# Patient Record
Sex: MALE | Race: WHITE | NOT HISPANIC OR LATINO | Employment: OTHER | URBAN - METROPOLITAN AREA
[De-identification: names, ages, dates, MRNs, and addresses within clinical notes are randomized per-mention and may not be internally consistent; named-entity substitution may affect disease eponyms.]

---

## 2019-01-10 ENCOUNTER — APPOINTMENT (EMERGENCY)
Dept: RADIOLOGY | Facility: HOSPITAL | Age: 74
DRG: 377 | End: 2019-01-10
Payer: MEDICARE

## 2019-01-10 ENCOUNTER — HOSPITAL ENCOUNTER (INPATIENT)
Facility: HOSPITAL | Age: 74
LOS: 5 days | Discharge: HOME HEALTH | DRG: 377 | End: 2019-01-15
Attending: EMERGENCY MEDICINE | Admitting: SURGERY
Payer: MEDICARE

## 2019-01-10 ENCOUNTER — APPOINTMENT (INPATIENT)
Dept: NON INVASIVE DIAGNOSTICS | Facility: HOSPITAL | Age: 74
DRG: 377 | End: 2019-01-10
Payer: MEDICARE

## 2019-01-10 DIAGNOSIS — K92.0 GASTROINTESTINAL HEMORRHAGE WITH HEMATEMESIS: ICD-10-CM

## 2019-01-10 DIAGNOSIS — K31.1 GASTRIC OUTLET OBSTRUCTION: ICD-10-CM

## 2019-01-10 DIAGNOSIS — K29.60 EMPHYSEMATOUS GASTRITIS: Primary | ICD-10-CM

## 2019-01-10 LAB
ABO GROUP BLD: NORMAL
ALBUMIN SERPL BCP-MCNC: 3.2 G/DL (ref 3.5–5)
ALP SERPL-CCNC: 70 U/L (ref 46–116)
ALT SERPL W P-5'-P-CCNC: 20 U/L (ref 12–78)
ANION GAP SERPL CALCULATED.3IONS-SCNC: 10 MMOL/L (ref 4–13)
AST SERPL W P-5'-P-CCNC: 15 U/L (ref 5–45)
ATRIAL RATE: 108 BPM
BASOPHILS # BLD AUTO: 0.22 THOUSANDS/ΜL (ref 0–0.1)
BASOPHILS NFR BLD AUTO: 2 % (ref 0–1)
BILIRUB SERPL-MCNC: 0.22 MG/DL (ref 0.2–1)
BLD GP AB SCN SERPL QL: NEGATIVE
BUN SERPL-MCNC: 26 MG/DL (ref 5–25)
CALCIUM SERPL-MCNC: 8.5 MG/DL (ref 8.3–10.1)
CHLORIDE SERPL-SCNC: 102 MMOL/L (ref 100–108)
CO2 SERPL-SCNC: 25 MMOL/L (ref 21–32)
CREAT SERPL-MCNC: 1.09 MG/DL (ref 0.6–1.3)
EOSINOPHIL # BLD AUTO: 0.49 THOUSAND/ΜL (ref 0–0.61)
EOSINOPHIL NFR BLD AUTO: 4 % (ref 0–6)
ERYTHROCYTE [DISTWIDTH] IN BLOOD BY AUTOMATED COUNT: 13.7 % (ref 11.6–15.1)
GFR SERPL CREATININE-BSD FRML MDRD: 67 ML/MIN/1.73SQ M
GLUCOSE SERPL-MCNC: 237 MG/DL (ref 65–140)
HCT VFR BLD AUTO: 29 % (ref 36.5–49.3)
HCT VFR BLD AUTO: 31.6 % (ref 36.5–49.3)
HCT VFR BLD AUTO: 32.2 % (ref 36.5–49.3)
HCT VFR BLD AUTO: 38.1 % (ref 36.5–49.3)
HGB BLD-MCNC: 11.4 G/DL (ref 12–17)
HGB BLD-MCNC: 8.8 G/DL (ref 12–17)
HGB BLD-MCNC: 9.7 G/DL (ref 12–17)
HGB BLD-MCNC: 9.8 G/DL (ref 12–17)
IMM GRANULOCYTES # BLD AUTO: 0.07 THOUSAND/UL (ref 0–0.2)
IMM GRANULOCYTES NFR BLD AUTO: 1 % (ref 0–2)
LACTATE SERPL-SCNC: 1.5 MMOL/L (ref 0.5–2)
LACTATE SERPL-SCNC: 3 MMOL/L (ref 0.5–2)
LYMPHOCYTES # BLD AUTO: 3.27 THOUSANDS/ΜL (ref 0.6–4.47)
LYMPHOCYTES NFR BLD AUTO: 27 % (ref 14–44)
MCH RBC QN AUTO: 30.1 PG (ref 26.8–34.3)
MCHC RBC AUTO-ENTMCNC: 29.9 G/DL (ref 31.4–37.4)
MCV RBC AUTO: 101 FL (ref 82–98)
MONOCYTES # BLD AUTO: 1.58 THOUSAND/ΜL (ref 0.17–1.22)
MONOCYTES NFR BLD AUTO: 13 % (ref 4–12)
NEUTROPHILS # BLD AUTO: 6.58 THOUSANDS/ΜL (ref 1.85–7.62)
NEUTS SEG NFR BLD AUTO: 53 % (ref 43–75)
NRBC BLD AUTO-RTO: 0 /100 WBCS
P AXIS: 53 DEGREES
PLATELET # BLD AUTO: 386 THOUSANDS/UL (ref 149–390)
PMV BLD AUTO: 10.7 FL (ref 8.9–12.7)
POTASSIUM SERPL-SCNC: 4.3 MMOL/L (ref 3.5–5.3)
PR INTERVAL: 134 MS
PROT SERPL-MCNC: 6.9 G/DL (ref 6.4–8.2)
QRS AXIS: -36 DEGREES
QRSD INTERVAL: 102 MS
QT INTERVAL: 346 MS
QTC INTERVAL: 463 MS
RBC # BLD AUTO: 3.79 MILLION/UL (ref 3.88–5.62)
RH BLD: POSITIVE
SODIUM SERPL-SCNC: 137 MMOL/L (ref 136–145)
SPECIMEN EXPIRATION DATE: NORMAL
T WAVE AXIS: 77 DEGREES
TROPONIN I SERPL-MCNC: <0.02 NG/ML
VENTRICULAR RATE: 108 BPM
WBC # BLD AUTO: 12.21 THOUSAND/UL (ref 4.31–10.16)

## 2019-01-10 PROCEDURE — 86901 BLOOD TYPING SEROLOGIC RH(D): CPT | Performed by: EMERGENCY MEDICINE

## 2019-01-10 PROCEDURE — 96361 HYDRATE IV INFUSION ADD-ON: CPT

## 2019-01-10 PROCEDURE — 80053 COMPREHEN METABOLIC PANEL: CPT | Performed by: EMERGENCY MEDICINE

## 2019-01-10 PROCEDURE — 86923 COMPATIBILITY TEST ELECTRIC: CPT

## 2019-01-10 PROCEDURE — 71046 X-RAY EXAM CHEST 2 VIEWS: CPT

## 2019-01-10 PROCEDURE — 86850 RBC ANTIBODY SCREEN: CPT | Performed by: EMERGENCY MEDICINE

## 2019-01-10 PROCEDURE — 99223 1ST HOSP IP/OBS HIGH 75: CPT | Performed by: SURGERY

## 2019-01-10 PROCEDURE — 83605 ASSAY OF LACTIC ACID: CPT | Performed by: SURGERY

## 2019-01-10 PROCEDURE — 74175 CTA ABDOMEN W/CONTRAST: CPT

## 2019-01-10 PROCEDURE — 93010 ELECTROCARDIOGRAM REPORT: CPT | Performed by: INTERNAL MEDICINE

## 2019-01-10 PROCEDURE — 1124F ACP DISCUSS-NO DSCNMKR DOCD: CPT | Performed by: SURGERY

## 2019-01-10 PROCEDURE — 85014 HEMATOCRIT: CPT | Performed by: SURGERY

## 2019-01-10 PROCEDURE — 96365 THER/PROPH/DIAG IV INF INIT: CPT

## 2019-01-10 PROCEDURE — 83605 ASSAY OF LACTIC ACID: CPT | Performed by: EMERGENCY MEDICINE

## 2019-01-10 PROCEDURE — 71275 CT ANGIOGRAPHY CHEST: CPT

## 2019-01-10 PROCEDURE — 93005 ELECTROCARDIOGRAM TRACING: CPT

## 2019-01-10 PROCEDURE — 99285 EMERGENCY DEPT VISIT HI MDM: CPT

## 2019-01-10 PROCEDURE — 84484 ASSAY OF TROPONIN QUANT: CPT | Performed by: EMERGENCY MEDICINE

## 2019-01-10 PROCEDURE — 93880 EXTRACRANIAL BILAT STUDY: CPT

## 2019-01-10 PROCEDURE — 86900 BLOOD TYPING SEROLOGIC ABO: CPT | Performed by: EMERGENCY MEDICINE

## 2019-01-10 PROCEDURE — 96375 TX/PRO/DX INJ NEW DRUG ADDON: CPT

## 2019-01-10 PROCEDURE — 85018 HEMOGLOBIN: CPT | Performed by: SURGERY

## 2019-01-10 PROCEDURE — 85025 COMPLETE CBC W/AUTO DIFF WBC: CPT | Performed by: EMERGENCY MEDICINE

## 2019-01-10 PROCEDURE — C9113 INJ PANTOPRAZOLE SODIUM, VIA: HCPCS | Performed by: STUDENT IN AN ORGANIZED HEALTH CARE EDUCATION/TRAINING PROGRAM

## 2019-01-10 PROCEDURE — 36415 COLL VENOUS BLD VENIPUNCTURE: CPT | Performed by: EMERGENCY MEDICINE

## 2019-01-10 RX ORDER — SODIUM CHLORIDE, SODIUM LACTATE, POTASSIUM CHLORIDE, CALCIUM CHLORIDE 600; 310; 30; 20 MG/100ML; MG/100ML; MG/100ML; MG/100ML
125 INJECTION, SOLUTION INTRAVENOUS CONTINUOUS
Status: DISCONTINUED | OUTPATIENT
Start: 2019-01-10 | End: 2019-01-14

## 2019-01-10 RX ORDER — PANTOPRAZOLE SODIUM 40 MG/1
40 INJECTION, POWDER, FOR SOLUTION INTRAVENOUS
Status: DISCONTINUED | OUTPATIENT
Start: 2019-01-11 | End: 2019-01-10

## 2019-01-10 RX ORDER — PANTOPRAZOLE SODIUM 40 MG/1
40 INJECTION, POWDER, FOR SOLUTION INTRAVENOUS EVERY 12 HOURS SCHEDULED
Status: DISCONTINUED | OUTPATIENT
Start: 2019-01-10 | End: 2019-01-13

## 2019-01-10 RX ORDER — ACETAMINOPHEN 325 MG/1
650 TABLET ORAL EVERY 6 HOURS PRN
Status: DISCONTINUED | OUTPATIENT
Start: 2019-01-10 | End: 2019-01-15 | Stop reason: HOSPADM

## 2019-01-10 RX ORDER — ONDANSETRON 2 MG/ML
4 INJECTION INTRAMUSCULAR; INTRAVENOUS ONCE
Status: COMPLETED | OUTPATIENT
Start: 2019-01-10 | End: 2019-01-10

## 2019-01-10 RX ORDER — LIDOCAINE HYDROCHLORIDE 20 MG/ML
JELLY TOPICAL ONCE
Status: COMPLETED | OUTPATIENT
Start: 2019-01-10 | End: 2019-01-10

## 2019-01-10 RX ORDER — ONDANSETRON 2 MG/ML
4 INJECTION INTRAMUSCULAR; INTRAVENOUS EVERY 6 HOURS PRN
Status: DISCONTINUED | OUTPATIENT
Start: 2019-01-10 | End: 2019-01-15 | Stop reason: HOSPADM

## 2019-01-10 RX ADMIN — SODIUM CHLORIDE, SODIUM LACTATE, POTASSIUM CHLORIDE, AND CALCIUM CHLORIDE 125 ML/HR: .6; .31; .03; .02 INJECTION, SOLUTION INTRAVENOUS at 17:28

## 2019-01-10 RX ADMIN — LIDOCAINE HYDROCHLORIDE 1 APPLICATION: 20 JELLY TOPICAL at 11:34

## 2019-01-10 RX ADMIN — SODIUM CHLORIDE 1000 ML: 0.9 INJECTION, SOLUTION INTRAVENOUS at 07:42

## 2019-01-10 RX ADMIN — SODIUM CHLORIDE 1000 ML: 0.9 INJECTION, SOLUTION INTRAVENOUS at 10:29

## 2019-01-10 RX ADMIN — TOPICAL ANESTHETIC 2 SPRAY: 200 SPRAY DENTAL; PERIODONTAL at 11:34

## 2019-01-10 RX ADMIN — SODIUM CHLORIDE, SODIUM LACTATE, POTASSIUM CHLORIDE, AND CALCIUM CHLORIDE 125 ML/HR: .6; .31; .03; .02 INJECTION, SOLUTION INTRAVENOUS at 20:25

## 2019-01-10 RX ADMIN — IOHEXOL 100 ML: 350 INJECTION, SOLUTION INTRAVENOUS at 09:58

## 2019-01-10 RX ADMIN — ONDANSETRON 4 MG: 2 INJECTION INTRAMUSCULAR; INTRAVENOUS at 07:41

## 2019-01-10 RX ADMIN — PANTOPRAZOLE SODIUM 40 MG: 40 INJECTION, POWDER, FOR SOLUTION INTRAVENOUS at 22:41

## 2019-01-10 RX ADMIN — PIPERACILLIN SODIUM,TAZOBACTAM SODIUM 3.38 G: 3; .375 INJECTION, POWDER, FOR SOLUTION INTRAVENOUS at 11:08

## 2019-01-10 RX ADMIN — SODIUM CHLORIDE 1000 ML: 0.9 INJECTION, SOLUTION INTRAVENOUS at 19:15

## 2019-01-10 NOTE — ED NOTES
1L NSS bolus started at this time   Dr Brandan Clay at the bedside for patient evaluation at this time     Venecia Bustos RN  01/10/19 8770

## 2019-01-10 NOTE — ED PROVIDER NOTES
History  Chief Complaint   Patient presents with    Syncope     pt with syncope x2 and vomited red colored emesis for EMS x2 pt with some dizzyness and weakness feeling at the ED upon arrival      HPI   Patient is a 42-year-old gentleman with a history of gastritis who presents to the emergency department for dizziness and syncopal episodes this morning  Patient states that other than mild nasal congestion over the past couple days he went to bed last night feeling well  When he woke up this morning at stepped out of bed and was extremely dizzy  He fell on the floor from the dizziness but did not lose consciousness  Patient's fiancee heard a thud from downstairs, and EMS was called  Per EMS patient had 2 additional syncopal episodes en route to the hospital, although patient says he does not remember this happening  He also had 1 episode of reddish black emesis before arriving to the hospital   At time of evaluation in the emergency department he is essentially asymptomatic  Patient denies any history of esophageal varices or peptic ulcers  No black/tarry bowel movements  No history of liver disease  Denies recent alcohol use  No fevers, chest pain, shortness of breath, abdominal pain, diarrhea, epistaxis  Prior to Admission Medications   Prescriptions Last Dose Informant Patient Reported? Taking? Esomeprazole Magnesium (NEXIUM PO) 1/9/2019 at Unknown time  Yes Yes   Sig: Take 20 mg by mouth every 12 (twelve) hours        Facility-Administered Medications: None       Past Medical History:   Diagnosis Date    Bladder cancer [facility])        History reviewed  No pertinent surgical history  History reviewed  No pertinent family history  I have reviewed and agree with the history as documented      Social History   Substance Use Topics    Smoking status: Current Every Day Smoker     Packs/day: 0 25     Types: Cigarettes    Smokeless tobacco: Former User    Alcohol use Yes      Comment: Socially Review of Systems   Constitutional: Negative for chills, diaphoresis, fatigue and fever  HENT: Positive for congestion  Negative for hearing loss, nosebleeds, sinus pain and sore throat  Eyes: Negative for photophobia, pain, redness and visual disturbance  Respiratory: Negative for cough, chest tightness, shortness of breath, wheezing and stridor  Cardiovascular: Negative for chest pain, palpitations and leg swelling  Gastrointestinal: Positive for abdominal distention and vomiting  Negative for abdominal pain, blood in stool, constipation, diarrhea and nausea  Genitourinary: Negative for dysuria, flank pain and hematuria  Musculoskeletal: Negative for back pain, neck pain and neck stiffness  Skin: Negative for pallor and rash  Allergic/Immunologic: Negative for immunocompromised state  Neurological: Positive for dizziness and syncope  Negative for weakness, numbness and headaches  Hematological: Negative for adenopathy  Psychiatric/Behavioral: Negative for agitation and confusion  All other systems reviewed and are negative  Physical Exam  ED Triage Vitals   Temperature Pulse Respirations Blood Pressure SpO2   01/10/19 0744 01/10/19 0711 01/10/19 0710 01/10/19 0710 01/10/19 0710   (!) 97 2 °F (36 2 °C) 105 16 (!) 55/38 95 %      Temp Source Heart Rate Source Patient Position - Orthostatic VS BP Location FiO2 (%)   01/10/19 0744 01/10/19 0711 01/10/19 0710 01/10/19 0710 --   Oral Monitor Sitting Right arm       Pain Score       01/10/19 0710       No Pain           Orthostatic Vital Signs  Vitals:    01/10/19 1545 01/10/19 1628 01/10/19 1630 01/10/19 1731   BP: 90/60 104/61 93/66 103/70   Pulse: (!) 116 (!) 114 (!) 114 (!) 111   Patient Position - Orthostatic VS: Sitting Lying         Physical Exam   Constitutional: He is oriented to person, place, and time  He appears well-developed and well-nourished  No distress  HENT:   Head: Normocephalic and atraumatic     Right Ear: External ear normal    Left Ear: External ear normal    Nose: Nose normal    Mouth/Throat: Oropharynx is clear and moist    No visible blood in nasal or oropharynx  Small amount of dried blood on beard  No signs of head trauma externally  Eyes: Pupils are equal, round, and reactive to light  Conjunctivae and EOM are normal  No scleral icterus  Neck: Normal range of motion  Neck supple  No JVD present  No tracheal deviation present  Cardiovascular: Normal rate and regular rhythm  Exam reveals no gallop and no friction rub  No murmur heard  Pulmonary/Chest: Effort normal and breath sounds normal  No stridor  No respiratory distress  He has no wheezes  He has no rales  He exhibits no tenderness  Abdominal: Soft  He exhibits distension  There is no tenderness  There is no rebound and no guarding  Musculoskeletal: Normal range of motion  He exhibits no edema or tenderness  Lymphadenopathy:     He has no cervical adenopathy  Neurological: He is alert and oriented to person, place, and time  No cranial nerve deficit or sensory deficit  He exhibits normal muscle tone  Skin: Skin is warm and dry  He is not diaphoretic  No erythema  No pallor  Psychiatric: He has a normal mood and affect  His behavior is normal    Nursing note and vitals reviewed        ED Medications  Medications   lactated ringers infusion (125 mL/hr Intravenous New Bag 1/10/19 2025)   ondansetron (ZOFRAN) injection 4 mg (not administered)   acetaminophen (TYLENOL) tablet 650 mg (not administered)   pantoprazole (PROTONIX) injection 40 mg (not administered)   ondansetron (ZOFRAN) injection 4 mg (4 mg Intravenous Given 1/10/19 0741)   sodium chloride 0 9 % bolus 1,000 mL (0 mL Intravenous Stopped 1/10/19 0743)   sodium chloride 0 9 % bolus 1,000 mL (0 mL Intravenous Stopped 1/10/19 0742)   iohexol (OMNIPAQUE) 350 MG/ML injection (MULTI-DOSE) 100 mL (100 mL Intravenous Given 1/10/19 0958)   sodium chloride 0 9 % bolus 1,000 mL (0 mL Intravenous Stopped 1/10/19 1108)   piperacillin-tazobactam (ZOSYN) 3 375 g in sodium chloride 0 9 % 50 mL IVPB (0 g Intravenous Stopped 1/10/19 1138)   lidocaine (URO-JET) 2 % topical gel (1 application Topical Given 1/10/19 1134)   benzocaine (HURRICAINE) 20 % mucosal spray 2 spray (2 sprays Mucosal Given 1/10/19 1134)   sodium chloride 0 9 % bolus 1,000 mL (0 mL Intravenous Stopped 1/10/19 2015)       Diagnostic Studies  Results Reviewed     Procedure Component Value Units Date/Time    Hemoglobin and hematocrit, blood [[id number]]  (Abnormal) Collected:  01/10/19 1641    Lab Status:  Final result Specimen:  Blood from Arm, Left Updated:  01/10/19 1731     Hemoglobin 9 7 (L) g/dL      Hematocrit 32 2 (L) %     Hemoglobin and hematocrit, blood [[id number]]  (Abnormal) Collected:  01/10/19 1135    Lab Status:  Final result Specimen:  Blood from Arm, Left Updated:  01/10/19 1152     Hemoglobin 9 8 (L) g/dL      Hematocrit 31 6 (L) %     Lactic acid, plasma [[id number]]  (Abnormal) Collected:  01/10/19 1037    Lab Status:  Final result Specimen:  Blood from Arm, Left Updated:  01/10/19 1120     LACTIC ACID 3 0 (HH) mmol/L     Narrative:         Result may be elevated if tourniquet was used during collection      Comprehensive metabolic panel [[id number]]  (Abnormal) Collected:  01/10/19 0705    Lab Status:  Final result Specimen:  Blood from Arm, Left Updated:  01/10/19 0750     Sodium 137 mmol/L      Potassium 4 3 mmol/L      Chloride 102 mmol/L      CO2 25 mmol/L      ANION GAP 10 mmol/L      BUN 26 (H) mg/dL      Creatinine 1 09 mg/dL      Glucose 237 (H) mg/dL      Calcium 8 5 mg/dL      AST 15 U/L      ALT 20 U/L      Alkaline Phosphatase 70 U/L      Total Protein 6 9 g/dL      Albumin 3 2 (L) g/dL      Total Bilirubin 0 22 mg/dL      eGFR 67 ml/min/1 73sq m     Narrative:         National Kidney Disease Education Program recommendations are as follows:  GFR calculation is accurate only with a steady state creatinine  Chronic Kidney disease less than 60 ml/min/1 73 sq  meters  Kidney failure less than 15 ml/min/1 73 sq  meters  Troponin I [[id number]]  (Normal) Collected:  01/10/19 0705    Lab Status:  Final result Specimen:  Blood from Arm, Left Updated:  01/10/19 0750     Troponin I <0 02 ng/mL     CBC and differential [[id number]]  (Abnormal) Collected:  01/10/19 0705    Lab Status:  Final result Specimen:  Blood from Arm, Left Updated:  01/10/19 0736     WBC 12 21 (H) Thousand/uL      RBC 3 79 (L) Million/uL      Hemoglobin 11 4 (L) g/dL      Hematocrit 38 1 %       (H) fL      MCH 30 1 pg      MCHC 29 9 (L) g/dL      RDW 13 7 %      MPV 10 7 fL      Platelets 499 Thousands/uL      nRBC 0 /100 WBCs      Neutrophils Relative 53 %      Immat GRANS % 1 %      Lymphocytes Relative 27 %      Monocytes Relative 13 (H) %      Eosinophils Relative 4 %      Basophils Relative 2 (H) %      Neutrophils Absolute 6 58 Thousands/µL      Immature Grans Absolute 0 07 Thousand/uL      Lymphocytes Absolute 3 27 Thousands/µL      Monocytes Absolute 1 58 (H) Thousand/µL      Eosinophils Absolute 0 49 Thousand/µL      Basophils Absolute 0 22 (H) Thousands/µL                  CTA chest and abdomen w wo contrast   Final Result by Yoseph Rojas MD (01/10 1040)      1  Infrarenal abdominal aortic aneurysm measuring 3 7 cm  No evidence of dissection  2   Markedly gastric distention with gastric intramural air and portal venous gas  Differential diagnosis include emphysematous gastritis or gastric emphysema, the former favored due to presence of portal venous gas  Further evaluation with upper GI    endoscopy is recommended  3   Age indeterminate compression fractures of T12 and L1               I personally discussed this study with Dr Azra Urias on 1/10/2019 at 10:28 AM                            Workstation performed: [id number]         X-ray chest 2 views   ED Interpretation by Ava Carmen MD (11/37 0373)   ED wet read:  Questionable pneumoperitoneum verses large gastric bubble  Final Result by Stephenie Jones DO (01/10 9964)      Apical interstitial and airspace opacities  Stomach is markedly distended with a large amount of fluid layering, best seen on the lateral view  CT of the chest, abdomen and pelvis has already been obtained  Workstation performed: [id number]         VAS carotid complete study    (Results Pending)         Procedures  Procedures      Phone Consults  ED Phone Contact    ED Course  ED Course as of Jeff 10 2110   Thu Jeff 10, 2019   0750 WBC: (!) 12 21   0750 Hemoglobin: (!) 11 4   0804 Troponin I: <0 02           Identification of Seniors at Risk      Most Recent Value   (ISAR) Identification of Seniors at Risk   Before the illness or injury that brought you to the Emergency, did you need someone to help you on a regular basis? 0 Filed at: 01/10/2019 3194   In the last 24 hours, have you needed more help than usual?  0 Filed at: 01/10/2019 1686   Have you been hospitalized for one or more nights during the past 6 months? 0 Filed at: 01/10/2019 7717   In general, do you see well?  0 Filed at: 01/10/2019 3638   In general, do you have serious problems with your memory? 0 Filed at: 01/10/2019 0708   Do you take more than three different medications every day? 1 Filed at: 01/10/2019 7757   ISAR Score  1 Filed at: 01/10/2019 1709          MDM   26-year-old gentleman presents to the emergency department after episode of hematemesis and multiple syncopal episodes this morning  Patient arrived in the emergency department hypotensive to the 06D systolic  Patient is asymptomatic and says that his blood pressure "always runs low "  He is afebrile with otherwise normal vital signs  IV fluid resuscitation begun with blood pressure improving to the 120s over 50s    He did have another episode of mucus tinged hematemesis shortly after arrival   Suspect that dizziness and syncopal episode this morning are likely related to upper GI bleed, although unclear what the source is at this point  Cardiac/abdominal pain labs and chest x-ray ordered  Will continue with antiemetics and fluid resuscitation  Frequently reassess  Mild anemia and leukocytosis  Chest x-ray concerning for pneumoperitoneum verses very distended gastric bubble  CTA chest/abdomen ordered showing likely emphysematous gastritis with portal venous air  Patient continues to have no abdominal pain or tenderness to palpation  Red surgery was consulted, who came and evaluated the patient in the emergency department  NG tube placed with large amount of bloody gastric contents drained from stomach  Patient continues to be hemodynamically normal   He will require admission to the hospital for continued monitoring, serial H&H, IV fluid resuscitation, and likely endoscopic evaluation by GI for hemorrhagic gastritis  Patient accepted by red surgery physician Dr Ange Schuzlnaz Time    Disposition  Final diagnoses:   Emphysematous gastritis     Time reflects when diagnosis was documented in both MDM as applicable and the Disposition within this note     Time User Action Codes Description Comment    1/10/2019 11:19 AM Gwendolyn Nugent [K29 60] Emphysematous gastritis       ED Disposition     ED Disposition Condition Comment    Admit  Admitting Physician: Lorena Rojas [[id number]]   Level of Care: Level 2 Stepdown / HOT [14]        Follow-up Information    None         Current Discharge Medication List      CONTINUE these medications which have NOT CHANGED    Details   Esomeprazole Magnesium (NEXIUM PO) Take 20 mg by mouth every 12 (twelve) hours             No discharge procedures on file  ED Provider  Attending physically available and evaluated Amira Grubbs  I managed the patient along with the ED Attending      Electronically Signed by         Jessica Gongora MD  01/10/19 4556

## 2019-01-10 NOTE — UTILIZATION REVIEW
Initial Clinical Review    Admission: Date/Time/Statement:INPT  1/10/19 @ 1145   Orders Placed This Encounter   Procedures    Inpatient Admission (expected length of stay for this patient is greater than two midnights)     Standing Status:   Standing     Number of Occurrences:   1     Order Specific Question:   Admitting Physician     Answer:   Thao Gill     Order Specific Question:   Level of Care     Answer:   Med Surg [16]     Order Specific Question:   Estimated length of stay     Answer:   More than 2 Midnights     Order Specific Question:   Certification     Answer:   I certify that inpatient services are medically necessary for this patient for a duration of greater than two midnights  See H&P and MD Progress Notes for additional information about the patient's course of treatment  ED: Date/Time/Mode of Arrival:   ED Arrival Information     Expected Arrival Acuity Means of Arrival Escorted By Service Admission Type    - 1/10/2019 06:52 [facility]) Surgery-General Emergency    Arrival Complaint    syncope        Chief Complaint:   Chief Complaint   Patient presents with    Syncope     pt with syncope x2 and vomited red colored emesis for EMS x2 pt with some dizzyness and weakness feeling at the ED upon arrival      History of Illness: 68 y o  male with past medical history bladder cancer status post resection and abdominal aortic aneurysm (3 6 cm) who presents following a syncopal episode earlier this morning around 4:30 a m  Patient's fiancee is bedside to assist in providing history  She states that she heard a loud thud, suspecting that he fell she found him lying on the bed minimally responsive  At this point she called 911  On arrival patient was refusing medical care and had an episode of hematemesis  At this point he was taken to [facility] ED for further evaluation    In the emergency department, patient had another episode of bloody emesis accompanied by episodes of hypotension that responded to IV fluids  CT imaging performed in the emergency department was especially concerning given the patient's marked gastric distention, gastric pneumatosis, and portal venous gas  However, patient denies any abdominal pain  Currently, he is complaining of left-sided rib pain that is reproducible on exam   States that he has been taking Excedrin on a regular basis for history of migraines  Patient denies reflux symptoms however fiancee reports that he has and has been treating with Pepto-Bismol       Last bowel movement was yesterday evening  Patient denies bloody bowel movements, has noted infrequent dark stools  Has a history of upper GI endoscopy approximately 5 years ago that was unremarkable per the patient's fiancee at that time  Denies fever chills chest pain or shortness of breath   Current smoker      ED Vital Signs:   ED Triage Vitals   Temperature Pulse Respirations Blood Pressure SpO2   01/10/19 0744 01/10/19 0711 01/10/19 0710 01/10/19 0710 01/10/19 0710   (!) 97 2 °F (36 2 °C) 105 16 (!) 55/38 95 %      Temp Source Heart Rate Source Patient Position - Orthostatic VS BP Location FiO2 (%)   01/10/19 0744 01/10/19 0711 01/10/19 0710 01/10/19 0710 --   Oral Monitor Sitting Right arm       Pain Score       01/10/19 0710       No Pain        Wt Readings from Last 1 Encounters:   01/10/19 63 5 kg (140 lb)     Vital Signs (abnormal):   01/10/19 1015  --   136  18  91/69  97 %  Nasal cannula  --   01/10/19 1000  --   116  18  106/65  95 %  --  --   01/10/19 0930  --   114  20  107/62  97 %  Nasal cannula  --   01/10/19 0845  --   112  18  113/79  96 %  Nasal cannula  --   01/10/19 0825  --   110  18  114/74  95 %  Nasal cannula  --   01/10/19 0745  --  102  --  128/56  95 %  --  --   01/10/19 0744   97 2 °F (36 2 °C)  --  --  --  --  --  --   01/10/19 0730  --   108  --  145/60  93 %  --  --   01/10/19 0729  --  --  --  --  --  None (Room air)  --   01/10/19 0715  -- 108  18   70/55  92 %  --  --   01/10/19 0711  --  105  16   70/55  95 %  None (Room air)  --   01/10/19 0710  --  --  16   55/38  95 %  None (Room air)         Pertinent Labs/Diagnostic Test Results:   Wbc 12 21  hgb 9 8  hct 31 6  Bun 26  alb 3 2  LA 3 0    CTA chest/abd:  1  Infrarenal abdominal aortic aneurysm measuring 3 7 cm  No evidence of dissection  2   Markedly gastric distention with gastric intramural air and portal venous gas  Differential diagnosis include emphysematous gastritis or gastric emphysema, the former favored due to presence of portal venous gas  Further evaluation with upper GI    endoscopy is recommended  3   Age indeterminate compression fractures of T12 and L1  CXR:  Apical interstitial and airspace opacities        Stomach is markedly distended with a large amount of fluid layering, best seen on the lateral view  CT of the chest, abdomen and pelvis has already been obtained           ED Treatment:   Medication Administration from 01/10/2019 0652 to 01/10/2019 1506       Date/Time Order Dose Route Action Action by Comments     01/10/2019 0741 ondansetron (ZOFRAN) injection 4 mg 4 mg Intravenous Given Jessica Pollock RN      01/10/2019 0743 sodium chloride 0 9 % bolus 1,000 mL 0 mL Intravenous Stopped John Bergeron RN      01/10/2019 8416 sodium chloride 0 9 % bolus 1,000 mL 1,000 mL Intravenous [address] [facility]      01/10/2019 3040 sodium chloride 0 9 % bolus 1,000 mL 0 mL Intravenous Stopped Jessica Pollock RN      01/10/2019 9647 sodium chloride 0 9 % bolus 1,000 mL 1,000 mL Intravenous [address] [facility]      01/10/2019 0958 iohexol (OMNIPAQUE) 350 MG/ML injection (MULTI-DOSE) 100 mL 100 mL Intravenous Given Aquiles Mercado      01/10/2019 1108 sodium chloride 0 9 % bolus 1,000 mL 0 mL Intravenous [address], RN      01/10/2019 1029 sodium chloride 0 9 % bolus 1,000 mL 1,000 mL Intravenous New Bag Akosua Easley RN      01/10/2019 1138 piperacillin-tazobactam (ZOSYN) 3 375 g in sodium chloride 0 9 % 50 mL IVPB 0 g Intravenous Stopped Akosua Easley RN      01/10/2019 1108 piperacillin-tazobactam (ZOSYN) 3 375 g in sodium chloride 0 9 % 50 mL IVPB 3 375 g Intravenous New [id number] Willis Townsend RN      01/10/2019 1134 lidocaine (URO-JET) 2 % topical gel 1 application Topical Given Akosua Easley RN      01/10/2019 1134 benzocaine (HURRICAINE) 20 % mucosal spray 2 spray 2 spray Mucosal Given Akosua Easley RN         Past Medical/Surgical History: Active Ambulatory Problems     Diagnosis Date Noted    No Active Ambulatory Problems     Resolved Ambulatory Problems     Diagnosis Date Noted    No Resolved Ambulatory Problems     Past Medical History:   Diagnosis Date    Bladder cancer [facility])      Admitting Diagnosis: Syncope [R55]  Age/Sex: 68 y o  male  Assessment/Plan: 67 yo male with hx bladder cancer s/p resection and AAA 3 7cm with syncopal episode, hematemesis  CT shows gastric emphysema and portal veinous gas  Possible gastritis, upper GI bleed, peptic ulcer disease  Admission Orders:  INPT  TELE  NGT    Scheduled Meds:   Continuous Infusions:   No current facility-administered medications for this encounter     PRN Meds:

## 2019-01-10 NOTE — ED NOTES
Pt sitting in chair, watching TV  Pt has NG tube in and is hooked to suction       Angelina Rudd, RN  01/10/19 8699

## 2019-01-10 NOTE — H&P
H&P Exam - General Surgery   Maggi Andrade 68 y o  male MRN: [id number]  Unit/Bed#: ED 26 Encounter: [id number]    Assessment/Plan     Assessment:  Maggi Andrade is a 68 y o  male with past medical history bladder cancer status post resection and abdominal aortic aneurysm (3 7 cm) who presents following a syncopal episode, hematemesis, and CT imaging w/ gastric emphysema and portal veinous gas  Concern for gastritis, upper GI bleed, peptic ulcer disease  Plan:  NPO/Nasogastric tube  Salena@SCL  STAT repeat hemoglobin   GI consult for ? upper GI endoscopy  Protonix  Pain control  Serial abdominal exams  SCDs/Hold pharmacologic DVT ppx    History of Present Illness     HPI:  Maggi Andrade is a 68 y o  male with past medical history bladder cancer status post resection and abdominal aortic aneurysm (3 6 cm) who presents following a syncopal episode earlier this morning around 4:30 a m  Patient's pabloe is bedside to assist in providing history  She states that she heard a loud thud, suspecting that he fell she found him lying on the bed minimally responsive  At this point she called 911  On arrival patient was refusing medical care and had an episode of hematemesis  At this point he was taken to [facility] ED for further evaluation  In the emergency department, patient had another episode of bloody emesis accompanied by episodes of hypotension that responded to IV fluids  CT imaging performed in the emergency department was especially concerning given the patient's marked gastric distention, gastric pneumatosis, and portal venous gas  However, patient denies any abdominal pain  Currently, he is complaining of left-sided rib pain that is reproducible on exam   States that he has been taking Excedrin on a regular basis for history of migraines  Patient denies reflux symptoms however pabloe reports that he has and has been treating with Pepto-Bismol  Last bowel movement was yesterday evening    Patient denies bloody bowel movements, has noted infrequent dark stools  Has a history of upper GI endoscopy approximately 5 years ago that was unremarkable per the patient's fiancee at that time  Denies fever chills chest pain or shortness of breath  Current smoker  Review of Systems   Constitutional: Negative for chills and fever  HENT: Negative  Eyes: Negative  Respiratory: Negative for shortness of breath  Cardiovascular: Negative  Negative for chest pain, palpitations and leg swelling  Gastrointestinal: Positive for abdominal distention and vomiting  Negative for abdominal pain, blood in stool, constipation, diarrhea and nausea  Endocrine: Negative  Genitourinary: Negative for difficulty urinating and hematuria  Musculoskeletal: Negative  Skin: Negative  Allergic/Immunologic: Negative  Neurological: Negative  Hematological: Negative  Psychiatric/Behavioral: Negative  Historical Information   Past Medical History:   Diagnosis Date    Bladder cancer [facility])      History reviewed  No pertinent surgical history    Social History   History   Alcohol Use    Yes     Comment: Socially     History   Drug Use No     History   Smoking Status    Current Every Day Smoker    Packs/day: 0 25    Types: Cigarettes   Smokeless Tobacco    Former User     Family History: non-contributory    Meds/Allergies   all medications and allergies reviewed  No Known Allergies    Objective   First Vitals:   Blood Pressure: (!) 55/38 (01/10/19 0710)  Pulse: 105 (01/10/19 0711)  Temperature: (!) 97 2 °F (36 2 °C) (01/10/19 0744)  Temp Source: Oral (01/10/19 0744)  Respirations: 16 (01/10/19 0710)  Height: 5' 10" (177 8 cm) (01/10/19 0710)  Weight - Scale: 63 5 kg (140 lb) (01/10/19 0710)  SpO2: 95 % (01/10/19 0710)    Current Vitals:   Blood Pressure: 94/63 (01/10/19 1045)  Pulse: (!) 114 (01/10/19 1045)  Temperature: (!) 97 2 °F (36 2 °C) (01/10/19 0744)  Temp Source: Oral (01/10/19 0744)  Respirations: 18 (01/10/19 1045)  Height: 5' 10" (177 8 cm) (01/10/19 0710)  Weight - Scale: 63 5 kg (140 lb) (01/10/19 0710)  SpO2: 97 % (01/10/19 1045)      Intake/Output Summary (Last 24 hours) at 01/10/19 1117  Last data filed at 01/10/19 0743   Gross per 24 hour   Intake             2400 ml   Output                0 ml   Net             2400 ml       Invasive Devices     Peripheral Intravenous Line            Peripheral IV 01/10/19 Left Antecubital less than 1 day    Peripheral IV 01/10/19 Right Antecubital less than 1 day                Physical Exam   Constitutional: He is oriented to person, place, and time  He appears well-developed and well-nourished  No distress  HENT:   Head: Normocephalic and atraumatic  NGT in place w/ dark red drainage   Cardiovascular: Regular rhythm  Tachycardic   Pulmonary/Chest: Effort normal  No respiratory distress  He exhibits no tenderness  Abdominal: Soft  He exhibits distension  There is no tenderness  There is no rebound and no guarding  Musculoskeletal: Normal range of motion  He exhibits no edema, tenderness or deformity  Neurological: He is alert and oriented to person, place, and time  Skin: Skin is warm and dry  Psychiatric: He has a normal mood and affect  Lab Results:   CBC:   Lab Results   Component Value Date    WBC 12 21 (H) 01/10/2019    HGB 9 8 (L) 01/10/2019    HCT 31 6 (L) 01/10/2019     (H) 01/10/2019     01/10/2019    MCH 30 1 01/10/2019    MCHC 29 9 (L) 01/10/2019    RDW 13 7 01/10/2019    MPV 10 7 01/10/2019    NRBC 0 01/10/2019   , CMP:   Lab Results   Component Value Date    SODIUM 137 01/10/2019    K 4 3 01/10/2019     01/10/2019    CO2 25 01/10/2019    BUN 26 (H) 01/10/2019    CREATININE 1 09 01/10/2019    CALCIUM 8 5 01/10/2019    AST 15 01/10/2019    ALT 20 01/10/2019    ALKPHOS 70 01/10/2019    EGFR 67 01/10/2019   , Coagulation: No results found for: PT, INR, APTT  Imaging: I have personally reviewed pertinent reports  and I have personally reviewed pertinent films in PACS  CTA chest and abdomen w wo contrast   Final Result by Abundio Ellison MD (01/10 4173)      1  Infrarenal abdominal aortic aneurysm measuring 3 7 cm  No evidence of dissection  2   Markedly gastric distention with gastric intramural air and portal venous gas  Differential diagnosis include emphysematous gastritis or gastric emphysema, the former favored due to presence of portal venous gas  Further evaluation with upper GI    endoscopy is recommended  3   Age indeterminate compression fractures of T12 and L1  I personally discussed this study with Dr Chely Cuevas on 1/10/2019 at 10:28 AM                            Workstation performed: [id number]         X-ray chest 2 views   ED Interpretation by Jenny Roque MD (01/10 9709)   ED wet read:  Questionable pneumoperitoneum verses large gastric bubble  Final Result by Amber Adams DO (01/10 7986)      Apical interstitial and airspace opacities  Stomach is markedly distended with a large amount of fluid layering, best seen on the lateral view  CT of the chest, abdomen and pelvis has already been obtained              Workstation performed: [id number]             Code Status: No Order  Advance Directive and Living Will:      Power of :    POLST:

## 2019-01-11 ENCOUNTER — APPOINTMENT (INPATIENT)
Dept: NON INVASIVE DIAGNOSTICS | Facility: HOSPITAL | Age: 74
DRG: 377 | End: 2019-01-11
Payer: MEDICARE

## 2019-01-11 LAB
ALBUMIN SERPL BCP-MCNC: 2.8 G/DL (ref 3.5–5)
ALP SERPL-CCNC: 51 U/L (ref 46–116)
ALT SERPL W P-5'-P-CCNC: 15 U/L (ref 12–78)
ANION GAP SERPL CALCULATED.3IONS-SCNC: 5 MMOL/L (ref 4–13)
AST SERPL W P-5'-P-CCNC: 24 U/L (ref 5–45)
BASOPHILS # BLD AUTO: 0.08 THOUSANDS/ΜL (ref 0–0.1)
BASOPHILS NFR BLD AUTO: 1 % (ref 0–1)
BILIRUB SERPL-MCNC: 0.19 MG/DL (ref 0.2–1)
BUN SERPL-MCNC: 40 MG/DL (ref 5–25)
CALCIUM SERPL-MCNC: 7.6 MG/DL (ref 8.3–10.1)
CHLORIDE SERPL-SCNC: 109 MMOL/L (ref 100–108)
CO2 SERPL-SCNC: 26 MMOL/L (ref 21–32)
CREAT SERPL-MCNC: 0.64 MG/DL (ref 0.6–1.3)
EOSINOPHIL # BLD AUTO: 0.1 THOUSAND/ΜL (ref 0–0.61)
EOSINOPHIL NFR BLD AUTO: 1 % (ref 0–6)
ERYTHROCYTE [DISTWIDTH] IN BLOOD BY AUTOMATED COUNT: 13.9 % (ref 11.6–15.1)
GFR SERPL CREATININE-BSD FRML MDRD: 97 ML/MIN/1.73SQ M
GLUCOSE SERPL-MCNC: 88 MG/DL (ref 65–140)
HCT VFR BLD AUTO: 28 % (ref 36.5–49.3)
HCT VFR BLD AUTO: 28.5 % (ref 36.5–49.3)
HGB BLD-MCNC: 8.3 G/DL (ref 12–17)
HGB BLD-MCNC: 8.6 G/DL (ref 12–17)
IMM GRANULOCYTES # BLD AUTO: 0.09 THOUSAND/UL (ref 0–0.2)
IMM GRANULOCYTES NFR BLD AUTO: 1 % (ref 0–2)
LYMPHOCYTES # BLD AUTO: 1.51 THOUSANDS/ΜL (ref 0.6–4.47)
LYMPHOCYTES NFR BLD AUTO: 11 % (ref 14–44)
MAGNESIUM SERPL-MCNC: 2 MG/DL (ref 1.6–2.6)
MCH RBC QN AUTO: 29.6 PG (ref 26.8–34.3)
MCHC RBC AUTO-ENTMCNC: 29.6 G/DL (ref 31.4–37.4)
MCV RBC AUTO: 100 FL (ref 82–98)
MONOCYTES # BLD AUTO: 1.31 THOUSAND/ΜL (ref 0.17–1.22)
MONOCYTES NFR BLD AUTO: 10 % (ref 4–12)
NEUTROPHILS # BLD AUTO: 10.24 THOUSANDS/ΜL (ref 1.85–7.62)
NEUTS SEG NFR BLD AUTO: 76 % (ref 43–75)
NRBC BLD AUTO-RTO: 0 /100 WBCS
PLATELET # BLD AUTO: 233 THOUSANDS/UL (ref 149–390)
PMV BLD AUTO: 10.5 FL (ref 8.9–12.7)
POTASSIUM SERPL-SCNC: 4.6 MMOL/L (ref 3.5–5.3)
PROT SERPL-MCNC: 5.9 G/DL (ref 6.4–8.2)
RBC # BLD AUTO: 2.8 MILLION/UL (ref 3.88–5.62)
SODIUM SERPL-SCNC: 140 MMOL/L (ref 136–145)
WBC # BLD AUTO: 13.33 THOUSAND/UL (ref 4.31–10.16)

## 2019-01-11 PROCEDURE — 85014 HEMATOCRIT: CPT | Performed by: PHYSICIAN ASSISTANT

## 2019-01-11 PROCEDURE — 85025 COMPLETE CBC W/AUTO DIFF WBC: CPT | Performed by: SURGERY

## 2019-01-11 PROCEDURE — 99232 SBSQ HOSP IP/OBS MODERATE 35: CPT | Performed by: SURGERY

## 2019-01-11 PROCEDURE — 93880 EXTRACRANIAL BILAT STUDY: CPT | Performed by: SURGERY

## 2019-01-11 PROCEDURE — 80053 COMPREHEN METABOLIC PANEL: CPT | Performed by: SURGERY

## 2019-01-11 PROCEDURE — 99222 1ST HOSP IP/OBS MODERATE 55: CPT | Performed by: INTERNAL MEDICINE

## 2019-01-11 PROCEDURE — C9113 INJ PANTOPRAZOLE SODIUM, VIA: HCPCS | Performed by: STUDENT IN AN ORGANIZED HEALTH CARE EDUCATION/TRAINING PROGRAM

## 2019-01-11 PROCEDURE — 93306 TTE W/DOPPLER COMPLETE: CPT | Performed by: INTERNAL MEDICINE

## 2019-01-11 PROCEDURE — 83735 ASSAY OF MAGNESIUM: CPT | Performed by: SURGERY

## 2019-01-11 PROCEDURE — 93306 TTE W/DOPPLER COMPLETE: CPT

## 2019-01-11 PROCEDURE — 85018 HEMOGLOBIN: CPT | Performed by: PHYSICIAN ASSISTANT

## 2019-01-11 RX ORDER — BISACODYL 10 MG
10 SUPPOSITORY, RECTAL RECTAL DAILY
Status: DISCONTINUED | OUTPATIENT
Start: 2019-01-12 | End: 2019-01-13

## 2019-01-11 RX ORDER — BISACODYL 10 MG
10 SUPPOSITORY, RECTAL RECTAL ONCE
Status: COMPLETED | OUTPATIENT
Start: 2019-01-11 | End: 2019-01-11

## 2019-01-11 RX ADMIN — BISACODYL 10 MG: 10 SUPPOSITORY RECTAL at 09:58

## 2019-01-11 RX ADMIN — SODIUM CHLORIDE, SODIUM LACTATE, POTASSIUM CHLORIDE, AND CALCIUM CHLORIDE 125 ML/HR: .6; .31; .03; .02 INJECTION, SOLUTION INTRAVENOUS at 07:31

## 2019-01-11 RX ADMIN — PANTOPRAZOLE SODIUM 40 MG: 40 INJECTION, POWDER, FOR SOLUTION INTRAVENOUS at 21:39

## 2019-01-11 RX ADMIN — SODIUM CHLORIDE, SODIUM LACTATE, POTASSIUM CHLORIDE, AND CALCIUM CHLORIDE 125 ML/HR: .6; .31; .03; .02 INJECTION, SOLUTION INTRAVENOUS at 16:02

## 2019-01-11 RX ADMIN — PANTOPRAZOLE SODIUM 40 MG: 40 INJECTION, POWDER, FOR SOLUTION INTRAVENOUS at 09:01

## 2019-01-11 NOTE — SOCIAL WORK
CM met with patient and explained cm role  Pt alert and oriented  Pt reports [facility] a [facility] with Alma Mcgraw [phone], with 15 steps to the 2nd floor with the bedrooms, and bathrooms, and 5 pilo in the front and 0 pilo in the back  Pt denies DME, and rehab, reports prev [facility] [place]  Pt reports being independent PTA, reports good support from zaria and family, he drives, and has transport home with zaria for d/c  Pts pharmacy is [facility]  Pt denies hx/admission for drug/etoh and psych/mental health  Pending d/c plan is home with family support  CM reviewed d/c planning process including the following: identifying help at home, patient preference for d/c planning needs, Discharge Lounge, [facility] Meds to Bed program, availability of treatment team to discuss questions or concerns patient and/or family may have regarding understanding medications and recognizing signs and symptoms once discharged  CM also encouraged patient to follow up with all recommended appointments after discharge  Patient advised of importance for patient and family to participate in managing patients medical well being

## 2019-01-11 NOTE — QUICK NOTE
Nurse provider rounds completed with Mitesh Garner    Continue NGT  Awaiting noon Hgb  GI consult noted and appreciated   Continue IVF

## 2019-01-11 NOTE — PLAN OF CARE
INFECTION - ADULT     Absence or prevention of progression during hospitalization Completed     Absence of fever/infection during neutropenic period Completed          DISCHARGE PLANNING     Discharge to home or other facility with appropriate resources Progressing        GASTROINTESTINAL - ADULT     Minimal or absence of nausea and/or vomiting Progressing     Maintains or returns to baseline bowel function Progressing        Knowledge Deficit     Patient/family/caregiver demonstrates understanding of disease process, treatment plan, medications, and discharge instructions Progressing        Nutrition/Hydration-ADULT     Nutrient/Hydration intake appropriate for improving, restoring or maintaining nutritional needs Progressing        PAIN - ADULT     Verbalizes/displays adequate comfort level or baseline comfort level Progressing        Potential for Falls     Patient will remain free of falls Progressing        SAFETY ADULT     Maintain or return to baseline ADL function Progressing     Maintain or return mobility status to optimal level Progressing

## 2019-01-11 NOTE — PROGRESS NOTES
Progress Note - General Surgery   Stephneie Hull 68 y o  male MRN: [id number]  Unit/Bed#: [facility] 928-01 Encounter: [id number]    Assessment:  Stephenie Hull is a 68 y o  male with past medical history bladder cancer status post resection and abdominal aortic aneurysm (3 7 cm) who presents following a syncopal episode, hematemesis, and CT imaging w/ gastric emphysema and portal veinous gas  Concern for gastritis, upper GI bleed, peptic ulcer disease  Plan:  Continue NPO with NG tube  Continue fluids  Follow-up repeat hemoglobins  Follow-up GI recommendations  Continue Protonix  Serial abdominal exams    Subjective/Objective   Chief Complaint:     Subjective:  No acute events overnight  Resident team overnight did advance NG tube, however this morning and appears of NG tube has continued dislodge, will confirm with a [facility] B  Patient states he is no longer any pain  Denies passing any flatus or having any bowel movements  Objective:     Blood pressure 104/69, pulse (!) 112, temperature 98 2 °F (36 8 °C), resp  rate 20, height 5' 10" (1 778 m), weight 63 5 kg (140 lb), SpO2 97 %  ,Body mass index is 20 09 kg/m²        Intake/Output Summary (Last 24 hours) at 01/11/19 9144  Last data filed at 01/11/19 1698   Gross per 24 hour   Intake          4535 83 ml   Output             2390 ml   Net          2145 83 ml       Invasive Devices     Peripheral Intravenous Line            Peripheral IV 01/10/19 Left Antecubital less than 1 day    Peripheral IV 01/10/19 Left Forearm less than 1 day          Drain            NG/OG/Enteral Tube Nasogastric 18 Fr Left nares less than 1 day                Physical Exam: General: AAOx3  Head: normocephalic, atraumatic  Neck: supple, trachea midline   Nose: NGT seems to be not in place - NGT with 840 cc out, coffee-ground emesis appearing  Respiratory: BS b/l  Abdomen: Soft, NT, no rebound/guarding  Heart:  Tachycardic, S1s2  Ext: Warm no cyanosis   Pulse:  2+ radial bilaterally      Lab, Imaging and other studies:  I have personally reviewed pertinent lab results    , CBC:   Lab Results   Component Value Date    WBC 12 21 (H) 01/10/2019    HGB 8 8 (L) 01/10/2019    HCT 29 0 (L) 01/10/2019     (H) 01/10/2019     01/10/2019    MCH 30 1 01/10/2019    MCHC 29 9 (L) 01/10/2019    RDW 13 7 01/10/2019    MPV 10 7 01/10/2019    NRBC 0 01/10/2019   , CMP:   Lab Results   Component Value Date    SODIUM 137 01/10/2019    K 4 3 01/10/2019     01/10/2019    CO2 25 01/10/2019    BUN 26 (H) 01/10/2019    CREATININE 1 09 01/10/2019    CALCIUM 8 5 01/10/2019    AST 15 01/10/2019    ALT 20 01/10/2019    ALKPHOS 70 01/10/2019    EGFR 67 01/10/2019     VTE Pharmacologic Prophylaxis: Sequential compression device (Venodyne)   VTE Mechanical Prophylaxis: sequential compression device

## 2019-01-11 NOTE — UTILIZATION REVIEW
Initial Clinical Review    Admission: Date/Time/Statement:INPT  1/10/19 @ 1145   Orders Placed This Encounter   Procedures    Inpatient Admission (expected length of stay for this patient is greater than two midnights)     Standing Status:   Standing     Number of Occurrences:   1     Order Specific Question:   Admitting Physician     Answer:   Luiza Jimenez     Order Specific Question:   Level of Care     Answer:   Med Surg [16]     Order Specific Question:   Estimated length of stay     Answer:   More than 2 Midnights     Order Specific Question:   Certification     Answer:   I certify that inpatient services are medically necessary for this patient for a duration of greater than two midnights  See H&P and MD Progress Notes for additional information about the patient's course of treatment  ED: Date/Time/Mode of Arrival:   ED Arrival Information     Expected Arrival Acuity Means of Arrival Escorted By Service Admission Type    - 1/10/2019 06:52 Emergent Ambulance [facility]) Surgery-General Emergency    Arrival Complaint    syncope        Chief Complaint:   Chief Complaint   Patient presents with    Syncope     pt with syncope x2 and vomited red colored emesis for EMS x2 pt with some dizzyness and weakness feeling at the ED upon arrival      History of Illness: 68 y o  male with past medical history bladder cancer status post resection and abdominal aortic aneurysm (3 6 cm) who presents following a syncopal episode earlier this morning around 4:30 a m  Patient's fiancee is bedside to assist in providing history  She states that she heard a loud thud, suspecting that he fell she found him lying on the bed minimally responsive  At this point she called 911  On arrival patient was refusing medical care and had an episode of hematemesis  At this point he was taken to [facility] ED for further evaluation    In the emergency department, patient had another episode of bloody emesis accompanied by episodes of hypotension that responded to IV fluids  CT imaging performed in the emergency department was especially concerning given the patient's marked gastric distention, gastric pneumatosis, and portal venous gas  However, patient denies any abdominal pain  Currently, he is complaining of left-sided rib pain that is reproducible on exam   States that he has been taking Excedrin on a regular basis for history of migraines  Patient denies reflux symptoms however fiancee reports that he has and has been treating with Pepto-Bismol       Last bowel movement was yesterday evening  Patient denies bloody bowel movements, has noted infrequent dark stools  Has a history of upper GI endoscopy approximately 5 years ago that was unremarkable per the patient's fiancee at that time  Denies fever chills chest pain or shortness of breath   Current smoker      ED Vital Signs:   ED Triage Vitals   Temperature Pulse Respirations Blood Pressure SpO2   01/10/19 0744 01/10/19 0711 01/10/19 0710 01/10/19 0710 01/10/19 0710   (!) 97 2 °F (36 2 °C) 105 16 (!) 55/38 95 %      Temp Source Heart Rate Source Patient Position - Orthostatic VS BP Location FiO2 (%)   01/10/19 0744 01/10/19 0711 01/10/19 0710 01/10/19 0710 --   Oral Monitor Sitting Right arm       Pain Score       01/10/19 0710       No Pain          Wt Readings from Last 1 Encounters:   01/10/19 63 5 kg (140 lb)     Vital Signs (abnormal):   01/10/19 1015  --   136  18  91/69  97 %  Nasal cannula  --   01/10/19 1000  --   116  18  106/65  95 %  --  --   01/10/19 0930  --   114  20  107/62  97 %  Nasal cannula  --   01/10/19 0845  --   112  18  113/79  96 %  Nasal cannula  --   01/10/19 0825  --   110  18  114/74  95 %  Nasal cannula  --   01/10/19 0745  --  102  --  128/56  95 %  --  --   01/10/19 0744   97 2 °F (36 2 °C)  --  --  --  --  --  --   01/10/19 0730  --   108  --  145/60  93 %  --  --   01/10/19 0729  --  --  --  --  --  None (Room air)  --   01/10/19 0715  -- 108  18   70/55  92 %  --  --   01/10/19 0711  --  105  16   70/55  95 %  None (Room air)  --   01/10/19 0710  --  --  16   55/38  95 %  None (Room air)         Pertinent Labs/Diagnostic Test Results:      Ref Range & Units 1/11/19 0547 1/10/19 2211 1/10/19 1641 1/10/19 1135 1/10/19 0705   WBC 4 31 - 10 16 Thousand/uL 13 33      12 21     RBC 3 88 - 5 62 Million/uL 2 80      3 79     Hemoglobin 12 0 - 17 0 g/dL 8 3   8 8   9 7   9 8   11 4     Hematocrit 36 5 - 49 3 % 28 0   29 0   32 2   31 6   38 1    MCV 82 - 98 fL 100      101     MCH 26 8 - 34 3 pg 29 6     30 1    MCHC 31 4 - 37 4 g/dL 29 6      29 9     RDW 11 6 - 15 1 % 13 9     13 7    MPV 8 9 - 12 7 fL 10 5     10 7    Platelets 224 - 528 Thousands/uL 233     386    nRBC /100 WBCs 0     0    Neutrophils Relative 43 - 75 % 76      53    Immat GRANS % 0 - 2 % 1     1    Lymphocytes Relative 14 - 44 % 11      27    Monocytes Relative 4 - 12 % 10     13     Eosinophils Relative 0 - 6 % 1     4    Basophils Relative 0 - 1 % 1     2     Neutrophils Absolute 1 85 - 7 62 Thousands/µL 10 24      6 58    Immature Grans Absolute 0 00 - 0 20 Thousand/uL 0 09     0 07    Lymphocytes Absolute 0 60 - 4 47 Thousands/µL 1 51     3 27    Monocytes Absolute 0 17 - 1 22 Thousand/µL 1 31      1 58     Eosinophils Absolute 0 00 - 0 61 Thousand/µL 0 10     0 49    Basophils Absolute 0 00 - 0 10 Thousands/µL 0 08     0 22              Ref Range & Units 1/11/19 0547 1/10/19 0705   Sodium 136 - 145 mmol/L 140  137    Potassium 3 5 - 5 3 mmol/L 4 6  4 3    Chloride 100 - 108 mmol/L 109   102    CO2 21 - 32 mmol/L 26  25    ANION GAP 4 - 13 mmol/L 5  10    BUN 5 - 25 mg/dL 40   26     Creatinine 0 60 - 1 30 mg/dL 0 64  1 09CM    Glucose 65 - 140 mg/dL 88  237CM     Calcium 8 3 - 10 1 mg/dL 7 6   8 5    AST 5 - 45 U/L 24  15CM    ALT 12 - 78 U/L 15  20CM    Alkaline Phosphatase 46 - 116 U/L 51  70    Total Protein 6 4 - 8 2 g/dL 5 9   6 9    Albumin 3 5 - 5 0 g/dL 2 8   3 2     Total Bilirubin 0 20 - 1 00 mg/dL 0 19   0 22    eGFR ml/min/1 73sq m 97  67                CTA chest/abd:  1  Infrarenal abdominal aortic aneurysm measuring 3 7 cm  No evidence of dissection  2   Markedly gastric distention with gastric intramural air and portal venous gas  Differential diagnosis include emphysematous gastritis or gastric emphysema, the former favored due to presence of portal venous gas  Further evaluation with upper GI    endoscopy is recommended  3   Age indeterminate compression fractures of T12 and L1  CXR:  Apical interstitial and airspace opacities        Stomach is markedly distended with a large amount of fluid layering, best seen on the lateral view  CT of the chest, abdomen and pelvis has already been obtained  ED Treatment:   Medication Administration from 01/10/2019 0652 to 01/10/2019 1506       Date/Time Order Dose Route Action     01/10/2019 0741 ondansetron (ZOFRAN) injection 4 mg 4 mg Intravenous Given     01/10/2019 0742 sodium chloride 0 9 % bolus 1,000 mL 1,000 mL Intravenous New Bag     01/10/2019 0742 sodium chloride 0 9 % bolus 1,000 mL 1,000 mL Intravenous New Bag     01/10/2019 0958 iohexol (OMNIPAQUE) 350 MG/ML injection (MULTI-DOSE) 100 mL 100 mL Intravenous Given     01/10/2019 1029 sodium chloride 0 9 % bolus 1,000 mL 1,000 mL Intravenous New Bag     01/10/2019 1108 piperacillin-tazobactam (ZOSYN) 3 375 g in sodium chloride 0 9 % 50 mL IVPB 3 375 g Intravenous New Bag     01/10/2019 1134 lidocaine (URO-JET) 2 % topical gel 1 application Topical Given     01/10/2019 1134 benzocaine (HURRICAINE) 20 % mucosal spray 2 spray 2 spray Mucosal Given     Past Medical/Surgical History:     Diagnosis    Bladder cancer ([facility] [address] )     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]  Syncope [R55]  Age/Sex: 68 y o  male  Assessment/Plan: 69 yo male with hx bladder cancer s/p resection and AAA 3 7cm with syncopal episode, hematemesis   CT shows gastric emphysema and portal veinous gas  Possible gastritis, upper GI bleed, peptic ulcer disease      Admission Orders:  INPT  TELE  NGT - LCWS - Diet NPO   SCD's to le's   Up with assistance -       Scheduled Meds:  Current Facility-Administered Medications:  acetaminophen 650 mg Oral Q6H PRN    [START ON 1/12/2019] bisacodyl 10 mg Rectal Daily    ondansetron 4 mg Intravenous Q6H PRN    pantoprazole 40 mg Intravenous Q12H [address]      Continuous Infusions:  lactated ringers 125 mL/hr         Gastroenterology - Hematemesis  -unclear etiology at this juncture may be secondary to NSAID use however patient denies excessive use, other differentials include esophagitis, gastritis, Dieulafoy's lesion, GI malignancy possible Melody-Chiu tear in the setting of CT findings with intramural gastric air and portal venous gas  -continue monitor hemoglobin; hemoglobin 8 3 from 11 4 on presentation  -continue with NG tube to intermittent suction  -continue with IV Protonix twice daily  -transfuse for hemoglobin less than 7 or hemodynamic instability  -continue with IV fluids as per primary team  -keep NPO  -no evidence of aortoenteric fistula  -will plan on EGD early next week pending improvement on CT exam as insufflation will increase risk of perforation     Intramural gastric air  Portal venous air  Gastric distension  -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, likely former given clinical stability, benign abdominal exam and hemodynamic stability  -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 likely secondary to trauma (in the setting of vomiting-possible Melody-Chiu tear) versus obstructive(given gastric distention seen on CT)-lactate normal  -no evidence of bowel necrosis, SBO, intra-abdominal abscess  -patient fortunately hemodynamically stable with normal lactate and no abdominal pain  -patient denies any recent procedures  -this will likely resolve on its own  -consider repeat imaging 48 hours  -patient will certainly need EGD for evaluation of hematemesis as well as gastric distension and possible gastric outlet obstruction however insufflation of air at this juncture may increase risk of perforation with his gastric emphysema  -supportive measures for now  -if patient acutely decompensates will consider EGD sooner     Constipation  -moderate stool burden seen on CT imaging in colon  -recommend suppository and tap water enemas as needed

## 2019-01-11 NOTE — CONSULTS
Consultation -  [facility]  Ginny Newton 68 y o  male MRN: [id number]  Unit/Bed#: [facility] 928-01 Encounter: [id number]             Inpatient consult to gastroenterology     Performed by  Leroy Officer by Mj Lofton              Reason for Consult / Principal Problem:     Hematemesis       ASSESSMENT AND PLAN:      Hematemesis  -unclear etiology at this juncture may be secondary to NSAID use however patient denies excessive use, other differentials include esophagitis, gastritis, Dieulafoy's lesion, GI malignancy possible Melody-Chiu tear in the setting of CT findings with intramural gastric air and portal venous gas  -continue monitor hemoglobin; hemoglobin 8 3 from 11 4 on presentation  -continue with NG tube to intermittent suction  -continue with IV Protonix twice daily  -transfuse for hemoglobin less than 7 or hemodynamic instability  -continue with IV fluids as per primary team  -keep NPO  -no evidence of aortoenteric fistula  -will plan on EGD early next week pending improvement on CT exam as insufflation will increase risk of perforation    Intramural gastric air  Portal venous air  Gastric distension  -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, likely former given clinical stability, benign abdominal exam and hemodynamic stability  -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 likely secondary to trauma (in the setting of vomiting-possible Melody-Chiu tear) versus obstructive(given gastric distention seen on CT)-lactate normal  -no evidence of bowel necrosis, SBO, intra-abdominal abscess  -patient fortunately hemodynamically stable with normal lactate and no abdominal pain  -patient denies any recent procedures  -this will likely resolve on its own  -consider repeat imaging 48 hours  -patient will certainly need EGD for evaluation of hematemesis as well as gastric distension and possible gastric outlet obstruction however insufflation of air at this juncture may increase risk of perforation with his gastric emphysema  -supportive measures for now  -if patient acutely decompensates will consider EGD sooner    Constipation  -moderate stool burden seen on CT imaging in colon  -recommend suppository and tap water enemas as needed  ______________________________________________________________________    HPI:  45-year-old male seen in consultation for emphysematous gastritis and hematemesis  Patient with significant past medical history for bladder cancer known aortic aneurysm  If patient presents to ED for syncope  As per patient he was sitting on the side of the bed yesterday and when he stood up he passed out  He denies chest pain shortness of breath or palpitations at that juncture  He or states that he was feeling lightheaded and dizzy  At home he had an episode of hematemesis  In the ED he had another episode of hematemesis  He denies associated abdominal pain, nausea, melena or overt GI bleeding  Prior to syncopal event he denies excessive vomiting  Denies diarrhea or constipation  Last bowel movement was 2 days ago and usually has 2 bowel movements daily  He denies excessive alcohol use, NSAID use and had not noticed any changes in his bowel habits or stool color  He states he has been taking 2 Excedrin daily for the past 2 days due to head cold  He denies heartburn or reflux or difficulty swallowing however he takes Nexium on a daily basis  He states he has had an "upper GI" twice in the past but does not recall the indications for this  Patient denies previous colonoscopy  CTA scan in of chest abdomen pelvis demonstrates infrarenal AAA measuring 3 7 cm without evidence of dissection, markedly gastric distention with gastric intramural air and portal venous gas suspicious for emphysematous gastritis or gastric emphysema the abdomen      REVIEW OF SYSTEMS:    CONSTITUTIONAL: Denies any fever, chills, rigors, and weight loss    HEENT: No earache or tinnitus  Denies hearing loss or visual disturbances  CARDIOVASCULAR: No chest pain or palpitations  RESPIRATORY: Denies any cough, hemoptysis, shortness of breath or dyspnea on exertion  GASTROINTESTINAL: As noted in the History of Present Illness  GENITOURINARY: No problems with urination  Denies any hematuria or dysuria  NEUROLOGIC: + dizziness/syncope   MUSCULOSKELETAL: Denies any muscle or joint pain  SKIN: Denies skin rashes or itching  ENDOCRINE: Denies excessive thirst  Denies intolerance to heat or cold  PSYCHOSOCIAL: Denies depression or anxiety  Denies any recent memory loss  Historical Information   Past Medical History:   Diagnosis Date    Bladder cancer [facility])      History reviewed  No pertinent surgical history  Social History   History   Alcohol Use    Yes     Comment: Socially     History   Drug Use No     History   Smoking Status    Current Every Day Smoker    Packs/day: 0 25    Types: Cigarettes   Smokeless Tobacco    Former User     History reviewed  No pertinent family history  Meds/Allergies     Prescriptions Prior to Admission   Medication    Esomeprazole Magnesium (NEXIUM PO)     Current Facility-Administered Medications   Medication Dose Route Frequency    acetaminophen (TYLENOL) tablet 650 mg  650 mg Oral Q6H PRN    lactated ringers infusion  125 mL/hr Intravenous Continuous    ondansetron (ZOFRAN) injection 4 mg  4 mg Intravenous Q6H PRN    pantoprazole (PROTONIX) injection 40 mg  40 mg Intravenous Q12H [facility]       No Known Allergies        Objective     Blood pressure 105/71, pulse 103, temperature 98 1 °F (36 7 °C), resp  rate 20, height 5' 10" (1 778 m), weight 63 5 kg (140 lb), SpO2 97 %  Body mass index is 20 09 kg/m²        Intake/Output Summary (Last 24 hours) at 01/11/19 0754  Last data filed at 01/11/19 0700   Gross per 24 hour   Intake          2635 83 ml   Output             2590 ml   Net            45 83 ml         PHYSICAL EXAM:      General Appearance:   Alert, cooperative, no distress   HEENT:   Normocephalic, atraumatic, anicteric      Neck:  Supple, symmetrical, trachea midline   Lungs:   Clear to auscultation bilaterally; no rales, rhonchi or wheezing; respirations unlabored    Heart[de-identified]   Regular rate and rhythm; no murmur, rub, or gallop     Abdomen:   Soft, non-tender, non-distended; normal bowel sounds; no masses, no organomegaly    Genitalia:   Deferred    Rectal:   Deferred    Extremities:  No cyanosis, clubbing or edema    Pulses:  2+ and symmetric all extremities    Skin:  No jaundice, rashes, or lesions    Lymph nodes:  No palpable cervical lymphadenopathy        Lab Results:   Admission on 01/10/2019   Component Date Value    Sodium 01/10/2019 137     Potassium 01/10/2019 4 3     Chloride 01/10/2019 102     CO2 01/10/2019 25     ANION GAP 01/10/2019 10     BUN 01/10/2019 26*    Creatinine 01/10/2019 1 09     Glucose 01/10/2019 237*    Calcium 01/10/2019 8 5     AST 01/10/2019 15     ALT 01/10/2019 20     Alkaline Phosphatase 01/10/2019 70     Total Protein 01/10/2019 6 9     Albumin 01/10/2019 3 2*    Total Bilirubin 01/10/2019 0 22     eGFR 01/10/2019 67     WBC 01/10/2019 12 21*    RBC 01/10/2019 3 79*    Hemoglobin 01/10/2019 11 4*    Hematocrit 01/10/2019 38 1     MCV 01/10/2019 101*    MCH 01/10/2019 30 1     MCHC 01/10/2019 29 9*    RDW 01/10/2019 13 7     MPV 01/10/2019 10 7     Platelets 91/09/7708 386     nRBC 01/10/2019 0     Neutrophils Relative 01/10/2019 53     Immat GRANS % 01/10/2019 1     Lymphocytes Relative 01/10/2019 27     Monocytes Relative 01/10/2019 13*    Eosinophils Relative 01/10/2019 4     Basophils Relative 01/10/2019 2*    Neutrophils Absolute 01/10/2019 6 58     Immature Grans Absolute 01/10/2019 0 07     Lymphocytes Absolute 01/10/2019 3 27     Monocytes Absolute 01/10/2019 1 58*    Eosinophils Absolute 01/10/2019 0 49     Basophils Absolute 01/10/2019 0 22*    Troponin I 01/10/2019 <0 02     ABO Grouping 01/10/2019 B     Rh Factor 01/10/2019 Positive     Antibody Screen 01/10/2019 Negative     Specimen Expiration Date 01/10/2019 46397012     LACTIC ACID 01/10/2019 3 0*    Hemoglobin 01/10/2019 9 8*    Hematocrit 01/10/2019 31 6*    Hemoglobin 01/10/2019 9 7*    Hematocrit 01/10/2019 32 2*    Ventricular Rate 01/10/2019 108     Atrial Rate 01/10/2019 108     UT Interval 01/10/2019 134     QRSD Interval 01/10/2019 102     QT Interval 01/10/2019 346     QTC Interval 01/10/2019 463     P Axis 01/10/2019 53     QRS Axis 01/10/2019 -36     T Wave Axis 01/10/2019 77     Hemoglobin 01/10/2019 8 8*    Hematocrit 01/10/2019 29 0*    LACTIC ACID 01/10/2019 1 5     Sodium 01/11/2019 140     Potassium 01/11/2019 4 6     Chloride 01/11/2019 109*    CO2 01/11/2019 26     ANION GAP 01/11/2019 5     BUN 01/11/2019 40*    Creatinine 01/11/2019 0 64     Glucose 01/11/2019 88     Calcium 01/11/2019 7 6*    AST 01/11/2019 24     ALT 01/11/2019 15     Alkaline Phosphatase 01/11/2019 51     Total Protein 01/11/2019 5 9*    Albumin 01/11/2019 2 8*    Total Bilirubin 01/11/2019 0 19*    eGFR 01/11/2019 97     Magnesium 01/11/2019 2 0        Imaging Studies: I have personally reviewed pertinent imaging studies

## 2019-01-12 LAB
ANION GAP SERPL CALCULATED.3IONS-SCNC: 5 MMOL/L (ref 4–13)
BASOPHILS # BLD AUTO: 0.06 THOUSANDS/ΜL (ref 0–0.1)
BASOPHILS NFR BLD AUTO: 1 % (ref 0–1)
BUN SERPL-MCNC: 21 MG/DL (ref 5–25)
CALCIUM SERPL-MCNC: 7.9 MG/DL (ref 8.3–10.1)
CHLORIDE SERPL-SCNC: 106 MMOL/L (ref 100–108)
CO2 SERPL-SCNC: 27 MMOL/L (ref 21–32)
CREAT SERPL-MCNC: 0.54 MG/DL (ref 0.6–1.3)
EOSINOPHIL # BLD AUTO: 0.19 THOUSAND/ΜL (ref 0–0.61)
EOSINOPHIL NFR BLD AUTO: 2 % (ref 0–6)
ERYTHROCYTE [DISTWIDTH] IN BLOOD BY AUTOMATED COUNT: 14.2 % (ref 11.6–15.1)
GFR SERPL CREATININE-BSD FRML MDRD: 104 ML/MIN/1.73SQ M
GLUCOSE SERPL-MCNC: 76 MG/DL (ref 65–140)
HCT VFR BLD AUTO: 26.1 % (ref 36.5–49.3)
HGB BLD-MCNC: 7.9 G/DL (ref 12–17)
IMM GRANULOCYTES # BLD AUTO: 0.03 THOUSAND/UL (ref 0–0.2)
IMM GRANULOCYTES NFR BLD AUTO: 0 % (ref 0–2)
LYMPHOCYTES # BLD AUTO: 1.33 THOUSANDS/ΜL (ref 0.6–4.47)
LYMPHOCYTES NFR BLD AUTO: 14 % (ref 14–44)
MCH RBC QN AUTO: 29.9 PG (ref 26.8–34.3)
MCHC RBC AUTO-ENTMCNC: 30.3 G/DL (ref 31.4–37.4)
MCV RBC AUTO: 99 FL (ref 82–98)
MONOCYTES # BLD AUTO: 0.88 THOUSAND/ΜL (ref 0.17–1.22)
MONOCYTES NFR BLD AUTO: 10 % (ref 4–12)
NEUTROPHILS # BLD AUTO: 6.78 THOUSANDS/ΜL (ref 1.85–7.62)
NEUTS SEG NFR BLD AUTO: 73 % (ref 43–75)
NRBC BLD AUTO-RTO: 0 /100 WBCS
PLATELET # BLD AUTO: 211 THOUSANDS/UL (ref 149–390)
PMV BLD AUTO: 11.1 FL (ref 8.9–12.7)
POTASSIUM SERPL-SCNC: 4.6 MMOL/L (ref 3.5–5.3)
RBC # BLD AUTO: 2.64 MILLION/UL (ref 3.88–5.62)
SODIUM SERPL-SCNC: 138 MMOL/L (ref 136–145)
WBC # BLD AUTO: 9.27 THOUSAND/UL (ref 4.31–10.16)

## 2019-01-12 PROCEDURE — C9113 INJ PANTOPRAZOLE SODIUM, VIA: HCPCS | Performed by: STUDENT IN AN ORGANIZED HEALTH CARE EDUCATION/TRAINING PROGRAM

## 2019-01-12 PROCEDURE — 99232 SBSQ HOSP IP/OBS MODERATE 35: CPT | Performed by: SURGERY

## 2019-01-12 PROCEDURE — 80048 BASIC METABOLIC PNL TOTAL CA: CPT | Performed by: PHYSICIAN ASSISTANT

## 2019-01-12 PROCEDURE — 85025 COMPLETE CBC W/AUTO DIFF WBC: CPT | Performed by: PHYSICIAN ASSISTANT

## 2019-01-12 PROCEDURE — 99232 SBSQ HOSP IP/OBS MODERATE 35: CPT | Performed by: INTERNAL MEDICINE

## 2019-01-12 RX ORDER — OXYCODONE HYDROCHLORIDE 5 MG/1
2.5 TABLET ORAL EVERY 4 HOURS PRN
Status: DISCONTINUED | OUTPATIENT
Start: 2019-01-12 | End: 2019-01-15 | Stop reason: HOSPADM

## 2019-01-12 RX ORDER — HYDROMORPHONE HCL/PF 1 MG/ML
0.2 SYRINGE (ML) INJECTION
Status: DISCONTINUED | OUTPATIENT
Start: 2019-01-12 | End: 2019-01-15 | Stop reason: HOSPADM

## 2019-01-12 RX ORDER — OXYCODONE HYDROCHLORIDE 5 MG/1
5 TABLET ORAL EVERY 4 HOURS PRN
Status: DISCONTINUED | OUTPATIENT
Start: 2019-01-12 | End: 2019-01-15 | Stop reason: HOSPADM

## 2019-01-12 RX ADMIN — HYDROMORPHONE HYDROCHLORIDE 0.2 MG: 1 INJECTION, SOLUTION INTRAMUSCULAR; INTRAVENOUS; SUBCUTANEOUS at 22:05

## 2019-01-12 RX ADMIN — SODIUM CHLORIDE, SODIUM LACTATE, POTASSIUM CHLORIDE, AND CALCIUM CHLORIDE 125 ML/HR: .6; .31; .03; .02 INJECTION, SOLUTION INTRAVENOUS at 16:31

## 2019-01-12 RX ADMIN — ONDANSETRON 4 MG: 2 INJECTION INTRAMUSCULAR; INTRAVENOUS at 19:51

## 2019-01-12 RX ADMIN — ACETAMINOPHEN 650 MG: 325 TABLET, FILM COATED ORAL at 19:54

## 2019-01-12 RX ADMIN — PANTOPRAZOLE SODIUM 40 MG: 40 INJECTION, POWDER, FOR SOLUTION INTRAVENOUS at 08:10

## 2019-01-12 RX ADMIN — SODIUM CHLORIDE, SODIUM LACTATE, POTASSIUM CHLORIDE, AND CALCIUM CHLORIDE 125 ML/HR: .6; .31; .03; .02 INJECTION, SOLUTION INTRAVENOUS at 08:08

## 2019-01-12 RX ADMIN — PANTOPRAZOLE SODIUM 40 MG: 40 INJECTION, POWDER, FOR SOLUTION INTRAVENOUS at 22:05

## 2019-01-12 RX ADMIN — OXYCODONE HYDROCHLORIDE 5 MG: 5 TABLET ORAL at 19:51

## 2019-01-12 NOTE — ED ATTENDING ATTESTATION
Francisco Nelson DO, saw and evaluated the patient  I have discussed the patient with the resident/non-physician practitioner and agree with the resident's/non-physician practitioner's findings, Plan of Care, and MDM as documented in the resident's/non-physician practitioner's note, except where noted  All available labs and Radiology studies were reviewed  At this point I agree with the current assessment done in the Emergency Department  I have conducted an independent evaluation of this patient a history and physical is as follows:      68 yom p/w syncope and vomiting BRB  Patient reports being at home and feeling lightheaded  Syncope x 2, after standing  Had myoclonic jerking  Reports vomiting mucous and blood pta  Ems called  Patient has syncope with EMS as well  BP on arrival 55/30 but alert and oriented  Denies melena  Denies blood thinners  Very poor historian otherwise  Gets most of his care at [address] [place]  Was told he had aneurysm but not sure where also that his stomach was "shifted"  A/P: syncope, shock, likely hypovolemic  Stat cxr showed possible free air but more likyl gastric bubble, given hx of aneurysm, cta ordered and 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  likeyl bleeding, no SBO  Surgery to see patient/will admit  Surgery saw patient and ng tube placed with maroon return, patient otherwise stable      Critical Care Time  CritCare Time    CriticalCare Time  Performed by: Danyell Mcrae  Authorized by: Danyell Mcrae     Critical care provider statement:     Critical care time (minutes):  40    Critical care time was exclusive of:  Separately billable procedures and treating other patients and teaching time    Critical care was necessary to treat or prevent imminent or life-threatening deterioration of the following conditions:  Shock    Critical care was time spent personally by me on the following activities:  Blood draw for specimens, obtaining history from patient or surrogate, re-evaluation of patient's condition, review of old charts, evaluation of patient's response to treatment, examination of patient, ordering and review of laboratory studies, ordering and performing treatments and interventions, development of treatment plan with patient or surrogate, ordering and review of radiographic studies and discussions with consultants

## 2019-01-12 NOTE — PROGRESS NOTES
Progress Note - General Surgery   Stephenie Hull 68 y o  male MRN: [id number]  Unit/Bed#: [facility] 928-01 Encounter: [id number]    Assessment:  77-year-old male with syncopal episode, hematemesis, and a CT demonstrating gastric emphysema with portal venous gas  GI has evaluated and will do a delayed endoscopy on 1/14  NG tube removed per patient request    Plan:  -advance diet to clears and continue IV fluids  -continued trend hemoglobin  -await EGD per GI  -continue Protonix  -serial abdominal exams  -out of bed, ambulate as tolerated    Subjective/Objective   Chief Complaint: none    Subjective: Feels well, no nausea, vomiting, fevers, chills  Objective:   Blood pressure 109/72, pulse 93, temperature 97 9 °F (36 6 °C), resp  rate 20, height 5' 10" (1 778 m), weight 63 5 kg (140 lb), SpO2 94 %  ,Body mass index is 20 09 kg/m²  Intake/Output Summary (Last 24 hours) at 01/12/19 0605  Last data filed at 01/12/19 0530   Gross per 24 hour   Intake          1439 58 ml   Output             1325 ml   Net           114 58 ml       Invasive Devices     Peripheral Intravenous Line            Peripheral IV 01/10/19 Left Forearm 1 day                Physical Exam:   General: No acute distress  HEENT: Normocephalic, atraumatic   Neck: Normal ROM  No tracheal deviation  Cardio: Normal rate, regular rhythm  Pulm: Normal respiratory effort  Abdomen: Soft, non-tender, non-distended  Extremities: PENA, No edema  Neuro: Cranial nerves II-XII intact  Psych: Normal affect      Lab, Imaging and other studies:I have personally reviewed pertinent lab results      VTE Pharmacologic Prophylaxis: Heparin  VTE Mechanical Prophylaxis: sequential compression device

## 2019-01-13 ENCOUNTER — APPOINTMENT (INPATIENT)
Dept: RADIOLOGY | Facility: HOSPITAL | Age: 74
DRG: 377 | End: 2019-01-13
Payer: MEDICARE

## 2019-01-13 ENCOUNTER — ANESTHESIA (INPATIENT)
Dept: PERIOP | Facility: HOSPITAL | Age: 74
DRG: 377 | End: 2019-01-13
Payer: MEDICARE

## 2019-01-13 ENCOUNTER — ANESTHESIA EVENT (INPATIENT)
Dept: PERIOP | Facility: HOSPITAL | Age: 74
DRG: 377 | End: 2019-01-13
Payer: MEDICARE

## 2019-01-13 PROBLEM — K92.2 GI BLEED: Status: ACTIVE | Noted: 2019-01-13

## 2019-01-13 PROBLEM — K92.0 GASTROINTESTINAL HEMORRHAGE WITH HEMATEMESIS: Status: ACTIVE | Noted: 2019-01-10

## 2019-01-13 PROBLEM — K31.1 GASTRIC OUTLET OBSTRUCTION: Status: ACTIVE | Noted: 2019-01-13

## 2019-01-13 PROBLEM — D62 ACUTE BLOOD LOSS ANEMIA: Status: ACTIVE | Noted: 2019-01-13

## 2019-01-13 LAB
ABO GROUP BLD BPU: NORMAL
ABO GROUP BLD: NORMAL
ANION GAP SERPL CALCULATED.3IONS-SCNC: 6 MMOL/L (ref 4–13)
APTT PPP: 29 SECONDS (ref 26–38)
BASOPHILS # BLD AUTO: 0.08 THOUSANDS/ΜL (ref 0–0.1)
BASOPHILS NFR BLD AUTO: 0 % (ref 0–1)
BLD GP AB SCN SERPL QL: NEGATIVE
BPU ID: NORMAL
BUN SERPL-MCNC: 27 MG/DL (ref 5–25)
CALCIUM SERPL-MCNC: 7.3 MG/DL (ref 8.3–10.1)
CHLORIDE SERPL-SCNC: 104 MMOL/L (ref 100–108)
CO2 SERPL-SCNC: 27 MMOL/L (ref 21–32)
CREAT SERPL-MCNC: 0.59 MG/DL (ref 0.6–1.3)
EOSINOPHIL # BLD AUTO: 0.08 THOUSAND/ΜL (ref 0–0.61)
EOSINOPHIL NFR BLD AUTO: 0 % (ref 0–6)
ERYTHROCYTE [DISTWIDTH] IN BLOOD BY AUTOMATED COUNT: 14.2 % (ref 11.6–15.1)
FIBRINOGEN PPP-MCNC: 354 MG/DL (ref 227–495)
GFR SERPL CREATININE-BSD FRML MDRD: 100 ML/MIN/1.73SQ M
GLUCOSE SERPL-MCNC: 128 MG/DL (ref 65–140)
HCT VFR BLD AUTO: 20.6 % (ref 36.5–49.3)
HCT VFR BLD AUTO: 23.5 % (ref 36.5–49.3)
HCT VFR BLD AUTO: 24.9 % (ref 36.5–49.3)
HCT VFR BLD AUTO: 26.7 % (ref 36.5–49.3)
HCT VFR BLD AUTO: 27.7 % (ref 36.5–49.3)
HGB BLD-MCNC: 6.5 G/DL (ref 12–17)
HGB BLD-MCNC: 7.3 G/DL (ref 12–17)
HGB BLD-MCNC: 7.9 G/DL (ref 12–17)
HGB BLD-MCNC: 8.5 G/DL (ref 12–17)
HGB BLD-MCNC: 8.9 G/DL (ref 12–17)
IMM GRANULOCYTES # BLD AUTO: 0.23 THOUSAND/UL (ref 0–0.2)
IMM GRANULOCYTES NFR BLD AUTO: 1 % (ref 0–2)
INR PPP: 1.05 (ref 0.86–1.17)
LYMPHOCYTES # BLD AUTO: 0.96 THOUSANDS/ΜL (ref 0.6–4.47)
LYMPHOCYTES NFR BLD AUTO: 4 % (ref 14–44)
MCH RBC QN AUTO: 30 PG (ref 26.8–34.3)
MCHC RBC AUTO-ENTMCNC: 31.1 G/DL (ref 31.4–37.4)
MCV RBC AUTO: 97 FL (ref 82–98)
MONOCYTES # BLD AUTO: 1.3 THOUSAND/ΜL (ref 0.17–1.22)
MONOCYTES NFR BLD AUTO: 5 % (ref 4–12)
NEUTROPHILS # BLD AUTO: 21.5 THOUSANDS/ΜL (ref 1.85–7.62)
NEUTS SEG NFR BLD AUTO: 90 % (ref 43–75)
NRBC BLD AUTO-RTO: 0 /100 WBCS
PLATELET # BLD AUTO: 182 THOUSANDS/UL (ref 149–390)
PMV BLD AUTO: 10.2 FL (ref 8.9–12.7)
POTASSIUM SERPL-SCNC: 4.7 MMOL/L (ref 3.5–5.3)
PROTHROMBIN TIME: 13.8 SECONDS (ref 11.8–14.2)
RBC # BLD AUTO: 2.43 MILLION/UL (ref 3.88–5.62)
RH BLD: POSITIVE
SODIUM SERPL-SCNC: 137 MMOL/L (ref 136–145)
SPECIMEN EXPIRATION DATE: NORMAL
UNIT DISPENSE STATUS: NORMAL
UNIT PRODUCT CODE: NORMAL
UNIT RH: NORMAL
WBC # BLD AUTO: 24.15 THOUSAND/UL (ref 4.31–10.16)

## 2019-01-13 PROCEDURE — 71045 X-RAY EXAM CHEST 1 VIEW: CPT

## 2019-01-13 PROCEDURE — 88305 TISSUE EXAM BY PATHOLOGIST: CPT | Performed by: PATHOLOGY

## 2019-01-13 PROCEDURE — 30233N1 TRANSFUSION OF NONAUTOLOGOUS RED BLOOD CELLS INTO PERIPHERAL VEIN, PERCUTANEOUS APPROACH: ICD-10-PCS | Performed by: SURGERY

## 2019-01-13 PROCEDURE — 80048 BASIC METABOLIC PNL TOTAL CA: CPT | Performed by: SURGERY

## 2019-01-13 PROCEDURE — 85610 PROTHROMBIN TIME: CPT | Performed by: PHYSICIAN ASSISTANT

## 2019-01-13 PROCEDURE — 86900 BLOOD TYPING SEROLOGIC ABO: CPT | Performed by: SURGERY

## 2019-01-13 PROCEDURE — P9021 RED BLOOD CELLS UNIT: HCPCS

## 2019-01-13 PROCEDURE — 85014 HEMATOCRIT: CPT | Performed by: SURGERY

## 2019-01-13 PROCEDURE — 88341 IMHCHEM/IMCYTCHM EA ADD ANTB: CPT | Performed by: PATHOLOGY

## 2019-01-13 PROCEDURE — 85384 FIBRINOGEN ACTIVITY: CPT | Performed by: PHYSICIAN ASSISTANT

## 2019-01-13 PROCEDURE — 88342 IMHCHEM/IMCYTCHM 1ST ANTB: CPT | Performed by: PATHOLOGY

## 2019-01-13 PROCEDURE — 86901 BLOOD TYPING SEROLOGIC RH(D): CPT | Performed by: SURGERY

## 2019-01-13 PROCEDURE — C9113 INJ PANTOPRAZOLE SODIUM, VIA: HCPCS | Performed by: STUDENT IN AN ORGANIZED HEALTH CARE EDUCATION/TRAINING PROGRAM

## 2019-01-13 PROCEDURE — 85730 THROMBOPLASTIN TIME PARTIAL: CPT | Performed by: PHYSICIAN ASSISTANT

## 2019-01-13 PROCEDURE — 85014 HEMATOCRIT: CPT | Performed by: PHYSICIAN ASSISTANT

## 2019-01-13 PROCEDURE — 85025 COMPLETE CBC W/AUTO DIFF WBC: CPT | Performed by: SURGERY

## 2019-01-13 PROCEDURE — 85014 HEMATOCRIT: CPT | Performed by: EMERGENCY MEDICINE

## 2019-01-13 PROCEDURE — 85018 HEMOGLOBIN: CPT | Performed by: PHYSICIAN ASSISTANT

## 2019-01-13 PROCEDURE — C9113 INJ PANTOPRAZOLE SODIUM, VIA: HCPCS | Performed by: PHYSICIAN ASSISTANT

## 2019-01-13 PROCEDURE — 99232 SBSQ HOSP IP/OBS MODERATE 35: CPT | Performed by: INTERNAL MEDICINE

## 2019-01-13 PROCEDURE — 43239 EGD BIOPSY SINGLE/MULTIPLE: CPT | Performed by: INTERNAL MEDICINE

## 2019-01-13 PROCEDURE — 99291 CRITICAL CARE FIRST HOUR: CPT | Performed by: SURGERY

## 2019-01-13 PROCEDURE — 85018 HEMOGLOBIN: CPT | Performed by: SURGERY

## 2019-01-13 PROCEDURE — 85018 HEMOGLOBIN: CPT | Performed by: EMERGENCY MEDICINE

## 2019-01-13 PROCEDURE — 86850 RBC ANTIBODY SCREEN: CPT | Performed by: SURGERY

## 2019-01-13 PROCEDURE — 0DB68ZX EXCISION OF STOMACH, VIA NATURAL OR ARTIFICIAL OPENING ENDOSCOPIC, DIAGNOSTIC: ICD-10-PCS | Performed by: INTERNAL MEDICINE

## 2019-01-13 PROCEDURE — 74018 RADEX ABDOMEN 1 VIEW: CPT

## 2019-01-13 RX ORDER — METOCLOPRAMIDE HCL 10 MG/2 ML
15 SYRINGE (ML) INJECTION ONCE
Status: COMPLETED | OUTPATIENT
Start: 2019-01-13 | End: 2019-01-13

## 2019-01-13 RX ORDER — LIDOCAINE HYDROCHLORIDE 20 MG/ML
JELLY TOPICAL ONCE
Status: COMPLETED | OUTPATIENT
Start: 2019-01-13 | End: 2019-01-13

## 2019-01-13 RX ORDER — PANTOPRAZOLE SODIUM 40 MG/1
40 INJECTION, POWDER, FOR SOLUTION INTRAVENOUS EVERY 12 HOURS SCHEDULED
Status: DISCONTINUED | OUTPATIENT
Start: 2019-01-13 | End: 2019-01-14

## 2019-01-13 RX ORDER — METOCLOPRAMIDE HYDROCHLORIDE 5 MG/ML
10 INJECTION INTRAMUSCULAR; INTRAVENOUS ONCE
Status: COMPLETED | OUTPATIENT
Start: 2019-01-13 | End: 2019-01-13

## 2019-01-13 RX ORDER — ONDANSETRON 2 MG/ML
4 INJECTION INTRAMUSCULAR; INTRAVENOUS ONCE AS NEEDED
Status: DISCONTINUED | OUTPATIENT
Start: 2019-01-13 | End: 2019-01-13 | Stop reason: HOSPADM

## 2019-01-13 RX ORDER — LEVALBUTEROL 1.25 MG/.5ML
1.25 SOLUTION, CONCENTRATE RESPIRATORY (INHALATION) ONCE
Status: COMPLETED | OUTPATIENT
Start: 2019-01-13 | End: 2019-01-13

## 2019-01-13 RX ORDER — METOCLOPRAMIDE HCL 10 MG/2 ML
15 SYRINGE (ML) INJECTION EVERY 6 HOURS PRN
Status: DISCONTINUED | OUTPATIENT
Start: 2019-01-13 | End: 2019-01-13

## 2019-01-13 RX ORDER — SUCCINYLCHOLINE CHLORIDE 20 MG/ML
INJECTION INTRAMUSCULAR; INTRAVENOUS AS NEEDED
Status: DISCONTINUED | OUTPATIENT
Start: 2019-01-13 | End: 2019-01-13 | Stop reason: SURG

## 2019-01-13 RX ORDER — PROPOFOL 10 MG/ML
INJECTION, EMULSION INTRAVENOUS AS NEEDED
Status: DISCONTINUED | OUTPATIENT
Start: 2019-01-13 | End: 2019-01-13 | Stop reason: SURG

## 2019-01-13 RX ORDER — MEPERIDINE HYDROCHLORIDE 25 MG/ML
25 INJECTION INTRAMUSCULAR; INTRAVENOUS; SUBCUTANEOUS AS NEEDED
Status: DISCONTINUED | OUTPATIENT
Start: 2019-01-13 | End: 2019-01-13 | Stop reason: HOSPADM

## 2019-01-13 RX ORDER — FENTANYL CITRATE 50 UG/ML
25 INJECTION, SOLUTION INTRAMUSCULAR; INTRAVENOUS ONCE
Status: COMPLETED | OUTPATIENT
Start: 2019-01-13 | End: 2019-01-13

## 2019-01-13 RX ORDER — METOCLOPRAMIDE HYDROCHLORIDE 5 MG/ML
10 INJECTION INTRAMUSCULAR; INTRAVENOUS ONCE
Status: DISCONTINUED | OUTPATIENT
Start: 2019-01-13 | End: 2019-01-13

## 2019-01-13 RX ORDER — LIDOCAINE HYDROCHLORIDE 10 MG/ML
INJECTION, SOLUTION INFILTRATION; PERINEURAL AS NEEDED
Status: DISCONTINUED | OUTPATIENT
Start: 2019-01-13 | End: 2019-01-13 | Stop reason: SURG

## 2019-01-13 RX ORDER — SODIUM CHLORIDE, SODIUM LACTATE, POTASSIUM CHLORIDE, CALCIUM CHLORIDE 600; 310; 30; 20 MG/100ML; MG/100ML; MG/100ML; MG/100ML
125 INJECTION, SOLUTION INTRAVENOUS CONTINUOUS
Status: DISCONTINUED | OUTPATIENT
Start: 2019-01-13 | End: 2019-01-13

## 2019-01-13 RX ADMIN — LIDOCAINE HYDROCHLORIDE: 20 JELLY TOPICAL at 10:06

## 2019-01-13 RX ADMIN — OXYCODONE HYDROCHLORIDE 5 MG: 5 TABLET ORAL at 05:57

## 2019-01-13 RX ADMIN — PANTOPRAZOLE SODIUM 40 MG: 40 INJECTION, POWDER, FOR SOLUTION INTRAVENOUS at 07:53

## 2019-01-13 RX ADMIN — OXYCODONE HYDROCHLORIDE 5 MG: 5 TABLET ORAL at 02:07

## 2019-01-13 RX ADMIN — FENTANYL CITRATE 25 MCG: 50 INJECTION INTRAMUSCULAR; INTRAVENOUS at 09:58

## 2019-01-13 RX ADMIN — TOPICAL ANESTHETIC 2 SPRAY: 200 SPRAY DENTAL; PERIODONTAL at 10:06

## 2019-01-13 RX ADMIN — LIDOCAINE HYDROCHLORIDE 50 MG: 10 INJECTION, SOLUTION INFILTRATION; PERINEURAL at 15:34

## 2019-01-13 RX ADMIN — SODIUM CHLORIDE, SODIUM LACTATE, POTASSIUM CHLORIDE, AND CALCIUM CHLORIDE 125 ML/HR: .6; .31; .03; .02 INJECTION, SOLUTION INTRAVENOUS at 17:38

## 2019-01-13 RX ADMIN — SODIUM CHLORIDE 8 MG/HR: 9 INJECTION, SOLUTION INTRAVENOUS at 09:58

## 2019-01-13 RX ADMIN — SUCCINYLCHOLINE CHLORIDE 100 MG: 20 INJECTION, SOLUTION INTRAMUSCULAR; INTRAVENOUS at 15:34

## 2019-01-13 RX ADMIN — PANTOPRAZOLE SODIUM 40 MG: 40 INJECTION, POWDER, FOR SOLUTION INTRAVENOUS at 20:08

## 2019-01-13 RX ADMIN — PROPOFOL 150 MG: 10 INJECTION, EMULSION INTRAVENOUS at 15:34

## 2019-01-13 RX ADMIN — SODIUM CHLORIDE, SODIUM LACTATE, POTASSIUM CHLORIDE, AND CALCIUM CHLORIDE 125 ML/HR: .6; .31; .03; .02 INJECTION, SOLUTION INTRAVENOUS at 16:56

## 2019-01-13 RX ADMIN — METOCLOPRAMIDE 10 MG: 5 INJECTION, SOLUTION INTRAMUSCULAR; INTRAVENOUS at 14:22

## 2019-01-13 RX ADMIN — LEVALBUTEROL HYDROCHLORIDE 1.25 MG: 1.25 SOLUTION, CONCENTRATE RESPIRATORY (INHALATION) at 16:12

## 2019-01-13 RX ADMIN — METOCLOPRAMIDE 15 MG: 5 INJECTION, SOLUTION INTRAMUSCULAR; INTRAVENOUS at 11:48

## 2019-01-13 RX ADMIN — SODIUM CHLORIDE, SODIUM LACTATE, POTASSIUM CHLORIDE, AND CALCIUM CHLORIDE 125 ML/HR: .6; .31; .03; .02 INJECTION, SOLUTION INTRAVENOUS at 09:43

## 2019-01-13 NOTE — NURSING NOTE
Notified Dr Jose Bucio 7 3/23 5 WBC: 24 15, vs, 97 9 80/55, 101, 20 per physician will place orders for blood transfusion continue to monitor vital signs/patient and advise

## 2019-01-13 NOTE — OP NOTE
ESOPHAGOGASTRODUODENOSCOPY    PROCEDURE: EGD    SEDATION: Monitored anesthesia care, check anesthesia records    ASA Class: 4E    INDICATIONS: Anemia, distended stomach on CT scan, air in the gastric wall, and old blood in the nasogastric tube aspirate    CONSENT:  Informed consent was obtained for the procedure, including sedation after explaining the risks and benefits of the procedure  Risks including but not limited to bleeding, perforation, infection, and missed lesion  PREPARATION:   Telemetry, pulse oximetry, blood pressure were monitored throughout the procedure  Patient was identified by myself both verbally and by visual inspection of ID band  DESCRIPTION:   Patient was placed in the left lateral decubitus position and was sedated with the above medication  The gastroscope was introduced in to the oropharynx and the esophagus was intubated under direct visualization  Scope was passed down the esophagus up to 2nd part of the duodenum  A careful inspection was made as the gastroscope was withdrawn, including a retroflexed view of the stomach; findings and interventions are described below  FINDINGS:    #1  Esophagus-  LA class B erosive reflux esophagitis was noted  #2  Stomach-   There was a small sliding hiatal hernia and there was evidence of severe erythema with some clean based ulcers in the body of the stomach  Biopsies were taken from these erythematous areas to rule out H pylori, malignancy, and ischemia  There is also a clean based ulcer next to the pylorus with some scarring in that area appearing to cause a partial gastric outlet obstruction      #3  Duodenum-   Normal          IMPRESSIONS:       LA class B erosive reflux esophagitis   small sliding hiatal hernia   severe erythema with some small clean based gastric ulcers - H pylori, malignancy, ischemia?   partial gastric outlet obstruction likely due to scarring from previous gastric ulcers    RECOMMENDATIONS:      await pathology results   continue Protonix twice a day   resume diet    COMPLICATIONS:  None; patient tolerated the procedure well      SPECIMENS:    ID Type Source Tests Collected by Time Destination   1 : GASTRIC BODY  R/O H-PYLORI Tissue Stomach TISSUE EXAM Darrius Thompson MD 1/13/2019 4523        ESTIMATED BLOOD LOSS:  Minimal

## 2019-01-13 NOTE — ANESTHESIA PREPROCEDURE EVALUATION
Review of Systems/Medical History          Cardiovascular  EKG reviewed,   Comment: EF-60%-1/19,  Infra Renal AAA -3 7cm,  Pulmonary  Smoker ,        GI/Hepatic      Comment: Gastric outlet Obstruction, Distended Abdomen     Genitourinary malignancy Bladder cancer,        Endo/Other     GYN       Hematology  Anemia ,     Musculoskeletal       Neurology   Psychology           Physical Exam    Airway  Comment: Large Ashby  Mallampati score: I  TM Distance: >3 FB  Neck ROM: full     Dental   Comment: Multiple Missing Teeth  Multiple carious teeth,     Cardiovascular      Pulmonary  Decreased breath sounds,     Other Findings        Anesthesia Plan  ASA Score- 4 Emergent    Anesthesia Type- general with ASA Monitors  Additional Monitors:   Airway Plan: ETT  Plan Factors-Patient not instructed to abstain from smoking on day of procedure  Patient did not smoke on day of surgery  Induction- intravenous  Postoperative Plan-     Informed Consent- Anesthetic plan and risks discussed with patient  I personally reviewed this patient with the CRNA  Discussed and agreed on the Anesthesia Plan with the CRNA  Saúl Bowie           2 units Crossmatched    Lab Results   Component Value Date    GLUC 128 01/13/2019    ALT 15 01/11/2019    AST 24 01/11/2019    BUN 27 (H) 01/13/2019    CALCIUM 7 3 (L) 01/13/2019     01/13/2019    CO2 27 01/13/2019    CREATININE 0 59 (L) 01/13/2019    INR 1 05 01/13/2019    HCT 27 7 (L) 01/13/2019    HGB 8 9 (L) 01/13/2019    MG 2 0 01/11/2019     01/13/2019    K 4 7 01/13/2019    WBC 24 15 (H) 01/13/2019

## 2019-01-13 NOTE — ANESTHESIA POSTPROCEDURE EVALUATION
Post-Op Assessment Note      CV Status:  Stable    Mental Status:  Alert and awake    Hydration Status:  Stable    PONV Controlled:  None    Airway Patency:  Patent and adequate  Airway: intubated    Post Op Vitals Reviewed: Yes          Staff: Anesthesiologist, CRNA           /66 (01/13/19 1602)    Temp 98 4 °F (36 9 °C) (01/13/19 1602)    Pulse (!) 112 (01/13/19 1602)   Resp (!) 24 (01/13/19 1602)    SpO2 99 % (01/13/19 1602)

## 2019-01-13 NOTE — PROGRESS NOTES
Bedside nasogastric tube lavage    Asked by GI to perform bedside lavage to clean out stomach and to assess for bleeding    NGT inserted into left nare without difficulty or vomiting or aspiration  Patient tolerated well  Immediate return of 600ml of thin, non clotted, dark blood tinged fluid mixed with bile     Stomach lavaged with 1 0L of warm water  Aspirate began to become clear after about 600ml  Plan for EGD later to assess etiology of gastric outlet obstruction and bleeding source   Ulcer vs masses vs  mucosal bleeding from over distension

## 2019-01-13 NOTE — PROGRESS NOTES
Progress Note - General Surgery   Dia Coe 68 y o  male MRN: [id number]  Unit/Bed#: [facility] 928-01 Encounter: [id number]    Assessment:  73M with hematemesis and a syncopal episode  He was intially stable with no signs of ongoing blood loss, but overnight he was hypotensive, dizzy, and got a bolus of fluids plus 1 unit pRBC  Hgb responded 6 5-> 7 3, but WBC also went up to 24    Plan:  -transfuse additional 1 unit pRBC, follow up Hgb  -contact GI about possibility for urgent EGD  -keep diet at clears  -serial abdominal exams  -OOB, AAT    Subjective/Objective   Chief Complaint: dizzy    Subjective: Feels ok, was dizzy and sweaty while sitting up last night  He got 1 unit of blood and a 1L bolus and felt better, but still not normal  No nausea or vomiting, no bloody BMs  Still passing gas    Objective:   Blood pressure 114/59, pulse 100, temperature 98 2 °F (36 8 °C), temperature source Oral, resp  rate 20, height 5' 10" (1 778 m), weight 63 5 kg (140 lb), SpO2 97 %  ,Body mass index is 20 09 kg/m²  Intake/Output Summary (Last 24 hours) at 01/13/19 0706  Last data filed at 01/13/19 0501   Gross per 24 hour   Intake             1220 ml   Output             2000 ml   Net             -780 ml       Invasive Devices     Peripheral Intravenous Line            Peripheral IV 01/10/19 Left Forearm 2 days                Physical Exam:   General: No acute distress  HEENT: Normocephalic, atraumatic  Neck: Normal ROM  No tracheal deviation  Cardio: Normal rate, regular rhythm  Pulm: Normal respiratory effort  Abdomen: Soft, non-tender, non-distended   Slightly tympanitic  Extremities: PENA, No edema  Neuro: Cranial nerves II-XII intact  Psych: Normal affect      Lab, Imaging and other studies:  CBC:   Lab Results   Component Value Date    WBC 24 15 (H) 01/13/2019    HGB 7 3 (L) 01/13/2019    HCT 23 5 (L) 01/13/2019    MCV 97 01/13/2019     01/13/2019    MCH 30 0 01/13/2019    MCHC 31 1 (L) 01/13/2019    RDW 14 2 01/13/2019    MPV 10 2 01/13/2019   , CMP:   Lab Results   Component Value Date    SODIUM 137 01/13/2019    K 4 7 01/13/2019     01/13/2019    CO2 27 01/13/2019    BUN 27 (H) 01/13/2019    CREATININE 0 59 (L) 01/13/2019    CALCIUM 7 3 (L) 01/13/2019    EGFR 100 01/13/2019     VTE Pharmacologic Prophylaxis: Heparin  VTE Mechanical Prophylaxis: sequential compression device

## 2019-01-13 NOTE — PROGRESS NOTES
Progress Note - Dia Coe 68 y o  male MRN: [id number]    Unit/Bed#: [facility] 928-01 Encounter: [id number]        Subjective:     Called to see the patient this morning due to symptomatic hypotension and anemia  Per nursing staff and surgical resident, patient over night developed significant hypotension ( BP 74/48 ) associated with complaints of dizziness and fatigue  The patient has received a bolus of NS and 1 until of RBC  Overnight the patient's Hgb dropped from 7 9 to 6 5  His Hgb has been trending down from 8 6 on 1/11/19  The patient has NOT developed any additional signs of overt GI bleeding  Currently he complains of some vague mild abdominal discomfort  I asked if he would be willing to re-attempt and NGT placement for lavage and aspiration  The patient declined NGT, but states he would be willing to attempt OGT lavage if it was temporary  Objective:     Vitals: Blood pressure (!) 89/66, pulse 103, temperature 98 06 °F (36 7 °C), resp  rate 20, height 5' 10" (1 778 m), weight 63 5 kg (140 lb), SpO2 95 %  ,Body mass index is 20 09 kg/m²        Intake/Output Summary (Last 24 hours) at 01/13/19 3233  Last data filed at 01/13/19 0801   Gross per 24 hour   Intake             1220 ml   Output             2300 ml   Net            -1080 ml       Physical Exam:     General Appearance: Alert, appears stated age and cooperative  Lungs: Clear to auscultation bilaterally, no rales or rhonchi, no labored breathing/accessory muscle use  Heart: Regular rate and rhythm, S1, S2 normal, no murmur, click, rub or gallop  Abdomen: Soft, mild diffuse tenderness, non-distended; bowel sounds normal; no masses or no organomegaly  Extremities: No cyanosis, edema    Invasive Devices     Peripheral Intravenous Line            Peripheral IV 01/10/19 Left Forearm 2 days                Lab Results:      Results from last 7 days  Lab Units 01/13/19  0548   WBC Thousand/uL 24 15*   HEMOGLOBIN g/dL 7 3*   HEMATOCRIT % 23 5* PLATELETS Thousands/uL 182   NEUTROS PCT % 90*   LYMPHS PCT % 4*   MONOS PCT % 5   EOS PCT % 0       Results from last 7 days  Lab Units 01/13/19  0548  01/11/19  0547   POTASSIUM mmol/L 4 7  < > 4 6   CHLORIDE mmol/L 104  < > 109*   CO2 mmol/L 27  < > 26   BUN mg/dL 27*  < > 40*   CREATININE mg/dL 0 59*  < > 0 64   CALCIUM mg/dL 7 3*  < > 7 6*   ALK PHOS U/L  --   --  51   ALT U/L  --   --  15   AST U/L  --   --  24   < > = values in this interval not displayed  Imaging Studies: I have personally reviewed pertinent imaging studies  X-ray Chest 2 Views    Result Date: 1/10/2019  Impression: Apical interstitial and airspace opacities  Stomach is markedly distended with a large amount of fluid layering, best seen on the lateral view  CT of the chest, abdomen and pelvis has already been obtained  Workstation performed: [id number]     Cta Chest And Abdomen W Wo Contrast    Result Date: 1/10/2019  Impression: 1  Infrarenal abdominal aortic aneurysm measuring 3 7 cm  No evidence of dissection  2   Markedly gastric distention with gastric intramural air and portal venous gas  Differential diagnosis include emphysematous gastritis or gastric emphysema, the former favored due to presence of portal venous gas  Further evaluation with upper GI endoscopy is recommended  3   Age indeterminate compression fractures of T12 and L1  I personally discussed this study with Dr Carroll Fleming on 1/10/2019 at 10:28 AM  Workstation performed: [id number]       ASSESMENT/ PLAN:   Principal Problem:    Gastric outlet obstruction      1  Symptomatic hypotension   -patient to be transferred to the ICU  Agree with supportive management (fluid bolus and RBC resuscitation)  Appreciate surgery and ICU's assistance       2  GI bleed-coffee-ground emesis/hematemesis   -no further overt signs of GI bleeding    -will ask that OG tube lavage and aspiration be performed further quantify possible source of GI bleeding    -will ask that the patient receive 1 additional unit of RBCs prior to endoscopic evaluation  -will check stat PT / INR    -tentative EGD planned for later today pending above    -Continue PPI will swtich from IV BID to ggt     3  Anemia secondary to suspected GI bleed    4  Gastric emphysema with portal venous gas on CT - ? Underlying PUD     Discussed above with attending  Please do not hesitate to contact us with any questions or concerns        [facility]

## 2019-01-13 NOTE — PROGRESS NOTES
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 and we will plan for an upper endoscopy on Monday if he remains clinically stable  He is aware there is an increased risk of perforation given his CT findings  We will continue Protonix and follow his abdominal exam, white blood count, and hemoglobin closely  His diet is being advanced and he is tolerating it well     ______________________________________________________________________    Subjective:     He denies any abdominal pain and he reports having a good appetite  He has not had any recent nausea, vomiting, or bleeding  Medication Administration - last 24 hours from 01/11/2019 2159 to 01/12/2019 2159       Date/Time Order Dose Route Action Action by     01/12/2019 1631 lactated ringers infusion 125 mL/hr Intravenous [facility], RN     01/12/2019 8315 lactated ringers infusion 125 mL/hr Intravenous Josephdaijamariela  Lien [address]ix[address]     01/12/2019 6425 lactated ringers infusion 0 mL/hr Intravenous Stopped Lien Calix RN     01/12/2019 1951 ondansetron (ZOFRAN) injection 4 mg 4 mg Intravenous Given Ivette Gram     01/12/2019 1954 acetaminophen (TYLENOL) tablet 650 mg 650 mg Oral Given Ivette Gram     01/12/2019 0810 pantoprazole (PROTONIX) injection 40 mg 40 mg Intravenous Given Lien Calix RN     01/12/2019 0809 bisacodyl (DULCOLAX) rectal suppository 10 mg 10 mg Rectal Not Given Lien Calix RN     01/12/2019 1951 oxyCODONE (ROXICODONE) IR tablet 5 mg 5 mg Oral Given Chacha Villafuerte          Objective:     Vitals: Blood pressure 115/69, pulse 99, temperature 97 9 °F (36 6 °C), temperature source Oral, resp  rate 20, height 5' 10" (1 778 m), weight 63 5 kg (140 lb), SpO2 97 %  ,Body mass index is 20 09 kg/m²        Intake/Output Summary (Last 24 hours) at 01/12/19 2159  Last data filed at 01/12/19 1900   Gross per 24 hour   Intake              740 ml   Output             1575 ml   Net             -835 ml       Physical Exam:   General Appearance: Awake and alert, in no acute distress  Abdomen: Soft, non-tender, non-distended; bowel sounds normal; no masses or no organomegaly    Invasive Devices     Peripheral Intravenous Line            Peripheral IV 01/10/19 Left Forearm 2 days                Lab Results:  Admission on 01/10/2019   Component Date Value    Sodium 01/10/2019 137     Potassium 01/10/2019 4 3     Chloride 01/10/2019 102     CO2 01/10/2019 25     ANION GAP 01/10/2019 10     BUN 01/10/2019 26*    Creatinine 01/10/2019 1 09     Glucose 01/10/2019 237*    Calcium 01/10/2019 8 5     AST 01/10/2019 15     ALT 01/10/2019 20     Alkaline Phosphatase 01/10/2019 70     Total Protein 01/10/2019 6 9     Albumin 01/10/2019 3 2*    Total Bilirubin 01/10/2019 0 22     eGFR 01/10/2019 67     WBC 01/10/2019 12 21*    RBC 01/10/2019 3 79*    Hemoglobin 01/10/2019 11 4*    Hematocrit 01/10/2019 38 1     MCV 01/10/2019 101*    MCH 01/10/2019 30 1     MCHC 01/10/2019 29 9*    RDW 01/10/2019 13 7     MPV 01/10/2019 10 7     Platelets 44/27/6068 386     nRBC 01/10/2019 0     Neutrophils Relative 01/10/2019 53     Immat GRANS % 01/10/2019 1     Lymphocytes Relative 01/10/2019 27     Monocytes Relative 01/10/2019 13*    Eosinophils Relative 01/10/2019 4     Basophils Relative 01/10/2019 2*    Neutrophils Absolute 01/10/2019 6 58     Immature Grans Absolute 01/10/2019 0 07     Lymphocytes Absolute 01/10/2019 3 27     Monocytes Absolute 01/10/2019 1 58*    Eosinophils Absolute 01/10/2019 0 49     Basophils Absolute 01/10/2019 0 22*    Troponin I 01/10/2019 <0 02     ABO Grouping 01/10/2019 B     Rh Factor 01/10/2019 Positive     Antibody Screen 01/10/2019 Negative     Specimen Expiration Date 01/10/2019 92175341     LACTIC ACID 01/10/2019 3 0*    Hemoglobin 01/10/2019 9 8*    Hematocrit 01/10/2019 31 6*    Hemoglobin 01/10/2019 9 7*    Hematocrit 01/10/2019 32 2*    Ventricular Rate 01/10/2019 108     Atrial Rate 01/10/2019 108     [place] Interval 01/10/2019 134     QRSD Interval 01/10/2019 102     QT Interval 01/10/2019 346     QTC Interval 01/10/2019 463     P Axis 01/10/2019 53     QRS Axis 01/10/2019 -36     T Wave Axis 01/10/2019 77     Hemoglobin 01/10/2019 8 8*    Hematocrit 01/10/2019 29 0*    LACTIC ACID 01/10/2019 1 5     WBC 01/11/2019 13 33*    RBC 01/11/2019 2 80*    Hemoglobin 01/11/2019 8 3*    Hematocrit 01/11/2019 28 0*    MCV 01/11/2019 100*    MCH 01/11/2019 29 6     MCHC 01/11/2019 29 6*    RDW 01/11/2019 13 9     MPV 01/11/2019 10 5     Platelets 88/99/1916 233     nRBC 01/11/2019 0     Neutrophils Relative 01/11/2019 76*    Immat GRANS % 01/11/2019 1     Lymphocytes Relative 01/11/2019 11*    Monocytes Relative 01/11/2019 10     Eosinophils Relative 01/11/2019 1     Basophils Relative 01/11/2019 1     Neutrophils Absolute 01/11/2019 10 24*    Immature Grans Absolute 01/11/2019 0 09     Lymphocytes Absolute 01/11/2019 1 51     Monocytes Absolute 01/11/2019 1 31*    Eosinophils Absolute 01/11/2019 0 10     Basophils Absolute 01/11/2019 0 08     Sodium 01/11/2019 140     Potassium 01/11/2019 4 6     Chloride 01/11/2019 109*    CO2 01/11/2019 26     ANION GAP 01/11/2019 5     BUN 01/11/2019 40*    Creatinine 01/11/2019 0 64     Glucose 01/11/2019 88     Calcium 01/11/2019 7 6*    AST 01/11/2019 24     ALT 01/11/2019 15     Alkaline Phosphatase 01/11/2019 51     Total Protein 01/11/2019 5 9*    Albumin 01/11/2019 2 8*    Total Bilirubin 01/11/2019 0 19*    eGFR 01/11/2019 97     Magnesium 01/11/2019 2 0     Hemoglobin 01/11/2019 8 6*    Hematocrit 01/11/2019 28 5*    WBC 01/12/2019 9 27     RBC 01/12/2019 2 64*    Hemoglobin 01/12/2019 7 9*    Hematocrit 01/12/2019 26 1*    MCV 01/12/2019 99*    MCH 01/12/2019 29 9     MCHC 01/12/2019 30 3*    RDW 01/12/2019 14 2     MPV 01/12/2019 11 1     Platelets 86/78/0002 211     nRBC 01/12/2019 0     Neutrophils Relative 01/12/2019 73     Immat GRANS % 01/12/2019 0     Lymphocytes Relative 01/12/2019 14     Monocytes Relative 01/12/2019 10     Eosinophils Relative 01/12/2019 2     Basophils Relative 01/12/2019 1     Neutrophils Absolute 01/12/2019 6 78     Immature Grans Absolute 01/12/2019 0 03     Lymphocytes Absolute 01/12/2019 1 33     Monocytes Absolute 01/12/2019 0 88     Eosinophils Absolute 01/12/2019 0 19     Basophils Absolute 01/12/2019 0 06     Sodium 01/12/2019 138     Potassium 01/12/2019 4 6     Chloride 01/12/2019 106     CO2 01/12/2019 27     ANION GAP 01/12/2019 5     BUN 01/12/2019 21     Creatinine 01/12/2019 0 54*    Glucose 01/12/2019 76     Calcium 01/12/2019 7 9*    eGFR 01/12/2019 104        Imaging Studies: I have personally reviewed pertinent imaging studies

## 2019-01-13 NOTE — PROGRESS NOTES
ICU Acceptance Note- R Venita Mccormick [age] y o  male MRN: [id number]  Unit/Bed#: [facility] 495-60 Encounter: [id number]     Physician Requesting Consult: Padmini Leary DO  Consults     Reason for Consultation / Chief Complaint: Acute blood loss anemia, ICU level of care     History of Present Illness:  sYabel Fonseca is a 68 y o  male w/ hx of bladder cancer s/p resection and abdominal aortic aneurysm (3 7 cm) who presented to hospital for syncopal episode and hematemesis  CT of abdomen/pelvis showed gastric emphysema and portal venous gas  Patient became hypotensive and had a drop in his hemoglobin to 6 5  Patient has received 3 U PRBCs  History obtained from chart review and the patient  Past Medical History:  Past Medical History:   Diagnosis Date    Bladder cancer [facility])         Past Surgical History:  History reviewed  No pertinent surgical history  Past Family History:  History reviewed  No pertinent family history  Social History:  History   Smoking Status    Current Every Day Smoker    Packs/day: 0 25    Types: Cigarettes   Smokeless Tobacco    Former User     History   Alcohol Use    Yes     Comment: Socially     History   Drug Use No     Marital Status:   Exercise History:      Home Medications:   Prior to Admission medications    Medication Sig Start Date End Date Taking?  Authorizing Provider   Esomeprazole Magnesium (NEXIUM PO) Take 20 mg by mouth every 12 (twelve) hours     Yes Historical Provider, MD        Inpatient Medications:  Scheduled Meds:  Current Facility-Administered Medications:  acetaminophen 650 mg Oral Q6H PRN David Willson MD    HYDROmorphone 0 2 mg Intravenous Q3H PRN Artem Rogers MD    lactated ringers 125 mL/hr Intravenous Continuous David Willson MD Last Rate: 125 mL/hr (01/13/19 0943)   ondansetron 4 mg Intravenous Q6H PRN David Willson MD    oxyCODONE 2 5 mg Oral Q4H PRN Artem Rogers MD    oxyCODONE 5 mg Oral Q4H PRN Essence Velazquez MD    pantoprozole (PROTONIX) infusion (Continuous) 8 mg/hr Intravenous Continuous Ron Pradhan PA-C      Continuous Infusions:  lactated ringers 125 mL/hr Last Rate: 125 mL/hr (19 0943)   pantoprozole (PROTONIX) infusion (Continuous) 8 mg/hr      PRN Meds:    acetaminophen 650 mg Q6H PRN   HYDROmorphone 0 2 mg Q3H PRN   ondansetron 4 mg Q6H PRN   oxyCODONE 2 5 mg Q4H PRN   oxyCODONE 5 mg Q4H PRN        Allergies:  No Known Allergies     ROS:   Review of Systems   Constitutional: Negative for chills, fever and unexpected weight change  HENT: Negative for congestion, sore throat and trouble swallowing  Eyes: Negative for pain, discharge and itching  Respiratory: Negative for cough, chest tightness, shortness of breath and wheezing  Cardiovascular: Negative for chest pain, palpitations and leg swelling  Gastrointestinal: Negative for abdominal pain, blood in stool, diarrhea, nausea and vomiting  Endocrine: Negative for polyuria  Genitourinary: Negative for difficulty urinating, dysuria, frequency and hematuria  Musculoskeletal: Negative for arthralgias and back pain  Neurological: Negative for dizziness, syncope, weakness, light-headedness and headaches  Vitals:  Vitals:    19 0805 19 0806 19 0918 19 0921   BP:  (!) 89/66  99/59   BP Location:       Pulse: 103 103  100   Resp:  20  22   Temp:  98 06 °F (36 7 °C) 98 3 °F (36 8 °C)    TempSrc:   Oral    SpO2: 95% 95%  94%   Weight:    69 8 kg (153 lb 14 1 oz)   Height:         Temperature:   Temp (24hrs), Av °F (36 7 °C), Min:97 5 °F (36 4 °C), Max:98 3 °F (36 8 °C)    Current Temperature: 98 3 °F (36 8 °C)     Weights:   IBW: 73 kg  Body mass index is 22 08 kg/m²       Hemodynamic Monitoring:  N/A     Non-Invasive/Invasive Ventilation Settings:  Respiratory    Lab Data (Last 4 hours)    None         O2/Vent Data (Last 4 hours)    None              No results found for: PHART, HPP9CSJ, PO2ART, SIL3GDM, G4MQNNPL, BEART, SOURCE  SpO2: SpO2: 94 %     Physical Exam:  Physical Exam   Constitutional: He is oriented to person, place, and time  He appears well-developed and well-nourished  No distress  HENT:   Head: Normocephalic and atraumatic  Right Ear: External ear normal    Left Ear: External ear normal    Mouth/Throat: No oropharyngeal exudate  Eyes: Pupils are equal, round, and reactive to light  Conjunctivae and EOM are normal  Right eye exhibits no discharge  Left eye exhibits no discharge  Neck: Normal range of motion  Neck supple  Cardiovascular: Normal rate, regular rhythm, normal heart sounds and intact distal pulses  No murmur heard  Pulmonary/Chest: Effort normal and breath sounds normal  No respiratory distress  He has no wheezes  He has no rales  Abdominal: Soft  He exhibits no distension  There is no tenderness  There is no rebound  Musculoskeletal: He exhibits no edema or deformity  Lymphadenopathy:     He has no cervical adenopathy  Neurological: He is alert and oriented to person, place, and time  No cranial nerve deficit  He exhibits normal muscle tone  Skin: Skin is warm and dry  No rash noted  Labs:    Results from last 7 days  Lab Units 01/13/19  0548 01/13/19  0010 01/12/19  0526 01/11/19  0547   WBC Thousand/uL 24 15*  --  9 27  --  13 33*   HEMOGLOBIN g/dL 7 3* 6 5* 7 9*  < > 8 3*   HEMATOCRIT % 23 5* 20 6* 26 1*  < > 28 0*   PLATELETS Thousands/uL 182  --  211  --  233   NEUTROS PCT % 90*  --  73  --  76*   MONOS PCT % 5  --  10  --  10   < > = values in this interval not displayed     Results from last 7 days  Lab Units 01/13/19  0548 01/12/19  0526 01/11/19  0547 01/10/19  0705   SODIUM mmol/L 137 138 140 137   POTASSIUM mmol/L 4 7 4 6 4 6 4 3   CHLORIDE mmol/L 104 106 109* 102   CO2 mmol/L 27 27 26 25   BUN mg/dL 27* 21 40* 26*   CREATININE mg/dL 0 59* 0 54* 0 64 1 09   CALCIUM mg/dL 7 3* 7 9* 7 6* 8 5   ALK PHOS U/L  --   --  51 70 ALT U/L  --   --  15 20   AST U/L  --   --  24 15       Results from last 7 days  Lab Units 01/11/19  0547   MAGNESIUM mg/dL 2 0                Results from last 7 days  Lab Units 01/10/19  2211   LACTIC ACID mmol/L 1 5       0  Lab Value Date/Time   TROPONINI <0 02 01/10/2019 6449        Imaging:  I have personally reviewed pertinent reports  EKG: This was personally reviewed by myself  Micro:  No results found for: Varsha Scale, WOUNDCULT, SPUTUMCULTUR    Assessment:   Acute blood loss anemia  Hypotension  Gastric emphysema     Plan:                   Neuro:    - No acute issues  - Pain control: PRN tylenol, oxycodone, dilaudid                  CV:    - Hypotension  2/2 to acute blood loss anemia  S/p 3 U PRBCs  Continue to monitor   - MAP goal > 65                 Lung:    - CXR ordered given cough   - Nasal Canula 2L  Maintain O2 > 94%                 GI:    - Acute blood loss anemia likely 2/2 to GI bleed  S/p 3 U PRBCs  Will have EGD done today  GI consulted   - NGT placed   - Protonix gtt                 FEN:    - LR @ 125 cc/hr   - replete electrolytes prn   - NPO for EGD later today  :    - Voiding spontaneously   - Monitor I/Os                 ID:    - No active issues  Continue to monitor                 Heme:    - Acute blood loss anemia  Hgb drop to 6 5  S/p 3U PRBCs  Q4 hr H/H   - PTT/ PT-INR/ Fibrinogen ordered   - Hold DVT ppx  SCDs                 Endo:   - Q6 hr glucose checks given NPO status                 Msk/Skin:    -Frequent repositioning   - PT/OT consult when able                  Disposition: ICU     VTE Pharmacologic Prophylaxis: Reason for no pharmacologic prophylaxis Acute blood loss anemia  VTE Mechanical Prophylaxis: sequential compression device     Invasive lines and devices:   Invasive Devices     Peripheral Intravenous Line            Peripheral IV 01/10/19 Left Forearm 2 days    Peripheral IV 01/13/19 Left Antecubital less than 1 day Peripheral IV 01/13/19 Right Antecubital less than 1 day                 Code Status: Level 1 - Full Code  POA:    POLST:       Given critical illness, patient length of stay will require greater than two midnights  Counseling / Coordination of Care  Total Critical Care time spent 30 minutes excluding procedures, teaching and family updates  Portions of the record may have been created with voice recognition software  Occasional wrong word or "sound a like" substitutions may have occurred due to the inherent limitations of voice recognition software  Read the chart carefully and recognize, using context, where substitutions have occurred          Vanda Hadley DO

## 2019-01-13 NOTE — PROGRESS NOTES
Patient with episodes of hypotension  Seen and evaluated  Appears pale and diaphoretic  1L bolus of NS was initiated  H&H and T&S drawn  Hemoglobin is 6 5 from 7 9 this morning  I will transfuse one unit at this time, then recheck       Rhina Ashford MD

## 2019-01-13 NOTE — NURSING NOTE
Pt awoke with tachycardia, diaphoresis and dizziness  Vitals show low BP 70s/40s  Red Surgery paged and ordered 1L NS bolus  Labs ordered STAT  Red Sx at bedside

## 2019-01-13 NOTE — PROGRESS NOTES
Since being transferred to CCU, the patient's Repeat Hgb( s/p 1 additional unit of RBC is 8 9)  The patient agreed to a temporarily placed NGT  A Large volume of black gastric contents was aspirated  Above consistent with upper gi bleeding  I spoke to Dr Severino Ogden, he will plan an EGD at the bedside today  Please call with any questions or concerns   Ashleigh Moraes [facility]

## 2019-01-13 NOTE — PROGRESS NOTES
Post Procedure Check    Patient was evaluated following EGD  Findings included mutliple clean based ulcers and scarring near the pylorus causing partial gastric outlet obstruction  Also showed esophagitis  Ulcerations concerning for possible malignancy or H  Pylori  Biopsies were sent  No intervention needed due to no active bleeding  He received a third unit of PRBCs in the OR  Patient extubated post procedure  He states that his abdomen feels better now  He denies pain or nausea  On exam his abdomen is soft and mildly distended but non-tender to palpation  Lungs are clear, heart sounds normal skin pink/warm/dry  Will plan to check serial H/H q6h starting at 6pm  Will change protonix gtt to IV BID and start diet if 6 pm H/H stable as recommended by GI

## 2019-01-14 ENCOUNTER — APPOINTMENT (INPATIENT)
Dept: RADIOLOGY | Facility: HOSPITAL | Age: 74
DRG: 377 | End: 2019-01-14
Payer: MEDICARE

## 2019-01-14 LAB
ABO GROUP BLD BPU: NORMAL
ALBUMIN SERPL BCP-MCNC: 2.7 G/DL (ref 3.5–5)
ALP SERPL-CCNC: 45 U/L (ref 46–116)
ALT SERPL W P-5'-P-CCNC: 17 U/L (ref 12–78)
ANION GAP SERPL CALCULATED.3IONS-SCNC: 3 MMOL/L (ref 4–13)
AST SERPL W P-5'-P-CCNC: 44 U/L (ref 5–45)
BASOPHILS # BLD AUTO: 0.08 THOUSANDS/ΜL (ref 0–0.1)
BASOPHILS NFR BLD AUTO: 1 % (ref 0–1)
BILIRUB SERPL-MCNC: 0.99 MG/DL (ref 0.2–1)
BPU ID: NORMAL
BUN SERPL-MCNC: 23 MG/DL (ref 5–25)
CALCIUM SERPL-MCNC: 8.2 MG/DL (ref 8.3–10.1)
CHLORIDE SERPL-SCNC: 101 MMOL/L (ref 100–108)
CO2 SERPL-SCNC: 31 MMOL/L (ref 21–32)
CREAT SERPL-MCNC: 0.6 MG/DL (ref 0.6–1.3)
EOSINOPHIL # BLD AUTO: 0.21 THOUSAND/ΜL (ref 0–0.61)
EOSINOPHIL NFR BLD AUTO: 2 % (ref 0–6)
ERYTHROCYTE [DISTWIDTH] IN BLOOD BY AUTOMATED COUNT: 16.1 % (ref 11.6–15.1)
GFR SERPL CREATININE-BSD FRML MDRD: 100 ML/MIN/1.73SQ M
GLUCOSE SERPL-MCNC: 113 MG/DL (ref 65–140)
HCT VFR BLD AUTO: 24.5 % (ref 36.5–49.3)
HCT VFR BLD AUTO: 26.3 % (ref 36.5–49.3)
HGB BLD-MCNC: 7.8 G/DL (ref 12–17)
HGB BLD-MCNC: 8.4 G/DL (ref 12–17)
IMM GRANULOCYTES # BLD AUTO: 0.13 THOUSAND/UL (ref 0–0.2)
IMM GRANULOCYTES NFR BLD AUTO: 1 % (ref 0–2)
LYMPHOCYTES # BLD AUTO: 1.55 THOUSANDS/ΜL (ref 0.6–4.47)
LYMPHOCYTES NFR BLD AUTO: 13 % (ref 14–44)
MAGNESIUM SERPL-MCNC: 1.6 MG/DL (ref 1.6–2.6)
MCH RBC QN AUTO: 30.1 PG (ref 26.8–34.3)
MCHC RBC AUTO-ENTMCNC: 31.9 G/DL (ref 31.4–37.4)
MCV RBC AUTO: 94 FL (ref 82–98)
MONOCYTES # BLD AUTO: 1.24 THOUSAND/ΜL (ref 0.17–1.22)
MONOCYTES NFR BLD AUTO: 11 % (ref 4–12)
NEUTROPHILS # BLD AUTO: 8.33 THOUSANDS/ΜL (ref 1.85–7.62)
NEUTS SEG NFR BLD AUTO: 72 % (ref 43–75)
NRBC BLD AUTO-RTO: 0 /100 WBCS
PHOSPHATE SERPL-MCNC: 2.1 MG/DL (ref 2.3–4.1)
PLATELET # BLD AUTO: 182 THOUSANDS/UL (ref 149–390)
PMV BLD AUTO: 10.3 FL (ref 8.9–12.7)
POTASSIUM SERPL-SCNC: 4.1 MMOL/L (ref 3.5–5.3)
PROT SERPL-MCNC: 5.7 G/DL (ref 6.4–8.2)
RBC # BLD AUTO: 2.79 MILLION/UL (ref 3.88–5.62)
SODIUM SERPL-SCNC: 135 MMOL/L (ref 136–145)
UNIT DISPENSE STATUS: NORMAL
UNIT PRODUCT CODE: NORMAL
UNIT RH: NORMAL
WBC # BLD AUTO: 11.54 THOUSAND/UL (ref 4.31–10.16)

## 2019-01-14 PROCEDURE — 99232 SBSQ HOSP IP/OBS MODERATE 35: CPT | Performed by: SURGERY

## 2019-01-14 PROCEDURE — 85025 COMPLETE CBC W/AUTO DIFF WBC: CPT | Performed by: PHYSICIAN ASSISTANT

## 2019-01-14 PROCEDURE — 80053 COMPREHEN METABOLIC PANEL: CPT | Performed by: PHYSICIAN ASSISTANT

## 2019-01-14 PROCEDURE — 83735 ASSAY OF MAGNESIUM: CPT | Performed by: PHYSICIAN ASSISTANT

## 2019-01-14 PROCEDURE — C9113 INJ PANTOPRAZOLE SODIUM, VIA: HCPCS | Performed by: PHYSICIAN ASSISTANT

## 2019-01-14 PROCEDURE — 74240 X-RAY XM UPR GI TRC 1CNTRST: CPT

## 2019-01-14 PROCEDURE — 84100 ASSAY OF PHOSPHORUS: CPT | Performed by: PHYSICIAN ASSISTANT

## 2019-01-14 RX ORDER — PANTOPRAZOLE SODIUM 40 MG/1
40 TABLET, DELAYED RELEASE ORAL
Status: DISCONTINUED | OUTPATIENT
Start: 2019-01-14 | End: 2019-01-15 | Stop reason: HOSPADM

## 2019-01-14 RX ORDER — SODIUM CHLORIDE, SODIUM LACTATE, POTASSIUM CHLORIDE, CALCIUM CHLORIDE 600; 310; 30; 20 MG/100ML; MG/100ML; MG/100ML; MG/100ML
75 INJECTION, SOLUTION INTRAVENOUS CONTINUOUS
Status: DISCONTINUED | OUTPATIENT
Start: 2019-01-14 | End: 2019-01-14

## 2019-01-14 RX ORDER — ECHINACEA PURPUREA EXTRACT 125 MG
1 TABLET ORAL
Status: DISCONTINUED | OUTPATIENT
Start: 2019-01-14 | End: 2019-01-15 | Stop reason: HOSPADM

## 2019-01-14 RX ORDER — SUCRALFATE ORAL 1 G/10ML
1000 SUSPENSION ORAL EVERY 6 HOURS SCHEDULED
Status: DISCONTINUED | OUTPATIENT
Start: 2019-01-14 | End: 2019-01-15 | Stop reason: HOSPADM

## 2019-01-14 RX ADMIN — SUCRALFATE 1000 MG: 1 SUSPENSION ORAL at 12:11

## 2019-01-14 RX ADMIN — SALINE NASAL SPRAY 1 SPRAY: 1.5 SOLUTION NASAL at 20:11

## 2019-01-14 RX ADMIN — SUCRALFATE 1000 MG: 1 SUSPENSION ORAL at 17:06

## 2019-01-14 RX ADMIN — SALINE NASAL SPRAY 1 SPRAY: 1.5 SOLUTION NASAL at 18:03

## 2019-01-14 RX ADMIN — SODIUM CHLORIDE, SODIUM LACTATE, POTASSIUM CHLORIDE, AND CALCIUM CHLORIDE 125 ML/HR: .6; .31; .03; .02 INJECTION, SOLUTION INTRAVENOUS at 01:37

## 2019-01-14 RX ADMIN — PANTOPRAZOLE SODIUM 40 MG: 40 TABLET, DELAYED RELEASE ORAL at 15:20

## 2019-01-14 RX ADMIN — PANTOPRAZOLE SODIUM 40 MG: 40 INJECTION, POWDER, FOR SOLUTION INTRAVENOUS at 08:15

## 2019-01-14 RX ADMIN — SUCRALFATE 1000 MG: 1 SUSPENSION ORAL at 08:16

## 2019-01-14 NOTE — PROGRESS NOTES
01/14/19 1000   Clinical Encounter Type   Visited With Patient   Routine Visit Introduction   Continue Visiting Yes

## 2019-01-14 NOTE — PROGRESS NOTES
Pt walked to BR from bed standby assist while on dash monitor  After BR, pt in bed for dinner  Chair alarm on  Will monitor

## 2019-01-14 NOTE — UTILIZATION REVIEW
Continued Stay Review    Date: 1/14/19 Monday LEVEL 2 STEPDOWN    Vital Signs: /65 (BP Location: Left arm)   Pulse 100   Temp 98 5 °F (36 9 °C) (Oral)   Resp (!) 29   Ht 5' 10" (1 778 m)   Wt 69 8 kg (153 lb 14 1 oz)   SpO2 94%   BMI 22 08 kg/m²        01/13 0701 01/14 0700 01/14 0701 01/14 1609  Most Recent    Temperature (°F) 97  998 9 98 198 5  98 5 (36 9)    Pulse 86112 100114  100    Respirations 1639 2234  29    Blood Pressure 83/62123/69 93/57131/71  123/65    Shock Index 0 811 28 0 781 23  0 81    SpO2 (%) 84100 9098  94              01/12 0701 01/13 0700 01/13 0701 01/14 0700 01/14 0701  01/15 0700   P  O  1220 2260 240   I V  (mL/kg)  7610 8 (109) 716 7 (10 3)   IV Piggyback  50    Total Intake(mL/kg) 1220 (19 2) 9920 8 (142 1) 956 7 (13 7)   Urine (mL/kg/hr) 2425 (1 6) 4275 (2 6) 675 (1 1)   Emesis/NG output   0   Total Output 2425 4275 675   Net -1205 +5645 8 +281 7         Unmeasured Urine Occurrence 1 x     Unmeasured Stool Occurrence   0 x       Diet Regular; House; Non Ulcerogenic     Continuous IV Infusions:  IVF lactated ringers infusion at 75 cc/hr      Assessment/Plan:   Assessment:  - UGI Bleed  - Gastric outlet obstruction  - Acute blood loss anemia  - Hx of bladder cancer  - Hx of abdominal aortic aneurysm      Plan:         Neuro:               - Pain control: PRN tylenol, oxycodone and Dilaudid (0 doses overnight)              - CAM ICU assessment                 CV:               - Hypotension 2/2 to acute blood loss anemia  Resolved s/p 3 U PRBCs yesterday  Continue to monitor  Goal MAP > 65              -  Tachycardia 2/2 acute blood loss anemia - resolved  Continue to monitor                 Lung:                - NC @ 2L  Maintain O2 > 94%               - CXR (1/13): stable biapical scarring              - Pulm toilet/ICS                 GI:              - UGI s/p 3 U PRBCs  600cc of dark fluid aspirated from NGT piror to EGD    Continue NGT - EGD (1/13): - Severe erythema w/ some small clean based gastric ulcers --- biopsy results pending                          - Partial gastric outlet obstruction likely 2/2 to scarring from previous gastric ulcers                          - LA class B erosive reflux esophagitis                          - Small sliding hiatal hernia               - Consider Gastric emptying study to determine degree of gastric outlet obstruction               - GI consulted  - Protonix 40mg BID              - Zofran prn nausea                 FEN:               - LR @ 125 cc/hr  Discontinue once patient has regular diet              - replete electrolytes PRN              - Hgb stable improved to 8 4 from 7 8  Advance diet per general surgery                  :               - Voiding spontaneously  - Monitor I/Os                 ID:               - No active signs of infection  Continue to monitor                  Heme:               - PT/INR, PTT and Fibrinogen WNL              - Hgb 8,4 from 7 8  Continue to monitor Q 12 hrs              - Hold DVT ppx   SCDs                 Endo:               - No acute issues              - Q6 hr glucose checks                            Msk/Skin:               - Frequent repositioning and turning                  Disposition:               - Step down 2      VTE Pharmacologic Prophylaxis: Reason for no pharmacologic prophylaxis UGI bleed  VTE Mechanical Prophylaxis: sequential compression device       Medications:   Scheduled Meds:   Current Facility-Administered Medications:  acetaminophen 650 mg Oral Q6H PRN    HYDROmorphone 0 2 mg Intravenous Q3H PRN    ondansetron 4 mg Intravenous Q6H PRN    oxyCODONE 2 5 mg Oral Q4H PRN    oxyCODONE 5 mg Oral Q4H PRN    pantoprazole 40 mg Oral BID AC    sodium chloride 1 spray Each Nare Q1H PRN    sucralfate 1,000 mg Oral Q6H [address]        PRN Meds:     acetaminophen    HYDROmorphone   ondansetron    oxyCODONE    oxyCODONE    sodium chloride    LABS/Diagnostic Results:   Results from last 7 days  Lab Units 01/14/19  0601 01/13/19  2355 01/13/19  1755   01/13/19  0548   01/12/19  0526   WBC Thousand/uL 11 54*  --   --   --  24 15*  --  9 27   HEMOGLOBIN g/dL 8 4* 7 8* 7 9*  < > 7 3*  < > 7 9*   HEMATOCRIT % 26 3* 24 5* 24 9*  < > 23 5*  < > 26 1*   PLATELETS Thousands/uL 182  --   --   --  182  --  211   NEUTROS PCT % 72  --   --   --  90*  --  73   MONOS PCT % 11  --   --   --  5  --  10      Results from last 7 days  Lab Units 01/13/19  0548 01/12/19  0526 01/11/19  0547 01/10/19  0705   SODIUM mmol/L 137 138 140 137   POTASSIUM mmol/L 4 7 4 6 4 6 4 3   CHLORIDE mmol/L 104 106 109* 102   CO2 mmol/L 27 27 26 25   BUN mg/dL 27* 21 40* 26*   CREATININE mg/dL 0 59* 0 54* 0 64 1 09   CALCIUM mg/dL 7 3* 7 9* 7 6* 8 5   ALK PHOS U/L  --   --  51 70   ALT U/L  --   --  15 20   AST U/L  --   --  24 15       Results from last 7 days  Lab Units 01/11/19  0547   MAGNESIUM mg/dL 2 0       Results from last 7 days  Lab Units 01/13/19  0920   INR   1 05   PTT seconds 29       Age/Sex: 68 y o  male           [facility]   [facility]    CASE MANAGEMENT FOLLOWING CLOSELY FOR ALL DISCHARGE NEEDS

## 2019-01-14 NOTE — PROGRESS NOTES
01/14/19 1000   Spiritual Beliefs/Perceptions   Support Systems Spouse/significant other   Stress Factors   Patient Stress Factors Other (Comment)   Coping Responses   Patient Coping Other (Comment)   Plan of Care   Comments Cultivated a caring and supportive presence     Assessment Completed by: Unit visit

## 2019-01-14 NOTE — NUTRITION
01/14/19 1410   Recommendations/Interventions   Interventions Diet: continued as ordered   Nutrition Recommendations Continue diet as ordered; Other (specify)  (Replete Phos)

## 2019-01-14 NOTE — PROGRESS NOTES
Progress Note - General Surgery   Olga Mckeon 68 y o  male MRN: [id number]  Unit/Bed#: [facility] 516-01 Encounter: [id number]    Assessment:  68year old male with bleeding gastric ulcers, partial gastric outlet obstruction s/p EGD    Plan:  NPO  Upper GI  Continue protonix BID, carafate  Trend hemoglobin  IV fluids    Subjective/Objective     Subjective: No events overnight  EGD yesterday revealed gastric ulcers with partial gastric outlet obstruction  Objective:    Blood pressure 123/69, pulse 104, temperature 98 9 °F (37 2 °C), temperature source Oral, resp  rate 22, height 5' 10" (1 778 m), weight 69 8 kg (153 lb 14 1 oz), SpO2 94 %  ,Body mass index is 22 08 kg/m²  Intake/Output Summary (Last 24 hours) at 01/14/19 0635  Last data filed at 01/14/19 0600   Gross per 24 hour   Intake          9920 75 ml   Output             4275 ml   Net          5645 75 ml       Invasive Devices     Peripheral Intravenous Line            Peripheral IV 01/10/19 Left Forearm 3 days    Peripheral IV 01/13/19 Left Antecubital less than 1 day    Peripheral IV 01/13/19 Right Antecubital less than 1 day                Physical Exam:   General: NAD, AAOx3  Eyes: PERRL  ENT: moist mucous membranes  Neck: supple  CV: tachycardic 100s  Chest: breath sounds bilaterally  Abdomen: soft, NT ND  Extremities: atraumatic, no edema        Results from last 7 days  Lab Units 01/14/19  0601 01/13/19  2355 01/13/19  1755  01/13/19  0548  01/12/19  0526   WBC Thousand/uL 11 54*  --   --   --  24 15*  --  9 27   HEMOGLOBIN g/dL 8 4* 7 8* 7 9*  < > 7 3*  < > 7 9*   HEMATOCRIT % 26 3* 24 5* 24 9*  < > 23 5*  < > 26 1*   PLATELETS Thousands/uL 182  --   --   --  182  --  211   < > = values in this interval not displayed      Results from last 7 days  Lab Units 01/13/19  0548 01/12/19  0526 01/11/19  0547   POTASSIUM mmol/L 4 7 4 6 4 6   CHLORIDE mmol/L 104 106 109*   CO2 mmol/L 27 27 26   BUN mg/dL 27* 21 40*   CREATININE mg/dL 0 59* 0 54* 0 64 CALCIUM mg/dL 7 3* 7 9* 7 6*       Results from last 7 days  Lab Units 01/13/19  0920   INR  1 05   PTT seconds 29

## 2019-01-14 NOTE — PROGRESS NOTES
Progress Note - Critical Care   Quita Elizabeth 68 y o  male MRN: [id number]  Unit/Bed#: [facility] 516-01 Encounter: [id number]    Assessment:  - UGI Bleed  - Gastric outlet obstruction  - Acute blood loss anemia  - Hx of bladder cancer  - Hx of abdominal aortic aneurysm     Plan:          Neuro:    - Pain control: PRN tylenol, oxycodone and Dilaudid (0 doses overnight)   - CAM ICU assessment                 CV:    - Hypotension 2/2 to acute blood loss anemia  Resolved s/p 3 U PRBCs yesterday  Continue to monitor  Goal MAP > 65   -  Tachycardia 2/2 acute blood loss anemia - resolved  Continue to monitor                 Lung:     - NC @ 2L  Maintain O2 > 94%    - CXR (1/13): stable biapical scarring   - Pulm toilet/ICS                GI:   - UGI s/p 3 U PRBCs  600cc of dark fluid aspirated from NGT piror to EGD  Continue NGT   - EGD (1/13): - Severe erythema w/ some small clean based gastric ulcers --- biopsy results pending    - Partial gastric outlet obstruction likely 2/2 to scarring from previous gastric ulcers    - LA class B erosive reflux esophagitis    - Small sliding hiatal hernia    - Consider Gastric emptying study to determine degree of gastric outlet obstruction    - GI consulted  - Protonix 40mg BID   - Zofran prn nausea                FEN:    - LR @ 125 cc/hr  Discontinue once patient has regular diet   - replete electrolytes PRN   - Hgb stable improved to 8 4 from 7 8  Advance diet per general surgery                 :    - Voiding spontaneously  - Monitor I/Os                 ID:    - No active signs of infection  Continue to monitor                  Heme:    - PT/INR, PTT and Fibrinogen WNL   - Hgb 8,4 from 7 8  Continue to monitor Q 12 hrs   - Hold DVT ppx  SCDs                 Endo:    - No acute issues   - Q6 hr glucose checks                            Msk/Skin:    - Frequent repositioning and turning                   Disposition:    - Step down 2     Chief Complaint: Sinus congestion    HPI/24hr events: s/p EGD for UGI and gastric outlet obstruction, s/p 3 U PRBCs    Physical Exam: Physical Exam   Constitutional: He is oriented to person, place, and time  He appears well-developed and well-nourished  No distress  HENT:   Head: Normocephalic and atraumatic  Right Ear: External ear normal    Left Ear: External ear normal    Eyes: Pupils are equal, round, and reactive to light  Conjunctivae and EOM are normal    Neck: Normal range of motion  Cardiovascular: Normal rate, regular rhythm, normal heart sounds and intact distal pulses  Exam reveals no gallop and no friction rub  No murmur heard  Pulmonary/Chest: Effort normal and breath sounds normal  No respiratory distress  He has no wheezes  He has no rales  Abdominal: Soft  Bowel sounds are normal  He exhibits no distension  There is no tenderness  There is no guarding  Musculoskeletal: He exhibits no edema, tenderness or deformity  Lymphadenopathy:     He has no cervical adenopathy  Neurological: He is alert and oriented to person, place, and time  No cranial nerve deficit or sensory deficit  He exhibits normal muscle tone  Skin: Skin is warm and dry  Psychiatric: He has a normal mood and affect  His behavior is normal    Nursing note and vitals reviewed  Vitals:    19 0300 19 0400 19 0500 19 0600   BP: 119/72 116/62 121/74 123/69   Pulse: 98 104 98 104   Resp: 21 22 20 22   Temp:       TempSrc:       SpO2: 95% 93% 92% 94%   Weight:       Height:                 Temperature:   Temp (24hrs), Av 4 °F (36 9 °C), Min:97 9 °F (36 6 °C), Max:98 9 °F (37 2 °C)    Current: Temperature: 98 9 °F (37 2 °C)    Weights:   IBW: 73 kg    Body mass index is 22 08 kg/m²    Weight (last 2 days)     Date/Time   Weight    19 0921  69 8 (153 88)              Hemodynamic Monitoring:  N/A     Non-Invasive/Invasive Ventilation Settings:  Respiratory    Lab Data (Last 4 hours)    None         O2/Vent Data (Last 4 hours)    None              No results found for: PHART, QJZ2XZQ, PO2ART, WWF9YFU, B5HXTAMI, BEART, SOURCE  SpO2: SpO2: 94 %    Intake and Outputs:  I/O       01/12 0701 - 01/13 0700 01/13 0701 - 01/14 0700    P  O  1220 2260    I V  (mL/kg)  7110 8 (101 9)    IV Piggyback  50    Total Intake(mL/kg) 1220 (19 2) 9420 8 (135)    Urine (mL/kg/hr) 2425 (1 6) 3625 (2 2)    Total Output 2425 3625    Net -1205 +5795 8          Unmeasured Urine Occurrence 1 x         Nutrition:        Diet Orders            Start     Ordered    01/13/19 1654  Diet GI; Non Ulcerogenic  Diet effective now     Question Answer Comment   Diet Type GI    GI Non Ulcerogenic    RD to adjust diet per protocol? No        01/13/19 1653          Labs:     Results from last 7 days  Lab Units 01/14/19  0601 01/13/19  2355 01/13/19  1755  01/13/19  0548  01/12/19  0526   WBC Thousand/uL 11 54*  --   --   --  24 15*  --  9 27   HEMOGLOBIN g/dL 8 4* 7 8* 7 9*  < > 7 3*  < > 7 9*   HEMATOCRIT % 26 3* 24 5* 24 9*  < > 23 5*  < > 26 1*   PLATELETS Thousands/uL 182  --   --   --  182  --  211   NEUTROS PCT % 72  --   --   --  90*  --  73   MONOS PCT % 11  --   --   --  5  --  10   < > = values in this interval not displayed       Results from last 7 days  Lab Units 01/13/19  0548 01/12/19  0526 01/11/19  0547 01/10/19  0705   SODIUM mmol/L 137 138 140 137   POTASSIUM mmol/L 4 7 4 6 4 6 4 3   CHLORIDE mmol/L 104 106 109* 102   CO2 mmol/L 27 27 26 25   BUN mg/dL 27* 21 40* 26*   CREATININE mg/dL 0 59* 0 54* 0 64 1 09   CALCIUM mg/dL 7 3* 7 9* 7 6* 8 5   ALK PHOS U/L  --   --  51 70   ALT U/L  --   --  15 20   AST U/L  --   --  24 15       Results from last 7 days  Lab Units 01/11/19  0547   MAGNESIUM mg/dL 2 0            Results from last 7 days  Lab Units 01/13/19  0920   INR  1 05   PTT seconds 29       Results from last 7 days  Lab Units 01/10/19  2211   LACTIC ACID mmol/L 1 5       0  Lab Value Date/Time   TROPONINI <0 02 01/10/2019 7487 Imaging:  I have personally reviewed pertinent reports  EKG: I have reviewed pertinent reports    Micro:  No results found for: Rella Sic, WOUNDCULT, SPUTUMCULTUR    Allergies: No Known Allergies    Medications:   Scheduled Meds:    Current Facility-Administered Medications:  acetaminophen 650 mg Oral Q6H PRN Efra Baker MD    HYDROmorphone 0 2 mg Intravenous Q3H PRN Krys Elizabeth MD    lactated ringers 125 mL/hr Intravenous Continuous Efra Baker MD Last Rate: 125 mL/hr (01/14/19 0137)   ondansetron 4 mg Intravenous Q6H PRN Efra Baker MD    oxyCODONE 2 5 mg Oral Q4H PRN Krys Elizabeth MD    oxyCODONE 5 mg Oral Q4H PRN Krys Elizabeth MD    pantoprazole 40 mg Intravenous Q12H [facility] Conchis Bello PA-C      Continuous Infusions:    lactated ringers 125 mL/hr Last Rate: 125 mL/hr (01/14/19 0137)     PRN Meds:    acetaminophen 650 mg Q6H PRN   HYDROmorphone 0 2 mg Q3H PRN   ondansetron 4 mg Q6H PRN   oxyCODONE 2 5 mg Q4H PRN   oxyCODONE 5 mg Q4H PRN       VTE Pharmacologic Prophylaxis: Reason for no pharmacologic prophylaxis UGI bleed  VTE Mechanical Prophylaxis: sequential compression device    Invasive lines and devices: Invasive Devices     Peripheral Intravenous Line            Peripheral IV 01/10/19 Left Forearm 3 days    Peripheral IV 01/13/19 Left Antecubital less than 1 day    Peripheral IV 01/13/19 Right Antecubital less than 1 day                   Counseling / Coordination of Care  Total Critical Care time spent 30 minutes excluding procedures, teaching and family updates  Code Status: Level 1 - Full Code     Portions of the record may have been created with voice recognition software  Occasional wrong word or "sound a like" substitutions may have occurred due to the inherent limitations of voice recognition software  Read the chart carefully and recognize, using context, where substitutions have occurred       Katharina Fishman DO

## 2019-01-14 NOTE — PROGRESS NOTES
Report given to Bonifacio Baptiste on P6  Pt to be taken to new unit via RN transport  Will monitor

## 2019-01-14 NOTE — PLAN OF CARE
DISCHARGE PLANNING     Discharge to home or other facility with appropriate resources Progressing        DISCHARGE PLANNING - CARE MANAGEMENT     Discharge to post-acute care or home with appropriate resources Progressing        GASTROINTESTINAL - ADULT     Minimal or absence of nausea and/or vomiting Progressing     Maintains or returns to baseline bowel function Progressing        Knowledge Deficit     Patient/family/caregiver demonstrates understanding of disease process, treatment plan, medications, and discharge instructions Progressing        Nutrition/Hydration-ADULT     Nutrient/Hydration intake appropriate for improving, restoring or maintaining nutritional needs Progressing        PAIN - ADULT     Verbalizes/displays adequate comfort level or baseline comfort level Progressing        Potential for Falls     Patient will remain free of falls Progressing        Prexisting or High Potential for Compromised Skin Integrity     Skin integrity is maintained or improved Progressing        SAFETY ADULT     Maintain or return to baseline ADL function Progressing     Maintain or return mobility status to optimal level Progressing

## 2019-01-15 ENCOUNTER — TELEPHONE (OUTPATIENT)
Dept: GASTROENTEROLOGY | Facility: CLINIC | Age: 74
End: 2019-01-15

## 2019-01-15 VITALS
OXYGEN SATURATION: 96 % | RESPIRATION RATE: 18 BRPM | HEART RATE: 94 BPM | SYSTOLIC BLOOD PRESSURE: 105 MMHG | DIASTOLIC BLOOD PRESSURE: 71 MMHG | WEIGHT: 140 LBS | TEMPERATURE: 97.3 F | BODY MASS INDEX: 20.04 KG/M2 | HEIGHT: 70 IN

## 2019-01-15 LAB
ANION GAP SERPL CALCULATED.3IONS-SCNC: 8 MMOL/L (ref 4–13)
BASOPHILS # BLD AUTO: 0.06 THOUSANDS/ΜL (ref 0–0.1)
BASOPHILS NFR BLD AUTO: 1 % (ref 0–1)
BUN SERPL-MCNC: 18 MG/DL (ref 5–25)
CALCIUM SERPL-MCNC: 7.9 MG/DL (ref 8.3–10.1)
CHLORIDE SERPL-SCNC: 97 MMOL/L (ref 100–108)
CO2 SERPL-SCNC: 30 MMOL/L (ref 21–32)
CREAT SERPL-MCNC: 0.54 MG/DL (ref 0.6–1.3)
EOSINOPHIL # BLD AUTO: 0.31 THOUSAND/ΜL (ref 0–0.61)
EOSINOPHIL NFR BLD AUTO: 3 % (ref 0–6)
ERYTHROCYTE [DISTWIDTH] IN BLOOD BY AUTOMATED COUNT: 15.7 % (ref 11.6–15.1)
GFR SERPL CREATININE-BSD FRML MDRD: 104 ML/MIN/1.73SQ M
GLUCOSE SERPL-MCNC: 93 MG/DL (ref 65–140)
HCT VFR BLD AUTO: 25.5 % (ref 36.5–49.3)
HGB BLD-MCNC: 8.1 G/DL (ref 12–17)
IMM GRANULOCYTES # BLD AUTO: 0.07 THOUSAND/UL (ref 0–0.2)
IMM GRANULOCYTES NFR BLD AUTO: 1 % (ref 0–2)
LYMPHOCYTES # BLD AUTO: 1.46 THOUSANDS/ΜL (ref 0.6–4.47)
LYMPHOCYTES NFR BLD AUTO: 16 % (ref 14–44)
MAGNESIUM SERPL-MCNC: 1.4 MG/DL (ref 1.6–2.6)
MCH RBC QN AUTO: 30.2 PG (ref 26.8–34.3)
MCHC RBC AUTO-ENTMCNC: 31.8 G/DL (ref 31.4–37.4)
MCV RBC AUTO: 95 FL (ref 82–98)
MONOCYTES # BLD AUTO: 1.09 THOUSAND/ΜL (ref 0.17–1.22)
MONOCYTES NFR BLD AUTO: 12 % (ref 4–12)
NEUTROPHILS # BLD AUTO: 6.28 THOUSANDS/ΜL (ref 1.85–7.62)
NEUTS SEG NFR BLD AUTO: 67 % (ref 43–75)
NRBC BLD AUTO-RTO: 0 /100 WBCS
PHOSPHATE SERPL-MCNC: 2.3 MG/DL (ref 2.3–4.1)
PLATELET # BLD AUTO: 192 THOUSANDS/UL (ref 149–390)
PMV BLD AUTO: 10.8 FL (ref 8.9–12.7)
POTASSIUM SERPL-SCNC: 4 MMOL/L (ref 3.5–5.3)
RBC # BLD AUTO: 2.68 MILLION/UL (ref 3.88–5.62)
SODIUM SERPL-SCNC: 135 MMOL/L (ref 136–145)
WBC # BLD AUTO: 9.27 THOUSAND/UL (ref 4.31–10.16)

## 2019-01-15 PROCEDURE — 80048 BASIC METABOLIC PNL TOTAL CA: CPT | Performed by: PHYSICIAN ASSISTANT

## 2019-01-15 PROCEDURE — 97167 OT EVAL HIGH COMPLEX 60 MIN: CPT

## 2019-01-15 PROCEDURE — 97163 PT EVAL HIGH COMPLEX 45 MIN: CPT

## 2019-01-15 PROCEDURE — 84100 ASSAY OF PHOSPHORUS: CPT | Performed by: PHYSICIAN ASSISTANT

## 2019-01-15 PROCEDURE — G8980 MOBILITY D/C STATUS: HCPCS

## 2019-01-15 PROCEDURE — 99232 SBSQ HOSP IP/OBS MODERATE 35: CPT | Performed by: INTERNAL MEDICINE

## 2019-01-15 PROCEDURE — G8988 SELF CARE GOAL STATUS: HCPCS

## 2019-01-15 PROCEDURE — G8978 MOBILITY CURRENT STATUS: HCPCS

## 2019-01-15 PROCEDURE — 97116 GAIT TRAINING THERAPY: CPT

## 2019-01-15 PROCEDURE — G8979 MOBILITY GOAL STATUS: HCPCS

## 2019-01-15 PROCEDURE — 83735 ASSAY OF MAGNESIUM: CPT | Performed by: PHYSICIAN ASSISTANT

## 2019-01-15 PROCEDURE — 97535 SELF CARE MNGMENT TRAINING: CPT

## 2019-01-15 PROCEDURE — G8987 SELF CARE CURRENT STATUS: HCPCS

## 2019-01-15 PROCEDURE — 85025 COMPLETE CBC W/AUTO DIFF WBC: CPT | Performed by: PHYSICIAN ASSISTANT

## 2019-01-15 RX ORDER — SUCRALFATE ORAL 1 G/10ML
1000 SUSPENSION ORAL EVERY 6 HOURS SCHEDULED
Qty: 420 ML | Refills: 0 | Status: SHIPPED | OUTPATIENT
Start: 2019-01-15 | End: 2020-01-01 | Stop reason: HOSPADM

## 2019-01-15 RX ORDER — SUCRALFATE ORAL 1 G/10ML
1000 SUSPENSION ORAL EVERY 6 HOURS SCHEDULED
Qty: 420 ML | Refills: 0 | Status: SHIPPED | OUTPATIENT
Start: 2019-01-15 | End: 2019-01-15

## 2019-01-15 RX ADMIN — SUCRALFATE 1000 MG: 1 SUSPENSION ORAL at 17:11

## 2019-01-15 RX ADMIN — SUCRALFATE 1000 MG: 1 SUSPENSION ORAL at 05:54

## 2019-01-15 RX ADMIN — PANTOPRAZOLE SODIUM 40 MG: 40 TABLET, DELAYED RELEASE ORAL at 05:55

## 2019-01-15 RX ADMIN — SUCRALFATE 1000 MG: 1 SUSPENSION ORAL at 00:40

## 2019-01-15 RX ADMIN — PANTOPRAZOLE SODIUM 40 MG: 40 TABLET, DELAYED RELEASE ORAL at 17:11

## 2019-01-15 RX ADMIN — SUCRALFATE 1000 MG: 1 SUSPENSION ORAL at 11:57

## 2019-01-15 NOTE — SOCIAL WORK
MSW Student met with patient to get [facility]  Pt said he would like referral to [facility]  Student sent referral  Student informed pt that he is ready for d/c per medical team  Pt called wife and said she will be able to provide transport at [address] unable to accept  Referrals sent to [facility], and [facility] accepted and patient is in agreement  [facility] on Thurs 1/17  CM reviewed d/c planning process including the following: identifying help at home, patient preference for d/c planning needs, Discharge Lounge, [facility] Meds to Bed program, availability of treatment team to discuss questions or concerns patient and/or family may have regarding understanding medications and recognizing signs and symptoms once discharged  CM also encouraged patient to follow up with all recommended appointments after discharge   Patient advised of importance for patient and family to participate in managing patients medical well being     ~I have read and agree with the above documentation~    LINA Nelson

## 2019-01-15 NOTE — PHYSICAL THERAPY NOTE
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]  Syncope [R55]    Problem List  Patient Active Problem List   Diagnosis    Gastric outlet obstruction    GI bleed    Acute blood loss anemia    Gastrointestinal hemorrhage with hematemesis       Past Medical History  Past Medical History:   Diagnosis Date    Bladder cancer [facility])        Past Surgical History  Past Surgical History:   Procedure Laterality Date    ESOPHAGOGASTRODUODENOSCOPY N/A 1/13/2019    Procedure: ESOPHAGOGASTRODUODENOSCOPY (EGD); Surgeon: Calin Adames MD;  Location: [facility];  Service: Gastroenterology      01/15/19 1320   Note Type   Note type Eval/Treat   Pain Assessment   Pain Assessment 0-10   Pain Score 3   Pain Type Acute pain   Pain Location Rib cage   Pain Orientation Left   Patient's Stated Pain Goal No pain   Hospital Pain Intervention(s) Repositioned; Ambulation/increased activity   Response to Interventions Tolerated session   Home Living   Type of Home House  (5 CONSTANCE vs 0 CONSTANCE)   Home Layout Two level;Bed/bath upstairs   Home Equipment (None)   Prior Function   Level of Wallace Independent with ADLs and functional mobility   Lives With Significant other; Son   Les 68 Help From Family   ADL Assistance Independent   IADLs Independent   Falls in the last 6 months 1 to 4   Vocational Retired   Restrictions/Precautions   Wells Vince Bearing Precautions Per Order No   Other Precautions Telemetry; Fall Risk;Pain;Hard of hearing   General   Family/Caregiver Present No   Cognition   Overall Cognitive Status WFL   Arousal/Participation Alert   Attention Within functional limits   Orientation Level Oriented X4   Memory Within functional limits   Following Commands Follows all commands and directions without difficulty   RLE Assessment   RLE Assessment WFL   LLE Assessment   LLE Assessment WFL   Bed Mobility   Additional Comments Pt seated in bedside chair at start of session, bed mobility not assessed   Transfers   Sit to Stand 6  Modified independent  (Progressed from supervision to Neymar within session)   Additional items Increased time required   Stand to Sit 6  Modified independent  (Progressed from supervision to Neymar within session)   Additional items Increased time required   Ambulation/Elevation   Gait pattern Improper Weight shift; Short stride; Excessively slow; Step to  (multiple LOB intiatial amb trial, no LOB with use of RW)   Gait Assistance 6  Modified independent  (Progressed from Olivia to Neymar with use of RW)   Additional items Verbal cues   Assistive Device Rolling walker   Distance 200 ft, 200 ft   Stair Management Assistance 5  Supervision   Additional items Verbal cues; Assist x 1   Stair Management Technique One rail L;Step to pattern   Number of Stairs 6   Balance   Static Sitting Good   Dynamic Sitting Good   Static Standing Fair +   Dynamic Standing Fair +   Ambulatory Fair  (Pt initially presenting poor-, improved with RW)   Endurance Deficit   Endurance Deficit Yes   Endurance Deficit Description O2 decreased to 82%, resaturated to 96% with cues for deep breathing   Activity Tolerance   Activity Tolerance Patient limited by fatigue;Patient tolerated treatment well   Medical Staff Made Aware CM   Nurse Made Aware Yes   Assessment   Prognosis Good   Problem List Decreased endurance; Impaired balance;Pain   Assessment Pt is a 69 yo male presenting to [facility] on 1/10/19 after a syncopal episode, hematemesis  CT imaging w/ gastric emphysema and portal veinous gas with concern for gastritis, upper GI bleed, peptic ulcer disease  Pt is s/p EGD on 1/13/19  PMH is significant for: bladder cancer status post resection and abdominal aortic aneurysm  Pt is seated in bedside chair at start of session and agreeable to therapy  Pt transferred sit to stand with supervision and SPC   Pt ambulated 200 ft with SPC and Olivia, presented with >5 lateral LOB requiring Olivia from therapist for recovery  Pt educated on use of RW for safety and dynamic stability, pt defers use of RW at this time despite maximal encouragement from PT  Pt navigated x6 steps with supervision and use of left hand rail  Pt remained seated in bedside chair with all needs within reach at end of session  Pt educated on importance of safety at home and use of correct AD with ambulation  Pt is anticipated safe to D/C home with family support and HHPT pending improved balance with ambulation  Goals   Patient Goals To go home   Treatment Day 0   Plan   Treatment/Interventions (D/C PT)   PT Frequency (D/C PT)   Recommendation   Recommendation Home PT; Home with family support   Equipment Recommended Walker   PT - OK to Discharge Yes   Modified Eliecer Scale   Modified [place] Scale 2   Barthel Index   Feeding 10   Bathing 0   Grooming Score 5   Dressing Score 5   Bladder Score 10   Bowels Score 10   Toilet Use Score 10   Transfers (Bed/Chair) Score 15   Mobility (Level Surface) Score 15   Stairs Score 5   Barthel Index Score 85     Physical Therapy Treatment   Time In: 1310  Time Out: 1320    Pt is seated in bedside chair and agreeable to ambulation trial with RW  Pt transferred sit <> stand with modified independence and RW  Pt ambulated 200 ft and RW, progressed from supervision to modified independence within session  Pt agreeable to use of RW for safety at home and in the community  Pt is safe to D/C home with home PT and RW  Pt has no further acute PT needs, PT to sign off at this time         Len Monreal, PT, DPT

## 2019-01-15 NOTE — PROGRESS NOTES
Progress Note - General Surgery   Stephenie Hull 68 y o  male MRN: [id number]  Unit/Bed#: [facility] 602-01 Encounter: [id number]    Assessment:  68year old male with bleeding gastric ulcers, partial gastric outlet obstruction s/p EGD    - 1/14 Upper GI Study  IMPRESSION:        1  Mild distal esophageal narrowing near the gastroesophageal junction, suggestive of a mild stricture      2  Moderate gastric distention of uncertain etiology  There is prompt passage of contrast into the duodenum  No stricture or obstructing mass is seen  Evaluation for mucosal ulcers/small lesions is limited due to difficulty in patient positioning      3  Possible nonobstructed right paraduodenal hernia which appears to reduce spontaneously  A similar appearance was seen on the prior CT study  Plan:   Continue Non-ulcerogenic diet  Continue Protonix BID and Carafate  Continue monitoring Hgb's  IV fluids  Dispo planning with GI follow up outpatient      Subjective/Objective   Chief Complaint:     Subjective: No acute events  Patient tolerating PO diet however minimal and states this is normal      Objective:     Blood pressure 104/63, pulse 105, temperature 98 2 °F (36 8 °C), resp  rate 18, height 5' 10" (1 778 m), weight 63 5 kg (140 lb), SpO2 99 %  ,Body mass index is 20 09 kg/m²        Intake/Output Summary (Last 24 hours) at 01/15/19 0600  Last data filed at 01/15/19 0050   Gross per 24 hour   Intake           956 67 ml   Output              950 ml   Net             6 67 ml       Invasive Devices     Peripheral Intravenous Line            Peripheral IV 01/13/19 Left Antecubital 1 day    Peripheral IV 01/13/19 Right Antecubital 1 day                Physical Exam: General: AAOx3  Head: normocephalic, atraumatic  Neck: supple, trachea midline  Respiratory: BS b/l  Abdomen: Soft, NT, no rebound/guarding  Heart: RRR, S1s2  Ext: Warm no cyanosis   Pulse: 2+ radial      Lab, Imaging and other studies:  I have personally reviewed pertinent lab results    , CBC:   Lab Results   Component Value Date    WBC 9 27 01/15/2019    HGB 8 1 (L) 01/15/2019    HCT 25 5 (L) 01/15/2019    MCV 95 01/15/2019     01/15/2019    MCH 30 2 01/15/2019    MCHC 31 8 01/15/2019    RDW 15 7 (H) 01/15/2019    MPV 10 8 01/15/2019    NRBC 0 01/15/2019   , CMP:   Lab Results   Component Value Date    SODIUM 135 (L) 01/15/2019    K 4 0 01/15/2019    CL 97 (L) 01/15/2019    CO2 30 01/15/2019    BUN 18 01/15/2019    CREATININE 0 54 (L) 01/15/2019    CALCIUM 7 9 (L) 01/15/2019    EGFR 104 01/15/2019     VTE Pharmacologic Prophylaxis: Heparin  VTE Mechanical Prophylaxis: sequential compression device

## 2019-01-15 NOTE — PROGRESS NOTES
[facility]  Progress Note - Kassie Kaur 68 y o  male MRN: [id number]    Unit/Bed#: [facility] 602-01 Encounter: [id number]    Assessment/Plan:  Gastric emphysema  Hematemesis  Gastric distention  -suspect that gastric emphysema was secondary to an acute episode of gastric distension secondary to a narrowing of the pylorus from ulcer burden with possible intermittent obstruction secondary to a paraduodenal hernia  -EGD on 1/13/19 revealed LA grade B esophagitis with clean based ulcers in body of stomach as well as 1 next to pylorus with some scarring that appeared to be causing a partial gastric outlet obstruction  -upper GI series reviewed with mild distal esophageal narrowing, moderate gastric distention however with prompt passage of contrast into the duodenum without any obstructions seen and possibly a nonobstructed right para duodenal hernia  -hemoglobin relatively stable at 8 1 without signs of active bleeding   -continue to monitor H&H  -continue with PPI twice daily and Carafate 4 times daily for at least 2 months  -follow up biopsy results  -will need repeat EGD in 2-3 months to document healing of ulcers  -follow-up with GI in 1 month as an outpatient      Subjective:   Patient denies nausea, vomiting, abdominal pain  No further episodes of hematemesis or dark/bloody stool  In fact patient states it is becoming more consistent in more brown in color  Tolerating diet    Objective:     Vitals: Blood pressure 105/71, pulse 94, temperature (!) 97 3 °F (36 3 °C), temperature source Oral, resp  rate 18, height 5' 10" (1 778 m), weight 63 5 kg (140 lb), SpO2 96 %  ,Body mass index is 20 09 kg/m²        Intake/Output Summary (Last 24 hours) at 01/15/19 1011  Last data filed at 01/15/19 8114   Gross per 24 hour   Intake           456 67 ml   Output             1060 ml   Net          -603 33 ml       Review of Systems: as per HPI  Review of Systems    Physical Exam:     Physical Exam Constitutional: He is oriented to person, place, and time  No distress  Eyes: No scleral icterus  Neck: Neck supple  Cardiovascular: Normal rate and regular rhythm  Pulmonary/Chest: Effort normal and breath sounds normal    Abdominal: Soft  Bowel sounds are normal  He exhibits no distension  There is no tenderness  Musculoskeletal: He exhibits no edema  Neurological: He is alert and oriented to person, place, and time  Skin: Skin is warm and dry  Psychiatric: He has a normal mood and affect           Invasive Devices     Peripheral Intravenous Line            Peripheral IV 01/13/19 Left Antecubital 2 days    Peripheral IV 01/13/19 Right Antecubital 2 days                        CBC: Lab Results   Component Value Date    WBC 9 27 01/15/2019    HGB 8 1 (L) 01/15/2019    HCT 25 5 (L) 01/15/2019    MCV 95 01/15/2019     01/15/2019    MCH 30 2 01/15/2019    MCHC 31 8 01/15/2019    RDW 15 7 (H) 01/15/2019    MPV 10 8 01/15/2019    NRBC 0 01/15/2019   ,   CMP: Lab Results   Component Value Date    K 4 0 01/15/2019    CL 97 (L) 01/15/2019    CO2 30 01/15/2019    BUN 18 01/15/2019    CREATININE 0 54 (L) 01/15/2019    CALCIUM 7 9 (L) 01/15/2019    EGFR 104 01/15/2019

## 2019-01-15 NOTE — PLAN OF CARE
Problem: OCCUPATIONAL THERAPY ADULT  Goal: Performs self-care activities at highest level of function for planned discharge setting  See evaluation for individualized goals  Treatment Interventions: ADL retraining, Functional transfer training, Endurance training, Patient/family training, Equipment evaluation/education, Compensatory technique education, Energy conservation, Activityengagement  Equipment Recommended:  (BSC- PT REFUSING  AGREE WITH PT RECS FOR RW  +SC )       See flowsheet documentation for full assessment, interventions and recommendations  Limitation: Decreased ADL status, Decreased Safe judgement during ADL, Decreased endurance, Decreased self-care trans, Decreased high-level ADLs  Prognosis: Good  Assessment: 69 YO Male SEEN FOR INITIAL OCCUPATIONAL THERAPY EVALUATION FOLLOWING ADMISSION TO [facility] S/P SYNCOPAL EPISODE  DX INCLUDES GASTRIC OUTLET OBSTRUCTION/ GI BLEED  PT IS S/P EGD ON 1/13  PROBLEMS LIST INCLUDES BLADDER CA WITH RESECTION AND ACUTE BLOOD LOSS ANEMIA  PT IS FROM HOME WITH FAMILY WHERE HE REPORTS BEING INDEPENDENT WITH ADLS/IADLS/DRIVING PTA  PT CURRENTLY REQUIRES OVERALL SUPERVISION-MIN A WITH ADLS, TRANSFERS AND FUNCTIONAL MOBILITY WITH USE OF SPC  PT WITH MULTIPLE LOB WITH USE OF SPC, REQUIRING MIN A TO CORRECT  PT CURRENTLY REFUSING TO USE RW  O2 SATS DROPPED TO MID 80'S ON RA HOWEVER UNSURE IF ACCURATE- RN AWARE  PT DOES PRESENT WITH MILD SOB WITH COMPLAINTS OF L-SIDED RIB PAIN  PT IS LIMITED 2' PAIN, FATIGUE, IMPAIRED BALANCE, FALL RISK , OVERALL WEAKNESS/DECONDITIONING , DIZZINESS WITH CHANGE OF POSITIONING , SOB, INACCESSIBLE HOME ENVIRONMENT-BED/BATH ON 2ND FLOOR  and OVERALL LIMITED ACTIVITY TOLERANCE  PT EDUCATED ON DEEP BREATHING TECHNIQUES T/O ACTIVITY, SLOWING OF PACE, INCREASED FAMILY SUPPORT and CONTINUE PARTICIPATION IN SELF-CARE/MOBILITY WITH STAFF [address]   PT CURRENTLY REFUSING BSC FOR 1ST FLOOR   FROM AN OCCUPATIONAL THERAPY PERSPECTIVE, PT CAN RETURN HOME WITH INCREASED FAMILY SUPPORT + HOME OT SERVICES UPON D/C  WILL CONT TO FOLLOW TO ADDRESS THE BELOW DESCRIBED GOALS  OT Discharge Recommendation: Home OT (+ INCREASED FAMILY SUPPORT )  OT - OK to Discharge:  Yes

## 2019-01-15 NOTE — DISCHARGE SUMMARY
Discharge Summary - Olga Mckeon 68 y o  male MRN: [id number]    [facility] Encounter: [id number]    Admission Date: 1/10/2019     Discharge Date: 01/15/19    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]  Syncope [R55]    Secondary Diagnosis:   Past Medical History:   Diagnosis Date    Bladder cancer [facility])        Discharge Diagnosis: Same    Procedures Performed: Procedure(s):  ESOPHAGOGASTRODUODENOSCOPY (EGD)    Consults: Gastroenterology    HPI: Olga Mckeon is a 68 y o  male with past medical history bladder cancer status post resection and abdominal aortic aneurysm (3 6 cm) who presents following a syncopal episode earlier this morning around 4:30 a m  Patient's pabloe is bedside to assist in providing history  She states that she heard a loud thud, suspecting that he fell she found him lying on the bed minimally responsive  At this point she called 911  On arrival patient was refusing medical care and had an episode of hematemesis  At this point he was taken to [facility] ED for further evaluation  In the emergency department, patient had another episode of bloody emesis accompanied by episodes of hypotension that responded to IV fluids  CT imaging performed in the emergency department was especially concerning given the patient's marked gastric distention, gastric pneumatosis, and portal venous gas  However, patient denies any abdominal pain  Currently, he is complaining of left-sided rib pain that is reproducible on exam   States that he has been taking Excedrin on a regular basis for history of migraines  Patient denies reflux symptoms however pabloe reports that he has and has been treating with Pepto-Bismol       Last bowel movement was yesterday evening  Patient denies bloody bowel movements, has noted infrequent dark stools  Has a history of upper GI endoscopy approximately 5 years ago that was unremarkable per the patient's fiancee at that time    Denies fever chills chest pain or shortness of breath  Current smoker  Hospital Course: Mr Yoanna Newby was admitted to the Acute Care Surgical team on the morning of 1/10/19 and had an NGT placed which showed bloody output and was being followed with serial H&H's and a consult to our GI team was placed for an EGD  Overnight the patients NGT had dislodged some and was advanced by the overnight team, and by 1/11/19 it was shown to be still somewhat dislodged from its normal position  He was continued on as NPO with fluids, protonix and was seen by GI for discussions of EGD which was planned for Monday, 1/14  At this time patient demanded his NGT be removed and threatened to remove it himself if it was not pulled and the GI team obliged by removing the NGT  He had 3x bowel movements on 1/11 however was continued on as NPO  We were trending the patients Hgb which had shown initially as 11 4 but by 1/12 had dropped to 7 9  He was advanced on his diet on 1/12 and tolerated this well however at midnight of 1/13 his serial Hgb draw showed a Hgb of 6 5 accompanied by an episode of Hypotension  It was decided to transfuse the patient 1U pRBC's at this time along with a bolus of 1L of fluid  Upon redraw the patients Hgb had bounced back to 7 3 but it was deemed necessary to give 1 further Unit of pRBC  The patient underwent a NGT lavage and was given Reglan for stomach clearance by GI and then went for his EGD on 1/13 given the precipitous drop in his Hgb  The EGD procedure showed the following:    FINDINGS:     #1  Esophagus-  LA class B erosive reflux esophagitis was noted      #2  Stomach-   There was a small sliding hiatal hernia and there was evidence of severe erythema with some clean based ulcers in the body of the stomach  Biopsies were taken from these erythematous areas to rule out H pylori, malignancy, and ischemia    There is also a clean based ulcer next to the pylorus with some scarring in that area appearing to cause a partial gastric outlet obstruction      #3  Duodenum-   Normal     GI's recommendations were to continue Protonix 2x/day and resume his diet  Patient was made NPO again at midnight of  for an Upper GI study performed on  which resulted the followin/14 Upper GI Study  IMPRESSION:  1   Mild distal esophageal narrowing near the gastroesophageal junction, suggestive of a mild stricture      2   Moderate gastric distention of uncertain etiology   There is prompt passage of contrast into the duodenum   No stricture or obstructing mass is seen   Evaluation for mucosal ulcers/small lesions is limited due to difficulty in patient positioning      3   Possible nonobstructed right paraduodenal hernia which appears to reduce spontaneously   A similar appearance was seen on the prior CT study  Patient was allowed to have a diet after this and was continued on BID Protonix and Carafate  The patient was deemed medically stable for discharge on 1/15/19 with discussions to follow up with the GI group as outpatient in 2 months time while continuing on the BID Protonix and Carafate regimen for ulcer healing  Disposition: Home/Stable  Call physician for temperature over 101, wound redness or discharge, vomiting, or intolerance to diet  Discharge Medications:  See after visit summary for reconciled discharge medications provided to patient and family  This text is generated with voice recognition software  There may be translation, syntax,  or grammatical errors  If you have any questions, please contact the dictating provider

## 2019-01-15 NOTE — TELEPHONE ENCOUNTER
----- Message from Nikos Cardenas MD sent at 1/15/2019 10:22 AM EST -----  Please schedule hospital follow-up appointment with me in 1 month    Patient will need repeat EGD in 2-3 months-in-hospital   Thanks

## 2019-01-15 NOTE — OCCUPATIONAL THERAPY NOTE
[address] Evaluation     Patient Name: Jose ALMENDAREZ Date: 1/15/2019  Problem List  Patient Active Problem List   Diagnosis    Gastric outlet obstruction    GI bleed    Acute blood loss anemia    Gastrointestinal hemorrhage with hematemesis     Past Medical History  Past Medical History:   Diagnosis Date    Bladder cancer [facility])      Past Surgical History  Past Surgical History:   Procedure Laterality Date    ESOPHAGOGASTRODUODENOSCOPY N/A 1/13/2019    Procedure: ESOPHAGOGASTRODUODENOSCOPY (EGD); Surgeon: Ryann Chavira MD;  Location: [facility];  Service: Gastroenterology         01/15/19 1319   Note Type   Note type Eval/Treat  (EVAL; 9316-8084  F/U TX; 5651-7182)   Restrictions/Precautions   Weight Bearing Precautions Per Order No   Other Precautions Fall Risk;Pain;Hard of hearing   Pain Assessment   Pain Assessment 0-10   Pain Score 3   Pain Type Acute pain   Pain Location Rib cage   Pain Orientation Left   Patient's Stated Pain Goal No pain   Hospital Pain Intervention(s) Repositioned; Ambulation/increased activity; Distraction; Emotional support   Response to Interventions TOLERATED    Home Living   Type of [address] [place] [address] Two level;Bed/bath upstairs;Stairs to enter without rails  (5 VS 0 CONSTANCE )   Bathroom Shower/Tub Tub/shower unit   Bathroom Toilet Standard   Bathroom Accessibility Accessible   Additional Comments NO USE OF DME AT BASELINE    Prior Function   Level of [place] Independent with ADLs and functional mobility   Lives With Significant other; Son   Les 68 Help From Family   ADL Assistance Independent   IADLs Independent   Falls in the last 6 months 1 to 4   Vocational Retired   Lifestyle   Autonomy  [facility] ADLS/IADLS/DRIVING PTA  Reciprocal Relationships LIVES WITH MAIA AND HER SON  PT REPORTS MAIA HAD BRAIN SX HOWEVER IS ABLE TO ASSIST (?)     Service to Others RETIRED   Intrinsic Gratification ENJOYS SPENDING TIME WITH FAMILY Psychosocial   Psychosocial (WDL) WDL   ADL   Eating Assistance 7  Independent   Grooming Assistance 5  Supervision/Setup   UB Bathing Assistance 5  Supervision/Setup   LB Bathing Assistance 4  Minimal Assistance   UB Dressing Assistance 5  Supervision/Setup   LB Dressing Assistance 4  Minimal Assistance   Toileting Assistance  4  Minimal Assistance   Functional Assistance 4  Minimal Assistance   Bed Mobility   Additional Comments PT SITTING OOB UPON ARRIVAL  RETURNED TO BEDSIDE CHAIR WITH ALL NEEDS IN REACH    Transfers   Sit to Stand 4  Minimal assistance   Additional items Assist x 1; Increased time required;Verbal cues   Stand to Sit 5  Supervision   Additional items Assist x 1; Increased time required;Verbal cues   Functional Mobility   Functional Mobility 4  Minimal assistance   Additional items SPC   Balance   Static Sitting Good   Static Standing Fair -   Ambulatory Poor +   Activity Tolerance   Activity Tolerance Patient limited by fatigue   Medical Staff Made Aware SPOKE TO CM REGARDING D/C PLAN    Nurse Made Aware APPROPRIATE TO SEE PER RNTERE Assessment   RUE Assessment WFL   LUE Assessment   LUE Assessment WFL   Hand Function   Gross Motor Coordination Functional   Fine Motor Coordination Functional   Sensation   Light Touch No apparent deficits   Cognition   Overall Cognitive Status WFL   Arousal/Participation Alert; Cooperative   Attention Within functional limits   Orientation Level Oriented X4   Memory Within functional limits   Following Commands Follows multistep commands without difficulty   Comments PT IS PLEASANT AND COOPERATIVE  [place]  PT WITH LIMITED INSIGHT/JUDGEMENT, INITIALLY REFUSING THERAPIST RECOMMENDATIONS STATING "I'M A STUBBORN [place] MAN"  Assessment   Limitation Decreased ADL status; Decreased Safe judgement during ADL;Decreased endurance;Decreased self-care trans;Decreased high-level ADLs   Prognosis Good   Assessment 69 YO Male SEEN FOR INITIAL OCCUPATIONAL THERAPY EVALUATION FOLLOWING ADMISSION TO [facility] S/P SYNCOPAL EPISODE  DX INCLUDES GASTRIC OUTLET OBSTRUCTION/ GI BLEED  PT IS S/P EGD ON 1/13  PROBLEMS LIST INCLUDES BLADDER CA WITH RESECTION AND ACUTE BLOOD LOSS ANEMIA  PT IS FROM HOME WITH FAMILY WHERE HE REPORTS BEING INDEPENDENT WITH ADLS/IADLS/DRIVING PTA  PT CURRENTLY REQUIRES OVERALL SUPERVISION-MIN A WITH ADLS, TRANSFERS AND FUNCTIONAL MOBILITY WITH USE OF SPC  PT WITH MULTIPLE LOB WITH USE OF SPC, REQUIRING MIN A TO CORRECT  PT CURRENTLY REFUSING TO USE RW  O2 SATS DROPPED TO MID 80'S ON RA HOWEVER UNSURE IF ACCURATE- RN AWARE  PT DOES PRESENT WITH MILD SOB WITH COMPLAINTS OF L-SIDED RIB PAIN  PT IS LIMITED 2' PAIN, FATIGUE, IMPAIRED BALANCE, FALL RISK , OVERALL WEAKNESS/DECONDITIONING , DIZZINESS WITH CHANGE OF POSITIONING , SOB, INACCESSIBLE HOME ENVIRONMENT-BED/BATH ON 2ND FLOOR  and OVERALL LIMITED ACTIVITY TOLERANCE  PT EDUCATED ON DEEP BREATHING TECHNIQUES T/O ACTIVITY, SLOWING OF PACE, INCREASED FAMILY SUPPORT and CONTINUE PARTICIPATION IN SELF-CARE/MOBILITY WITH STAFF [address]   PT CURRENTLY REFUSING BSC FOR 1ST FLOOR  FROM AN OCCUPATIONAL THERAPY PERSPECTIVE, PT CAN RETURN HOME WITH INCREASED FAMILY SUPPORT + HOME OT SERVICES UPON D/C  WILL CONT TO FOLLOW TO ADDRESS THE BELOW DESCRIBED GOALS  Goals   LTG Time Frame 3-7   Long Term Goal #1 SEE BELOW    Plan   Treatment Interventions ADL retraining;Functional transfer training; Endurance training;Patient/family training;Equipment evaluation/education; Compensatory technique education; Energy conservation; Activityengagement   Goal Expiration Date 01/22/19   OT Frequency 3-5x/wk   Additional Treatment Session   Start Time 1301   End Time 1319   Treatment Assessment PT SEEN FOR F/U TX SESSION FOCUSING ON PT EDUCATION, FUNCTIONAL MOBILITY AND D/C PLANNING  PT EDUCATED ON NOTED LOB'S WITH USE OF SPC WITH CONCERNS FOR HIGH FALL RISK AT HOME   PT EDUCATED ON USE OF RW FOR ENERGY CONSERVATION AND FALL PREVENTIONS, EVENTUALLY AGREEABLE TO TRIAL RW  IMPROVEMENT NOTED IN FUCNTIONAL MOBILITY TO MOD I LEVEL WITH USE OF RW  PT EDUCATION ON ADDITIONAL ENERGY CONSERVATION TECHNQIUES TO CARRY OVER UPON D/C  PT CONT TO REFUSE BSC FOR 1ST FLOOR SET-UP, PT RECOMMENDED TO USE URINAL FOR 1ST FLOOR AS NEEDED  PT AGREEABLE FOR USE OF FRIEND'S SC UPON D/C  CONT TO RECOMMEND HOME OT + INCREASED FAMILY SUPPORT UPON D/C  WILL CONT TO FOLLOW  Additional Treatment Day 1   Recommendation   OT Discharge Recommendation Home OT  (+ INCREASED FAMILY SUPPORT )   Equipment Recommended (BSC- PT REFUSING  AGREE WITH PT RECS FOR RW  +SC )   OT - OK to Discharge Yes   Barthel Index   Feeding 10   Bathing 0   Grooming Score 5   Dressing Score 5   Bladder Score 10   Bowels Score 10   Toilet Use Score 5   Transfers (Bed/Chair) Score 10   Mobility (Level Surface) Score 10   Stairs Score 5   Barthel Index Score 70   Modified Eliecer Scale   Modified Diamond Scale 3       OCCUPATIONAL THERAPY GOALS TO BE MET WITHIN 7 DAYS:    -Pt will increase bed mobility to MOD I to participate in functional activities with G tolerance and balance  -Pt will improve functional mobility and transfers to MOD I on/off all surfaces w/ G balance/safety including toileting   -Pt will participate in lt grooming task with MOD I after set-up standing at sink ~3-5 minutes with G safety and balance  -Pt will increase independence in all ADLS to MOD I with G balance sitting upright in chair   -Pt will improve activity tolerance to G for 30 min txment sessions w/ G carry over of learned energy conservation techniques   -Pt will improve independence in lt homemaking activities to MOD I without requiring cues for safety   -Pt will demonstrate G carryover of learned safety techniques and proper body mechanics in functional and leisure activities with use of DME   -Pt will be 100% attentive during pt education to assist with safe d/c plan       Documentation completed by Deyanira Duenas, HERSON, OTR/L

## 2019-01-17 NOTE — PROGRESS NOTES
I called him with his results and left a voicemail to call me back  I suspect ulcerative exudate was due to stretch of the gastric wall and possibly ischemia from the gastric distension from gastric outlet obstruction

## 2020-01-01 ENCOUNTER — ANESTHESIA (INPATIENT)
Dept: GASTROENTEROLOGY | Facility: HOSPITAL | Age: 75
DRG: 377 | End: 2020-01-01
Payer: MEDICARE

## 2020-01-01 ENCOUNTER — EPISODE CHANGES (OUTPATIENT)
Dept: CASE MANAGEMENT | Facility: OTHER | Age: 75
End: 2020-01-01

## 2020-01-01 ENCOUNTER — EPISODE CHANGES (OUTPATIENT)
Dept: CASE MANAGEMENT | Facility: HOSPITAL | Age: 75
End: 2020-01-01

## 2020-01-01 ENCOUNTER — PATIENT OUTREACH (OUTPATIENT)
Dept: CASE MANAGEMENT | Facility: OTHER | Age: 75
End: 2020-01-01

## 2020-01-01 ENCOUNTER — HOSPITAL ENCOUNTER (INPATIENT)
Facility: HOSPITAL | Age: 75
LOS: 3 days | DRG: 377 | End: 2020-09-18
Attending: EMERGENCY MEDICINE | Admitting: INTERNAL MEDICINE
Payer: MEDICARE

## 2020-01-01 ENCOUNTER — APPOINTMENT (INPATIENT)
Dept: GASTROENTEROLOGY | Facility: HOSPITAL | Age: 75
DRG: 377 | End: 2020-01-01
Payer: MEDICARE

## 2020-01-01 ENCOUNTER — HOSPITAL ENCOUNTER (INPATIENT)
Facility: HOSPITAL | Age: 75
LOS: 5 days | Discharge: HOME | DRG: 881 | End: 2020-09-23
Attending: PSYCHIATRY & NEUROLOGY | Admitting: PSYCHIATRY & NEUROLOGY
Payer: MEDICARE

## 2020-01-01 ENCOUNTER — ANESTHESIA EVENT (INPATIENT)
Dept: GASTROENTEROLOGY | Facility: HOSPITAL | Age: 75
DRG: 377 | End: 2020-01-01
Payer: MEDICARE

## 2020-01-01 VITALS
TEMPERATURE: 98.5 F | DIASTOLIC BLOOD PRESSURE: 48 MMHG | RESPIRATION RATE: 18 BRPM | WEIGHT: 137.8 LBS | OXYGEN SATURATION: 93 % | HEART RATE: 93 BPM | SYSTOLIC BLOOD PRESSURE: 111 MMHG | BODY MASS INDEX: 19.77 KG/M2

## 2020-01-01 VITALS
WEIGHT: 138.89 LBS | HEIGHT: 71 IN | OXYGEN SATURATION: 90 % | HEART RATE: 86 BPM | RESPIRATION RATE: 18 BRPM | BODY MASS INDEX: 19.44 KG/M2 | SYSTOLIC BLOOD PRESSURE: 90 MMHG | TEMPERATURE: 98.3 F | DIASTOLIC BLOOD PRESSURE: 54 MMHG

## 2020-01-01 VITALS — HEART RATE: 95 BPM

## 2020-01-01 DIAGNOSIS — R45.851 DEPRESSION WITH SUICIDAL IDEATION: ICD-10-CM

## 2020-01-01 DIAGNOSIS — R45.851 SUICIDAL IDEATION: ICD-10-CM

## 2020-01-01 DIAGNOSIS — D64.9 ANEMIA: Primary | ICD-10-CM

## 2020-01-01 DIAGNOSIS — F32.A DEPRESSION WITH SUICIDAL IDEATION: ICD-10-CM

## 2020-01-01 DIAGNOSIS — K92.2 GASTROINTESTINAL HEMORRHAGE, UNSPECIFIED GASTROINTESTINAL HEMORRHAGE TYPE: ICD-10-CM

## 2020-01-01 DIAGNOSIS — K27.9 PUD (PEPTIC ULCER DISEASE): ICD-10-CM

## 2020-01-01 DIAGNOSIS — F32.A DEPRESSIVE DISORDER: Primary | ICD-10-CM

## 2020-01-01 DIAGNOSIS — D62 ACUTE BLOOD LOSS ANEMIA: ICD-10-CM

## 2020-01-01 LAB
ABO GROUP BLD BPU: NORMAL
ABO GROUP BLD BPU: NORMAL
ABO GROUP BLD: NORMAL
ALBUMIN SERPL BCP-MCNC: 2.4 G/DL (ref 3.5–5)
ALBUMIN SERPL BCP-MCNC: 2.8 G/DL (ref 3.5–5)
ALP SERPL-CCNC: 51 U/L (ref 46–116)
ALP SERPL-CCNC: 55 U/L (ref 46–116)
ALT SERPL W P-5'-P-CCNC: 13 U/L (ref 12–78)
ALT SERPL W P-5'-P-CCNC: 14 U/L (ref 12–78)
ANION GAP SERPL CALCULATED.3IONS-SCNC: 3 MMOL/L (ref 4–13)
ANION GAP SERPL CALCULATED.3IONS-SCNC: 4 MMOL/L (ref 4–13)
ANION GAP SERPL CALCULATED.3IONS-SCNC: 4 MMOL/L (ref 4–13)
APTT PPP: 35 SECONDS (ref 23–37)
AST SERPL W P-5'-P-CCNC: 14 U/L (ref 5–45)
AST SERPL W P-5'-P-CCNC: 20 U/L (ref 5–45)
ATRIAL RATE: 82 BPM
BASOPHILS # BLD AUTO: 0.06 THOUSANDS/ΜL (ref 0–0.1)
BASOPHILS # BLD AUTO: 0.09 THOUSANDS/ΜL (ref 0–0.1)
BASOPHILS # BLD AUTO: 0.1 THOUSANDS/ΜL (ref 0–0.1)
BASOPHILS NFR BLD AUTO: 1 % (ref 0–1)
BILIRUB SERPL-MCNC: 0.2 MG/DL (ref 0.2–1)
BILIRUB SERPL-MCNC: 0.4 MG/DL (ref 0.2–1)
BLD GP AB SCN SERPL QL: NEGATIVE
BPU ID: NORMAL
BPU ID: NORMAL
BUN SERPL-MCNC: 19 MG/DL (ref 5–25)
BUN SERPL-MCNC: 27 MG/DL (ref 5–25)
BUN SERPL-MCNC: 35 MG/DL (ref 5–25)
CALCIUM SERPL-MCNC: 7.9 MG/DL (ref 8.3–10.1)
CALCIUM SERPL-MCNC: 8.2 MG/DL (ref 8.3–10.1)
CALCIUM SERPL-MCNC: 8.7 MG/DL (ref 8.3–10.1)
CHLORIDE SERPL-SCNC: 101 MMOL/L (ref 100–108)
CHLORIDE SERPL-SCNC: 105 MMOL/L (ref 100–108)
CHLORIDE SERPL-SCNC: 106 MMOL/L (ref 100–108)
CHOLEST SERPL-MCNC: 96 MG/DL
CO2 SERPL-SCNC: 28 MMOL/L (ref 21–32)
CO2 SERPL-SCNC: 30 MMOL/L (ref 21–32)
CO2 SERPL-SCNC: 34 MMOL/L (ref 21–32)
CREAT SERPL-MCNC: 0.82 MG/DL (ref 0.6–1.3)
CREAT SERPL-MCNC: 0.83 MG/DL (ref 0.6–1.3)
CREAT SERPL-MCNC: 0.89 MG/DL (ref 0.6–1.3)
CROSSMATCH: NORMAL
CROSSMATCH: NORMAL
EOSINOPHIL # BLD AUTO: 0.18 THOUSAND/ΜL (ref 0–0.61)
EOSINOPHIL # BLD AUTO: 0.28 THOUSAND/ΜL (ref 0–0.61)
EOSINOPHIL # BLD AUTO: 0.4 THOUSAND/ΜL (ref 0–0.4)
EOSINOPHIL NFR BLD AUTO: 1 % (ref 0–6)
EOSINOPHIL NFR BLD AUTO: 3 % (ref 0–6)
EOSINOPHIL NFR BLD AUTO: 5 % (ref 0–6)
ERYTHROCYTE [DISTWIDTH] IN BLOOD BY AUTOMATED COUNT: 15.3 %
ERYTHROCYTE [DISTWIDTH] IN BLOOD BY AUTOMATED COUNT: 15.3 % (ref 11.6–15.1)
ERYTHROCYTE [DISTWIDTH] IN BLOOD BY AUTOMATED COUNT: 15.3 % (ref 11.6–15.1)
ERYTHROCYTE [DISTWIDTH] IN BLOOD BY AUTOMATED COUNT: 15.6 % (ref 11.6–15.1)
ERYTHROCYTE [DISTWIDTH] IN BLOOD BY AUTOMATED COUNT: 16.4 % (ref 11.6–15.1)
ETHANOL SERPL-MCNC: <3 MG/DL (ref 0–3)
GFR SERPL CREATININE-BSD FRML MDRD: 84 ML/MIN/1.73SQ M
GFR SERPL CREATININE-BSD FRML MDRD: 86 ML/MIN/1.73SQ M
GFR SERPL CREATININE-BSD FRML MDRD: 87 ML/MIN/1.73SQ M
GLUCOSE SERPL-MCNC: 77 MG/DL (ref 65–140)
GLUCOSE SERPL-MCNC: 85 MG/DL (ref 65–140)
GLUCOSE SERPL-MCNC: 94 MG/DL (ref 65–140)
HCT VFR BLD AUTO: 18.3 % (ref 36.5–49.3)
HCT VFR BLD AUTO: 23.5 % (ref 36.5–49.3)
HCT VFR BLD AUTO: 24.2 % (ref 36.5–49.3)
HCT VFR BLD AUTO: 25.2 % (ref 36.5–49.3)
HCT VFR BLD AUTO: 25.9 % (ref 36.5–49.3)
HCT VFR BLD AUTO: 27.3 % (ref 41–53)
HDLC SERPL-MCNC: 29 MG/DL
HGB BLD-MCNC: 5.8 G/DL (ref 12–17)
HGB BLD-MCNC: 7.7 G/DL (ref 12–17)
HGB BLD-MCNC: 7.8 G/DL (ref 12–17)
HGB BLD-MCNC: 8 G/DL (ref 12–17)
HGB BLD-MCNC: 8.2 G/DL (ref 12–17)
HGB BLD-MCNC: 9 G/DL (ref 13.5–17.5)
IMM GRANULOCYTES # BLD AUTO: 0.05 THOUSAND/UL (ref 0–0.2)
IMM GRANULOCYTES # BLD AUTO: 0.07 THOUSAND/UL (ref 0–0.2)
IMM GRANULOCYTES NFR BLD AUTO: 1 % (ref 0–2)
IMM GRANULOCYTES NFR BLD AUTO: 1 % (ref 0–2)
INR PPP: 1.02 (ref 0.84–1.19)
LDLC SERPL CALC-MCNC: 52 MG/DL
LIPASE SERPL-CCNC: 98 U/L (ref 73–393)
LYMPHOCYTES # BLD AUTO: 1.3 THOUSANDS/ΜL (ref 0.5–4)
LYMPHOCYTES # BLD AUTO: 1.41 THOUSANDS/ΜL (ref 0.6–4.47)
LYMPHOCYTES # BLD AUTO: 1.44 THOUSANDS/ΜL (ref 0.6–4.47)
LYMPHOCYTES NFR BLD AUTO: 11 % (ref 14–44)
LYMPHOCYTES NFR BLD AUTO: 13 % (ref 14–44)
LYMPHOCYTES NFR BLD AUTO: 14 % (ref 25–45)
MCH RBC QN AUTO: 30.8 PG (ref 26–34)
MCH RBC QN AUTO: 31.3 PG (ref 26.8–34.3)
MCH RBC QN AUTO: 31.4 PG (ref 26.8–34.3)
MCH RBC QN AUTO: 31.5 PG (ref 26.8–34.3)
MCH RBC QN AUTO: 31.6 PG (ref 26.8–34.3)
MCHC RBC AUTO-ENTMCNC: 31.7 G/DL (ref 31.4–37.4)
MCHC RBC AUTO-ENTMCNC: 32.8 G/DL (ref 31.4–37.4)
MCHC RBC AUTO-ENTMCNC: 33 G/DL (ref 31–36)
MCV RBC AUTO: 93 FL (ref 80–100)
MCV RBC AUTO: 96 FL (ref 82–98)
MCV RBC AUTO: 99 FL (ref 82–98)
MONOCYTES # BLD AUTO: 1.1 THOUSAND/ΜL (ref 0.2–0.9)
MONOCYTES # BLD AUTO: 1.13 THOUSAND/ΜL (ref 0.17–1.22)
MONOCYTES # BLD AUTO: 1.44 THOUSAND/ΜL (ref 0.17–1.22)
MONOCYTES NFR BLD AUTO: 10 % (ref 4–12)
MONOCYTES NFR BLD AUTO: 11 % (ref 4–12)
MONOCYTES NFR BLD AUTO: 12 % (ref 1–10)
NEUTROPHILS # BLD AUTO: 6.2 THOUSANDS/ΜL (ref 1.8–7.8)
NEUTROPHILS # BLD AUTO: 8.05 THOUSANDS/ΜL (ref 1.85–7.62)
NEUTROPHILS # BLD AUTO: 9.44 THOUSANDS/ΜL (ref 1.85–7.62)
NEUTS SEG NFR BLD AUTO: 68 % (ref 45–65)
NEUTS SEG NFR BLD AUTO: 72 % (ref 43–75)
NEUTS SEG NFR BLD AUTO: 75 % (ref 43–75)
NONHDLC SERPL-MCNC: 67 MG/DL
NRBC BLD AUTO-RTO: 0 /100 WBCS
NRBC BLD AUTO-RTO: 0 /100 WBCS
P AXIS: 35 DEGREES
PLATELET # BLD AUTO: 258 THOUSANDS/UL (ref 149–390)
PLATELET # BLD AUTO: 295 THOUSANDS/UL (ref 149–390)
PLATELET # BLD AUTO: 304 THOUSANDS/UL (ref 149–390)
PLATELET # BLD AUTO: 338 THOUSANDS/UL (ref 149–390)
PLATELET # BLD AUTO: 442 THOUSANDS/UL (ref 150–450)
PMV BLD AUTO: 10 FL (ref 8.9–12.7)
PMV BLD AUTO: 10.4 FL (ref 8.9–12.7)
PMV BLD AUTO: 10.6 FL (ref 8.9–12.7)
PMV BLD AUTO: 8.2 FL (ref 8.9–12.7)
PMV BLD AUTO: 9.9 FL (ref 8.9–12.7)
POTASSIUM SERPL-SCNC: 4.5 MMOL/L (ref 3.5–5.3)
POTASSIUM SERPL-SCNC: 4.6 MMOL/L (ref 3.5–5.3)
POTASSIUM SERPL-SCNC: 4.7 MMOL/L (ref 3.5–5.3)
PR INTERVAL: 140 MS
PROCALCITONIN SERPL-MCNC: <0.05 NG/ML
PROCALCITONIN SERPL-MCNC: <0.05 NG/ML
PROT SERPL-MCNC: 5.3 G/DL (ref 6.4–8.2)
PROT SERPL-MCNC: 5.9 G/DL (ref 6.4–8.2)
PROTHROMBIN TIME: 13.5 SECONDS (ref 11.6–14.5)
QRS AXIS: -36 DEGREES
QRSD INTERVAL: 114 MS
QT INTERVAL: 406 MS
QTC INTERVAL: 474 MS
RBC # BLD AUTO: 1.85 MILLION/UL (ref 3.88–5.62)
RBC # BLD AUTO: 2.44 MILLION/UL (ref 3.88–5.62)
RBC # BLD AUTO: 2.54 MILLION/UL (ref 3.88–5.62)
RBC # BLD AUTO: 2.62 MILLION/UL (ref 3.88–5.62)
RBC # BLD AUTO: 2.93 MILLION/UL (ref 4.5–5.9)
RH BLD: POSITIVE
RPR SER QL: NORMAL
SARS-COV-2 RNA RESP QL NAA+PROBE: NEGATIVE
SODIUM SERPL-SCNC: 138 MMOL/L (ref 136–145)
SODIUM SERPL-SCNC: 138 MMOL/L (ref 136–145)
SODIUM SERPL-SCNC: 139 MMOL/L (ref 136–145)
SPECIMEN EXPIRATION DATE: NORMAL
T WAVE AXIS: 68 DEGREES
TRIGL SERPL-MCNC: 76 MG/DL
TROPONIN I SERPL-MCNC: <0.02 NG/ML
UNIT DISPENSE STATUS: NORMAL
UNIT DISPENSE STATUS: NORMAL
UNIT PRODUCT CODE: NORMAL
UNIT PRODUCT CODE: NORMAL
UNIT RH: NORMAL
UNIT RH: NORMAL
VENTRICULAR RATE: 82 BPM
WBC # BLD AUTO: 10.19 THOUSAND/UL (ref 4.31–10.16)
WBC # BLD AUTO: 11.01 THOUSAND/UL (ref 4.31–10.16)
WBC # BLD AUTO: 12.63 THOUSAND/UL (ref 4.31–10.16)
WBC # BLD AUTO: 8.95 THOUSAND/UL (ref 4.31–10.16)
WBC # BLD AUTO: 9.2 THOUSAND/UL (ref 4.5–11)

## 2020-01-01 PROCEDURE — 99221 1ST HOSP IP/OBS SF/LOW 40: CPT | Performed by: PHYSICIAN ASSISTANT

## 2020-01-01 PROCEDURE — 88305 TISSUE EXAM BY PATHOLOGIST: CPT | Performed by: PATHOLOGY

## 2020-01-01 PROCEDURE — 97167 OT EVAL HIGH COMPLEX 60 MIN: CPT

## 2020-01-01 PROCEDURE — 99223 1ST HOSP IP/OBS HIGH 75: CPT | Performed by: INTERNAL MEDICINE

## 2020-01-01 PROCEDURE — 87635 SARS-COV-2 COVID-19 AMP PRB: CPT | Performed by: EMERGENCY MEDICINE

## 2020-01-01 PROCEDURE — 97530 THERAPEUTIC ACTIVITIES: CPT

## 2020-01-01 PROCEDURE — 99232 SBSQ HOSP IP/OBS MODERATE 35: CPT | Performed by: INTERNAL MEDICINE

## 2020-01-01 PROCEDURE — 97116 GAIT TRAINING THERAPY: CPT

## 2020-01-01 PROCEDURE — 43239 EGD BIOPSY SINGLE/MULTIPLE: CPT | Performed by: INTERNAL MEDICINE

## 2020-01-01 PROCEDURE — 0DB68ZX EXCISION OF STOMACH, VIA NATURAL OR ARTIFICIAL OPENING ENDOSCOPIC, DIAGNOSTIC: ICD-10-PCS | Performed by: INTERNAL MEDICINE

## 2020-01-01 PROCEDURE — 80061 LIPID PANEL: CPT | Performed by: NURSE PRACTITIONER

## 2020-01-01 PROCEDURE — 86592 SYPHILIS TEST NON-TREP QUAL: CPT | Performed by: NURSE PRACTITIONER

## 2020-01-01 PROCEDURE — 36430 TRANSFUSION BLD/BLD COMPNT: CPT

## 2020-01-01 PROCEDURE — 99232 SBSQ HOSP IP/OBS MODERATE 35: CPT | Performed by: PSYCHIATRY & NEUROLOGY

## 2020-01-01 PROCEDURE — 85610 PROTHROMBIN TIME: CPT | Performed by: EMERGENCY MEDICINE

## 2020-01-01 PROCEDURE — 84484 ASSAY OF TROPONIN QUANT: CPT | Performed by: EMERGENCY MEDICINE

## 2020-01-01 PROCEDURE — 85014 HEMATOCRIT: CPT | Performed by: INTERNAL MEDICINE

## 2020-01-01 PROCEDURE — 86920 COMPATIBILITY TEST SPIN: CPT

## 2020-01-01 PROCEDURE — C9113 INJ PANTOPRAZOLE SODIUM, VIA: HCPCS | Performed by: INTERNAL MEDICINE

## 2020-01-01 PROCEDURE — 30233N1 TRANSFUSION OF NONAUTOLOGOUS RED BLOOD CELLS INTO PERIPHERAL VEIN, PERCUTANEOUS APPROACH: ICD-10-PCS | Performed by: EMERGENCY MEDICINE

## 2020-01-01 PROCEDURE — 85018 HEMOGLOBIN: CPT | Performed by: INTERNAL MEDICINE

## 2020-01-01 PROCEDURE — 80053 COMPREHEN METABOLIC PANEL: CPT | Performed by: INTERNAL MEDICINE

## 2020-01-01 PROCEDURE — 80320 DRUG SCREEN QUANTALCOHOLS: CPT | Performed by: EMERGENCY MEDICINE

## 2020-01-01 PROCEDURE — 97110 THERAPEUTIC EXERCISES: CPT

## 2020-01-01 PROCEDURE — 84145 PROCALCITONIN (PCT): CPT | Performed by: INTERNAL MEDICINE

## 2020-01-01 PROCEDURE — 36415 COLL VENOUS BLD VENIPUNCTURE: CPT | Performed by: EMERGENCY MEDICINE

## 2020-01-01 PROCEDURE — 85027 COMPLETE CBC AUTOMATED: CPT | Performed by: INTERNAL MEDICINE

## 2020-01-01 PROCEDURE — 85730 THROMBOPLASTIN TIME PARTIAL: CPT | Performed by: EMERGENCY MEDICINE

## 2020-01-01 PROCEDURE — 99221 1ST HOSP IP/OBS SF/LOW 40: CPT | Performed by: PSYCHIATRY & NEUROLOGY

## 2020-01-01 PROCEDURE — 97163 PT EVAL HIGH COMPLEX 45 MIN: CPT

## 2020-01-01 PROCEDURE — 93005 ELECTROCARDIOGRAM TRACING: CPT

## 2020-01-01 PROCEDURE — 93010 ELECTROCARDIOGRAM REPORT: CPT | Performed by: INTERNAL MEDICINE

## 2020-01-01 PROCEDURE — 99285 EMERGENCY DEPT VISIT HI MDM: CPT | Performed by: EMERGENCY MEDICINE

## 2020-01-01 PROCEDURE — 80053 COMPREHEN METABOLIC PANEL: CPT | Performed by: EMERGENCY MEDICINE

## 2020-01-01 PROCEDURE — P9016 RBC LEUKOCYTES REDUCED: HCPCS

## 2020-01-01 PROCEDURE — 83690 ASSAY OF LIPASE: CPT | Performed by: EMERGENCY MEDICINE

## 2020-01-01 PROCEDURE — 85025 COMPLETE CBC W/AUTO DIFF WBC: CPT | Performed by: EMERGENCY MEDICINE

## 2020-01-01 PROCEDURE — 86900 BLOOD TYPING SEROLOGIC ABO: CPT | Performed by: EMERGENCY MEDICINE

## 2020-01-01 PROCEDURE — 85025 COMPLETE CBC W/AUTO DIFF WBC: CPT | Performed by: INTERNAL MEDICINE

## 2020-01-01 PROCEDURE — 80048 BASIC METABOLIC PNL TOTAL CA: CPT | Performed by: INTERNAL MEDICINE

## 2020-01-01 PROCEDURE — 97535 SELF CARE MNGMENT TRAINING: CPT

## 2020-01-01 PROCEDURE — 84145 PROCALCITONIN (PCT): CPT | Performed by: NURSE PRACTITIONER

## 2020-01-01 PROCEDURE — C9113 INJ PANTOPRAZOLE SODIUM, VIA: HCPCS | Performed by: EMERGENCY MEDICINE

## 2020-01-01 PROCEDURE — 99238 HOSP IP/OBS DSCHRG MGMT 30/<: CPT | Performed by: PSYCHIATRY & NEUROLOGY

## 2020-01-01 PROCEDURE — 86901 BLOOD TYPING SEROLOGIC RH(D): CPT | Performed by: EMERGENCY MEDICINE

## 2020-01-01 PROCEDURE — 86850 RBC ANTIBODY SCREEN: CPT | Performed by: EMERGENCY MEDICINE

## 2020-01-01 PROCEDURE — 99285 EMERGENCY DEPT VISIT HI MDM: CPT

## 2020-01-01 PROCEDURE — 85025 COMPLETE CBC W/AUTO DIFF WBC: CPT | Performed by: PSYCHIATRY & NEUROLOGY

## 2020-01-01 RX ORDER — SODIUM CHLORIDE 9 MG/ML
75 INJECTION, SOLUTION INTRAVENOUS CONTINUOUS
Status: DISCONTINUED | OUTPATIENT
Start: 2020-01-01 | End: 2020-01-01

## 2020-01-01 RX ORDER — POLYETHYLENE GLYCOL 3350 17 G/17G
17 POWDER, FOR SOLUTION ORAL DAILY PRN
Status: DISCONTINUED | OUTPATIENT
Start: 2020-01-01 | End: 2020-01-01 | Stop reason: HOSPADM

## 2020-01-01 RX ORDER — MIRTAZAPINE 15 MG/1
15 TABLET, FILM COATED ORAL
Qty: 30 TABLET | Refills: 0 | Status: SHIPPED | OUTPATIENT
Start: 2020-01-01

## 2020-01-01 RX ORDER — SUCRALFATE 1 G/1
1 TABLET ORAL
Status: DISCONTINUED | OUTPATIENT
Start: 2020-01-01 | End: 2020-01-01

## 2020-01-01 RX ORDER — LORAZEPAM 0.5 MG/1
0.5 TABLET ORAL EVERY 4 HOURS PRN
Status: DISCONTINUED | OUTPATIENT
Start: 2020-01-01 | End: 2020-01-01 | Stop reason: HOSPADM

## 2020-01-01 RX ORDER — ACETAMINOPHEN 325 MG/1
975 TABLET ORAL EVERY 6 HOURS PRN
Status: CANCELLED | OUTPATIENT
Start: 2020-01-01

## 2020-01-01 RX ORDER — LORAZEPAM 0.5 MG/1
0.5 TABLET ORAL EVERY 4 HOURS PRN
Status: CANCELLED | OUTPATIENT
Start: 2020-01-01

## 2020-01-01 RX ORDER — ACETAMINOPHEN 325 MG/1
650 TABLET ORAL EVERY 4 HOURS PRN
Status: DISCONTINUED | OUTPATIENT
Start: 2020-01-01 | End: 2020-01-01 | Stop reason: HOSPADM

## 2020-01-01 RX ORDER — PANTOPRAZOLE SODIUM 40 MG/1
40 INJECTION, POWDER, FOR SOLUTION INTRAVENOUS
Status: DISCONTINUED | OUTPATIENT
Start: 2020-01-01 | End: 2020-01-01

## 2020-01-01 RX ORDER — VITAMIN B COMPLEX
1 CAPSULE ORAL DAILY
Qty: 30 CAPSULE | Refills: 0 | Status: SHIPPED | OUTPATIENT
Start: 2020-01-01

## 2020-01-01 RX ORDER — PANTOPRAZOLE SODIUM 40 MG/1
40 TABLET, DELAYED RELEASE ORAL
Qty: 30 TABLET | Refills: 0 | Status: SHIPPED | OUTPATIENT
Start: 2020-01-01

## 2020-01-01 RX ORDER — AMOXICILLIN 250 MG
1 CAPSULE ORAL DAILY PRN
Status: CANCELLED | OUTPATIENT
Start: 2020-01-01

## 2020-01-01 RX ORDER — HEPARIN SODIUM 5000 [USP'U]/ML
5000 INJECTION, SOLUTION INTRAVENOUS; SUBCUTANEOUS EVERY 8 HOURS SCHEDULED
Status: CANCELLED | OUTPATIENT
Start: 2020-01-01

## 2020-01-01 RX ORDER — NICOTINE 21 MG/24HR
1 PATCH, TRANSDERMAL 24 HOURS TRANSDERMAL DAILY
Status: DISCONTINUED | OUTPATIENT
Start: 2020-01-01 | End: 2020-01-01 | Stop reason: HOSPADM

## 2020-01-01 RX ORDER — AMOXICILLIN 250 MG
1 CAPSULE ORAL DAILY PRN
Status: DISCONTINUED | OUTPATIENT
Start: 2020-01-01 | End: 2020-01-01

## 2020-01-01 RX ORDER — SUCRALFATE ORAL 1 G/10ML
1000 SUSPENSION ORAL ONCE
Status: COMPLETED | OUTPATIENT
Start: 2020-01-01 | End: 2020-01-01

## 2020-01-01 RX ORDER — NICOTINE 21 MG/24HR
1 PATCH, TRANSDERMAL 24 HOURS TRANSDERMAL DAILY
Status: CANCELLED | OUTPATIENT
Start: 2020-01-01

## 2020-01-01 RX ORDER — ACETAMINOPHEN 325 MG/1
650 TABLET ORAL EVERY 6 HOURS PRN
Status: DISCONTINUED | OUTPATIENT
Start: 2020-01-01 | End: 2020-01-01 | Stop reason: HOSPADM

## 2020-01-01 RX ORDER — HALOPERIDOL 5 MG/ML
2 INJECTION INTRAMUSCULAR EVERY 8 HOURS PRN
Status: DISCONTINUED | OUTPATIENT
Start: 2020-01-01 | End: 2020-01-01 | Stop reason: HOSPADM

## 2020-01-01 RX ORDER — CALCIUM CARBONATE 200(500)MG
500 TABLET,CHEWABLE ORAL DAILY PRN
Status: CANCELLED | OUTPATIENT
Start: 2020-01-01

## 2020-01-01 RX ORDER — FAMOTIDINE 20 MG/1
20 TABLET, FILM COATED ORAL ONCE
Status: COMPLETED | OUTPATIENT
Start: 2020-01-01 | End: 2020-01-01

## 2020-01-01 RX ORDER — LORAZEPAM 2 MG/ML
1 INJECTION INTRAMUSCULAR EVERY 8 HOURS PRN
Status: DISCONTINUED | OUTPATIENT
Start: 2020-01-01 | End: 2020-01-01 | Stop reason: HOSPADM

## 2020-01-01 RX ORDER — ACETAMINOPHEN 325 MG/1
650 TABLET ORAL EVERY 6 HOURS PRN
Status: CANCELLED | OUTPATIENT
Start: 2020-01-01

## 2020-01-01 RX ORDER — HALOPERIDOL 1 MG/1
2 TABLET ORAL EVERY 8 HOURS PRN
Status: DISCONTINUED | OUTPATIENT
Start: 2020-01-01 | End: 2020-01-01 | Stop reason: HOSPADM

## 2020-01-01 RX ORDER — FERROUS SULFATE TAB EC 324 MG (65 MG FE EQUIVALENT) 324 (65 FE) MG
324 TABLET DELAYED RESPONSE ORAL DAILY
Qty: 30 TABLET | Refills: 0 | Status: SHIPPED | OUTPATIENT
Start: 2020-01-01

## 2020-01-01 RX ORDER — ACETAMINOPHEN 325 MG/1
650 TABLET ORAL EVERY 4 HOURS PRN
Status: CANCELLED | OUTPATIENT
Start: 2020-01-01

## 2020-01-01 RX ORDER — MIDAZOLAM HYDROCHLORIDE 2 MG/2ML
INJECTION, SOLUTION INTRAMUSCULAR; INTRAVENOUS AS NEEDED
Status: DISCONTINUED | OUTPATIENT
Start: 2020-01-01 | End: 2020-01-01

## 2020-01-01 RX ORDER — PANTOPRAZOLE SODIUM 40 MG/1
40 TABLET, DELAYED RELEASE ORAL
Status: DISCONTINUED | OUTPATIENT
Start: 2020-01-01 | End: 2020-01-01 | Stop reason: HOSPADM

## 2020-01-01 RX ORDER — HALOPERIDOL 0.5 MG/1
2 TABLET ORAL EVERY 8 HOURS PRN
Status: CANCELLED | OUTPATIENT
Start: 2020-01-01

## 2020-01-01 RX ORDER — LORAZEPAM 2 MG/ML
1 INJECTION INTRAMUSCULAR EVERY 8 HOURS PRN
Status: CANCELLED | OUTPATIENT
Start: 2020-01-01

## 2020-01-01 RX ORDER — CALCIUM CARBONATE 200(500)MG
500 TABLET,CHEWABLE ORAL DAILY PRN
Status: DISCONTINUED | OUTPATIENT
Start: 2020-01-01 | End: 2020-01-01 | Stop reason: HOSPADM

## 2020-01-01 RX ORDER — AMOXICILLIN 250 MG
1 CAPSULE ORAL DAILY PRN
Status: DISCONTINUED | OUTPATIENT
Start: 2020-01-01 | End: 2020-01-01 | Stop reason: HOSPADM

## 2020-01-01 RX ORDER — ACETAMINOPHEN 325 MG/1
975 TABLET ORAL EVERY 6 HOURS PRN
Status: DISCONTINUED | OUTPATIENT
Start: 2020-01-01 | End: 2020-01-01 | Stop reason: HOSPADM

## 2020-01-01 RX ORDER — HEPARIN SODIUM 5000 [USP'U]/ML
5000 INJECTION, SOLUTION INTRAVENOUS; SUBCUTANEOUS EVERY 8 HOURS SCHEDULED
Status: DISCONTINUED | OUTPATIENT
Start: 2020-01-01 | End: 2020-01-01

## 2020-01-01 RX ORDER — PANTOPRAZOLE SODIUM 40 MG/1
40 TABLET, DELAYED RELEASE ORAL
Status: CANCELLED | OUTPATIENT
Start: 2020-01-01

## 2020-01-01 RX ORDER — NICOTINE 21 MG/24HR
1 PATCH, TRANSDERMAL 24 HOURS TRANSDERMAL DAILY
Status: DISCONTINUED | OUTPATIENT
Start: 2020-01-01 | End: 2020-01-01

## 2020-01-01 RX ORDER — POLYETHYLENE GLYCOL 3350 17 G/17G
17 POWDER, FOR SOLUTION ORAL DAILY PRN
Status: CANCELLED | OUTPATIENT
Start: 2020-01-01

## 2020-01-01 RX ORDER — HEPARIN SODIUM 5000 [USP'U]/ML
5000 INJECTION, SOLUTION INTRAVENOUS; SUBCUTANEOUS EVERY 8 HOURS SCHEDULED
Status: DISCONTINUED | OUTPATIENT
Start: 2020-01-01 | End: 2020-01-01 | Stop reason: HOSPADM

## 2020-01-01 RX ORDER — PANTOPRAZOLE SODIUM 40 MG/1
40 TABLET, DELAYED RELEASE ORAL
Status: DISCONTINUED | OUTPATIENT
Start: 2020-01-01 | End: 2020-01-01

## 2020-01-01 RX ORDER — MIRTAZAPINE 15 MG/1
15 TABLET, FILM COATED ORAL
Status: DISCONTINUED | OUTPATIENT
Start: 2020-01-01 | End: 2020-01-01 | Stop reason: HOSPADM

## 2020-01-01 RX ORDER — HALOPERIDOL 5 MG/ML
2 INJECTION INTRAMUSCULAR EVERY 8 HOURS PRN
Status: CANCELLED | OUTPATIENT
Start: 2020-01-01

## 2020-01-01 RX ORDER — PROPOFOL 10 MG/ML
INJECTION, EMULSION INTRAVENOUS AS NEEDED
Status: DISCONTINUED | OUTPATIENT
Start: 2020-01-01 | End: 2020-01-01

## 2020-01-01 RX ORDER — PANTOPRAZOLE SODIUM 40 MG/1
40 INJECTION, POWDER, FOR SOLUTION INTRAVENOUS ONCE
Status: COMPLETED | OUTPATIENT
Start: 2020-01-01 | End: 2020-01-01

## 2020-01-01 RX ADMIN — PANTOPRAZOLE SODIUM 40 MG: 40 INJECTION, POWDER, FOR SOLUTION INTRAVENOUS at 08:42

## 2020-01-01 RX ADMIN — PANTOPRAZOLE SODIUM 40 MG: 40 TABLET, DELAYED RELEASE ORAL at 06:14

## 2020-01-01 RX ADMIN — SODIUM CHLORIDE 75 ML/HR: 0.9 INJECTION, SOLUTION INTRAVENOUS at 05:00

## 2020-01-01 RX ADMIN — PANTOPRAZOLE SODIUM 40 MG: 40 TABLET, DELAYED RELEASE ORAL at 05:26

## 2020-01-01 RX ADMIN — HEPARIN SODIUM 5000 UNITS: 5000 INJECTION INTRAVENOUS; SUBCUTANEOUS at 05:00

## 2020-01-01 RX ADMIN — SODIUM CHLORIDE 75 ML/HR: 0.9 INJECTION, SOLUTION INTRAVENOUS at 10:53

## 2020-01-01 RX ADMIN — FAMOTIDINE 20 MG: 20 TABLET ORAL at 12:39

## 2020-01-01 RX ADMIN — SODIUM CHLORIDE: 0.9 INJECTION, SOLUTION INTRAVENOUS at 12:39

## 2020-01-01 RX ADMIN — SODIUM CHLORIDE 75 ML/HR: 0.9 INJECTION, SOLUTION INTRAVENOUS at 21:45

## 2020-01-01 RX ADMIN — Medication 8 MG/HR: at 05:50

## 2020-01-01 RX ADMIN — MIRTAZAPINE 15 MG: 15 TABLET, FILM COATED ORAL at 22:25

## 2020-01-01 RX ADMIN — CALCIUM CARBONATE (ANTACID) CHEW TAB 500 MG 500 MG: 500 CHEW TAB at 11:49

## 2020-01-01 RX ADMIN — Medication 8 MG/HR: at 01:25

## 2020-01-01 RX ADMIN — SODIUM CHLORIDE 75 ML/HR: 0.9 INJECTION, SOLUTION INTRAVENOUS at 01:25

## 2020-01-01 RX ADMIN — PANTOPRAZOLE SODIUM 40 MG: 40 TABLET, DELAYED RELEASE ORAL at 14:01

## 2020-01-01 RX ADMIN — SODIUM CHLORIDE 1000 ML: 0.9 INJECTION, SOLUTION INTRAVENOUS at 19:42

## 2020-01-01 RX ADMIN — HEPARIN SODIUM 5000 UNITS: 5000 INJECTION INTRAVENOUS; SUBCUTANEOUS at 13:54

## 2020-01-01 RX ADMIN — Medication 8 MG/HR: at 20:33

## 2020-01-01 RX ADMIN — PANTOPRAZOLE SODIUM 40 MG: 40 INJECTION, POWDER, FOR SOLUTION INTRAVENOUS at 10:44

## 2020-01-01 RX ADMIN — HEPARIN SODIUM 5000 UNITS: 5000 INJECTION INTRAVENOUS; SUBCUTANEOUS at 20:55

## 2020-01-01 RX ADMIN — PANTOPRAZOLE SODIUM 40 MG: 40 TABLET, DELAYED RELEASE ORAL at 06:01

## 2020-01-01 RX ADMIN — SODIUM CHLORIDE 75 ML/HR: 0.9 INJECTION, SOLUTION INTRAVENOUS at 14:41

## 2020-01-01 RX ADMIN — PROPOFOL 50 MG: 10 INJECTION, EMULSION INTRAVENOUS at 12:52

## 2020-01-01 RX ADMIN — HEPARIN SODIUM 5000 UNITS: 5000 INJECTION INTRAVENOUS; SUBCUTANEOUS at 05:44

## 2020-01-01 RX ADMIN — HEPARIN SODIUM 5000 UNITS: 5000 INJECTION INTRAVENOUS; SUBCUTANEOUS at 14:23

## 2020-01-01 RX ADMIN — Medication 8 MG/HR: at 16:07

## 2020-01-01 RX ADMIN — HEPARIN SODIUM 5000 UNITS: 5000 INJECTION INTRAVENOUS; SUBCUTANEOUS at 18:35

## 2020-01-01 RX ADMIN — PANTOPRAZOLE SODIUM 40 MG: 40 TABLET, DELAYED RELEASE ORAL at 06:15

## 2020-01-01 RX ADMIN — SUCRALFATE 1000 MG: 1 SUSPENSION ORAL at 12:39

## 2020-01-01 RX ADMIN — MIRTAZAPINE 15 MG: 15 TABLET, FILM COATED ORAL at 21:23

## 2020-01-01 RX ADMIN — PANTOPRAZOLE SODIUM 40 MG: 40 INJECTION, POWDER, FOR SOLUTION INTRAVENOUS at 15:16

## 2020-01-01 RX ADMIN — MIRTAZAPINE 15 MG: 15 TABLET, FILM COATED ORAL at 21:16

## 2020-01-01 RX ADMIN — MIDAZOLAM 4 MG: 1 INJECTION INTRAMUSCULAR; INTRAVENOUS at 12:52

## 2020-09-15 PROBLEM — R45.851 SUICIDAL IDEATION: Status: ACTIVE | Noted: 2020-01-01

## 2020-09-15 NOTE — ASSESSMENT & PLAN NOTE
Patient reported with suicidal ideation with a plan  Continue one-to-one  Consult psych for likely inpatient psych admission once medically cleared

## 2020-09-15 NOTE — ED PROVIDER NOTES
History  Chief Complaint   Patient presents with    Weakness - Generalized     pt states not feeling well for one month since his wife   Pt states no appetite, weak  Only drinking Ensure   Psychiatric Evaluation     "I'd take myself out if I could"  Pt states he does not own a pistol, "otherwise it would have been done a long time ago"     77-year-old male presents for fatigue, decreased appetite, weakness  The patient also has worsening depression with suicidal thoughts since his wife  approximately 1 month ago  Patient states that he has no access to a pistol however he has thought about using a knife  The patient also complains about epigastric abdominal pain that has been ongoing for the past month  Prior to Admission Medications   Prescriptions Last Dose Informant Patient Reported? Taking? Esomeprazole Magnesium (NEXIUM PO) Not Taking at Unknown time  Yes No   Sig: Take 20 mg by mouth every 12 (twelve) hours     sucralfate (CARAFATE) 1 g/10 mL suspension   No No   Sig: Take 10 mL (1,000 mg total) by mouth every 6 (six) hours for 90 days      Facility-Administered Medications: None       Past Medical History:   Diagnosis Date    Bladder cancer [facility])        Past Surgical History:   Procedure Laterality Date    ESOPHAGOGASTRODUODENOSCOPY N/A 2019    Procedure: ESOPHAGOGASTRODUODENOSCOPY (EGD); Surgeon: Ino Aguilera MD;  Location: [facility];  Service: Gastroenterology       Family History   Problem Relation Age of Onset    No Known Problems Mother     No Known Problems Father      I have reviewed and agree with the history as documented  E-Cigarette/Vaping     E-Cigarette/Vaping Substances     Social History     Tobacco Use    Smoking status: Current Every Day Smoker     Packs/day: 0 25     Types: Cigarettes    Smokeless tobacco: Former User   Substance Use Topics    Alcohol use: Not Currently     Frequency: Monthly or less     Comment: Socially    Drug use:  No Review of Systems   Constitutional: Positive for appetite change and fatigue  Negative for chills and fever  HENT: Negative for sore throat  Respiratory: Negative for cough, chest tightness and shortness of breath  Cardiovascular: Negative for chest pain and palpitations  Gastrointestinal: Positive for abdominal pain  Negative for constipation, diarrhea, nausea and vomiting  Genitourinary: Negative for difficulty urinating and dysuria  Musculoskeletal: Negative for back pain  Skin: Negative for rash  Neurological: Positive for weakness  Negative for dizziness, seizures, syncope and headaches  Psychiatric/Behavioral: Positive for suicidal ideas  All other systems reviewed and are negative  Physical Exam  Physical Exam  Vitals signs and nursing note reviewed  Constitutional:       General: He is not in acute distress  Appearance: He is well-developed  HENT:      Head: Normocephalic and atraumatic  Right Ear: External ear normal       Left Ear: External ear normal       Mouth/Throat:      Pharynx: No oropharyngeal exudate  Eyes:      General: No scleral icterus  Pupils: Pupils are equal, round, and reactive to light  Neck:      Musculoskeletal: Normal range of motion and neck supple  Cardiovascular:      Rate and Rhythm: Normal rate and regular rhythm  Heart sounds: Normal heart sounds  Pulmonary:      Effort: Pulmonary effort is normal  No respiratory distress  Breath sounds: Normal breath sounds  Abdominal:      General: Bowel sounds are normal       Palpations: Abdomen is soft  Tenderness: There is no abdominal tenderness  There is no guarding or rebound  Genitourinary:     Rectum: Guaiac result positive  Musculoskeletal: Normal range of motion  Skin:     General: Skin is warm and dry  Findings: No rash  Neurological:      Mental Status: He is alert and oriented to person, place, and time     Psychiatric:         Mood and Affect: Mood is depressed  Thought Content: Thought content includes suicidal ideation  Thought content includes suicidal ( Gun or knife) plan  Vital Signs  ED Triage Vitals [09/15/20 1134]   Temperature Pulse Respirations Blood Pressure SpO2   97 5 °F (36 4 °C) 83 17 93/68 99 %      Temp Source Heart Rate Source Patient Position - Orthostatic VS BP Location FiO2 (%)   Oral Monitor Sitting Right arm --      Pain Score       4           Vitals:    09/16/20 1323 09/16/20 2208 09/17/20 0845 09/17/20 1048   BP: 93/50 110/57 (!) 88/39 (!) 91/47   Pulse: 89 65 77 86   Patient Position - Orthostatic VS:   Sitting          Visual Acuity      ED Medications  Medications   nicotine (NICODERM CQ) 14 mg/24hr TD 24 hr patch 1 patch (1 patch Transdermal Not Given 9/17/20 0857)   sodium chloride 0 9 % infusion (75 mL/hr Intravenous New Bag 9/17/20 0125)   pantoprazole (PROTONIX) injection 40 mg (40 mg Intravenous Given 9/17/20 1044)   heparin (porcine) subcutaneous injection 5,000 Units (5,000 Units Subcutaneous Given 9/17/20 0544)   calcium carbonate (TUMS) chewable tablet 500 mg (500 mg Oral Given 9/17/20 1149)   sucralfate (CARAFATE) oral suspension 1,000 mg (1,000 mg Oral Given 9/15/20 1239)   famotidine (PEPCID) tablet 20 mg (20 mg Oral Given 9/15/20 1239)   pantoprazole (PROTONIX) injection 40 mg (40 mg Intravenous Given 9/15/20 1516)   sodium chloride 0 9 % bolus 1,000 mL (1,000 mL Intravenous New Bag 9/15/20 1942)       Diagnostic Studies  Results Reviewed     Procedure Component Value Units Date/Time    Novel Coronavirus Shiraus Paul [[id number]]  (Normal) Collected:  09/15/20 1331    Lab Status:  Final result Specimen:  Nares from Nose Updated:  09/15/20 1440     SARS-CoV-2 Negative    Narrative:        The specimen collection materials, transport medium, and/or testing methodology utilized in the production of these test results have been proven to be reliable in a limited validation with an abbreviated program under the Emergency Utilization Authorization provided by the FDA  Testing reported as "Presumptive positive" will be confirmed with secondary testing with a reference laboratory to ensure result accuracy  Clinical caution and judgement should be used with the interpretation of these results with consideration of the clinical impression and other laboratory testing  Testing reported as "Positive" or "Negative" has been proven to be accurate according to standard laboratory validation requirements  All testing is performed with control materials showing appropriate reactivity at standard intervals        Ethanol [[id number]]  (Normal) Collected:  09/15/20 1331    Lab Status:  Final result Specimen:  Blood from Arm, Left Updated:  09/15/20 1410     Ethanol Lvl <3 mg/dL     Protime-INR [[id number]]  (Normal) Collected:  09/15/20 1331    Lab Status:  Final result Specimen:  Blood from Arm, Left Updated:  09/15/20 1355     Protime 13 5 seconds      INR 1 02    APTT [[id number]]  (Normal) Collected:  09/15/20 1331    Lab Status:  Final result Specimen:  Blood from Arm, Left Updated:  09/15/20 1355     PTT 35 seconds     Troponin I [[id number]]  (Normal) Collected:  09/15/20 1238    Lab Status:  Final result Specimen:  Blood from Arm, Right Updated:  09/15/20 1310     Troponin I <0 02 ng/mL     Comprehensive metabolic panel [[id number]]  (Abnormal) Collected:  09/15/20 1238    Lab Status:  Final result Specimen:  Blood from Arm, Right Updated:  09/15/20 1307     Sodium 138 mmol/L      Potassium 4 7 mmol/L      Chloride 101 mmol/L      CO2 34 mmol/L      ANION GAP 3 mmol/L      BUN 35 mg/dL      Creatinine 0 83 mg/dL      Glucose 94 mg/dL      Calcium 8 7 mg/dL      AST 20 U/L      ALT 14 U/L      Alkaline Phosphatase 55 U/L      Total Protein 5 9 g/dL      Albumin 2 8 g/dL      Total Bilirubin 0 20 mg/dL      eGFR 86 ml/min/1 73sq m     Narrative:       [place] guidelines for Chronic Kidney Disease (CKD):     Stage 1 with normal or high GFR (GFR > 90 mL/min/1 73 square meters)    Stage 2 Mild CKD (GFR = 60-89 mL/min/1 73 square meters)    Stage 3A Moderate CKD (GFR = 45-59 mL/min/1 73 square meters)    Stage 3B Moderate CKD (GFR = 30-44 mL/min/1 73 square meters)    Stage 4 Severe CKD (GFR = 15-29 mL/min/1 73 square meters)    Stage 5 End Stage CKD (GFR <15 mL/min/1 73 square meters)  Note: GFR calculation is accurate only with a steady state creatinine    Lipase [[id number]]  (Normal) Collected:  09/15/20 1238    Lab Status:  Final result Specimen:  Blood from Arm, Right Updated:  09/15/20 1307     Lipase 98 u/L     CBC and differential [[id number]]  (Abnormal) Collected:  09/15/20 1238    Lab Status:  Final result Specimen:  Blood from Arm, Right Updated:  09/15/20 1254     WBC 12 63 Thousand/uL      RBC 1 85 Million/uL      Hemoglobin 5 8 g/dL      Hematocrit 18 3 %      MCV 99 fL      MCH 31 4 pg      MCHC 31 7 g/dL      RDW 16 4 %      MPV 10 0 fL      Platelets 944 Thousands/uL      nRBC 0 /100 WBCs      Neutrophils Relative 75 %      Immat GRANS % 1 %      Lymphocytes Relative 11 %      Monocytes Relative 11 %      Eosinophils Relative 1 %      Basophils Relative 1 %      Neutrophils Absolute 9 44 Thousands/µL      Immature Grans Absolute 0 07 Thousand/uL      Lymphocytes Absolute 1 41 Thousands/µL      Monocytes Absolute 1 44 Thousand/µL      Eosinophils Absolute 0 18 Thousand/µL      Basophils Absolute 0 09 Thousands/µL     Rapid drug screen, urine [[id number]]     Lab Status:  No result Specimen:  Urine                  No orders to display              Procedures  ECG 12 Lead Documentation Only    Date/Time: 9/15/2020 12:14 PM  Performed by: Rudy Richter DO  Authorized by:  Rudy Richter DO     Indications / Diagnosis:  Epigastric  ECG reviewed by me, the ED Provider: yes    Patient location:  ED  Previous ECG:     Previous ECG:  Compared to current    Comparison ECG info: 1/10/2019    Similarity:  No change  Interpretation:     Interpretation: normal    Rate:     ECG rate assessment: normal    Rhythm:     Rhythm: sinus rhythm    Ectopy:     Ectopy: none    QRS:     QRS axis:  Left    QRS intervals:  Normal  Conduction:     Conduction: normal    ST segments:     ST segments:  Normal  T waves:     T waves: normal               ED Course  ED Course as of Sep 17 1208   Tue Sep 15, 2020   1327 [facility] paged for admit      1410 2nd page to [address] [place] [address] 2:33 PM  I discussed the case with the hospitalist  We reviewed the HPI, pertinent PMH, ED course and workup  Hospitalist agreed with plan and will admit the patient to the hospital  Requesting 2nd unit PRBC                               SBIRT 22yo+      Most Recent Value   SBIRT (25 yo +)   In order to provide better care to our patients, we are screening all of our patients for alcohol and drug use  Would it be okay to ask you these screening questions? Yes Filed at: 09/15/2020 1714   Initial Alcohol Screen: US AUDIT-C    1  How often do you have a drink containing alcohol? 1 Filed at: 09/15/2020 1714   2  How many drinks containing alcohol do you have on a typical day you are drinking? 0 Filed at: 09/15/2020 1714   3b  FEMALE Any Age, or MALE 65+: How often do you have 4 or more drinks on one occassion? 0 Filed at: 09/15/2020 1714   Audit-C Score  1 Filed at: 09/15/2020 1714   SABRINA: How many times in the past year have you    Used an illegal drug or used a prescription medication for non-medical reasons?   Never Filed at: 09/15/2020 1714                  MDM  Number of Diagnoses or Management Options  Anemia:   Depression with suicidal ideation:   Diagnosis management comments: Differential diagnosis:  Gastric ulcer, GI bleed, anemia, electrolyte disturbance, renal failure, dehydration this is all heel complicated by the patient's depression with suicidal ideation however he will need medical clearance prior to mental health treatment  Patient has heme-positive stool with associated anemia  Plan is for blood consent, transfusion and admission       Amount and/or Complexity of Data Reviewed  Clinical lab tests: reviewed and ordered  Tests in the medicine section of CPT®: ordered and reviewed  Decide to obtain previous medical records or to obtain history from someone other than the patient: yes  Review and summarize past medical records: yes  Discuss the patient with other providers: yes  Independent visualization of images, tracings, or specimens: yes        Disposition  Final diagnoses:   Anemia   Depression with suicidal ideation     Time reflects when diagnosis was documented in both MDM as applicable and the Disposition within this note     Time User Action Codes Description Comment    9/15/2020  1:11 PM Tom Suzanna Add [D64 9] Anemia     9/15/2020  1:11 PM Tom Suzanna Add [F32 9,  R45 851] Depression with suicidal ideation     9/15/2020  7:40 PM Donte Gallegos Add [R45 851] Suicidal ideation     9/16/2020  8:10 AM Emerson-Berenice West Add [K92 2] Gastrointestinal hemorrhage, unspecified gastrointestinal hemorrhage type     9/16/2020  8:10 AM Berenice Rodgers Add [D62] Acute blood loss anemia     9/16/2020 12:55 PM Genevia Zeus E Modify [D64 9] Anemia     9/16/2020 12:55 PM Genevia Zeus E Modify [F32 9,  R45 851] Depression with suicidal ideation     9/16/2020 12:55 PM Genevia Zeus E Modify [R45 851] Suicidal ideation     9/16/2020 12:55 PM Genevia Zeus E Modify [K92 2] Gastrointestinal hemorrhage, unspecified gastrointestinal hemorrhage type     9/16/2020 12:55 PM Genevia Zeus E Modify [D62] Acute blood loss anemia       ED Disposition     ED Disposition Condition Date/Time Comment    Admit Stable Tue Sep 15, 2020  2:40 PM Case was discussed with Dr Damaris Montgomery and the patient's admission status was agreed to be Admission Status: inpatient status to the service of [facility]   Follow-up Information    None         Current Discharge Medication List      CONTINUE these medications which have NOT CHANGED    Details   Esomeprazole Magnesium (NEXIUM PO) Take 20 mg by mouth every 12 (twelve) hours        sucralfate (CARAFATE) 1 g/10 mL suspension Take 10 mL (1,000 mg total) by mouth every 6 (six) hours for 90 days  Qty: 420 mL, Refills: 0    Associated Diagnoses: Emphysematous gastritis           No discharge procedures on file      PDMP Review     None          ED Provider  Electronically Signed by           Peace Bender DO  09/17/20 9050

## 2020-09-15 NOTE — ED NOTES
Pt states he has urinated in his brief  Pt changed and encouraged to call for urinal next time       Gustabo Castaneda RN  09/15/20 5320

## 2020-09-15 NOTE — CONSULTS
Consultation - GI   Willie Samuels 76 y o  male MRN: [id number]  Unit/Bed#: ED 10 Encounter: [id number]    Consults  ASSESSMENT and PLAN    3 49-year-old male admitted with generalized weakness, found to have a macrocytic profound anemia  Perhaps recent melena he does have a history of ulcer disease  I would recommend admission characterizing the anemia transfusing 2 units  Plan to proceed with EGD tomorrow  He can have clear liquids tonight  Thanks will follow discussed with Dr Means Po   Patient presents with    Weakness - Generalized     pt states not feeling well for one month since his wife   Pt states no appetite, weak  Only drinking Ensure   Psychiatric Evaluation     "I'd take myself out if I could"  Pt states he does not own a pistol, "otherwise it would have been done a long time ago"     Physician Requesting Consult: Carlos Harvey, DO    HPI Willie Samuels is a 76y o  year old male who presents with weakness  He was noted to have generalized abdominal pain as well as occasional dark stools  The patient is confused but does remember previous admission in [place] for ulcers  He denies any nonsteroidal use he has been taking acid lowering medicines by his own account  Historical Information   Past Medical History:   Diagnosis Date    Bladder cancer [facility])      Past Surgical History:   Procedure Laterality Date    ESOPHAGOGASTRODUODENOSCOPY N/A 2019    Procedure: ESOPHAGOGASTRODUODENOSCOPY (EGD); Surgeon: Bruce Moncada MD;  Location: [facility];  Service: Gastroenterology     Social History   Social History     Substance and Sexual Activity   Alcohol Use Yes    Comment: Socially     Social History     Substance and Sexual Activity   Drug Use No     Social History     Tobacco Use   Smoking Status Current Every Day Smoker    Packs/day: 0 25    Types: Cigarettes   Smokeless Tobacco Former User     No family history on file      Meds/Allergies   No current facility-administered medications for this encounter  (Not in a hospital admission)      No Known Allergies    PHYSICALEXAM  Blood pressure (!) 89/57, pulse 84, temperature 98 6 °F (37 °C), temperature source Oral, resp  rate 16, SpO2 94 %  There is no height or weight on file to calculate BMI  General Appearance: NAD, cooperative, alert  Eyes: Anicteric, PERRLA, EOMI  ENT:  Normocephalic, atraumatic, normal mucosa  Neck:  Supple, symmetrical, trachea midline  Resp:  Clear to auscultation bilaterally; no rales, rhonchi or wheezing; respirations unlabored   CV:  S1 S2, Regular rate and rhythm; no murmur, rub, or gallop  GI:  Soft, non-tender, non-distended; normal bowel sounds; no masses, no organomegaly   Rectal: Deferred  Musculoskeletal: No cyanosis, clubbing or edema  Normal ROM  Skin:  No jaundice, rashes, or lesions   Heme/Lymph: No palpable cervical lymphadenopathy  Psych: Normal affect, good eye contact  Neuro: No gross deficits, AAOx3    Lab Results   Component Value Date    CALCIUM 8 7 09/15/2020    K 4 7 09/15/2020    CO2 34 (H) 09/15/2020     09/15/2020    BUN 35 (H) 09/15/2020    CREATININE 0 83 09/15/2020     Lab Results   Component Value Date    WBC 12 63 (H) 09/15/2020    HGB 5 8 (LL) 09/15/2020    HCT 18 3 (L) 09/15/2020    MCV 99 (H) 09/15/2020     09/15/2020     Lab Results   Component Value Date    ALT 14 09/15/2020    AST 20 09/15/2020    ALKPHOS 55 09/15/2020     No results found for: AMYLASE  Lab Results   Component Value Date    LIPASE 98 09/15/2020     No results found for: IRON, TIBC, FERRITIN  Lab Results   Component Value Date    INR 1 02 09/15/2020       Imaging Studies: I have personally reviewed pertinent reports  EKG, Pathology, and Other Studies: I have personally reviewed pertinent reports  REVIEW OF SYSTEMS:    CONSTITUTIONAL: Denies any fever, chills, rigors, and weight loss  HEENT: No earache or tinnitus   Denies hearing loss or visual disturbances  CARDIOVASCULAR: No chest pain or palpitations  RESPIRATORY: Denies any cough, hemoptysis, shortness of breath or dyspnea on exertion  GASTROINTESTINAL: As noted in the History of Present Illness  GENITOURINARY: No problems with urination  Denies any hematuria or dysuria  NEUROLOGIC: No dizziness or vertigo, denies headaches  MUSCULOSKELETAL: Denies any muscle or joint pain  SKIN: Denies skin rashes or itching  ENDOCRINE: Denies excessive thirst  Denies intolerance to heat or cold  PSYCHOSOCIAL: Denies depression or anxiety  Denies any recent memory loss

## 2020-09-15 NOTE — ASSESSMENT & PLAN NOTE
Patient presented with melena  Reports last melanotic bowel movement 3 days ago  Has a history of upper GI bleed previously  Baseline hemoglobin approximately 8  Presented with a hemoglobin of 5 8  Received 2 units packed red blood cells  Will provide IVF  IV PPI  Clear liquid diet now  GI consult  Plan for EGD tomorrow  NPO at midnight

## 2020-09-15 NOTE — ED NOTES
Pt is visibly dirty  Pt states it has been "a while" since he has showered  Pt states nausea and pain in stomach, generalized       Karen Russ, TOVA  09/15/20 4357

## 2020-09-15 NOTE — H&P (VIEW-ONLY)
Consultation - GI   Dia Coe 76 y o  male MRN: [id number]  Unit/Bed#: ED 10 Encounter: [id number]    Consults  ASSESSMENT and PLAN    3 22-year-old male admitted with generalized weakness, found to have a macrocytic profound anemia  Perhaps recent melena he does have a history of ulcer disease  I would recommend admission characterizing the anemia transfusing 2 units  Plan to proceed with EGD tomorrow  He can have clear liquids tonight  Thanks will follow discussed with Dr Juany Navarro   Patient presents with    Weakness - Generalized     pt states not feeling well for one month since his wife   Pt states no appetite, weak  Only drinking Ensure   Psychiatric Evaluation     "I'd take myself out if I could"  Pt states he does not own a pistol, "otherwise it would have been done a long time ago"     Physician Requesting Consult: Chandra Nunez DO    HPI Dia Ceo is a 76y o  year old male who presents with weakness  He was noted to have generalized abdominal pain as well as occasional dark stools  The patient is confused but does remember previous admission in [place] for ulcers  He denies any nonsteroidal use he has been taking acid lowering medicines by his own account  Historical Information   Past Medical History:   Diagnosis Date    Bladder cancer [facility])      Past Surgical History:   Procedure Laterality Date    ESOPHAGOGASTRODUODENOSCOPY N/A 2019    Procedure: ESOPHAGOGASTRODUODENOSCOPY (EGD); Surgeon: Malu Mendenhall MD;  Location: [facility];  Service: Gastroenterology     Social History   Social History     Substance and Sexual Activity   Alcohol Use Yes    Comment: Socially     Social History     Substance and Sexual Activity   Drug Use No     Social History     Tobacco Use   Smoking Status Current Every Day Smoker    Packs/day: 0 25    Types: Cigarettes   Smokeless Tobacco Former User     No family history on file      Meds/Allergies   No current facility-administered medications for this encounter  (Not in a hospital admission)      No Known Allergies    PHYSICALEXAM  Blood pressure (!) 89/57, pulse 84, temperature 98 6 °F (37 °C), temperature source Oral, resp  rate 16, SpO2 94 %  There is no height or weight on file to calculate BMI  General Appearance: NAD, cooperative, alert  Eyes: Anicteric, PERRLA, EOMI  ENT:  Normocephalic, atraumatic, normal mucosa  Neck:  Supple, symmetrical, trachea midline  Resp:  Clear to auscultation bilaterally; no rales, rhonchi or wheezing; respirations unlabored   CV:  S1 S2, Regular rate and rhythm; no murmur, rub, or gallop  GI:  Soft, non-tender, non-distended; normal bowel sounds; no masses, no organomegaly   Rectal: Deferred  Musculoskeletal: No cyanosis, clubbing or edema  Normal ROM  Skin:  No jaundice, rashes, or lesions   Heme/Lymph: No palpable cervical lymphadenopathy  Psych: Normal affect, good eye contact  Neuro: No gross deficits, AAOx3    Lab Results   Component Value Date    CALCIUM 8 7 09/15/2020    K 4 7 09/15/2020    CO2 34 (H) 09/15/2020     09/15/2020    BUN 35 (H) 09/15/2020    CREATININE 0 83 09/15/2020     Lab Results   Component Value Date    WBC 12 63 (H) 09/15/2020    HGB 5 8 (LL) 09/15/2020    HCT 18 3 (L) 09/15/2020    MCV 99 (H) 09/15/2020     09/15/2020     Lab Results   Component Value Date    ALT 14 09/15/2020    AST 20 09/15/2020    ALKPHOS 55 09/15/2020     No results found for: AMYLASE  Lab Results   Component Value Date    LIPASE 98 09/15/2020     No results found for: IRON, TIBC, FERRITIN  Lab Results   Component Value Date    INR 1 02 09/15/2020       Imaging Studies: I have personally reviewed pertinent reports  EKG, Pathology, and Other Studies: I have personally reviewed pertinent reports  REVIEW OF SYSTEMS:    CONSTITUTIONAL: Denies any fever, chills, rigors, and weight loss  HEENT: No earache or tinnitus   Denies hearing loss or visual disturbances  CARDIOVASCULAR: No chest pain or palpitations  RESPIRATORY: Denies any cough, hemoptysis, shortness of breath or dyspnea on exertion  GASTROINTESTINAL: As noted in the History of Present Illness  GENITOURINARY: No problems with urination  Denies any hematuria or dysuria  NEUROLOGIC: No dizziness or vertigo, denies headaches  MUSCULOSKELETAL: Denies any muscle or joint pain  SKIN: Denies skin rashes or itching  ENDOCRINE: Denies excessive thirst  Denies intolerance to heat or cold  PSYCHOSOCIAL: Denies depression or anxiety  Denies any recent memory loss

## 2020-09-15 NOTE — H&P
H&P- Olga Mckeon 1945, 76 y o  male MRN: [id number]    Unit/Bed#: -01 Encounter: [id number]    Primary Care Provider: Joseph Clifford MD   Date and time admitted to hospital: 9/15/2020 11:33 AM        * Acute blood loss anemia  Assessment & Plan  Baseline hemoglobin approximately 8  Hemoglobin noted to be 5 8  Likely secondary to GI bleed  Follow-up hemoglobin after 2 units packed red blood cells  Plan for EGD tomorrow as outlined below    Suicidal ideation  Assessment & Plan  Patient reported with suicidal ideation with a plan  Continue one-to-one  Consult psych for likely inpatient psych admission once medically cleared    GI bleed  Assessment & Plan  Patient presented with melena  Reports last melanotic bowel movement 3 days ago  Has a history of upper GI bleed previously  Baseline hemoglobin approximately 8  Presented with a hemoglobin of 5 8  Received 2 units packed red blood cells  Will provide IVF  IV PPI  Clear liquid diet now  GI consult  Plan for EGD tomorrow  NPO at midnight        VTE Prophylaxis: Pharmacologic VTE Prophylaxis contraindicated due to gi bleed  / sequential compression device   Code Status: full code  POLST: POLST form is on file already (pre-hospital)  Discussion with family: pt    Anticipated Length of Stay:  Patient will be admitted on an Inpatient basis with an anticipated length of stay of  > 2 midnights  Justification for Hospital Stay:  GI bleed    Total Time for Visit, including Counseling / Coordination of Care: 60   Greater than 50% of this total time spent on direct patient counseling and coordination of care  Chief Complaint:   Abdominal pain    History of Present Illness:    lOga Mckeon is a 76 y o  male w with past medical history significant of PUD who initially presented with generalized weakness and generalized abdominal pain  Notes abdominal pain for approximately the past 1 week progressively worsened    Notes occasional dark stool with the last episode 3 days ago  Was previously admitted with PUD  Reports no NSAID use  He also states approximately 1 month ago his wife  and reports suicidal ideation with plan  Notes if he had a pistol he he would take himself out if he could  denies any other complaints at this time    Review of Systems:    Review of Systems   Constitutional: Negative for appetite change, chills, diaphoresis, fatigue, fever and unexpected weight change  HENT: Negative for congestion, rhinorrhea and sore throat  Eyes: Negative for photophobia and visual disturbance  Respiratory: Negative for cough, shortness of breath and wheezing  Cardiovascular: Negative for chest pain, palpitations and leg swelling  Gastrointestinal: Negative for abdominal pain, anal bleeding, blood in stool, constipation, diarrhea, nausea and vomiting  Genitourinary: Negative for decreased urine volume, difficulty urinating, dysuria, flank pain, frequency, hematuria and urgency  Musculoskeletal: Negative for arthralgias, back pain, joint swelling and myalgias  Skin: Negative for color change and rash  Neurological: Negative for dizziness, seizures, facial asymmetry, speech difficulty, numbness and headaches  Psychiatric/Behavioral: Negative for agitation, confusion and decreased concentration  The patient is not nervous/anxious  Past Medical and Surgical History:     Past Medical History:   Diagnosis Date    Bladder cancer [facility])        Past Surgical History:   Procedure Laterality Date    ESOPHAGOGASTRODUODENOSCOPY N/A 2019    Procedure: ESOPHAGOGASTRODUODENOSCOPY (EGD); Surgeon: Mahamed Alfredo MD;  Location:  MAIN OR;  Service: Gastroenterology       Meds/Allergies:    Prior to Admission medications    Medication Sig Start Date End Date Taking?  Authorizing Provider   Esomeprazole Magnesium (NEXIUM PO) Take 20 mg by mouth every 12 (twelve) hours      Historical Provider, MD   sucralfate (CARAFATE) 1 g/10 mL suspension Take 10 mL (1,000 mg total) by mouth every 6 (six) hours for 90 days 1/15/19 4/15/19  Brenda Mcclelland MD     I have reviewed home medications with patient personally  Allergies: No Known Allergies    Social History:     Marital Status:      Patient Pre-hospital Living Situation: home  Patient Pre-hospital Level of Mobility: independent  Patient Pre-hospital Diet Restrictions: none  Substance Use History:   Social History     Substance and Sexual Activity   Alcohol Use Not Currently    Frequency: Monthly or less    Comment: Socially     Social History     Tobacco Use   Smoking Status Current Every Day Smoker    Packs/day: 0 25    Types: Cigarettes   Smokeless Tobacco Former User     Social History     Substance and Sexual Activity   Drug Use No       Family History:    Family History   Problem Relation Age of Onset    No Known Problems Mother     No Known Problems Father        Physical Exam:     Vitals:   Blood Pressure: 97/55 (09/15/20 1840)  Pulse: 82 (09/15/20 1849)  Temperature: 98 2 °F (36 8 °C) (09/15/20 1800)  Temp Source: Oral (09/15/20 1800)  Respirations: 18 (09/15/20 1849)  SpO2: 100 % (09/15/20 1901)    Physical Exam  Constitutional:       General: He is not in acute distress  Appearance: He is well-developed  He is not diaphoretic  HENT:      Head: Normocephalic and atraumatic  Nose: Nose normal       Mouth/Throat:      Pharynx: No oropharyngeal exudate  Eyes:      General: No scleral icterus  Conjunctiva/sclera: Conjunctivae normal    Neck:      Musculoskeletal: Normal range of motion and neck supple  Cardiovascular:      Rate and Rhythm: Normal rate and regular rhythm  Heart sounds: Normal heart sounds  No murmur  No friction rub  No gallop  Pulmonary:      Effort: Pulmonary effort is normal  No respiratory distress  Breath sounds: Normal breath sounds  No wheezing or rales  Chest:      Chest wall: No tenderness     Abdominal: General: Bowel sounds are normal  There is no distension  Palpations: Abdomen is soft  Tenderness: There is no abdominal tenderness  There is no guarding  Musculoskeletal: Normal range of motion  General: No tenderness or deformity  Skin:     General: Skin is warm and dry  Findings: No erythema  Neurological:      Mental Status: He is alert and oriented to person, place, and time  Additional Data:     Lab Results: I have personally reviewed pertinent reports  Results from last 7 days   Lab Units 09/15/20  1238   WBC Thousand/uL 12 63*   HEMOGLOBIN g/dL 5 8*   HEMATOCRIT % 18 3*   PLATELETS Thousands/uL 304   NEUTROS PCT % 75   LYMPHS PCT % 11*   MONOS PCT % 11   EOS PCT % 1     Results from last 7 days   Lab Units 09/15/20  1238   SODIUM mmol/L 138   POTASSIUM mmol/L 4 7   CHLORIDE mmol/L 101   CO2 mmol/L 34*   BUN mg/dL 35*   CREATININE mg/dL 0 83   ANION GAP mmol/L 3*   CALCIUM mg/dL 8 7   ALBUMIN g/dL 2 8*   TOTAL BILIRUBIN mg/dL 0 20   ALK PHOS U/L 55   ALT U/L 14   AST U/L 20   GLUCOSE RANDOM mg/dL 94     Results from last 7 days   Lab Units 09/15/20  1331   INR  1 02                   Imaging: I have personally reviewed pertinent reports  No orders to display       EKG, Pathology, and Other Studies Reviewed on Admission:   · EKG: reviewed    [facility]: Yes     ** Please Note: This note has been constructed using a voice recognition system   **

## 2020-09-15 NOTE — ASSESSMENT & PLAN NOTE
Baseline hemoglobin approximately 8  Hemoglobin noted to be 5 8  Likely secondary to GI bleed  Follow-up hemoglobin after 2 units packed red blood cells  Plan for EGD tomorrow as outlined below

## 2020-09-15 NOTE — PLAN OF CARE
Problem: Potential for Falls  Goal: Patient will remain free of falls  Description: INTERVENTIONS:  - Assess patient frequently for physical needs  -  Identify cognitive and physical deficits and behaviors that affect risk of falls    -  [place] fall precautions as indicated by assessment   - Educate patient/family on patient safety including physical limitations  - Instruct patient to call for assistance with activity based on assessment  - Modify environment to reduce risk of injury  - Consider OT/PT consult to assist with strengthening/mobility  Outcome: Progressing     Problem: Prexisting or High Potential for Compromised Skin Integrity  Goal: Skin integrity is maintained or improved  Description: INTERVENTIONS:  - Identify patients at risk for skin breakdown  - Assess and monitor skin integrity  - Assess and monitor nutrition and hydration status  - Monitor labs   - Assess for incontinence   - Turn and reposition patient  - Assist with mobility/ambulation  - Relieve pressure over bony prominences  - Avoid friction and shearing  - Provide appropriate hygiene as needed including keeping skin clean and dry  - Evaluate need for skin moisturizer/barrier cream  - Collaborate with interdisciplinary team   - Patient/family teaching  - Consider wound care consult   Outcome: Progressing     Problem: PAIN - ADULT  Goal: Verbalizes/displays adequate comfort level or baseline comfort level  Description: Interventions:  - Encourage patient to monitor pain and request assistance  - Assess pain using appropriate pain scale  - Administer analgesics based on type and severity of pain and evaluate response  - Implement non-pharmacological measures as appropriate and evaluate response  - Consider cultural and social influences on pain and pain management  - Notify physician/advanced practitioner if interventions unsuccessful or patient reports new pain  Outcome: Progressing     Problem: INFECTION - ADULT  Goal: Absence or prevention of progression during hospitalization  Description: INTERVENTIONS:  - Assess and monitor for signs and symptoms of infection  - Monitor lab/diagnostic results  - Monitor all insertion sites, i e  indwelling lines, tubes, and drains  - Monitor endotracheal if appropriate and nasal secretions for changes in amount and color  - [place] appropriate cooling/warming therapies per order  - Administer medications as ordered  - Instruct and encourage patient and family to use good hand hygiene technique  - Identify and instruct in appropriate isolation precautions for identified infection/condition  Outcome: Progressing     Problem: SAFETY ADULT  Goal: Patient will remain free of falls  Description: INTERVENTIONS:  - Assess patient frequently for physical needs  -  Identify cognitive and physical deficits and behaviors that affect risk of falls    -  [place] fall precautions as indicated by assessment   - Educate patient/family on patient safety including physical limitations  - Instruct patient to call for assistance with activity based on assessment  - Modify environment to reduce risk of injury  - Consider OT/PT consult to assist with strengthening/mobility  Outcome: Progressing  Goal: Maintain or return to baseline ADL function  Description: INTERVENTIONS:  -  Assess patient's ability to carry out ADLs; assess patient's baseline for ADL function and identify physical deficits which impact ability to perform ADLs (bathing, care of mouth/teeth, toileting, grooming, dressing, etc )  - Assess/evaluate cause of self-care deficits   - Assess range of motion  - Assess patient's mobility; develop plan if impaired  - Assess patient's need for assistive devices and provide as appropriate  - Encourage maximum independence but intervene and supervise when necessary  - Involve family in performance of ADLs  - Assess for home care needs following discharge   - Consider OT consult to assist with ADL evaluation and planning for discharge  - Provide patient education as appropriate  Outcome: Progressing  Goal: Maintain or return mobility status to optimal level  Description: INTERVENTIONS:  - Assess patient's baseline mobility status (ambulation, transfers, stairs, etc )    - Identify cognitive and physical deficits and behaviors that affect mobility  - Identify mobility aids required to assist with transfers and/or ambulation (gait belt, sit-to-stand, lift, walker, cane, etc )  - [place] fall precautions as indicated by assessment  - Record patient progress and toleration of activity level on Mobility SBAR; progress patient to next Phase/Stage  - Instruct patient to call for assistance with activity based on assessment  - Consider rehabilitation consult to assist with strengthening/weightbearing, etc   Outcome: Progressing     Problem: DISCHARGE PLANNING  Goal: Discharge to home or other facility with appropriate resources  Description: INTERVENTIONS:  - Identify barriers to discharge w/patient and caregiver  - Arrange for needed discharge resources and transportation as appropriate  - Identify discharge learning needs (meds, wound care, etc )  - Arrange for interpretive services to assist at discharge as needed  - Refer to Case Management Department for coordinating discharge planning if the patient needs post-hospital services based on physician/advanced practitioner order or complex needs related to functional status, cognitive ability, or social support system  Outcome: Progressing     Problem: Knowledge Deficit  Goal: Patient/family/caregiver demonstrates understanding of disease process, treatment plan, medications, and discharge instructions  Description: Complete learning assessment and assess knowledge base    Interventions:  - Provide teaching at level of understanding  - Provide teaching via preferred learning methods  Outcome: Progressing

## 2020-09-16 PROBLEM — I77.9 AORTIC DISEASE (HCC): Status: ACTIVE | Noted: 2020-01-01

## 2020-09-16 PROBLEM — K27.9 PUD (PEPTIC ULCER DISEASE): Status: ACTIVE | Noted: 2020-01-01

## 2020-09-16 PROBLEM — E43 SEVERE PROTEIN-CALORIE MALNUTRITION (HCC): Status: ACTIVE | Noted: 2020-01-01

## 2020-09-16 NOTE — INTERVAL H&P NOTE
H&P reviewed  After examining the patient I find no changes in the patients condition since the H&P had been written      Vitals:    09/16/20 1233   BP: 105/58   Pulse: 77   Resp: 18   Temp: 98 8 °F (37 1 °C)   SpO2: 98%

## 2020-09-16 NOTE — ANESTHESIA PREPROCEDURE EVALUATION
Procedure:  EGD    Relevant Problems   CARDIO   (+) Aortic disease (HCC)      GI/HEPATIC   (+) GI bleed   (+) Gastric outlet obstruction   (+) Gastrointestinal hemorrhage with hematemesis   (+) PUD (peptic ulcer disease)      HEMATOLOGY   (+) Acute blood loss anemia        Physical Exam    Airway    Mallampati score: I  TM Distance: >3 FB  Neck ROM: full     Dental       Cardiovascular  Rhythm: regular, Rate: normal, Cardiovascular exam normal    Pulmonary  Pulmonary exam normal Breath sounds clear to auscultation,     Other Findings        Anesthesia Plan  ASA Score- 3     Anesthesia Type- IV sedation with anesthesia and general with ASA Monitors  Additional Monitors:   Airway Plan:           Plan Factors-    Induction-     Postoperative Plan-     Informed Consent- Anesthetic plan and risks discussed with patient

## 2020-09-16 NOTE — CASE MANAGEMENT
LOS: 1  Patient is not a 30 day readmisson or a Medicare Bundled patient  Patient's risk for unplanned readmission is 9, [place]  Met with patient and reviewed the discharge planning process including identifying help at home and patient preference for discharge  Patient reports residing in a 2nd floor apartment with [address]  with his SO's son, Kain Guzman after the recent death of his SO in August  Patient report being independent of ADL's and using no assistive device  He reports [employer] [employer] does little for him at home  Patient has been feeling depressed since his SO's death in August, stating to me "if I had a gun I would have shot myself", provided  emotional support  Patient to be seen by psych  Patient denies MH/SNF/D&A rehab admission  Patient uses [facility] for his meds and reports no barriers in obtaining his med  CM to follow and assist with discharge plans once seen by PT/OT and psych

## 2020-09-16 NOTE — ASSESSMENT & PLAN NOTE
Patient reported with suicidal ideation with a plan  Continue one-to-one  Will be seen by Psychiatry tomorrow

## 2020-09-16 NOTE — ASSESSMENT & PLAN NOTE
Due to upper GI bleed, differentials include peptic ulcer disease, Dieulafoy's lesion  Baseline hemoglobin approximately 8  Hemoglobin noted to be 5 8 on presentation status post 2 units of packed red blood cell, hemoglobin is now 7 7  EGD done today: large duodenal bulb ulceration not causing gastric outlet obstruction  No hemorrhage or stigmata of recent hemorrhage  Plan:  Once this IV Protonix is done running    Will switch him to Protonix 40 mg daily  Okay for regular diet

## 2020-09-16 NOTE — PHYSICAL THERAPY NOTE
PT eval   09/16/20 1038   PT Last Visit   PT Visit Date 09/16/20   Note Type   Note type Eval only   Pain Assessment   Pain Assessment Tool 0-10   Pain Score No Pain   Home Living   [facility] [address]   [facility]   Home Equipment [place]   Prior Function   Level of [place] Independent with ADLs and functional mobility; Needs assistance with IADLs   Lives With Son   Receives Help From Family   ADL Assistance Independent   IADLs Needs assistance   Vocational Retired   Comments recent death of wife eating only nesure, son provides little help, adm with weakenss, depreesion, SI   Restrictions/Precautions   Weight Bearing Precautions Per Order No   Other Precautions 1:1;Fall Risk   General   Additional Pertinent History smoker, PUD, adm with anemia, weakness, SI, unkept malnurished   Family/Caregiver Present No   Cognition   Arousal/Participation Alert   Orientation Level Oriented X4   Memory Within functional limits   Following Commands Follows one step commands with increased time or repetition   Comments koes with staff but dark humor  Cooperative with asseement and ADL   RUE Assessment   RUE Assessment WFL   LUE Assessment   LUE Assessment WFL   RLE Assessment   RLE Assessment WFL   LLE Assessment   LLE Assessment WFL   Coordination   Movements are Fluid and Coordinated 1   Proprioception   RLE Proprioception Grossly intact   LLE Proprioception Grossly Intact   Bed Mobility   Rolling R 5  Supervision   Rolling L 5  Supervision   Supine to Sit 4  Minimal assistance   Additional items Assist x 1   Sit to Supine 4  Minimal assistance   Additional items Assist x 1   Transfers   Sit to Stand 4  Minimal assistance   Additional items Assist x 1   Stand to Sit 4  Minimal assistance   Additional items Assist x 1   Stand pivot 4  Minimal assistance   Additional items Assist x 1   Ambulation/Elevation   Gait pattern Improper Weight shift;Narrow AURY; Forward Flexion; Shuffling; Inconsistent khadar; Short stride   Gait Assistance 4  Minimal assist   Additional items Assist x 1;Verbal cues; Tactile cues   Assistive Device   (hand hold assist)   Distance 5'   Balance   Static Sitting Good   Dynamic Sitting Good   Static Standing Fair +   Dynamic Standing Fair   Ambulatory Fair   Endurance Deficit   Endurance Deficit Yes   Endurance Deficit Description tires quickly   Activity Tolerance   Activity Tolerance Patient tolerated treatment well   Medical Staff Made Aware OT Janessa   Nurse Made Aware   (Benny RN)   Assessment   Prognosis Good   Problem List Decreased strength;Decreased endurance; Impaired balance;Decreased mobility   Assessment Pt presents with impaired activit ytolernace, balance, trnsfer and giat  Able to mobilize with 1 asist  Recommend RW use currently for safe amb with assistnace  Can benefit fomr ksilledPT serivc esto regain safe funcitonal mobility  Will follow see goals   Barriers to Discharge   (medical clearance)   Goals   Patient Goals get better   STG Expiration Date 09/26/20   Short Term Goal #1 1) safe ind transfers 2) safe ind amb with rw 150' levle 3) imrpvoe balance to good   Plan   Treatment/Interventions ADL retraining;Functional transfer training;LE strengthening/ROM; Elevations; Therapeutic exercise; Endurance training;Cognitive reorientation;Patient/family training;Equipment eval/education; Bed mobility;Gait training;Spoke to nursing;Spoke to case management;OT   PT Frequency 5x/wk   Recommendation   PT Discharge Recommendation Post-Acute Rehabilitation Services   Equipment Recommended [facility]   PT - OK to Discharge Yes   Additional Comments   (with medical clearance to STR)   Modified Sanders Scale   Modified Sanders Scale 3   Susana [place], PT

## 2020-09-16 NOTE — PLAN OF CARE
Problem: Potential for Falls  Goal: Patient will remain free of falls  Description: INTERVENTIONS:  - Assess patient frequently for physical needs  -  Identify cognitive and physical deficits and behaviors that affect risk of falls    -  [place] fall precautions as indicated by assessment   - Educate patient/family on patient safety including physical limitations  - Instruct patient to call for assistance with activity based on assessment  - Modify environment to reduce risk of injury  - Consider OT/PT consult to assist with strengthening/mobility  Outcome: Progressing     Problem: Prexisting or High Potential for Compromised Skin Integrity  Goal: Skin integrity is maintained or improved  Description: INTERVENTIONS:  - Identify patients at risk for skin breakdown  - Assess and monitor skin integrity  - Assess and monitor nutrition and hydration status  - Monitor labs   - Assess for incontinence   - Turn and reposition patient  - Assist with mobility/ambulation  - Relieve pressure over bony prominences  - Avoid friction and shearing  - Provide appropriate hygiene as needed including keeping skin clean and dry  - Evaluate need for skin moisturizer/barrier cream  - Collaborate with interdisciplinary team   - Patient/family teaching  - Consider wound care consult   Outcome: Progressing     Problem: PAIN - ADULT  Goal: Verbalizes/displays adequate comfort level or baseline comfort level  Description: Interventions:  - Encourage patient to monitor pain and request assistance  - Assess pain using appropriate pain scale  - Administer analgesics based on type and severity of pain and evaluate response  - Implement non-pharmacological measures as appropriate and evaluate response  - Consider cultural and social influences on pain and pain management  - Notify physician/advanced practitioner if interventions unsuccessful or patient reports new pain  Outcome: Progressing     Problem: INFECTION - ADULT  Goal: Absence or prevention of progression during hospitalization  Description: INTERVENTIONS:  - Assess and monitor for signs and symptoms of infection  - Monitor lab/diagnostic results  - Monitor all insertion sites, i e  indwelling lines, tubes, and drains  - Monitor endotracheal if appropriate and nasal secretions for changes in amount and color  - [place] appropriate cooling/warming therapies per order  - Administer medications as ordered  - Instruct and encourage patient and family to use good hand hygiene technique  - Identify and instruct in appropriate isolation precautions for identified infection/condition  Outcome: Progressing     Problem: SAFETY ADULT  Goal: Patient will remain free of falls  Description: INTERVENTIONS:  - Assess patient frequently for physical needs  -  Identify cognitive and physical deficits and behaviors that affect risk of falls    -  [place] fall precautions as indicated by assessment   - Educate patient/family on patient safety including physical limitations  - Instruct patient to call for assistance with activity based on assessment  - Modify environment to reduce risk of injury  - Consider OT/PT consult to assist with strengthening/mobility  Outcome: Progressing  Goal: Maintain or return to baseline ADL function  Description: INTERVENTIONS:  -  Assess patient's ability to carry out ADLs; assess patient's baseline for ADL function and identify physical deficits which impact ability to perform ADLs (bathing, care of mouth/teeth, toileting, grooming, dressing, etc )  - Assess/evaluate cause of self-care deficits   - Assess range of motion  - Assess patient's mobility; develop plan if impaired  - Assess patient's need for assistive devices and provide as appropriate  - Encourage maximum independence but intervene and supervise when necessary  - Involve family in performance of ADLs  - Assess for home care needs following discharge   - Consider OT consult to assist with ADL evaluation and planning for discharge  - Provide patient education as appropriate  Outcome: Progressing  Goal: Maintain or return mobility status to optimal level  Description: INTERVENTIONS:  - Assess patient's baseline mobility status (ambulation, transfers, stairs, etc )    - Identify cognitive and physical deficits and behaviors that affect mobility  - Identify mobility aids required to assist with transfers and/or ambulation (gait belt, sit-to-stand, lift, walker, cane, etc )  - [place] fall precautions as indicated by assessment  - Record patient progress and toleration of activity level on Mobility SBAR; progress patient to next Phase/Stage  - Instruct patient to call for assistance with activity based on assessment  - Consider rehabilitation consult to assist with strengthening/weightbearing, etc   Outcome: Progressing     Problem: DISCHARGE PLANNING  Goal: Discharge to home or other facility with appropriate resources  Description: INTERVENTIONS:  - Identify barriers to discharge w/patient and caregiver  - Arrange for needed discharge resources and transportation as appropriate  - Identify discharge learning needs (meds, wound care, etc )  - Arrange for interpretive services to assist at discharge as needed  - Refer to Case Management Department for coordinating discharge planning if the patient needs post-hospital services based on physician/advanced practitioner order or complex needs related to functional status, cognitive ability, or social support system  Outcome: Progressing     Problem: Knowledge Deficit  Goal: Patient/family/caregiver demonstrates understanding of disease process, treatment plan, medications, and discharge instructions  Description: Complete learning assessment and assess knowledge base    Interventions:  - Provide teaching at level of understanding  - Provide teaching via preferred learning methods  Outcome: Progressing     Problem: Nutrition/Hydration-ADULT  Goal: Nutrient/Hydration intake appropriate for improving, restoring or maintaining nutritional needs  Description: Monitor and assess patient's nutrition/hydration status for malnutrition  Collaborate with interdisciplinary team and initiate plan and interventions as ordered  Monitor patient's weight and dietary intake as ordered or per policy  Utilize nutrition screening tool and intervene as necessary  Determine patient's food preferences and provide high-protein, high-caloric foods as appropriate       INTERVENTIONS:  - Monitor oral intake, urinary output, labs, and treatment plans  - Assess nutrition and hydration status and recommend course of action  - Evaluate amount of meals eaten  - Assist patient with eating if necessary   - Allow adequate time for meals  - Recommend/ encourage appropriate diets, oral nutritional supplements, and vitamin/mineral supplements  - Order, calculate, and assess calorie counts as needed  - Recommend, monitor, and adjust tube feedings and TPN/PPN based on assessed needs  - Assess need for intravenous fluids  - Provide specific nutrition/hydration education as appropriate  - Include patient/family/caregiver in decisions related to nutrition  Outcome: Progressing

## 2020-09-16 NOTE — ASSESSMENT & PLAN NOTE
Malnutrition Findings:   Malnutrition type: Chronic illness  Degree of Malnutrition: Other severe protein calorie malnutrition    BMI Findings: Body mass index is 19 64 kg/m²     Chronic severe protein calorie malnutrition Present on admission as evidenced by BMI of 19 64

## 2020-09-16 NOTE — OCCUPATIONAL THERAPY NOTE
Occupational Therapy Evaluation     Patient Name: Amira Grubbs  EWWTS'E Date: 9/16/2020  Problem List  Principal Problem:    Acute blood loss anemia  Active Problems:    GI bleed    Suicidal ideation    Aortic disease (HCC)    PUD (peptic ulcer disease)    Past Medical History  Past Medical History:   Diagnosis Date    Bladder cancer [facility])      Past Surgical History  Past Surgical History:   Procedure Laterality Date    ESOPHAGOGASTRODUODENOSCOPY N/A 1/13/2019    Procedure: ESOPHAGOGASTRODUODENOSCOPY (EGD); Surgeon: Mark Wheeler MD;  Location:  MAIN OR;  Service: Gastroenterology         09/16/20 1039   OT Last Visit   OT Visit Date 09/16/20   Note Type   Note type Eval only   Pain Assessment   Pain Assessment Tool Pain Assessment not indicated - pt denies pain   Home Living   Type of Home Apartment  (2nd floor)   Home Layout   (2nd floor)   Bathroom Toilet Standard   Prior Function   Lives With   (son-in-law)   Receives Help From Family  (Pt reports assist is minimal)   ADL Assistance Independent   IADLs Needs assistance   Comments Pt reports recentl decline since spouse passed away  Subjective   Subjective Pt states "You guys are so helpful   Thank you "    ADL   Eating Assistance Unable to assess   Eating Deficit NPO   Grooming Assistance 5  Supervision/Setup   UB Bathing Assistance 5  Supervision/Setup   LB Bathing Assistance 5  Supervision/Setup   UB Dressing Assistance 5  Supervision/Setup   LB Dressing Assistance 5  Supervision/Setup   Toileting Assistance  5  Supervision/Setup   Bed Mobility   Supine to Sit 5  Supervision   Additional Comments Pt received sitting in recliner by end of session   Transfers   Sit to Stand 4  Minimal assistance   Additional items Assist x 1;Verbal cues   Stand to Sit 4  Minimal assistance   Additional items Assist x 1;Verbal cues   Stand pivot 4  Minimal assistance   Additional items Assist x 1;Verbal cues   Balance   Static Sitting Normal   Dynamic Sitting Good Static Standing Fair +   Dynamic Standing Fair +   Activity Tolerance   Activity Tolerance Patient tolerated treatment well   Medical Staff Made Aware PT Trecia Gottron, PCA Pushpa Bob   Nurse Made Aware TOVA MCCABE Assessment   RUE Assessment WFL   LUE Assessment   LUE Assessment WFL   Cognition   Overall Cognitive Status Impaired   Arousal/Participation Alert   Attention Within functional limits   Orientation Level Oriented X4   Memory Decreased recall of precautions   Following Commands Follows one step commands without difficulty   Comments Pt made multiple comments in a "joking" manner regarding self-harm  Pt on 1:1   Assessment   Limitation Decreased ADL status; Decreased self-care trans;Decreased high-level ADLs; Decreased endurance   Prognosis Good   Assessment Pt is a 76 y o  male seen for OT evaluation at [facility], admitted 9/15/2020 w/ Acute blood loss anemia, depression with suicidal ideation, and weakness  OT completed expanded review of pt's medical and social history  Comorbidities affecting pt's functional performance at time of assessment include: GI bleed, aortic disease, PUD, and GI hemorrhage with hematemeis   Personal factors affecting pt at time of IE include:limited home support, difficulty performing ADLS, difficulty performing IADLS  and decreased functional mobility  Pt with active OT orders  Prior to admission, pt was living with son-in-law in a 2nd floor apt  Pt was I w/  ADLS and IADLS, & required no use of DME PTA  Pt was functionally declining since recent death of his wife  Upon evaluation: Pt requires supervision for bed mobility, min A for functional mobility/transfers, supervision for UB ADLs and supervision for LB ADLS 2* the following deficits impacting occupational performance: weakness, decreased strength, decreased balance and decreased tolerance   Pt to benefit from continued skilled OT tx while in the hospital to address deficits as defined above and maximize level of functional independence w ADL's and functional mobility  Occupational Performance areas to address include: bathing/shower, toilet hygiene, dressing, health maintenance, functional mobility and community mobility  Based on findings, pt is of hgh complexity  At this time, OT recommends that pt will be safe and functionally able to participate in inpt psych  Plan   Treatment Interventions ADL retraining;Patient/family training;Cognitive reorientation; Endurance training;Functional transfer training; Compensatory technique education   Goal Expiration Date 09/26/20   OT Treatment Day 0   OT Frequency 2-3x/wk   Recommendation   OT Discharge Recommendation Other (Comment)  (Will follow while inpt  Pt possibly going to inpt pysch  )             Pt will achieve the following goals within 10 days  *Pt will complete UB bathing and dressing with independent  *Pt will complete LB bathing and dressing with independent       * Pt will complete toileting w/ independent  w/ G hygiene/thoroughness using DME PRN    *Pt will complete bed mobility with independent  , with bed flat and no side rail to prep for purposeful tasks    *Pt will perform functional transfers with on/off all surfaces with independent  using DME as needed w/ G balance/safety  *Pt will increase standing tolerance to 5 minutes in order to complete sinkside ADL task  *Pt will identify 3-5 fall risks during ADL routine to ensure home safety upon discharge  *Pt will improve functional mobility during ADL/IADL/leisure tasks to mod I using DME as needed w/ G balance/safety       *Pt will demonstrate G carryover of pt/caregiver education and training as appropriate w/ mod I w/o cues w/ good tolerance          Janessa Cheung OTR/EARNEST

## 2020-09-16 NOTE — PLAN OF CARE
Problem: PHYSICAL THERAPY ADULT  Goal: Performs mobility at highest level of function for planned discharge setting  See evaluation for individualized goals  Description: Treatment/Interventions: ADL retraining, Functional transfer training, LE strengthening/ROM, Elevations, Therapeutic exercise, Endurance training, Cognitive reorientation, Patient/family training, Equipment eval/education, Bed mobility, Gait training, Spoke to nursing, Spoke to case management, OT  Equipment Recommended: Sharda Vazquez       See flowsheet documentation for full assessment, interventions and recommendations  Outcome: Progressing  Note: Prognosis: Good  Problem List: Decreased strength, Decreased endurance, Impaired balance, Decreased mobility  Assessment: Pt presents with impaired activit ytolernace, balance, trnsfer and giat  Able to mobilize with 1 asist  Recommend RW use currently for safe amb with assistnace  Can benefit fomr ksilledPT serivc esto regain safe funcitonal mobility  Will follow see goals  Barriers to Discharge: (medical clearance)     PT Discharge Recommendation: Post-Acute Rehabilitation Services     PT - OK to Discharge: Yes    See flowsheet documentation for full assessment

## 2020-09-16 NOTE — PROGRESS NOTES
Progress Note - Bianca Alcaraz 1945, 76 y o  male MRN: [id number]    Unit/Bed#: -01 Encounter: [id number]    Primary Care Provider: Dutch Hinton MD   Date and time admitted to hospital: 9/15/2020 11:33 AM        Severe protein-calorie malnutrition ([address] )  Assessment & Plan  Malnutrition Findings:   Malnutrition type: Chronic illness  Degree of Malnutrition: Other severe protein calorie malnutrition    BMI Findings: Body mass index is 19 64 kg/m²  Chronic severe protein calorie malnutrition Present on admission as evidenced by BMI of 19 64      Suicidal ideation  Assessment & Plan  Patient reported with suicidal ideation with a plan  Continue one-to-one  Will be seen by Psychiatry tomorrow  GI bleed  Assessment & Plan  Plan is as documented on the acute blood loss anemia    * Acute blood loss anemia  Assessment & Plan  Due to upper GI bleed, differentials include peptic ulcer disease, Dieulafoy's lesion  Baseline hemoglobin approximately 8  Hemoglobin noted to be 5 8 on presentation status post 2 units of packed red blood cell, hemoglobin is now 7 7  EGD done today: large duodenal bulb ulceration not causing gastric outlet obstruction  No hemorrhage or stigmata of recent hemorrhage  Plan:  Once this IV Protonix is done running  Will switch him to Protonix 40 mg daily  Okay for regular diet           VTE Pharmacologic Prophylaxis:   Pharmacologic: Heparin  Mechanical VTE Prophylaxis in Place: Yes    Patient Centered Rounds: I have performed bedside rounds with nursing staff today  Discussions with Specialists or Other Care Team Provider:  Gastroenterology    Education and Discussions with Family / Patient:  Yes    Time Spent for Care: 20 minutes  More than 50% of total time spent on counseling and coordination of care as described above      Current Length of Stay: 1 day(s)    Current Patient Status: Inpatient   Certification Statement: The patient will continue to require additional inpatient hospital stay due to Acute blood loss anemia due to upper GI bleed    Discharge Plan:     Code Status: Level 1 - Full Code      Subjective:   Seen  Abdominal pain is much improved  Has not had melena in the stools today  Underwent EGD successfully today  Expresses suicidal ideation  Remains on a one-to-one  Objective:     Vitals:   Temp (24hrs), Av 2 °F (36 8 °C), Min:97 7 °F (36 5 °C), Max:98 8 °F (37 1 °C)    Temp:  [97 7 °F (36 5 °C)-98 8 °F (37 1 °C)] 98 8 °F (37 1 °C)  HR:  [68-95] 89  Resp:  [16-24] 18  BP: ()/(42-61) 93/50  SpO2:  [85 %-100 %] 91 %  Body mass index is 19 64 kg/m²  Input and Output Summary (last 24 hours): Intake/Output Summary (Last 24 hours) at 2020 1736  Last data filed at 2020 1719  Gross per 24 hour   Intake 652 ml   Output 105 ml   Net 547 ml       Physical Exam:     Physical Exam  Vitals signs reviewed  Constitutional:       General: He is not in acute distress  Appearance: Normal appearance  He is not ill-appearing, toxic-appearing or diaphoretic  HENT:      Head: Normocephalic  Eyes:      Extraocular Movements: Extraocular movements intact  Pupils: Pupils are equal, round, and reactive to light  Neck:      Musculoskeletal: Normal range of motion and neck supple  No neck rigidity or muscular tenderness  Vascular: No carotid bruit  Cardiovascular:      Rate and Rhythm: Normal rate and regular rhythm  Pulses: Normal pulses  Heart sounds: Normal heart sounds  No murmur  No friction rub  Pulmonary:      Effort: Pulmonary effort is normal       Breath sounds: Normal breath sounds  No stridor  No wheezing, rhonchi or rales  Chest:      Chest wall: No tenderness  Abdominal:      General: Abdomen is flat  Bowel sounds are normal  There is no distension  Palpations: Abdomen is soft  Tenderness: There is no abdominal tenderness  There is no guarding or rebound     Musculoskeletal: Normal range of motion  General: No swelling or tenderness  Right lower leg: No edema  Left lower leg: No edema  Skin:     General: Skin is warm and dry  Coloration: Skin is not jaundiced  Neurological:      General: No focal deficit present  Mental Status: He is alert and oriented to person, place, and time  Cranial Nerves: No cranial nerve deficit  Sensory: No sensory deficit  Additional Data:     Labs:    Results from last 7 days   Lab Units 09/16/20  0450  09/15/20  1238   WBC Thousand/uL 10 19*  --  12 63*   HEMOGLOBIN g/dL 7 7*   < > 5 8*   HEMATOCRIT % 23 5*   < > 18 3*   PLATELETS Thousands/uL 258  --  304   NEUTROS PCT %  --   --  75   LYMPHS PCT %  --   --  11*   MONOS PCT %  --   --  11   EOS PCT %  --   --  1    < > = values in this interval not displayed  Results from last 7 days   Lab Units 09/16/20  0450   SODIUM mmol/L 139   POTASSIUM mmol/L 4 5   CHLORIDE mmol/L 105   CO2 mmol/L 30   BUN mg/dL 27*   CREATININE mg/dL 0 89   ANION GAP mmol/L 4   CALCIUM mg/dL 8 2*   ALBUMIN g/dL 2 4*   TOTAL BILIRUBIN mg/dL 0 40   ALK PHOS U/L 51   ALT U/L 13   AST U/L 14   GLUCOSE RANDOM mg/dL 85     Results from last 7 days   Lab Units 09/15/20  1331   INR  1 02                       * I Have Reviewed All Lab Data Listed Above  * Additional Pertinent Lab Tests Reviewed:  All Labs Within Last 24 Hours Reviewed    Imaging:    Imaging Reports Reviewed Today Include:   Imaging Personally Reviewed by Myself Includes:      Recent Cultures (last 7 days):           Last 24 Hours Medication List:   Current Facility-Administered Medications   Medication Dose Route Frequency Provider Last Rate    nicotine  1 patch Transdermal Daily Donte Gallegos MD      pantoprozole (PROTONIX) infusion (Continuous)  8 mg/hr Intravenous Continuous Donte Gallegos MD 8 mg/hr (09/16/20 1607)    sodium chloride  75 mL/hr Intravenous Continuous Donte Gallegos MD Stopped (09/16/20 1324)        Today, Patient Was Seen By: Danielle Buck MD    ** Please Note: Dictation voice to text software may have been used in the creation of this document   **

## 2020-09-16 NOTE — PLAN OF CARE
Problem: OCCUPATIONAL THERAPY ADULT  Goal: Performs self-care activities at highest level of function for planned discharge setting  See evaluation for individualized goals  Description: Treatment Interventions: ADL retraining, Patient/family training, Cognitive reorientation, Endurance training, Functional transfer training, Compensatory technique education          See flowsheet documentation for full assessment, interventions and recommendations  Note: Limitation: Decreased ADL status, Decreased self-care trans, Decreased high-level ADLs, Decreased endurance  Prognosis: Good  Assessment: Pt is a 76 y o  male seen for OT evaluation at [facility], admitted 9/15/2020 w/ Acute blood loss anemia, depression with suicidal ideation, and weakness  OT completed expanded review of pt's medical and social history  Comorbidities affecting pt's functional performance at time of assessment include: GI bleed, aortic disease, PUD, and GI hemorrhage with hematemeis   Personal factors affecting pt at time of IE include:limited home support, difficulty performing ADLS, difficulty performing IADLS  and decreased functional mobility  Pt with active OT orders  Prior to admission, pt was living with son-in-law in a 2nd floor apt  Pt was I w/  ADLS and IADLS, & required no use of DME PTA  Pt was functionally declining since recent death of his wife  Upon evaluation: Pt requires supervision for bed mobility, min A for functional mobility/transfers, supervision for UB ADLs and supervision for LB ADLS 2* the following deficits impacting occupational performance: weakness, decreased strength, decreased balance and decreased tolerance  Pt to benefit from continued skilled OT tx while in the hospital to address deficits as defined above and maximize level of functional independence w ADL's and functional mobility   Occupational Performance areas to address include: bathing/shower, toilet hygiene, dressing, health maintenance, functional mobility and community mobility  Based on findings, pt is of hgh complexity  At this time, OT recommends that pt will be safe and functionally able to participate in inpt psych  OT Discharge Recommendation: Other (Comment)(Will follow while inpt   Pt possibly going to inpt pysch  )

## 2020-09-16 NOTE — MALNUTRITION/BMI
/This medical record reflects one or more clinical /indicators suggestive of malnutrition and/or morbid obesity  Malnutrition Findings:   Malnutrition type: Chronic illness  Degree of Malnutrition: Other severe protein calorie malnutrition  Malnutrition Characteristics: (Pt presents with severe protein calorie malnutrition as evidenced by < 75% po intake> 1 month, sunken and darkened orbitals and temporal  hollowing )     Treat with regular diet and supplements TID  BMI Findings: Body mass index is 19 64 kg/m²  See Nutrition note dated 09/16/2020  for additional details  Completed nutrition assessment is viewable in the nutrition documentation

## 2020-09-16 NOTE — ANESTHESIA POSTPROCEDURE EVALUATION
Post-Op Assessment Note    CV Status:  Stable  Pain Score: 0    Pain management: adequate     Mental Status:  Alert and awake   Hydration Status:  Euvolemic   PONV Controlled:  Controlled   Airway Patency:  Patent       Post Op Vitals Reviewed: No      Staff: Anesthesiologist         No complications documented      BP      Temp      Pulse     Resp      SpO2

## 2020-09-17 NOTE — ASSESSMENT & PLAN NOTE
Patient reported with suicidal ideation with a plan  Continue one-to-one  Will be seen by Psychiatry today

## 2020-09-17 NOTE — CASE MANAGEMENT
Called and spoke with Mazin Ortiz [phone] in crisis re: bed availability  As per Marichuy, there are no OA beds in network at this time  Informed patient of same   Will check availability in am

## 2020-09-17 NOTE — CASE MANAGEMENT
LOS: 2 days  Continuing to follow patient  Patient seen by Yoseph Astudillo, psych PA  Recommendation is for IP BHU in [facility]  Patient is agreeable and signed 12  Faxed completed 201 to crisis at [phone]  Await bed availability

## 2020-09-17 NOTE — CONSULTS
Consultation - Felipa 76 y o  male MRN: [id number]  Unit/Bed#: -01 Encounter: [id number]    Assessment/Plan     Assessment:  Mood Disorder    Plan:   1  Patient will sign voluntary 201 at this time  2  Will defer any medications being started to accepting psychiatrist  3  Psychiatry signing off or follow up PRN  4  To remain on 1:1    Risks, benefits and possible side effects of Medications:   Risks, benefits, and possible side effects of medications explained to patient and patient verbalizes understanding  Chief Complaint: "I have nothing left, I'm content"    History of Present Illness   Physician Requesting Consult: Eyal Momin MD  Reason for Consult / Principal Problem: Suicidal thoughts    Patient presented to [address] [place] [address] ED due to generalized weakness  Suicidal ideation with plan to shoot himself  Precipitating event is death of his fiancee well both ago  Since that of he has been increasingly depressed where he is no longer take care of himself  He reported over the last month low energy, anhedonia, hopelessness, poor appetite, and passive death wishes  He did report low levels of anxiety  He states he is content with his life is nothing left to live for  Shows no signs of gus or agitation  Denied psychosis and delusional material   Denied history of PTSD  Currently being treated for anemia      Psychosocial Stressors:  Death of pablo last month    Inpatient consult to Psychiatry  Consult performed by: Kate Lopez PA-C  Consult ordered by: Genevieve Christopher MD          Psychiatric Review Of Systems:  sleep: no  appetite changes: yes, poor  weight changes: yes, loss  energy/anergy: yes, low  interest/pleasure/anhedonia: yes  somatic symptoms: no  anxiety/panic: yes  gus: no  guilty/hopeless: yes  self injurious behavior/risky behavior: no    Historical Information   Past Psychiatric History:   Previous inpatient admission in the 60's  Currently in treatment with nobody  Past Suicide attempts: denied  Past Violent behavior: denied  Past Psychiatric medication trial: denied    Substance Abuse History:  Denied    Family Psychiatric History:   Denied    Social History  Education: high school diploma/GED  Learning Disabilities: no  Marital history: single  Living arrangement, social support: lives with son  Occupational History: retired  Functioning Relationships: poor support system  Other Pertinent History: None    Traumatic History:   Abuse: none      Past Medical History:   Diagnosis Date    Bladder cancer [facility])        Medical Review Of Systems:  Review of Systems    Meds/Allergies   all current active meds have been reviewed and current meds:   Current Facility-Administered Medications   Medication Dose Route Frequency    calcium carbonate (TUMS) chewable tablet 500 mg  500 mg Oral Daily PRN    heparin (porcine) subcutaneous injection 5,000 Units  5,000 Units Subcutaneous Q8H [address]    nicotine (NICODERM CQ) 14 mg/24hr TD 24 hr patch 1 patch  1 patch Transdermal Daily    pantoprazole (PROTONIX) injection 40 mg  40 mg Intravenous Q24H [place]    sodium chloride 0 9 % infusion  75 mL/hr Intravenous Continuous     No Known Allergies    Objective   Vital signs in last 24 hours:  Temp:  [97 1 °F (36 2 °C)-97 4 °F (36 3 °C)] 97 4 °F (36 3 °C)  HR:  [65-86] 86  BP: ()/(39-57) 91/47      Intake/Output Summary (Last 24 hours) at 9/17/2020 1355  Last data filed at 9/17/2020 1254  Gross per 24 hour   Intake 177 ml   Output 955 ml   Net -778 ml       Mental Status Evaluation:  Appearance:  in hospital attire   Behavior:  cooperative   Speech:  loud   Mood:  depressed   Affect:  increased in range   Language: Appropriate   Thought Process:  logical   Thought Content:  normal   Perceptual Disturbances: None   Risk Potential: Passive death wishes  Denied HI    Potential for aggression: No   Sensorium:  person, place and time/date   Cognition:  recent and remote memory grossly intact   Consciousness:  alert and awake    Attention: attention span and concentration were age appropriate   Intellect: within normal limits   Fund of Knowledge: awareness of current events: yes   Insight:  poor   Judgment: poor   Muscle Strength and Tone: Did not assess   Gait/Station: did not assess   Motor Activity: no abnormal movements     Lab Results: reviewed  Imaging Studies: reviewed  EKG, Pathology, and Other Studies: reviewed    Code Status: Level 1 - Full Code     Blake Su PA-C

## 2020-09-17 NOTE — PROGRESS NOTES
Progress note - Gastroenterology   Real Abril 76 y o  male MRN: [id number]  Unit/Bed#: -01 Encounter: [id number]    ASSESSMENT and PLAN    3 41-year-old male admitted with generalized weakness, found to have a macrocytic profound anemia  Perhaps recent melena he does have a history of ulcer disease  EGD showed large duodenal ulcer  Biopsies taken for H pylori  This is an adequate explanation for his pain and probably his anemia  I would recommend advancing his diet continuing his PPI check the results of the H pylori  He should still eventually have a colonoscopy if this is not been done past but we can arrange this as an outpatient assuming he follows up  Chief Complaint   Patient presents with    Weakness - Generalized     pt states not feeling well for one month since his wife   Pt states no appetite, weak  Only drinking Ensure   Psychiatric Evaluation     "I'd take myself out if I could"  Pt states he does not own a pistol, "otherwise it would have been done a long time ago"       SUBJECTIVE/HPI   Feels good no abdominal pain eating      BP (!) 91/47   Pulse 86   Temp (!) 97 4 °F (36 3 °C) (Oral)   Resp 18   Wt 62 1 kg (136 lb 14 5 oz)   SpO2 94%   BMI 19 64 kg/m²     PHYSICALEXAM  General appearance: alert, appears stated age and cooperative  Eyes: PERLLA, EOMI, no icterus   Head: Normocephalic, without obvious abnormality, atraumatic  Abdomen: soft, non-tender; bowel sounds normal; no masses,  no organomegaly  Extremities: extremities normal, atraumatic, no cyanosis or edema  Neurologic: Grossly normal    Lab Results   Component Value Date    CALCIUM 7 9 (L) 2020    K 4 6 2020    CO2 28 2020     2020    BUN 19 2020    CREATININE 0 82 2020     Lab Results   Component Value Date    WBC 8 95 2020    HGB 8 0 (L) 2020    HCT 25 2 (L) 2020    MCV 99 (H) 2020     2020     Lab Results   Component Value Date    ALT 13 09/16/2020    AST 14 09/16/2020    ALKPHOS 51 09/16/2020     No results found for: AMYLASE  Lab Results   Component Value Date    LIPASE 98 09/15/2020     No results found for: IRON, TIBC, FERRITIN  Lab Results   Component Value Date    INR 1 02 09/15/2020

## 2020-09-17 NOTE — PLAN OF CARE
Problem: Potential for Falls  Goal: Patient will remain free of falls  Description: INTERVENTIONS:  - Assess patient frequently for physical needs  -  Identify cognitive and physical deficits and behaviors that affect risk of falls    -  [place] fall precautions as indicated by assessment   - Educate patient/family on patient safety including physical limitations  - Instruct patient to call for assistance with activity based on assessment  - Modify environment to reduce risk of injury  - Consider OT/PT consult to assist with strengthening/mobility  Outcome: Progressing     Problem: Prexisting or High Potential for Compromised Skin Integrity  Goal: Skin integrity is maintained or improved  Description: INTERVENTIONS:  - Identify patients at risk for skin breakdown  - Assess and monitor skin integrity  - Assess and monitor nutrition and hydration status  - Monitor labs   - Assess for incontinence   - Turn and reposition patient  - Assist with mobility/ambulation  - Relieve pressure over bony prominences  - Avoid friction and shearing  - Provide appropriate hygiene as needed including keeping skin clean and dry  - Evaluate need for skin moisturizer/barrier cream  - Collaborate with interdisciplinary team   - Patient/family teaching  - Consider wound care consult   Outcome: Progressing     Problem: PAIN - ADULT  Goal: Verbalizes/displays adequate comfort level or baseline comfort level  Description: Interventions:  - Encourage patient to monitor pain and request assistance  - Assess pain using appropriate pain scale  - Administer analgesics based on type and severity of pain and evaluate response  - Implement non-pharmacological measures as appropriate and evaluate response  - Consider cultural and social influences on pain and pain management  - Notify physician/advanced practitioner if interventions unsuccessful or patient reports new pain  Outcome: Progressing     Problem: INFECTION - ADULT  Goal: Absence or prevention of progression during hospitalization  Description: INTERVENTIONS:  - Assess and monitor for signs and symptoms of infection  - Monitor lab/diagnostic results  - Monitor all insertion sites, i e  indwelling lines, tubes, and drains  - Monitor endotracheal if appropriate and nasal secretions for changes in amount and color  - [place] appropriate cooling/warming therapies per order  - Administer medications as ordered  - Instruct and encourage patient and family to use good hand hygiene technique  - Identify and instruct in appropriate isolation precautions for identified infection/condition  Outcome: Progressing     Problem: SAFETY ADULT  Goal: Patient will remain free of falls  Description: INTERVENTIONS:  - Assess patient frequently for physical needs  -  Identify cognitive and physical deficits and behaviors that affect risk of falls    -  [place] fall precautions as indicated by assessment   - Educate patient/family on patient safety including physical limitations  - Instruct patient to call for assistance with activity based on assessment  - Modify environment to reduce risk of injury  - Consider OT/PT consult to assist with strengthening/mobility  Outcome: Progressing  Goal: Maintain or return to baseline ADL function  Description: INTERVENTIONS:  -  Assess patient's ability to carry out ADLs; assess patient's baseline for ADL function and identify physical deficits which impact ability to perform ADLs (bathing, care of mouth/teeth, toileting, grooming, dressing, etc )  - Assess/evaluate cause of self-care deficits   - Assess range of motion  - Assess patient's mobility; develop plan if impaired  - Assess patient's need for assistive devices and provide as appropriate  - Encourage maximum independence but intervene and supervise when necessary  - Involve family in performance of ADLs  - Assess for home care needs following discharge   - Consider OT consult to assist with ADL evaluation and planning for discharge  - Provide patient education as appropriate  Outcome: Progressing  Goal: Maintain or return mobility status to optimal level  Description: INTERVENTIONS:  - Assess patient's baseline mobility status (ambulation, transfers, stairs, etc )    - Identify cognitive and physical deficits and behaviors that affect mobility  - Identify mobility aids required to assist with transfers and/or ambulation (gait belt, sit-to-stand, lift, walker, cane, etc )  - [place] fall precautions as indicated by assessment  - Record patient progress and toleration of activity level on Mobility SBAR; progress patient to next Phase/Stage  - Instruct patient to call for assistance with activity based on assessment  - Consider rehabilitation consult to assist with strengthening/weightbearing, etc   Outcome: Progressing     Problem: DISCHARGE PLANNING  Goal: Discharge to home or other facility with appropriate resources  Description: INTERVENTIONS:  - Identify barriers to discharge w/patient and caregiver  - Arrange for needed discharge resources and transportation as appropriate  - Identify discharge learning needs (meds, wound care, etc )  - Arrange for interpretive services to assist at discharge as needed  - Refer to Case Management Department for coordinating discharge planning if the patient needs post-hospital services based on physician/advanced practitioner order or complex needs related to functional status, cognitive ability, or social support system  Outcome: Progressing     Problem: Knowledge Deficit  Goal: Patient/family/caregiver demonstrates understanding of disease process, treatment plan, medications, and discharge instructions  Description: Complete learning assessment and assess knowledge base    Interventions:  - Provide teaching at level of understanding  - Provide teaching via preferred learning methods  Outcome: Progressing     Problem: Nutrition/Hydration-ADULT  Goal: Nutrient/Hydration intake appropriate for improving, restoring or maintaining nutritional needs  Description: Monitor and assess patient's nutrition/hydration status for malnutrition  Collaborate with interdisciplinary team and initiate plan and interventions as ordered  Monitor patient's weight and dietary intake as ordered or per policy  Utilize nutrition screening tool and intervene as necessary  Determine patient's food preferences and provide high-protein, high-caloric foods as appropriate       INTERVENTIONS:  - Monitor oral intake, urinary output, labs, and treatment plans  - Assess nutrition and hydration status and recommend course of action  - Evaluate amount of meals eaten  - Assist patient with eating if necessary   - Allow adequate time for meals  - Recommend/ encourage appropriate diets, oral nutritional supplements, and vitamin/mineral supplements  - Order, calculate, and assess calorie counts as needed  - Recommend, monitor, and adjust tube feedings and TPN/PPN based on assessed needs  - Assess need for intravenous fluids  - Provide specific nutrition/hydration education as appropriate  - Include patient/family/caregiver in decisions related to nutrition  Outcome: Progressing

## 2020-09-17 NOTE — PROGRESS NOTES
Progress Note - Dori Napoles 1945, 76 y o  male MRN: [id number]    Unit/Bed#: -01 Encounter: [id number]    Primary Care Provider: Yolanda Wilson MD   Date and time admitted to hospital: 9/15/2020 11:33 AM        Severe protein-calorie malnutrition ([address] )  Assessment & Plan  Malnutrition Findings:   Malnutrition type: Chronic illness  Degree of Malnutrition: Other severe protein calorie malnutrition    BMI Findings: Body mass index is 19 64 kg/m²  Chronic severe protein calorie malnutrition Present on admission as evidenced by BMI of 19 64,< 75% po intake> 1 month, sunken and darkened orbitals and temporal  hollowing )    Plan:  Regular diet, fortify with Ensure    Suicidal ideation  Assessment & Plan  Patient reported with suicidal ideation with a plan  Continue one-to-one  Will be seen by Psychiatry today  GI bleed  Assessment & Plan  Plan is as documented on the acute blood loss anemia    * Acute blood loss anemia  Assessment & Plan  Due to upper GI bleed, differentials include peptic ulcer disease, Dieulafoy's lesion  Baseline hemoglobin approximately 8  Hemoglobin noted to be 5 8 on presentation status post 2 units of packed red blood cell, hemoglobin is stable at 8  EGD done 09/16/2020: large duodenal bulb ulceration not causing gastric outlet obstruction  No hemorrhage or stigmata of recent hemorrhage  Plan:  Protonix 40 mg daily  Okay for regular diet         VTE Pharmacologic Prophylaxis:   Pharmacologic: Heparin  Mechanical VTE Prophylaxis in Place: Yes    Patient Centered Rounds: I have performed bedside rounds with nursing staff today  Discussions with Specialists or Other Care Team Provider:  Gastroenterology    Education and Discussions with Family / Patient:  Yes    Time Spent for Care: 30 minutes  More than 50% of total time spent on counseling and coordination of care as described above      Current Length of Stay: 2 day(s)    Current Patient Status: Inpatient   Certification Statement: The patient will continue to require additional inpatient hospital stay due to Ongoing treatment for acute blood loss anemia, suicidal ideation    Discharge Plan:  Likely inpatient psych    Code Status: Level 1 - Full Code      Subjective:   Seen  Still with generalized weakness, no reports of melena stool  has no other complaints to offer  Remains on a one-to-one    Objective:     Vitals:   Temp (24hrs), Av 8 °F (36 6 °C), Min:97 1 °F (36 2 °C), Max:98 8 °F (37 1 °C)    Temp:  [97 1 °F (36 2 °C)-98 8 °F (37 1 °C)] 97 4 °F (36 3 °C)  HR:  [65-95] 86  Resp:  [16-18] 18  BP: ()/(39-61) 91/47  SpO2:  [91 %-100 %] 94 %  Body mass index is 19 64 kg/m²  Input and Output Summary (last 24 hours): Intake/Output Summary (Last 24 hours) at 2020 1152  Last data filed at 2020 0830  Gross per 24 hour   Intake 327 ml   Output 805 ml   Net -478 ml       Physical Exam:     Physical Exam  Vitals signs reviewed  Constitutional:       General: He is not in acute distress  Appearance: Normal appearance  He is not ill-appearing, toxic-appearing or diaphoretic  HENT:      Head: Normocephalic  Eyes:      Extraocular Movements: Extraocular movements intact  Pupils: Pupils are equal, round, and reactive to light  Neck:      Musculoskeletal: Normal range of motion and neck supple  No neck rigidity or muscular tenderness  Vascular: No carotid bruit  Cardiovascular:      Rate and Rhythm: Normal rate and regular rhythm  Pulses: Normal pulses  Heart sounds: Murmur (Systolic) present  No friction rub  Pulmonary:      Effort: Pulmonary effort is normal       Breath sounds: Normal breath sounds  No stridor  No wheezing, rhonchi or rales  Chest:      Chest wall: No tenderness  Abdominal:      General: Abdomen is flat  Bowel sounds are normal  There is no distension  Palpations: Abdomen is soft  Tenderness:  There is no abdominal tenderness  There is no guarding or rebound  Musculoskeletal: Normal range of motion  General: No swelling or tenderness  Right lower leg: No edema  Left lower leg: No edema  Skin:     General: Skin is warm and dry  Coloration: Skin is not jaundiced  Neurological:      General: No focal deficit present  Mental Status: He is alert and oriented to person, place, and time  Cranial Nerves: No cranial nerve deficit  Sensory: No sensory deficit  Additional Data:     Labs:    Results from last 7 days   Lab Units 09/17/20  0506  09/15/20  1238   WBC Thousand/uL 8 95   < > 12 63*   HEMOGLOBIN g/dL 8 0*   < > 5 8*   HEMATOCRIT % 25 2*   < > 18 3*   PLATELETS Thousands/uL 295   < > 304   NEUTROS PCT %  --   --  75   LYMPHS PCT %  --   --  11*   MONOS PCT %  --   --  11   EOS PCT %  --   --  1    < > = values in this interval not displayed  Results from last 7 days   Lab Units 09/17/20  0506 09/16/20  0450   SODIUM mmol/L 138 139   POTASSIUM mmol/L 4 6 4 5   CHLORIDE mmol/L 106 105   CO2 mmol/L 28 30   BUN mg/dL 19 27*   CREATININE mg/dL 0 82 0 89   ANION GAP mmol/L 4 4   CALCIUM mg/dL 7 9* 8 2*   ALBUMIN g/dL  --  2 4*   TOTAL BILIRUBIN mg/dL  --  0 40   ALK PHOS U/L  --  51   ALT U/L  --  13   AST U/L  --  14   GLUCOSE RANDOM mg/dL 77 85     Results from last 7 days   Lab Units 09/15/20  1331   INR  1 02                       * I Have Reviewed All Lab Data Listed Above  * Additional Pertinent Lab Tests Reviewed:  [facility] Admission Reviewed    Imaging:    Imaging Reports Reviewed Today Include:   Imaging Personally Reviewed by Myself Includes:      Recent Cultures (last 7 days):           Last 24 Hours Medication List:   Current Facility-Administered Medications   Medication Dose Route Frequency Provider Last Rate    calcium carbonate  500 mg Oral Daily PRN Jaswant Spence MD      heparin (porcine)  5,000 Units Subcutaneous [facility] Ham Bush MD      nicotine  1 patch Transdermal Daily Donte Gallegos MD      pantoprazole  40 mg Intravenous Q24H [facility] Marcos Friend MD      sodium chloride  75 mL/hr Intravenous Continuous Donte Gallegos MD 75 mL/hr (09/17/20 0125)        Today, Patient Was Seen By: Ham Bush MD    ** Please Note: Dictation voice to text software may have been used in the creation of this document   **

## 2020-09-17 NOTE — ASSESSMENT & PLAN NOTE
Malnutrition Findings:   Malnutrition type: Chronic illness  Degree of Malnutrition: Other severe protein calorie malnutrition    BMI Findings: Body mass index is 19 64 kg/m²     Chronic severe protein calorie malnutrition Present on admission as evidenced by BMI of 19 64,< 75% po intake> 1 month, sunken and darkened orbitals and temporal  hollowing )    Plan:  Regular diet, fortify with Ensure

## 2020-09-17 NOTE — ASSESSMENT & PLAN NOTE
Due to upper GI bleed, differentials include peptic ulcer disease, Dieulafoy's lesion  Baseline hemoglobin approximately 8  Hemoglobin noted to be 5 8 on presentation status post 2 units of packed red blood cell, hemoglobin is stable at 8  EGD done 09/16/2020: large duodenal bulb ulceration not causing gastric outlet obstruction  No hemorrhage or stigmata of recent hemorrhage        Plan:  Protonix 40 mg daily  Okay for regular diet

## 2020-09-18 NOTE — ASSESSMENT & PLAN NOTE
Plan is as documented on the acute blood loss anemia  Patient is stable for discharge to behavior health today

## 2020-09-18 NOTE — PROGRESS NOTES
Progress note - Gastroenterology   Neal Callahan 76 y o  male MRN: [id number]  Unit/Bed#: -[place] Encounter: [id number]    ASSESSMENT and PLAN    1  Acute blood loss anemia  2  Peptic ulcer disease, noted on EGD on 2020, large duodenal bulb ulcer, clean based  3  GI bleed   4  Suicide ideation    - stable hemoglobin  Continue PPI daily  - negative for H pylori  - tolerating diet  - okay for discharge from GI perspective to inpatient psychiatry    Chief Complaint   Patient presents with    Weakness - Generalized     pt states not feeling well for one month since his wife   Pt states no appetite, weak  Only drinking Ensure   Psychiatric Evaluation     "I'd take myself out if I could"  Pt states he does not own a pistol, "otherwise it would have been done a long time ago"       SUBJECTIVE/HPI   No complaints    BP (!) 110/49   Pulse 91   Temp 98 7 °F (37 1 °C)   Resp 18   Wt 62 5 kg (137 lb 12 8 oz)   SpO2 98%   BMI 19 77 kg/m²     PHYSICALEXAM  General appearance: alert, appears stated age and cooperative  Eyes: SHANE, EOMI, no scleral icterus   Head: Normocephalic, without obvious abnormality, atraumatic  Lungs: clear to auscultation bilaterally, no c/w/r  Heart: regular rate and rhythm, S1, S2 normal, no murmur, click, rub or gallop  Abdomen: soft, non-tender, non-distended; bowel sounds normal; no masses,  no organomegaly  Extremities: extremities normal, atraumatic, no clubbing or cyanosis   No LE edema  Neurologic: Grossly normal, AAOx3, no asterixis    Lab Results   Component Value Date    CALCIUM 7 9 (L) 2020    K 4 6 2020    CO2 28 2020     2020    BUN 19 2020    CREATININE 0 82 2020     Lab Results   Component Value Date    WBC 11 01 (H) 2020    HGB 8 2 (L) 2020    HCT 25 9 (L) 2020    MCV 99 (H) 2020     2020     Lab Results   Component Value Date    ALT 13 2020    AST 14 2020 ALKPHOS 51 09/16/2020     No results found for: AMYLASE  Lab Results   Component Value Date    LIPASE 98 09/15/2020     No results found for: IRON, TIBC, FERRITIN  Lab Results   Component Value Date    INR 1 02 09/15/2020

## 2020-09-18 NOTE — CASE MANAGEMENT
Continue to follow  Spoke with psych intake, awaiting determination for bed availability in the network  Anticipate return call  CM to follow

## 2020-09-18 NOTE — ASSESSMENT & PLAN NOTE
EGD done 09/16/2020: large duodenal bulb ulceration not causing gastric outlet obstruction  No hemorrhage or stigmata of recent hemorrhage  Presented with acute blood loss anemia due to likely bleeding duodenal ulcer with a hemoglobin of 5 8  He was transfused 2 units of blood, hemoglobin improved from 5 8-8 2  At present is stable

## 2020-09-18 NOTE — ASSESSMENT & PLAN NOTE
Due to upper GI bleed, due to duodenal ulcer  Baseline hemoglobin approximately 8  Hemoglobin noted to be 5 8 on presentation status post 2 units of packed red blood cell, hemoglobin is stable at 8  EGD done 09/16/2020: large duodenal bulb ulceration not causing gastric outlet obstruction  No hemorrhage or stigmata of recent hemorrhage  Plan:  Protonix 40 mg daily  regular diet  Medically stable to be discharged to [facility]/ inpatient psych today    Hemoglobin stable at 8 2

## 2020-09-18 NOTE — DISCHARGE SUMMARY
Discharge- Luis Patel 1945, 76 y o  male MRN: [id number]    Unit/Bed#: -01 Encounter: [id number]    Primary Care Provider: Laney Daley MD   Date and time admitted to hospital: 9/15/2020 11:33 AM        Severe protein-calorie malnutrition ([address] )  Assessment & Plan  Malnutrition Findings:   Malnutrition type: Chronic illness  Degree of Malnutrition: Other severe protein calorie malnutrition    BMI Findings: Body mass index is 19 77 kg/m²  Chronic severe protein calorie malnutrition Present on admission as evidenced by BMI of 19 64,< 75% po intake> 1 month, sunken and darkened orbitals and temporal  hollowing )    Plan:  Regular diet, fortify with Ensure    PUD (peptic ulcer disease)  Assessment & Plan  EGD done 09/16/2020: large duodenal bulb ulceration not causing gastric outlet obstruction  No hemorrhage or stigmata of recent hemorrhage  Presented with acute blood loss anemia due to likely bleeding duodenal ulcer with a hemoglobin of 5 8  He was transfused 2 units of blood, hemoglobin improved from 5 8-8 2  At present is stable  Suicidal ideation  Assessment & Plan  Patient reported with suicidal ideation with a plan  Continue one-to-one  He signed voluntary 201  He is stable for discharge to inpatient psych       GI bleed  Assessment & Plan  Plan is as documented on the acute blood loss anemia  Patient is stable for discharge to behavior health today  * Acute blood loss anemia  Assessment & Plan  Due to upper GI bleed, due to duodenal ulcer  Baseline hemoglobin approximately 8  Hemoglobin noted to be 5 8 on presentation status post 2 units of packed red blood cell, hemoglobin is stable at 8  EGD done 09/16/2020: large duodenal bulb ulceration not causing gastric outlet obstruction  No hemorrhage or stigmata of recent hemorrhage        Plan:  Protonix 40 mg daily  regular diet  Medically stable to be discharged to [facility]/ inpatient psych today   Hemoglobin stable at 8 2             Discharging Physician / Practitioner: Danielle Buck MD  PCP: Adry Keller MD  Admission Date:   Admission Orders (From admission, onward)     Ordered        09/15/20 1440  Inpatient Admission  Once                   Discharge Date: 09/18/20    Resolved Problems  Date Reviewed: 9/16/2020    None          Consultations During Hospital Stay:  Gastroenterology, psychiatry  Procedures Performed:   Endoscopy 09/16/2020: large duodenal bulb ulceration not causing gastric outlet obstruction  No hemorrhage or stigmata of recent hemorrhage  ·     Significant Findings / Test Results:   As above  Incidental Findings:   Large duodenal bulb ulceration  Test Results Pending at Discharge (will require follow up): None   Outpatient Tests Requested:  Non  Complications:    Reason for Admission:  Abdominal pain, suicidal ideation  Hospital Course:     Inderjit Machuca is a 76 y o  male patient who originally presented to the hospital on 9/15/2020 due to abdominal pain, suicidal ideation  Patient had noted melena stools at home, hemoglobin on presentation was 5 8 and was transfused with 2 units of blood with appropriate response of hemoglobin  He was seen in consultation by Gastroenterology and underwent an endoscopy on 09/16/2020 which showed large duodenal bulb ulceration  He was started on IV Protonix, changed to oral Protonix  Hemoglobin at present is stable at 8 2  He was seen in consultation by Psychiatry and has opted for inpatient psych  Based on this patient is being transferred to inpatient psych for further management  Recommendations on discharge:  1  He will continue Protonix 40 mg daily       Please see above list of diagnoses and related plan for additional information       Condition at Discharge: stable     Discharge Day Visit / Exam:     * Please refer to separate progress note for these details *    Discussion with Family: No, patient stated he was aware of his condition and is making decisions himself  Discharge instructions/Information to patient and family:   See after visit summary for information provided to patient and family  Provisions for Follow-Up Care:  See after visit summary for information related to follow-up care and any pertinent home health orders  Disposition:     Inpatient Psychiatry at [facility]    For Discharges to [facility]:   · Not Applicable to this Patient - Not Applicable to this Patient    Planned Readmission:  No     Discharge Statement:  I spent 35 minutes discharging the patient  This time was spent on the day of discharge  I had direct contact with the patient on the day of discharge  Greater than 50% of the total time was spent examining patient, answering all patient questions, arranging and discussing plan of care with patient as well as directly providing post-discharge instructions  Additional time then spent on discharge activities  Discharge Medications:  See after visit summary for reconciled discharge medications provided to patient and family        ** Please Note: This note has been constructed using a voice recognition system **

## 2020-09-18 NOTE — ASSESSMENT & PLAN NOTE
Due to upper GI bleed, due to duodenal ulcer  Baseline hemoglobin approximately 8  Hemoglobin noted to be 5 8 on presentation status post 2 units of packed red blood cell, hemoglobin is stable at 8  EGD done 09/16/2020: large duodenal bulb ulceration not causing gastric outlet obstruction  No hemorrhage or stigmata of recent hemorrhage  Plan:  Protonix 40 mg daily  regular diet  Medically stable to be discharged to [facility]/ inpatient psych today    Hemoglobin stable at 8 2

## 2020-09-18 NOTE — ASSESSMENT & PLAN NOTE
Patient reported with suicidal ideation with a plan  Continue one-to-one  He signed voluntary 201  He is stable for discharge to inpatient psych  Cale Quarlse

## 2020-09-18 NOTE — ASSESSMENT & PLAN NOTE
Patient reported with suicidal ideation with a plan  Continue one-to-one  He signed voluntary 201  He is stable for discharge to inpatient psych  Khoa Busby

## 2020-09-18 NOTE — ASSESSMENT & PLAN NOTE
Malnutrition Findings:   Malnutrition type: Chronic illness  Degree of Malnutrition: Other severe protein calorie malnutrition    BMI Findings: Body mass index is 19 77 kg/m²     Chronic severe protein calorie malnutrition Present on admission as evidenced by BMI of 19 64,< 75% po intake> 1 month, sunken and darkened orbitals and temporal  hollowing )    Plan:  Regular diet, fortify with Ensure

## 2020-09-18 NOTE — PLAN OF CARE
Problem: Potential for Falls  Goal: Patient will remain free of falls  Description: INTERVENTIONS:  - Assess patient frequently for physical needs  -  Identify cognitive and physical deficits and behaviors that affect risk of falls    -  [place] fall precautions as indicated by assessment   - Educate patient/family on patient safety including physical limitations  - Instruct patient to call for assistance with activity based on assessment  - Modify environment to reduce risk of injury  - Consider OT/PT consult to assist with strengthening/mobility  Outcome: Progressing     Problem: Prexisting or High Potential for Compromised Skin Integrity  Goal: Skin integrity is maintained or improved  Description: INTERVENTIONS:  - Identify patients at risk for skin breakdown  - Assess and monitor skin integrity  - Assess and monitor nutrition and hydration status  - Monitor labs   - Assess for incontinence   - Turn and reposition patient  - Assist with mobility/ambulation  - Relieve pressure over bony prominences  - Avoid friction and shearing  - Provide appropriate hygiene as needed including keeping skin clean and dry  - Evaluate need for skin moisturizer/barrier cream  - Collaborate with interdisciplinary team   - Patient/family teaching  - Consider wound care consult   Outcome: Progressing     Problem: PAIN - ADULT  Goal: Verbalizes/displays adequate comfort level or baseline comfort level  Description: Interventions:  - Encourage patient to monitor pain and request assistance  - Assess pain using appropriate pain scale  - Administer analgesics based on type and severity of pain and evaluate response  - Implement non-pharmacological measures as appropriate and evaluate response  - Consider cultural and social influences on pain and pain management  - Notify physician/advanced practitioner if interventions unsuccessful or patient reports new pain  Outcome: Progressing     Problem: INFECTION - ADULT  Goal: Absence or prevention of progression during hospitalization  Description: INTERVENTIONS:  - Assess and monitor for signs and symptoms of infection  - Monitor lab/diagnostic results  - Monitor all insertion sites, i e  indwelling lines, tubes, and drains  - Monitor endotracheal if appropriate and nasal secretions for changes in amount and color  - [place] appropriate cooling/warming therapies per order  - Administer medications as ordered  - Instruct and encourage patient and family to use good hand hygiene technique  - Identify and instruct in appropriate isolation precautions for identified infection/condition  Outcome: Progressing     Problem: SAFETY ADULT  Goal: Patient will remain free of falls  Description: INTERVENTIONS:  - Assess patient frequently for physical needs  -  Identify cognitive and physical deficits and behaviors that affect risk of falls    -  [place] fall precautions as indicated by assessment   - Educate patient/family on patient safety including physical limitations  - Instruct patient to call for assistance with activity based on assessment  - Modify environment to reduce risk of injury  - Consider OT/PT consult to assist with strengthening/mobility  Outcome: Progressing  Goal: Maintain or return to baseline ADL function  Description: INTERVENTIONS:  -  Assess patient's ability to carry out ADLs; assess patient's baseline for ADL function and identify physical deficits which impact ability to perform ADLs (bathing, care of mouth/teeth, toileting, grooming, dressing, etc )  - Assess/evaluate cause of self-care deficits   - Assess range of motion  - Assess patient's mobility; develop plan if impaired  - Assess patient's need for assistive devices and provide as appropriate  - Encourage maximum independence but intervene and supervise when necessary  - Involve family in performance of ADLs  - Assess for home care needs following discharge   - Consider OT consult to assist with ADL evaluation and planning for discharge  - Provide patient education as appropriate  Outcome: Progressing  Goal: Maintain or return mobility status to optimal level  Description: INTERVENTIONS:  - Assess patient's baseline mobility status (ambulation, transfers, stairs, etc )    - Identify cognitive and physical deficits and behaviors that affect mobility  - Identify mobility aids required to assist with transfers and/or ambulation (gait belt, sit-to-stand, lift, walker, cane, etc )  - [place] fall precautions as indicated by assessment  - Record patient progress and toleration of activity level on Mobility SBAR; progress patient to next Phase/Stage  - Instruct patient to call for assistance with activity based on assessment  - Consider rehabilitation consult to assist with strengthening/weightbearing, etc   Outcome: Progressing     Problem: DISCHARGE PLANNING  Goal: Discharge to home or other facility with appropriate resources  Description: INTERVENTIONS:  - Identify barriers to discharge w/patient and caregiver  - Arrange for needed discharge resources and transportation as appropriate  - Identify discharge learning needs (meds, wound care, etc )  - Arrange for interpretive services to assist at discharge as needed  - Refer to Case Management Department for coordinating discharge planning if the patient needs post-hospital services based on physician/advanced practitioner order or complex needs related to functional status, cognitive ability, or social support system  Outcome: Progressing     Problem: Knowledge Deficit  Goal: Patient/family/caregiver demonstrates understanding of disease process, treatment plan, medications, and discharge instructions  Description: Complete learning assessment and assess knowledge base    Interventions:  - Provide teaching at level of understanding  - Provide teaching via preferred learning methods  Outcome: Progressing     Problem: Nutrition/Hydration-ADULT  Goal: Nutrient/Hydration intake appropriate for improving, restoring or maintaining nutritional needs  Description: Monitor and assess patient's nutrition/hydration status for malnutrition  Collaborate with interdisciplinary team and initiate plan and interventions as ordered  Monitor patient's weight and dietary intake as ordered or per policy  Utilize nutrition screening tool and intervene as necessary  Determine patient's food preferences and provide high-protein, high-caloric foods as appropriate       INTERVENTIONS:  - Monitor oral intake, urinary output, labs, and treatment plans  - Assess nutrition and hydration status and recommend course of action  - Evaluate amount of meals eaten  - Assist patient with eating if necessary   - Allow adequate time for meals  - Recommend/ encourage appropriate diets, oral nutritional supplements, and vitamin/mineral supplements  - Order, calculate, and assess calorie counts as needed  - Recommend, monitor, and adjust tube feedings and TPN/PPN based on assessed needs  - Assess need for intravenous fluids  - Provide specific nutrition/hydration education as appropriate  - Include patient/family/caregiver in decisions related to nutrition  Outcome: Progressing

## 2020-09-18 NOTE — CASE MANAGEMENT
Continue to follow  Call placed to psych intake([phone]), left message  Anticipate return call  Faxed 201 paperwork to psych intake at [phone]  Await return call  CM to follow

## 2020-09-18 NOTE — SOCIAL WORK
[facility] will transport via BLS to [facility]  RN, MD aware  Medical necess formed emailed and placed on chart  Addendum 1400: pt aware of transport time and destination

## 2020-09-18 NOTE — PROGRESS NOTES
Progress Note - Nicolás Silva 1945, 76 y o  male MRN: [id number]    Unit/Bed#: -01 Encounter: [id number]    Primary Care Provider: Eliel Viveros MD   Date and time admitted to hospital: 9/15/2020 11:33 AM        Severe protein-calorie malnutrition ([address] )  Assessment & Plan  Malnutrition Findings:   Malnutrition type: Chronic illness  Degree of Malnutrition: Other severe protein calorie malnutrition    BMI Findings: Body mass index is 19 77 kg/m²  Chronic severe protein calorie malnutrition Present on admission as evidenced by BMI of 19 64,< 75% po intake> 1 month, sunken and darkened orbitals and temporal  hollowing )    Plan:  Regular diet, fortify with Ensure    PUD (peptic ulcer disease)  Assessment & Plan  EGD done 09/16/2020: large duodenal bulb ulceration not causing gastric outlet obstruction  No hemorrhage or stigmata of recent hemorrhage  Presented with acute blood loss anemia due to likely bleeding duodenal ulcer with a hemoglobin of 5 8  He was transfused 2 units of blood, hemoglobin improved from 5 8-8 2  At present is stable  Suicidal ideation  Assessment & Plan  Patient reported with suicidal ideation with a plan  Continue one-to-one  He signed voluntary 201  He is stable for discharge to inpatient psych       GI bleed  Assessment & Plan  Plan is as documented on the acute blood loss anemia  Patient is stable for discharge to behavior health today  * Acute blood loss anemia  Assessment & Plan  Due to upper GI bleed, due to duodenal ulcer  Baseline hemoglobin approximately 8  Hemoglobin noted to be 5 8 on presentation status post 2 units of packed red blood cell, hemoglobin is stable at 8  EGD done 09/16/2020: large duodenal bulb ulceration not causing gastric outlet obstruction  No hemorrhage or stigmata of recent hemorrhage        Plan:  Protonix 40 mg daily  regular diet  Medically stable to be discharged to [facility]/ inpatient psych today   Hemoglobin stable at 8 2             VTE Pharmacologic Prophylaxis:   Pharmacologic: Heparin  Mechanical VTE Prophylaxis in Place: Yes    Patient Centered Rounds: I have performed bedside rounds with nursing staff today  Discussions with Specialists or Other Care Team Provider:  Gastroenterology  Education and Discussions with Family / Patient:  Yes    Time Spent for Care: 30 minutes  More than 50% of total time spent on counseling and coordination of care as described above  Current Length of Stay: 3 day(s)    Current Patient Status: Inpatient   Certification Statement: The patient will continue to require additional inpatient hospital stay due to Pending dispo for inpatient psych    Discharge Plan:  Inpatient psych  Code Status: Level 1 - Full Code      Subjective:   Seen  Has no complaints to offer today  Feels better than he did yesterday  Objective:     Vitals:   Temp (24hrs), Av 9 °F (36 6 °C), Min:97 3 °F (36 3 °C), Max:98 7 °F (37 1 °C)    Temp:  [97 3 °F (36 3 °C)-98 7 °F (37 1 °C)] 98 7 °F (37 1 °C)  HR:  [83-91] 91  Resp:  [17-18] 18  BP: ()/(49-64) 110/49  SpO2:  [93 %-98 %] 98 %  Body mass index is 19 77 kg/m²  Input and Output Summary (last 24 hours): Intake/Output Summary (Last 24 hours) at 2020 1129  Last data filed at 2020 1049  Gross per 24 hour   Intake 2560 ml   Output 2225 ml   Net 335 ml       Physical Exam:     Physical Exam  Vitals signs reviewed  Constitutional:       General: He is not in acute distress  Appearance: Normal appearance  He is not ill-appearing, toxic-appearing or diaphoretic  HENT:      Head: Normocephalic  Eyes:      Extraocular Movements: Extraocular movements intact  Pupils: Pupils are equal, round, and reactive to light  Neck:      Musculoskeletal: Normal range of motion and neck supple  No neck rigidity or muscular tenderness  Vascular: No carotid bruit     Cardiovascular:      Rate and Rhythm: Normal rate and regular rhythm  Pulses: Normal pulses  Heart sounds: Normal heart sounds  No murmur  No friction rub  Pulmonary:      Effort: Pulmonary effort is normal       Breath sounds: Normal breath sounds  No stridor  No wheezing, rhonchi or rales  Chest:      Chest wall: No tenderness  Abdominal:      General: Abdomen is flat  Bowel sounds are normal  There is no distension  Palpations: Abdomen is soft  Tenderness: There is no abdominal tenderness  There is no guarding or rebound  Musculoskeletal: Normal range of motion  General: No swelling or tenderness  Right lower leg: No edema  Left lower leg: No edema  Skin:     General: Skin is warm and dry  Coloration: Skin is not jaundiced  Neurological:      General: No focal deficit present  Mental Status: He is alert and oriented to person, place, and time  Cranial Nerves: No cranial nerve deficit  Sensory: No sensory deficit  Additional Data:     Labs:    Results from last 7 days   Lab Units 09/18/20  0519   WBC Thousand/uL 11 01*   HEMOGLOBIN g/dL 8 2*   HEMATOCRIT % 25 9*   PLATELETS Thousands/uL 338   NEUTROS PCT % 72   LYMPHS PCT % 13*   MONOS PCT % 10   EOS PCT % 3     Results from last 7 days   Lab Units 09/17/20  0506 09/16/20  0450   SODIUM mmol/L 138 139   POTASSIUM mmol/L 4 6 4 5   CHLORIDE mmol/L 106 105   CO2 mmol/L 28 30   BUN mg/dL 19 27*   CREATININE mg/dL 0 82 0 89   ANION GAP mmol/L 4 4   CALCIUM mg/dL 7 9* 8 2*   ALBUMIN g/dL  --  2 4*   TOTAL BILIRUBIN mg/dL  --  0 40   ALK PHOS U/L  --  51   ALT U/L  --  13   AST U/L  --  14   GLUCOSE RANDOM mg/dL 77 85     Results from last 7 days   Lab Units 09/15/20  1331   INR  1 02                       * I Have Reviewed All Lab Data Listed Above  * Additional Pertinent Lab Tests Reviewed:  All Labs For Current Hospital Admission Reviewed    Imaging:    Imaging Reports Reviewed Today Include:   Imaging Personally Reviewed by Myself Includes:      Recent Cultures (last 7 days):           Last 24 Hours Medication List:   Current Facility-Administered Medications   Medication Dose Route Frequency Provider Last Rate    calcium carbonate  500 mg Oral Daily PRN Sascha Caldwell MD      heparin (porcine)  5,000 Units Subcutaneous Q8H [address] Marcos Garcia MD      nicotine  1 patch Transdermal Daily Donte Gallegos MD      pantoprazole  40 mg Intravenous Q24H [address] Babatunde A Merribeth Goodpasture, MD      sodium chloride  75 mL/hr Intravenous Continuous Donte Gallegos MD 75 mL/hr (09/18/20 0500)        Today, Patient Was Seen By: Sascha Caldwell MD    ** Please Note: Dictation voice to text software may have been used in the creation of this document   **

## 2020-09-19 PROBLEM — F32.A DEPRESSIVE DISORDER: Status: ACTIVE | Noted: 2020-01-01

## 2020-09-19 PROBLEM — Z00.8 MEDICAL CLEARANCE FOR PSYCHIATRIC ADMISSION: Status: ACTIVE | Noted: 2020-01-01

## 2020-09-19 NOTE — NURSING NOTE
Awake in intervals to use BR  Ambulates independently   Pleasant on approach , contracts for safety  Denies SI at present   Rates depression 4/10   Denies anxiety

## 2020-09-19 NOTE — TREATMENT PLAN
TREATMENT PLAN REVIEW - [facility] 76 y o  1945 male MRN: [id number]    [address] [address] [address]den [address] [facility] Room / Bed: [address]/[facility] [id number] Encounter: [id number]          Admit Date/Time:  9/18/2020 10:51 PM    Treatment Team: Attending Provider: Alexandra Ji MD; Consulting Physician: Jayde Uribe MD; Registered Nurse: Debbie Maldonado RN; Advanced Practitioner: Angelo Mckeon PA-C; Patient Care Assistant: Nanette Oliver    Diagnosis: Principal Problem:    Depressive disorder      Patient Strengths/Assets: cooperative, patient is on a voluntary commitment    Patient Barriers/Limitations: limited support system, poor physical health    Short Term Goals: decrease in depressive symptoms, decrease in suicidal thoughts, ability to stay safe on the unit    Long Term Goals: improvement in depression, free of suicidal thoughts, improved insight    Progress Towards Goals: starting psychiatric medications as prescribed    Recommended Treatment: medication management, patient medication education, group therapy, milieu therapy, continued Behavioral Health psychiatric evaluation/assessment process    Treatment Frequency: daily medication monitoring, group and milieu therapy daily, monitoring through interdisciplinary rounds, monitoring through weekly patient care conferences    Expected Discharge Date:   In 3 to 5 days     Discharge Plan: referrals as indicated    Treatment Plan Created/Updated By: Angelina Clarke MD

## 2020-09-19 NOTE — ASSESSMENT & PLAN NOTE
 Patient is medically cleared for admission to [facility] and treatment of underlying psychiatric illness

## 2020-09-19 NOTE — NURSING NOTE
Pt care assumed at 1900 change of shift by day shift RN  Pt is on a 1:1, Patient is AOx4 with no complaints and stable vitals  Patients peripheral access removed at care transferred to ems crew for transfer to facility   Griselda Trinidad, TOVA

## 2020-09-19 NOTE — ASSESSMENT & PLAN NOTE
· Patient originally presented to [facility] with abdominal pain and generalized weakness  Found to have significant anemia and undergone EGD, which demonstrated PUD  Received 2 units PRBCs with stable hemoglobin since  · Monitor H&H weekly

## 2020-09-19 NOTE — CONSULTS
Consult- Delroy Mejia 1945, 76 y o  male MRN: [id number]  Unit/Bed#: [facility] 569-76 Encounter: [id number]  Primary Care Provider: Paty Lagunas MD   Date and time admitted to hospital: 9/18/2020 10:51 PM    Inpatient consult for Medical Clearance for [address] [place] [address] patient  Consult performed by: Isaias Yates PA-C  Consult ordered by: MANSI Munguia        Medical clearance for psychiatric admission  Assessment & Plan   Patient is medically cleared for admission to [facility] and treatment of underlying psychiatric illness  PUD (peptic ulcer disease)  Assessment & Plan  · S/p EGD on 09/16/20 with GI  · Continue PPI    Acute blood loss anemia  Assessment & Plan  · Patient originally presented to [facility] with abdominal pain and generalized weakness  Found to have significant anemia and undergone EGD, which demonstrated PUD  Received 2 units PRBCs with stable hemoglobin since  · Monitor H&H weekly  * Depressive disorder  Assessment & Plan  · Per psychiatry      ECT Clearance:   History of recent seizure or stroke:  No   History of pheochromocytoma:  No   History of active bleeding (Intracranial hemorrhage, aneurysm or AVM):  No   History of metallic implants in the head or neck:  No   History of increased intracranial pressure with mass effect:  No   Does the patient have a current arrhythmia? No      Based on above criteria, Patient is medically cleared for ECT should it be recommended  Counseling / Coordination of Care Time: 30 minutes  Greater than 50% of total time spent on patient counseling and coordination of care  Collaboration of Care: Were Recommendations Directly Discussed with Primary Treatment Team? - Yes     History of Present Illness:    Delroy Mejia is a 76 y o  male who is originally admitted to the psychiatry service due to depression and suicidal ideation   We are consulted for medical clearance for admission to [address] [place] [address] and treatment of underlying psychiatric illness  Patient has a remote history of bladder cancer and PUD  He was just treated at [facility] for acute blood loss anemia and PUD  He recently lost is "soul mate" and has been feeling very depressed  Review of Systems:    Review of Systems   Constitutional: Negative for appetite change, chills, diaphoresis, fatigue, fever and unexpected weight change  HENT: Negative for congestion and sore throat  Eyes: Negative for visual disturbance  Respiratory: Negative for cough, chest tightness, shortness of breath and wheezing  Cardiovascular: Negative for chest pain, palpitations and leg swelling  Gastrointestinal: Negative for abdominal distention, abdominal pain, blood in stool, constipation, diarrhea, nausea and vomiting  Genitourinary: Negative for difficulty urinating, dysuria, frequency, hematuria and urgency  Musculoskeletal: Negative for back pain, gait problem and neck pain  Skin: Negative for rash  Neurological: Negative for dizziness, tremors, speech difficulty, weakness, light-headedness, numbness and headaches  Psychiatric/Behavioral: Positive for dysphoric mood  Negative for confusion  The patient is not nervous/anxious  All other systems reviewed and are negative  Past Medical and Surgical History:     Past Medical History:   Diagnosis Date    Bladder cancer [facility])        Past Surgical History:   Procedure Laterality Date    ESOPHAGOGASTRODUODENOSCOPY N/A 1/13/2019    Procedure: ESOPHAGOGASTRODUODENOSCOPY (EGD); Surgeon: Corey Vasquez MD;  Location: [facility] MAIN OR;  Service: Gastroenterology       Meds/Allergies:    PTA meds:   Prior to Admission Medications   Prescriptions Last Dose Informant Patient Reported? Taking?    Esomeprazole Magnesium (NEXIUM PO) 9/18/2020 at Unknown time  Yes Yes   Sig: Take 20 mg by mouth every 12 (twelve) hours     sucralfate (CARAFATE) 1 g/10 mL suspension   No Yes   Sig: Take 10 mL (1,000 mg total) by mouth every 6 (six) hours for 90 days      Facility-Administered Medications: None       Allergies: No Known Allergies    Social History:     Marital Status:     Substance Use History:   Social History     Substance and Sexual Activity   Alcohol Use Not Currently    Frequency: Monthly or less    Comment: Socially     Social History     Tobacco Use   Smoking Status Former Smoker    Packs/day: 0 25    Types: Cigarettes    Last attempt to quit: 2020    Years since quittin 0   Smokeless Tobacco Former Dirk    stopped smoking 2 weeks ago     Social History     Substance and Sexual Activity   Drug Use No       Family History:    Family History   Problem Relation Age of Onset    No Known Problems Mother     No Known Problems Father        Physical Exam:     Vitals:   Blood Pressure: 90/51 (20 0792)  Pulse: 86 (20)  Temperature: 99 1 °F (37 3 °C) (20)  Temp Source: Temporal (20 2053)  Respirations: 18 (20 3584)  Height: 5' 11" (180 3 cm) (20)  Weight - Scale: 63 kg (138 lb 14 2 oz) (20)  SpO2: 93 % (20 8564)    Physical Exam  Vitals signs reviewed  HENT:      Head: Normocephalic and atraumatic  Eyes:      General: No scleral icterus  Cardiovascular:      Rate and Rhythm: Normal rate and regular rhythm  Heart sounds: No murmur  Pulmonary:      Breath sounds: Normal breath sounds  No wheezing or rales  Chest:      Chest wall: No tenderness  Abdominal:      General: Bowel sounds are normal  There is no distension  Palpations: Abdomen is soft  Tenderness: There is no abdominal tenderness  Musculoskeletal: Normal range of motion  Skin:     General: Skin is warm and dry  Findings: No rash  Additional Data:     Lab Results: I have personally reviewed pertinent reports        Results from last 7 days   Lab Units 20  0519   WBC Thousand/uL 11 01*   HEMOGLOBIN g/dL 8 2*   HEMATOCRIT % 25 9* PLATELETS Thousands/uL 338   NEUTROS PCT % 72   LYMPHS PCT % 13*   MONOS PCT % 10   EOS PCT % 3     Results from last 7 days   Lab Units 09/17/20  0506 09/16/20  0450   SODIUM mmol/L 138 139   POTASSIUM mmol/L 4 6 4 5   CHLORIDE mmol/L 106 105   CO2 mmol/L 28 30   BUN mg/dL 19 27*   CREATININE mg/dL 0 82 0 89   ANION GAP mmol/L 4 4   CALCIUM mg/dL 7 9* 8 2*   ALBUMIN g/dL  --  2 4*   TOTAL BILIRUBIN mg/dL  --  0 40   ALK PHOS U/L  --  51   ALT U/L  --  13   AST U/L  --  14   GLUCOSE RANDOM mg/dL 77 85     Results from last 7 days   Lab Units 09/15/20  1331   INR  1 02     Results from last 7 days   Lab Units 09/15/20  1238   TROPONIN I ng/mL <0 02     No results found for: HGBA1C        EKG, Pathology, and Other Studies Reviewed on Admission:   · EKG (09/15/20): NSR with QTc: 474    ** Please Note: This note has been constructed using a voice recognition system   **

## 2020-09-19 NOTE — H&P
Psychiatric Evaluation - Felipa 76 y o  male MRN: [id number]  Unit/Bed#: [facility] 924-53 Encounter: [id number]    Assessment/Plan   Principal Problem:    Depressive disorder    Plan:   Risks, benefits and possible side effects of Medications:   Risks, benefits, and possible side effects of medications explained to patient and patient verbalizes understanding  I suggested starting SSRI or Mirtazapine to help with depression and poor appetite but patient declined medications    Chief Complaint: "I lost my soul mate and I felt I had nothing to live for"    History of Present Illness     Patient is a 76 y o  male presents with Signs of suicidal potential   Patient was admitted to psychiatric unit on a voluntarily 201 commitment basis  Primary complaints include: feeling depressed and feeling suicidal   Onset of symptoms was gradual starting several weeks ago with gradually improving course since that time  Psychosocial Stressors: family, health and marital     Patient presented to [address] [place] [address] ED due to generalized weakness, fatigue, poor appetite, lack of motivation and suicidal ideation with plan to shoot himself  Precipitating event is death of his fiancee   Since that of he has been increasingly depressed and no longer take care of himself  He reported over the last month low energy, anhedonia, hopelessness, and passive death wishes  He did report low levels of anxiety  He denies hx of gus or agitation  Denied psychosis  Denied history of PTSD  He was admitted to the medical floor to treat bleeding ulcer and anemia  He stated he has been feeling better after he called 911 and was brought in the hospital, he denies SI now or plans to kill himself  He stated he has been learning to accept the loss of his GF   He doesn't feels he needs treatment for depression at this time       Psychosocial Stressors:  Death of fiance last month  Psychiatric Review Of Systems:  sleep: no  appetite changes: yes  weight changes: yes  energy/anergy: yes  interest/pleasure/anhedonia: yes  somatic symptoms: no  anxiety/panic: no  gus: no  guilty/hopeless: no  self injurious behavior/risky behavior: no    Historical Information     Past Psychiatric History: In Patient once many years ago for depression and SI was admitted for only 3 days no medications were prescribed  Past Suicide attempts: denies  Past Violent behavior: denies  Past Psychiatric medication trial: None    Substance Abuse History:  E-Cigarette/Vaping      E-Cigarette/Vaping Substances       Social History     Tobacco History     Smoking Status  Former Smoker Quit date  9/1/2020 Smoking Frequency  0 25 packs/day Smoking Tobacco Type  Cigarettes    Smokeless Tobacco Use  Former User    Tobacco Comment  stopped smoking 2 weeks ago          Alcohol History     Alcohol Use Status  Not Currently Comment  Socially          Drug Use     Drug Use Status  No          Sexual Activity     Sexually Active  Yes Partners  Female          Activities of Daily Living    Not Asked                 I have assessed this patient for substance use within the past 12 months    Family Psychiatric History:   Psychiatric Illness denies Hospitalization: denies    Social History:  Education: high school diploma/GED  Learning Disabilities: denies  Marital history: single  Living arrangement, social support: The patient lives alone  Occupational History: retired  Functioning Relationships: alone & isolated  Other Pertinent History:  Service: branch: [employer]      Traumatic History:   Abuse: denies  Other Traumatic Events: n/a    Past Medical History:   Diagnosis Date    Bladder cancer [facility])        Medical Review Of Systems:  Pertinent items are noted in HPI      Meds/Allergies   all current active meds have been reviewed and current meds:   Current Facility-Administered Medications   Medication Dose Route Frequency    acetaminophen (TYLENOL) tablet 650 mg  650 mg Oral Q6H PRN    acetaminophen (TYLENOL) tablet 650 mg  650 mg Oral Q4H PRN    acetaminophen (TYLENOL) tablet 975 mg  975 mg Oral Q6H PRN    calcium carbonate (TUMS) chewable tablet 500 mg  500 mg Oral Daily PRN    haloperidol (HALDOL) tablet 2 mg  2 mg Oral Q8H PRN    haloperidol lactate (HALDOL) injection 2 mg  2 mg Intramuscular Q8H PRN    LORazepam (ATIVAN) injection 1 mg  1 mg Intramuscular Q8H PRN    LORazepam (ATIVAN) tablet 0 5 mg  0 5 mg Oral Q4H PRN    nicotine (NICODERM CQ) 14 mg/24hr TD 24 hr patch 1 patch  1 patch Transdermal Daily    nicotine polacrilex (NICORETTE) gum 2 mg  2 mg Oral Q2H PRN    pantoprazole (PROTONIX) EC tablet 40 mg  40 mg Oral Early Morning    polyethylene glycol (MIRALAX) packet 17 g  17 g Oral Daily PRN    senna-docusate sodium (SENOKOT S) 8 6-50 mg per tablet 1 tablet  1 tablet Oral Daily PRN     No Known Allergies    Objective   Vital signs in last 24 hours:  Temp:  [97 1 °F (36 2 °C)-99 1 °F (37 3 °C)] 99 1 °F (37 3 °C)  HR:  [86-93] 86  Resp:  [18-22] 18  BP: ()/(48-56) 90/51      Intake/Output Summary (Last 24 hours) at 9/19/2020 1229  Last data filed at 9/19/2020 1215  Gross per 24 hour   Intake 640 ml   Output    Net 640 ml       Mental Status Evaluation:  Appearance:  age appropriate and disheveled   Behavior:  cooperative   Speech:  normal pitch and normal volume   Mood:  constricted and depressed   Affect:  constricted   Language: anomia No and aphasia  No   Thought Process:  goal directed and logical   Thought Content:  normal   Perceptual Disturbances: None   Risk Potential: Suicidal Ideations none  Homicidal Ideations none  Potential for Aggression No   Sensorium:  person, place and time/date   Memory:  recent and remote memory grossly intact   Consciousness:  alert and awake    Attention: attention span and concentration were age appropriate   Intellect: not examined   Fund of Knowledge: awareness of current events: seems adequate   Insight: limited   Judgment: limited   Muscle Strength and Tone: not examined   Gait/Station: normal gait/station and normal balance   Motor Activity: no abnormal movements     Lab Results: I have personally reviewed all pertinent laboratory/tests results  Most Recent Labs:   Lab Results   Component Value Date    WBC 11 01 (H) 09/18/2020    RBC 2 62 (L) 09/18/2020    HGB 8 2 (L) 09/18/2020    HCT 25 9 (L) 09/18/2020     09/18/2020    RDW 15 3 (H) 09/18/2020    NEUTROABS 8 05 (H) 09/18/2020    SODIUM 138 09/17/2020    K 4 6 09/17/2020     09/17/2020    CO2 28 09/17/2020    BUN 19 09/17/2020    CREATININE 0 82 09/17/2020    GLUC 77 09/17/2020    CALCIUM 7 9 (L) 09/17/2020    AST 14 09/16/2020    ALT 13 09/16/2020    ALKPHOS 51 09/16/2020    TP 5 3 (L) 09/16/2020    ALB 2 4 (L) 09/16/2020    TBILI 0 40 09/16/2020    CHOLESTEROL 96 09/19/2020    HDL 29 (L) 09/19/2020    TRIG 76 09/19/2020    LDLCALC 52 09/19/2020    NONHDLC 67 09/19/2020      Imaging Studies: n/a  EKG, Pathology, and Other Studies:   Code Status: Level 1 - Full Code  Advance Directive and Living Will:      Power of :    POLST:        Patient Strengths/Assets: ability for insight, cooperative, patient is on a voluntary commitment    Patient Barriers/Limitations: lack of social/family support    Counseling / Coordination of Care  Total floor / unit time spent today n/a minutes  Greater than 50% of total time was spent with the patient and / or family counseling and / or coordination of care   A description of the counseling / coordination of care:

## 2020-09-19 NOTE — CMS CERTIFICATION NOTE
Certification: Based upon physical, mental and social evaluations, I certify that inpatient psychiatric services are medically necessary for this patient for a duration of 5 midnights for the treatment of Depressive Disorder  Available alternative community resources do not meet the patient's mental health care needs  I further attest that an established written individualized plan of care has been implemented and is outlined in the patient's medical records

## 2020-09-19 NOTE — PLAN OF CARE
Problem: Risk for Self Injury/Neglect  Goal: Treatment Goal: Remain safe during length of stay, learn and adopt new coping skills, and be free of self-injurious ideation, impulses and acts at the time of discharge  Outcome: Not Progressing  Goal: Verbalize thoughts and feelings  Description: Interventions:  - Assess and re-assess patient's lethality and potential for self-injury  - Engage patient in 1:1 interactions, daily, for a minimum of 15 minutes  - Encourage patient to express feelings, fears, frustrations, hopes  - Establish rapport/trust with patient   Outcome: Not Progressing  Goal: Refrain from harming self  Description: Interventions:  - Monitor patient closely, per order  - Develop a trusting relationship  - Supervise medication ingestion, monitor effects and side effects   Outcome: Not Progressing  Goal: Attend and participate in unit activities, including therapeutic, recreational, and educational groups  Description: Interventions:  - Provide therapeutic and educational activities daily, encourage attendance and participation, and document same in the medical record  - Obtain collateral information, encourage visitation and family involvement in care   Outcome: Not Progressing  Goal: Recognize maladaptive responses and adopt new coping mechanisms  Outcome: Not Progressing  Goal: Complete daily ADLs, including personal hygiene independently, as able  Description: Interventions:  - Observe, teach, and assist patient with ADLS  - Monitor and promote a balance of rest/activity, with adequate nutrition and elimination  Outcome: Not Progressing     Problem: Depression  Goal: Treatment Goal: Demonstrate behavioral control of depressive symptoms, verbalize feelings of improved mood/affect, and adopt new coping skills prior to discharge  Outcome: Not Progressing  Goal: Verbalize thoughts and feelings  Description: Interventions:  - Assess and re-assess patient's level of risk   - Engage patient in 1:1 interactions, daily, for a minimum of 15 minutes   - Encourage patient to express feelings, fears, frustrations, hopes   Outcome: Not Progressing  Goal: Refrain from harming self  Description: Interventions:  - Monitor patient closely, per order   - Supervise medication ingestion, monitor effects and side effects   Outcome: Not Progressing  Goal: Refrain from isolation  Description: Interventions:  - Develop a trusting relationship   - Encourage socialization   Outcome: Not Progressing  Goal: Refrain from self-neglect  Outcome: Not Progressing  Goal: Attend and participate in unit activities, including therapeutic, recreational, and educational groups  Description: Interventions:  - Provide therapeutic and educational activities daily, encourage attendance and participation, and document same in the medical record   Outcome: Not Progressing  Goal: Complete daily ADLs, including personal hygiene independently, as able  Description: Interventions:  - Observe, teach, and assist patient with ADLS  -  Monitor and promote a balance of rest/activity, with adequate nutrition and elimination   Outcome: Not Progressing

## 2020-09-19 NOTE — NURSING NOTE
Admitted from [facility] 3rd floor   Is 201  States his wife passed 1 month ago and he had thoughts, "I would take myself out if I could" Denies access to guns  Contracts for safety  " I don't think I could do it " I have kids and foster kids too  " Rates depression 4/10 and anxiety 0/4  Admits to weight loss of about 20 lbs  Treated at [facility] for duodenal ulcer, had 2 units of blood  Ambulates independently  " I move slow but I can still go " Cooperative with assessment  Sacrum red but blanchable, house cream applied  Oriented to unit and roommate  Denies AH  VH

## 2020-09-19 NOTE — NURSING NOTE
Pt bright on approach, conversational, and cooperative with care  Denies abdominal pain and discomfort  Pt reports depression 6/10 and denies suicidal thoughts  States that he felt that he was going to take his own life and was worried about the mess it would make (referring to blood)  He then called 911  He feels that he has lived a fulfilled life and would be OK with not living any longer, but is not suicidal at this time  States that every day he is coming to acceptance with his "soul mate's" death, and every day he feels "a little better " Denies anxiety, AH/VH  Endorses good appetite and sleep  Contracts for safety

## 2020-09-20 NOTE — PLAN OF CARE
Problem: Risk for Self Injury/Neglect  Goal: Treatment Goal: Remain safe during length of stay, learn and adopt new coping skills, and be free of self-injurious ideation, impulses and acts at the time of discharge  Outcome: Progressing  Goal: Verbalize thoughts and feelings  Description: Interventions:  - Assess and re-assess patient's lethality and potential for self-injury  - Engage patient in 1:1 interactions, daily, for a minimum of 15 minutes  - Encourage patient to express feelings, fears, frustrations, hopes  - Establish rapport/trust with patient   Outcome: Progressing  Goal: Refrain from harming self  Description: Interventions:  - Monitor patient closely, per order  - Develop a trusting relationship  - Supervise medication ingestion, monitor effects and side effects   Outcome: Progressing  Goal: Attend and participate in unit activities, including therapeutic, recreational, and educational groups  Description: Interventions:  - Provide therapeutic and educational activities daily, encourage attendance and participation, and document same in the medical record  - Obtain collateral information, encourage visitation and family involvement in care   Outcome: Progressing  Goal: Recognize maladaptive responses and adopt new coping mechanisms  Outcome: Progressing  Goal: Complete daily ADLs, including personal hygiene independently, as able  Description: Interventions:  - Observe, teach, and assist patient with ADLS  - Monitor and promote a balance of rest/activity, with adequate nutrition and elimination  Outcome: Progressing     Problem: Depression  Goal: Treatment Goal: Demonstrate behavioral control of depressive symptoms, verbalize feelings of improved mood/affect, and adopt new coping skills prior to discharge  Outcome: Progressing  Goal: Verbalize thoughts and feelings  Description: Interventions:  - Assess and re-assess patient's level of risk   - Engage patient in 1:1 interactions, daily, for a minimum of 15 minutes   - Encourage patient to express feelings, fears, frustrations, hopes   Outcome: Progressing  Goal: Refrain from harming self  Description: Interventions:  - Monitor patient closely, per order   - Supervise medication ingestion, monitor effects and side effects   Outcome: Progressing  Goal: Refrain from isolation  Description: Interventions:  - Develop a trusting relationship   - Encourage socialization   Outcome: Progressing  Goal: Refrain from self-neglect  Outcome: Progressing  Goal: Attend and participate in unit activities, including therapeutic, recreational, and educational groups  Description: Interventions:  - Provide therapeutic and educational activities daily, encourage attendance and participation, and document same in the medical record   Outcome: Progressing  Goal: Complete daily ADLs, including personal hygiene independently, as able  Description: Interventions:  - Observe, teach, and assist patient with ADLS  -  Monitor and promote a balance of rest/activity, with adequate nutrition and elimination   Outcome: Progressing     Problem: Nutrition/Hydration-ADULT  Goal: Nutrient/Hydration intake appropriate for improving, restoring or maintaining nutritional needs  Description: Monitor and assess patient's nutrition/hydration status for malnutrition  Collaborate with interdisciplinary team and initiate plan and interventions as ordered  Monitor patient's weight and dietary intake as ordered or per policy  Utilize nutrition screening tool and intervene as necessary  Determine patient's food preferences and provide high-protein, high-caloric foods as appropriate       INTERVENTIONS:  - Monitor oral intake, urinary output, labs, and treatment plans  - Assess nutrition and hydration status and recommend course of action  - Evaluate amount of meals eaten  - Assist patient with eating if necessary   - Allow adequate time for meals  - Recommend/ encourage appropriate diets, oral nutritional supplements, and vitamin/mineral supplements  - Order, calculate, and assess calorie counts as needed  - Recommend, monitor, and adjust tube feedings and TPN/PPN based on assessed needs  - Assess need for intravenous fluids  - Provide specific nutrition/hydration education as appropriate  - Include patient/family/caregiver in decisions related to nutrition  Outcome: Progressing

## 2020-09-20 NOTE — PROGRESS NOTES
Progress Note - Behavioral Health   Jessica Alvesr 76 y o  male MRN: [id number]  Unit/Bed#: [facility] Patient 707-11 Encounter: [id number]    Assessment/Plan   Principal Problem:    Depressive disorder  Active Problems:    Acute blood loss anemia    PUD (peptic ulcer disease)    Severe protein-calorie malnutrition (HCC)    Medical clearance for psychiatric admission      Behavior over the last 24 hours:  unchanged  Sleep: insomnia  Appetite: poor  Medication side effects: No  ROS: poor sleep    Mental Status Evaluation:  Appearance:  age appropriate and disheveled   Behavior:  cooperative   Speech:  normal pitch and normal volume   Mood:  constricted   Affect:  constricted   Thought Process:  goal directed and logical   Thought Content:  normal   Perceptual Disturbances: None   Risk Potential: Suicidal Ideations none  Homicidal Ideations none  Potential for Aggression No   Sensorium:  person, place and time/date   Memory:  recent and remote memory grossly intact   Consciousness:  alert and awake    Attention: attention span appeared shorter than expected for age   Insight:  limited   Judgment: limited   Gait/Station: normal gait/station and normal balance   Motor Activity: no abnormal movements     Progress Toward Goals: Patient stated he didn't sleep well night and today he agrees to start Mirtazapine at bedtime to help with mood, sleep and appetite  His mood ids depressed but denies SI  Recommended Treatment: Continue with group therapy, milieu therapy and occupational therapy  Risks, benefits and possible side effects of Medications:   Risks, benefits, and possible side effects of medications explained to patient and patient verbalizes understanding        Medications:   all current active meds have been reviewed, continue current psychiatric medications and current meds:   Current Facility-Administered Medications   Medication Dose Route Frequency    acetaminophen (TYLENOL) tablet 650 mg  650 mg Oral Q6H PRN    acetaminophen (TYLENOL) tablet 650 mg  650 mg Oral Q4H PRN    acetaminophen (TYLENOL) tablet 975 mg  975 mg Oral Q6H PRN    calcium carbonate (TUMS) chewable tablet 500 mg  500 mg Oral Daily PRN    haloperidol (HALDOL) tablet 2 mg  2 mg Oral Q8H PRN    haloperidol lactate (HALDOL) injection 2 mg  2 mg Intramuscular Q8H PRN    LORazepam (ATIVAN) injection 1 mg  1 mg Intramuscular Q8H PRN    LORazepam (ATIVAN) tablet 0 5 mg  0 5 mg Oral Q4H PRN    mirtazapine (REMERON) tablet 15 mg  15 mg Oral HS    nicotine polacrilex (NICORETTE) gum 2 mg  2 mg Oral Q2H PRN    pantoprazole (PROTONIX) EC tablet 40 mg  40 mg Oral Early Morning    polyethylene glycol (MIRALAX) packet 17 g  17 g Oral Daily PRN    senna-docusate sodium (SENOKOT S) 8 6-50 mg per tablet 1 tablet  1 tablet Oral Daily PRN     Labs: I have personally reviewed all pertinent laboratory/tests results  Most Recent Labs:   Lab Results   Component Value Date    WBC 11 01 (H) 09/18/2020    RBC 2 62 (L) 09/18/2020    HGB 8 2 (L) 09/18/2020    HCT 25 9 (L) 09/18/2020     09/18/2020    RDW 15 3 (H) 09/18/2020    NEUTROABS 8 05 (H) 09/18/2020    SODIUM 138 09/17/2020    K 4 6 09/17/2020     09/17/2020    CO2 28 09/17/2020    BUN 19 09/17/2020    CREATININE 0 82 09/17/2020    GLUC 77 09/17/2020    CALCIUM 7 9 (L) 09/17/2020    AST 14 09/16/2020    ALT 13 09/16/2020    ALKPHOS 51 09/16/2020    TP 5 3 (L) 09/16/2020    ALB 2 4 (L) 09/16/2020    TBILI 0 40 09/16/2020    CHOLESTEROL 96 09/19/2020    HDL 29 (L) 09/19/2020    TRIG 76 09/19/2020    LDLCALC 52 09/19/2020    [place] 67 09/19/2020       Counseling / Coordination of Care  Total floor / unit time spent today n/a minutes  Greater than 50% of total time was spent with the patient and / or family counseling and / or coordination of care   A description of the counseling / coordination of care:

## 2020-09-20 NOTE — NURSING NOTE
Patient is present on the unit and makes needs known  Withdrawn  Reports depression 6/10 and denies all other symptoms  Medication compliant and cooperative with care

## 2020-09-20 NOTE — NURSING NOTE
Patient with interrupted/disturbed sleep  Patient woke up around 0320 to use the bathroom and did not appear to fall back to sleep until 0430  Patient reported sleeping in spurts  Patient currently with about 3 hrs of sleep  Patient free of falls  Q7 safety checks maintained

## 2020-09-20 NOTE — NURSING NOTE
Patient appears depressed an d withdrawn  Patient is pleasant and cooperative  He denied SI  Medication and meal compliant  He denied any needs  VSS  Patient is in bed  Laying quietly in bed  Eyes closed  Monitored on q 7 minute

## 2020-09-21 NOTE — OCCUPATIONAL THERAPY NOTE
Occupational Therapy Evaluation & Treatment  Note      Patient Name: Neal Callahan  RRYOELOKENDRA Date: 9/21/2020  Problem List  Principal Problem:    Depressive disorder  Active Problems:    Acute blood loss anemia    PUD (peptic ulcer disease)    Severe protein-calorie malnutrition ([address] )    Medical clearance for psychiatric admission    Past Medical History  Past Medical History:   Diagnosis Date    Bladder cancer [facility])      Past Surgical History  Past Surgical History:   Procedure Laterality Date    ESOPHAGOGASTRODUODENOSCOPY N/A 1/13/2019    Procedure: ESOPHAGOGASTRODUODENOSCOPY (EGD); Surgeon: Joao Carroll MD;  Location: [facility];  Service: Gastroenterology             09/21/20 0959--> 25 min txt   OT Last Visit   OT Visit Date 09/21/20   Note Type   Note type Eval/Treat   Restrictions/Precautions   Weight Bearing Precautions Per Order No   Other Precautions Fall Risk   Pain Assessment   Pain Assessment Tool Pain Assessment not indicated - pt denies pain   Home Living   Type of [address] Multi-level;1/2 bath on main level;Stairs to enter with rails   [facility]   Prior Function   Level of [place] Independent with ADLs and functional mobility   Lives With Son   Receives Help From Family  ((foster son))   ADL Assistance Independent   IADLs Needs assistance   Falls in the last 6 months 1 to 4   Vocational Retired   Comments Pt reports that he does not drive, Pt's son assists with grocery shopping, Pt prepares meals mainly at the microwave level      Subjective   Subjective "I do feel a lot bettter"    ADL   Grooming Assistance 6  Modified Independent   UB Bathing Assistance 5  Supervision/Setup   LB Bathing Assistance 5  Supervision/Setup   UB Dressing Assistance 5  Supervision/Setup   LB Dressing Assistance 5  Supervision/Setup   LB Dressing Deficit Don/doff R shoe;Don/doff L shoe   Toileting Assistance  5  Supervision/Setup   Bed Mobility   Rolling R 7  Independent   Rolling L 7  Independent   Supine to Sit 7  Independent   Sit to Supine 7  Independent   Transfers   Sit to Stand 5  Supervision   Stand to Sit 5  Supervision   Stand pivot 5  Supervision   Toilet transfer 5  Supervision   Functional Mobility   Functional Mobility 5  Supervision   Additional Comments household distances    Balance   Static Sitting Normal   Dynamic Sitting Good   Static Standing Fair +   Dynamic Standing Fair +   Ambulatory Fair +   Activity Tolerance   Activity Tolerance Patient tolerated treatment well   RUE Assessment   RUE Assessment WFL   LUE Assessment   LUE Assessment WFL   Hand Function   Gross Motor Coordination Functional   Fine Motor Coordination Functional   Sensation   Light Touch No apparent deficits   Proprioception   Proprioception No apparent deficits   Perception   Inattention/Neglect Appears intact   Cognition   Arousal/Participation Alert; Cooperative   Attention Within functional limits   Orientation Level Oriented X4   Memory Within functional limits   Following Commands Follows all commands and directions without difficulty   Comments Pt hard of hearing, difficulty understanding certain things due to this limitation  MOCA: 26/30  Pt with limitations in short term memory/recall  Pt demonstrated insight into this deficits, denies having memory issues impacting function at home  Assessment   Limitation Decreased cognition;Decreased endurance;Decreased high-level ADLs   Prognosis Good   Assessment   Pt is a 76 y o  male seen for OT evaluation s/p admit to [facility] on 9/18/2020 w/ Depressive disorder  See above for extensive list of comorbidities affecting Pt's functional performance at time of assessment  Personal factors affecting Pt at time of IE include:limited home support, behavioral pattern, difficulty performing ADLS, difficulty performing IADLS , health management  and environment   Prior to admission, Pt was independent with ADLs, receives assist from his foster son  Upon evaluation: Pt requires supervision for ambulation in room and bathroom mobility, Pt reaching out for walls initially for steadying, noted improved stability with prolonged ambulation  Pt able to don slippers independently  Pt very hard of hearing, which limited command following and accuracy of cognitive testing at times  The following deficits impact occupational performance: weakness, decreased strength, decreased balance, decreased tolerance, impaired problem solving and decreased safety awareness  Pt to benefit from continued skilled OT services while in the hospital to address deficits as defined above and maximize level of functional independence w ADL's and functional mobility  Occupational performance areas to address include: bathing/shower, toilet hygiene, dressing, health maintenance, functional mobility and clothing management  From OT standpoint, recommendation at time of d/c would be return to previous environment with social support  Goals   STG Time Frame   (2 weeks )   Short Term Goals 1  Pt will complete ~ 10 minutes of standing activity without rest break  2  Pt will complete toileting including clothing management Neymar  3  Pt will identify 1-3 leisure activities to engage in at home  Plan   Treatment Interventions ADL retraining;Functional transfer training;UE strengthening/ROM; Endurance training;Continued evaluation; Energy conservation   Goal Expiration Date 10/05/20   OT Treatment Day 1   OT Frequency 1-2x/wk   Additional Treatment Session   Start Time 1030   End Time 1055   Treatment Assessment Pt seen for OT txt following initial evaluation  Pt completed MOCA assessment, results showing Pt's cognition WNL  Pt pleasant and able to verbalize circumstances leading to hospitalization  Pt demonstrates decreased short term memory and problem-solving- discussed home safety and strategies for increased problem-solving at home    Pt able to attend to ~ 50 minute session with good attention and engagement  Overall, Pt remains limited by decreased endurance, functional strength, and coping skills  Continue OT POC      Recommendation   OT Discharge Recommendation Return to previous environment with social support

## 2020-09-21 NOTE — PROGRESS NOTES
20 1104   Team Meeting   Meeting Type Daily Rounds   Initial Conference Date 20   Next Conference Date 20   Team Members Present   Team Members Present Physician;Nurse;;   Physician Team Member [id number] Team Member Polly Apodaca, [address]   Care Management Team Member [facility]   Social Work Team Member Gab Jenkins   Patient/Family Present   Patient Present No   Patient's Family Present No   Dep  6/10, Anx  0/4  Pt was admitted with plan to shoot self if he had a pistol, thoughts of using a knife  Wife  1 mo  Ago, Pt has a Duodenal ulcer and required 2 units of blood

## 2020-09-21 NOTE — PLAN OF CARE
Problem: PHYSICAL THERAPY ADULT  Goal: Performs mobility at highest level of function for planned discharge setting  See evaluation for individualized goals  Description: Treatment/Interventions: Functional transfer training, Elevations, Endurance training, Patient/family training, Spoke to nursing, OT          See flowsheet documentation for full assessment, interventions and recommendations  Outcome: Progressing  Note: Prognosis: Good  Problem List: Decreased mobility, Impaired balance, Impaired hearing(low BP)  Assessment: Pt is 76 y o  male seen for PT evaluation s/p admit to [facility] on 9/18/2020 w/ Depressive disorder  Per medical chart Patient presented to [address] [place] [address] ED due to generalized weakness, fatigue, poor appetite, lack of motivation and suicidal ideation with plan to shoot himself  Precipitating event is death of his pabloe  Pt S/p EGD on 09/16/20 with GI which demonstrated PUD(peptic ulcer disease)  Pt also received 2 unites of PRBC's  PT consulted to assess Pt's functional mobility and d/c needs  Please see above for past medical history and comorbidities  On evaluation patient was in bed  Easily aroused and agreeable for evaluation  Pt was found to have low BP's  Nursing notified  Use of dynamap for BP's, recommend manual BP's to assure accuracy of reading  Pt is asymptomatic  HR increases with activity  Pt static standing and gait steady for limited distance  Due to low BP held longer distance ambulation at this time  Pt will benefit from skilled PT during stay to assure stable parameters with activity  PT Discharge Recommendation: Return to previous environment with social support          See flowsheet documentation for full assessment

## 2020-09-21 NOTE — DISCHARGE INSTR - OTHER ORDERS
You will be returning to your home at [address] - [phone]      After discharge, if you find your coping skills are not as effective and you continue to feel distressed please call [facility] services are available 24 hours a day by calling [facility] [place] at [phone], and [address] [facility] [address] : [phone]    Crisis Text Line : [phone]     If you feel you are a danger to yourself or others please contact 911 or go to nearest Emergency room to seek immediate help  Salinas Costa RN, our [employer], will be calling you after your discharge, on the phone number that you provided  She will be available as an additional support, if needed  If you wish to speak with her, you may contact Theaf[facility] Mclean at [phone]  If you are in a Crisis situation Call [phone] to speak to a trained crisis worker - Anytime - Day or Night  You may also call one of the numbers below:  [employer]     [facility]:   [phone]        What you need to know aboutcoronavirus disease 2019 (COVID-19)     What is coronavirus disease 2019 (COVID-19)? Coronavirus disease 2019 (COVID-19) is a respiratory illness that can spread from person to person  The virus that causes COVID-19 is a novel coronavirus that was first identified during an investigation into an outbreak in [place], [place]  Can people in [place]  get COVID[place]19? Yes  COVID-19 is spreading from person to person in parts of the [place]  Risk of infection with COVID-19 is higher for people who are close contacts of someone known to have COVID-19, for example healthcare workers, or household members  Other people at higher risk for infection are those who live in or have recently been in an area with ongoing spread of COVID-19   Learn more about places with ongoing spread at [facility]  html#geographic  Have there been cases of COVID-19 in [place] ?   Yes  The first case of COVID-19 in the [place] was reported on January 21, 2020  The current count of cases of COVID-19 in the [place] is available on [employer] at [facility]  How does COVID-19 spread? The virus that causes COVID-19 probably emerged from an animal source, but is now spreading from person to person  The virus is thought to spread mainly between people who are in close contact with one another (within about 6 feet) through respiratory droplets produced when an infected person coughs or sneezes  It also may be possible that a person can get COVID-19 by touching a surface or object that has the virus on it and then touching their own mouth, nose, or possibly their eyes, but this is not thought to be the main way the virus spreads  Learn what is known about the spread of newly emerged coronaviruses at [facility]  What are the symptoms of COVID-19? Patients with COVID-19 have had mild to severe respiratory illness with symptoms of   fever   cough   shortness of breath  What are severe complications from this virus? Some patients have pneumonia in both lungs, multi-organ failure and in some cases death  How can I help protect myself? People can help protect themselves from respiratory illness with everyday preventive actions  Avoid close contact with people who are sick  Avoid touching your eyes, nose, and mouth withunwashed hands  Wash your hands often with soap and water for at least 20 seconds  Use an alcohol-based hand  that contains at least 60% alcohol if soap and water are not available  If you are sick, to keep from spreading respiratory illness to others, you should   Stay home when you are sick      Cover your cough or sneeze with a tissue, then throw the tissue in the trash  Clean and disinfect frequently touched objectsand surfaces  What should I do if I recently traveled from an area with ongoing spread of COVID-19? If you have traveled from an affected area, there may be restrictions on your movements for up to 2 weeks  If you develop symptoms during that period (fever, cough, trouble breathing), seek medical advice  Call the office of your health care provider before you go, and tell them about your travel and your symptoms  They will give you instructions on how to get care without exposing other people to your illness  While sick, avoid contact with people, don't go out and delay any travel to reduce the possibility of spreading illness to others  Is there a vaccine? There is currently no vaccine to protect against COVID-19  The best way to prevent infection is to take everyday preventive actions, like avoiding close contact with people who are sick and washing your hands often  Is there a treatment? There is no specific antiviral treatment for COVID-19  People with COVID-19 can seek medical care to helprelieve symptoms  For more information: www cdc gov/RJSZJ32RH 620324-Z 03/03/2020       What to do if you are sick withcoronavirus disease 2019 (COVID-19)     If you are sick with COVID-19 or suspect you are infected with the virus that causes COVID-19, follow the steps below to help prevent the disease from spreading to people in your home and community  Stay home except to get medical care   You should restrict activities outside your home, except for getting medical care  Do not go to work, school, or public areas  Avoid using public transportation, ride-sharing, or taxis  Separate yourself from other people and animals inyour home  People: As much as possible, you should stay in a specific room and away from other people in your home  Also, you should use a separate bathroom, if available    Animals: Do not handle pets or other animals while sick  See COVID-19 and Animals for more information  Call ahead before visiting your doctor   If you have a medical appointment, call the healthcare provider and tell them that you have or may have COVID-19  This will help the healthcare provider's office take steps to keep other people from getting infected or exposed  Wear a facemask  You should wear a facemask when you are around other people (e g , sharing a room or vehicle) or pets and before you enter a healthcare provider's office  If you are not able to wear a facemask (for example, because it causes trouble breathing), then people who live with you should not stay in the same room with you, or they should wear a facemask if they enteryour room  Cover your coughs and sneezes   Cover your mouth and nose with a tissue when you cough or sneeze  Throw used tissues in a lined trash can; immediately wash your hands with soap and water for at least 20 seconds or clean your hands with an alcohol-based hand  that contains at least 60 to 95% alcohol, covering all surfaces of your hands and rubbing them together until they feel dry  Soap and water should be used preferentially if hands are visibly dirty  Avoid sharing personal household items   You should not share dishes, drinking glasses, cups, eating utensils, towels, or bedding with other people or pets in your home  After using these items, they should be washed thoroughly with soap and water  Clean your hands often  Wash your hands often with soap and water for at least 20 seconds  If soap and water are not available, clean your hands with an alcohol-based hand  that contains at least 60% alcohol, covering all surfaces of your hands and rubbing them together until they feel dry  Soap and water should be used preferentially if hands are visibly dirty  Avoid touching your eyes, nose, and mouth with unwashed hands    Clean all "high-touch" surfaces every day  High touch surfaces include counters, tabletops, doorknobs, bathroom fixtures, toilets, phones, keyboards, tablets, and bedside tables  Also, clean any surfaces that may have blood, stool, or body fluids on them  Use a household cleaning spray or wipe, according to the label instructions  Labels contain instructions for safe and effective use of the cleaning product including precautions you should take when applying the product, such as wearing gloves and making sure you have good ventilation during use of the product  Monitor your symptoms  Seek prompt medical attention if your illness is worsening (e g , difficulty breathing)  Before seeking care, call your healthcare provider and tell them that you have, or are being evaluated for, COVID-19  Put on a facemask before you enter the facility  These steps will help the healthcare provider's office to keep other people in the office or waiting room from getting infectedor exposed  Ask your healthcare provider to call the local or state health department  Persons who are placed under active monitoring or facilitated self-monitoring should follow instructions provided by their local health department or occupational health professionals, as appropriate  If you have a medical emergency and need to call 911, notify the dispatch personnel that you have, or are being evaluated for COVID-19  If possible, put on a facemask before emergency medical services arrive  Discontinuing home isolation  Patients with confirmed COVID-19 should remain under home isolation precautions until the risk of secondary transmission to others is thought to be low  The decision to discontinue home isolation precautions should be made on a case-by-case basis, in consultation with healthcare providers and state and local health departments  For more information: www cdc gov/XEWZM73MO 949191-S 02/24/2020       Stay home when you are sick,except to get medical care    Wash your hands often with soap and water for at least 20 seconds  Cover your cough or sneeze with a tissue, then throw the tissue in the trash  Clean and disinfect frequently touched objects and surfaces  Avoid touching your eyes, nose, and mouth  STOP THE SPREAD OF GERMS  For more information: www cdc gov/COVID19 Avoid close contact with people who are sick  Help prevent the spread of respiratory diseases like COVID-19  What you need to know aboutcoronavirus disease 2019 (COVID-19)

## 2020-09-21 NOTE — PROGRESS NOTES
In basket notification of new BPCI episode received  Chart reviewed  Patient admitted to [facility] at [facility] after inpatient admission for acute blood loss anemia  Will follow for discharge

## 2020-09-21 NOTE — NURSING NOTE
Patient's blood pressures have been running low  Patient reports his blood pressures are always low  RN reported low blood pressures to [facility]  No new orders-encourage fluids and monitor

## 2020-09-21 NOTE — PROGRESS NOTES
Pt did not attend groups when prompted or offered         09/21/20 1000   Activity/Group Checklist   Group Other (Comment)  ( open discussion)   Attendance Did not attend

## 2020-09-21 NOTE — PROGRESS NOTES
Psychiatrist Progress Note - Felipa 76 y o  male MRN: [id number]  Unit/Bed#: Katia Smith 063-24 Encounter: [id number]    The patient was seen for continuing care and reviewed with treatment team   Per staff, patient has been cooperative with care  Patient reports his mood has been improving  Though he is still grieving, he does acknowledge she needs to move on  He has been thinking about taking up on his son's offer to move in with him in [place]  Denies suicidal thoughts at this time though he acknowledges that should he die tomorrow, he would be at peace with this  No delusions elicited  Denies adverse effects of medications      Mental Status Evaluation:  Appearance:  Appears pale, Limited grooming, fair hygiene, no abnormal involuntary movements noted  Behavior:  Calm, cooperative, appropriate  Speech:  Normal rate, rhythm, volume  Mood:  Sad  Affect:  Congruent, stable, reactive  Thought process:  Linear, logical  Thought content:  No delusions elicited, denies SI/HI  Perceptual disturbances:  Denies AH/VH  Cognition:  Adequate oriented    Insight:  Improving  Judgement:  Improving    Vitals:    09/20/20 2223 09/21/20 0633 09/21/20 1415 09/21/20 1455   BP: 107/61 95/52  (!) 84/60   BP Location: Left arm Right arm  Right arm   Pulse:  65  60   Resp:  18     Temp:  97 7 °F (36 5 °C)  97 8 °F (36 6 °C)   TempSrc:  Temporal  Temporal   SpO2:  90%  93%   Weight:   63 kg (138 lb 14 2 oz)    Height:   5' 11" (1 803 m)        Current Facility-Administered Medications   Medication Dose Route Frequency Provider Last Rate    acetaminophen  650 mg Oral Q6H PRN Freedom Grout, CRNP      acetaminophen  650 mg Oral Q4H PRN Freedom Grout, CRNP      acetaminophen  975 mg Oral Q6H PRN Freedom Grout, CRNP      calcium carbonate  500 mg Oral Daily PRN Murray Jolley MD      haloperidol  2 mg Oral Q8H PRN Freedom Grout, CRNP      haloperidol lactate  2 mg Intramuscular Q8H PRN Muriel Gage MANSI Hill      LORazepam  1 mg Intramuscular Q8H PRN Deborrah Linear, CRNP      LORazepam  0 5 mg Oral Q4H PRN Deborrah Linear, CRNP      mirtazapine  15 mg Oral HS José Antonio Hathaway MD      nicotine polacrilex  2 mg Oral Q2H PRN Deborrah Linear, CRNP      pantoprazole  40 mg Oral Early Morning Shani Low MD      polyethylene glycol  17 g Oral Daily PRN Deborrah Linear, CRNP      senna-docusate sodium  1 tablet Oral Daily PRN Fitz Reed MD             Assessment/Plan    Principal Problem:    Depressive disorder  Active Problems:    Acute blood loss anemia    PUD (peptic ulcer disease)    Severe protein-calorie malnutrition (HCC)    Medical clearance for psychiatric admission      Progress Toward Goals:  Improving    Recommended Treatment:   - continue mirtazapine 15 mg p o  Q h s  - patient does not want to take any antidepressant but writer encouraged him to continue at this dose for aiding sleep and appetite at least  - monitor blood pressure  - discharge in 1-2 days when there is sustained improvement  - Continue with pharmacotherapy, group therapy, milieu therapy and occupational therapy  Risks, benefits and possible side effects of Medications:   Risks, benefits, and possible side effects of medications explained to patient and patient verbalizes understanding

## 2020-09-21 NOTE — PLAN OF CARE
Problem: OCCUPATIONAL THERAPY ADULT  Goal: Performs self-care activities at highest level of function for planned discharge setting  See evaluation for individualized goals  Description: Treatment Interventions: ADL retraining, Functional transfer training, UE strengthening/ROM, Endurance training, Continued evaluation, Energy conservation          See flowsheet documentation for full assessment, interventions and recommendations  Note: Limitation: Decreased cognition, Decreased endurance, Decreased high-level ADLs  Prognosis: Good  Assessment: Pt is a 76 y o  male seen for OT evaluation s/p admit to [facility] on 9/18/2020 w/ Depressive disorder  See above for extensive list of comorbidities affecting Pt's functional performance at time of assessment  Personal factors affecting Pt at time of IE include:limited home support, behavioral pattern, difficulty performing ADLS, difficulty performing IADLS , health management  and environment  Prior to admission, Pt was independent with ADLs, receives assist from his foster son  Upon evaluation: Pt requires supervision for ambulation in room and bathroom mobility, Pt reaching out for walls initially for steadying, noted improved stability with prolonged ambulation  Pt able to don slippers independently  Pt very hard of hearing, which limited command following and accuracy of cognitive testing at times  The following deficits impact occupational performance: weakness, decreased strength, decreased balance, decreased tolerance, impaired problem solving and decreased safety awareness  Pt to benefit from continued skilled OT services while in the hospital to address deficits as defined above and maximize level of functional independence w ADL's and functional mobility  Occupational performance areas to address include: bathing/shower, toilet hygiene, dressing, health maintenance, functional mobility and clothing management   From OT standpoint, recommendation at time of d/c would be return to previous environment with social support         OT Discharge Recommendation: Return to previous environment with social support

## 2020-09-21 NOTE — MALNUTRITION/BMI
This medical record reflects one or more clinical indicators suggestive of malnutrition     Malnutrition Findings:   Malnutrition type: Chronic illness  Degree of Malnutrition: Other severe protein calorie malnutrition  Malnutrition Characteristics: Muscle loss, Fat loss, Inadequate energy    Pt presents with severe malnutrition r/t prolonged po intake with depression as evidenced by intake hx indicative of meeting <75% of estimated needs for the past 1 month, mild sunken and darkened orbitals, temporal wasting; to be treated with diet liberalization to regular with continuation of Ensure Enlive TID  BMI Findings: Body mass index is 19 37 kg/m²  See Nutrition note dated 09/21/20 for additional details  Completed nutrition assessment is viewable in the nutrition documentation

## 2020-09-21 NOTE — NURSING NOTE
Pt pleasant  Prior to  HS last night, pt noted in the miliue interacting with peers, No Bx noted  Pt asleep all night long  Offered no concerns this shift

## 2020-09-21 NOTE — PHYSICAL THERAPY NOTE
Physical Therapy Evaluation     Patient's Name: Stephenie Hull    Admitting Diagnosis  Depression [F32 9]    Problem List  Patient Active Problem List   Diagnosis    Gastric outlet obstruction    GI bleed    Acute blood loss anemia    Gastrointestinal hemorrhage with hematemesis    Suicidal ideation    Aortic disease (HCC)    PUD (peptic ulcer disease)    Severe protein-calorie malnutrition ([address] )    Depressive disorder    Medical clearance for psychiatric admission       Past Medical History  Past Medical History:   Diagnosis Date    Bladder cancer [facility])        Past Surgical History  Past Surgical History:   Procedure Laterality Date    ESOPHAGOGASTRODUODENOSCOPY N/A 1/13/2019    Procedure: ESOPHAGOGASTRODUODENOSCOPY (EGD); Surgeon: Jewell Dickey MD;  Location: [facility];  Service: Gastroenterology         ________________________________________________________       09/21/20 1058   PT Last Visit   PT Visit Date 09/21/20   Pain Assessment   Pain Assessment Tool 0-10   Pain Score No Pain   Home Living   Type of Home House  (3 SH per patient)   Home Equipment Cane   Prior Function   Level of [place] Independent with ADLs and functional mobility   Receives Help From Family  (foster son)   Falls in the last 6 months 1 to 4  (couple per patient-reports no injury)   Vocational Retired   Restrictions/Precautions   [place] Bearing Precautions Per Order No   Other Precautions Fall Risk  (low BP without symptoms)   Cognition   Attention Within functional limits   Orientation Level Oriented X4   Following Commands Follows one step commands without difficulty   Comments Appears Hard of hearing however patient reports he feels it is due to a head cold that he is getting over    Wet cough noted, none productive   RUE Assessment   RUE Assessment WFL   LUE Assessment   LUE Assessment WFL   RLE Assessment   RLE Assessment WFL   LLE Assessment   LLE Assessment WFL   Coordination   Movements are Fluid and Coordinated 1   Bed Mobility   Rolling R 7  Independent   Rolling L 7  Independent   Supine to Sit 7  Independent   Sit to Supine 7  Independent   Transfers   Sit to Stand 5  Supervision   Additional items Assist x 1   Stand to Sit 5  Supervision   Additional items Assist x 1   Stand pivot 5  Supervision   Additional items Assist x 1   Ambulation/Elevation   Gait Assistance 5  Supervision   Additional items Assist x 1   Assistive Device None   Distance 15 feet  (around bed in room)   Stair Management Assistance   (pt with low BP; nursing aware)   Balance   Static Sitting Normal   Dynamic Sitting Normal   Static Standing Fair +   Ambulatory Fair   Activity Tolerance   Activity Tolerance Patient tolerated treatment well   Nurse Made Aware RN clears to see   Assessment   Prognosis Good   Problem List Decreased mobility; Impaired balance; Impaired hearing  (low BP)   Assessment Pt is 76 y o  male seen for PT evaluation s/p admit to [facility] on 9/18/2020 w/ Depressive disorder  Per medical chart Patient presented to [facility] ED due to generalized weakness, fatigue, poor appetite, lack of motivation and suicidal ideation with plan to shoot himself   Precipitating event is death of his fiancee  Pt S/p EGD on 09/16/20 with GI which demonstrated PUD(peptic ulcer disease)  Pt also received 2 unites of [facility]'s  PT consulted to assess Pt's functional mobility and d/c needs  Please see above for past medical history and comorbidities  On evaluation patient was in bed  Easily aroused and agreeable for evaluation  Pt was found to have low BP's  Nursing notified  Use of dynamap for BP's, recommend manual BP's to assure accuracy of reading  Pt is asymptomatic  HR increases with activity  Pt static standing and gait steady for limited distance  Due to low BP held longer distance ambulation at this time  Pt will benefit from skilled PT during stay to assure stable parameters with activity  Goals   Patient Goals to live to be 68     STG Expiration Date 10/05/20   Short Term Goal #1 1  Pt will demonstrate stable cardiac parameters with position change  2  Pt will transfers all functional surfaces independent  3  Pt will ambulate at least 150 feet with no AD independently with stable BP/HR  3  Pt will ascend and descend FF of steps with rail and stable BP/HR mod  Independent  4  Pt will ascend/descend curb step independently  5  Pt will demonstrate good standing balance obtained by objective measures to decrease fall risk  PT Treatment Day 0   Plan   Treatment/Interventions Functional transfer training;Elevations; Endurance training;Patient/family training;Spoke to nursing;OT   PT Frequency 2-3x/wk   Recommendation   PT Discharge Recommendation Return to previous environment with social support   Barthel Index   Feeding 10   Bathing 0   Grooming Score 5   Dressing Score 10   Bladder Score 10   Bowels Score 10   Toilet Use Score 5   Transfers (Bed/Chair) Score 10   Mobility (Level Surface) Score 0   Stairs Score 0   Barthel Index Score 60       Devyn Jo, PT

## 2020-09-21 NOTE — NURSING NOTE
Patient is present on the unit but withdrawn  Continues to report depression but denies all other symptoms including SI  Bright upon approach, pleasant and cooperative  Medication compliant

## 2020-09-21 NOTE — QUICK NOTE
[address]  Inpatient Psychiatry  Medical Student Progress Note    Patient Name: Carly Jeffrey  MRN: [id number]  DOS: 09/21/20     Chief Complaint:  Major depressive disorder with suicidal thoughts  Interval History:   On exam, patient is a 76year old man, who is conversational and able to explain his thought processes and emotions  Patient states that he lost his soul mate, who he was extremely close to, about a month ago  Since then, he has been dealing with persistent grief and depression, and has had thoughts of suicide  He does not have a firearm, but states that if he had one, he would have killed himself  Recognizing suicidal thoughts, he drove himself to an ED and agreed to admission  Currently, patient says that he does not have active suicidal intent but that he is satisfied with his life, and that if he were to die tomorrow, he would have no regrets for it  He says that he has met a lot of good people and traveled to a lot of places  Prior to the death of his soul mate, he enjoyed his life  He says that he is sad overall, but glad to be in the hospital     He said that he has relatives in [place] and has considered moving down there to stay with them  Goals and intent with moving were unclear  He does not want to impose himself on his son, who was implied to live nearby  Patient complained of blood loss and GI distress due to peptic ulcer disease  Patient is medication compliant  No adverse reactions reported  Mental Status Exam [Per above +]  Appearance: Age appropriate, disheveled  Behavior: Cooperative, good eye contact, slow movements at times  Speech: Normal rate and rhythm, sometimes slow to find words, good articulation  Mood: Depressed, grieving  Affect: Constricted but able to express self well  Thought process: Logical and goal directed  Thought content: Passive suicidal ideation, no homicidal ideation    Perceptual disturbances: None reported  Cognition: Alert and oriented to all domains  Insight: Good, patient expresses understanding of his current hospitalization and his grief process  Judgement: Limited, patient seems unsure on how to move forward in life, though he mentions [place], he did not seem to have strong motivations to go there        Current Facility-Administered Medications:     acetaminophen (TYLENOL) tablet 650 mg, 650 mg, Oral, Q6H PRN, Catrina Etienne, MANSI    acetaminophen (TYLENOL) tablet 650 mg, 650 mg, Oral, Q4H PRN, Catrina Etienne, MANSI    acetaminophen (TYLENOL) tablet 975 mg, 975 mg, Oral, Q6H PRN, MANSI Yoo    calcium carbonate (TUMS) chewable tablet 500 mg, 500 mg, Oral, Daily PRN, Niko Anderson MD    haloperidol (HALDOL) tablet 2 mg, 2 mg, Oral, Q8H PRN, MANSI Yoo    haloperidol lactate (HALDOL) injection 2 mg, 2 mg, Intramuscular, Q8H PRN, MANSI Yoo    LORazepam (ATIVAN) injection 1 mg, 1 mg, Intramuscular, Q8H PRN, MANSI Yoo    LORazepam (ATIVAN) tablet 0 5 mg, 0 5 mg, Oral, Q4H PRN, MANSI Yoo    mirtazapine (REMERON) tablet 15 mg, 15 mg, Oral, HS, Kell Vee MD, 15 mg at 09/20/20 2123    nicotine polacrilex (NICORETTE) gum 2 mg, 2 mg, Oral, Q2H PRN, MANSI Yoo    pantoprazole (PROTONIX) EC tablet 40 mg, 40 mg, Oral, Early Morning, Niko Anderson MD, 40 mg at 09/21/20 0615    polyethylene glycol (MIRALAX) packet 17 g, 17 g, Oral, Daily PRN, MANSI Yoo    senna-docusate sodium (SENOKOT S) 8 6-50 mg per tablet 1 tablet, 1 tablet, Oral, Daily PRN, Frank Chacko MD    Vitals:    09/20/20 1405 09/20/20 2127 09/20/20 2223 09/21/20 0633   BP: (!) 80/50 (!) 93/48 107/61 95/52   BP Location: Left arm Left arm Left arm Right arm   Pulse: 67 89  65   Resp: 16 16  18   Temp: 98 2 °F (36 8 °C) 99 2 °F (37 3 °C)  97 7 °F (36 5 °C)   TempSrc: Temporal Temporal  Temporal   SpO2: 94% 90%  90%   Weight: Height:           ASSESSMENT:   Principle Problem: Major depressive disorder with suicidal thoughts  Other Medical Problems: Acute blood loss anemia, peptic ulcer disease, severe protein-calorie malnutrition  Patient is a 76year old man admitted for major depressive disorder with suicidal thoughts  Currently, patient denies active plans to kill himself, but says that he is satisfied with his life and does not mind dying  Patient is sad, but open to treatment and glad to be getting help  Patient has a history of peptic ulcer disease that causes him significant discomfort  Plan:   1  Observe course on Mirtazapine and adjust dosage as needed  2  Monitor for blood loss and GI problems and treat symptomatically  3  Monitor for future depressive symptoms        NAME Harriett Talbert, MS3   09/21/20

## 2020-09-22 NOTE — PROGRESS NOTES
09/22/20 1408   Team Meeting   Meeting Type Tx Team Meeting   Initial Conference Date 09/22/20   Next Conference Date 10/22/20   Team Members Present   Team Members Present Physician;Nurse;   Physician Team Member Marleny   Nursing Team Member Analy Zavala [facility]   Care Management Team Member Komal   Patient/Family Present   Patient Present Yes   Patient's Family Present No   Pt signed his Tx plan and is pleased with the content

## 2020-09-22 NOTE — NURSING NOTE
Pt out and about, and pleasantly interacting with peers  Pt excitedly negotiates for his needs  Denies SI thoughts and very social amongst peers

## 2020-09-22 NOTE — PROGRESS NOTES
Psychiatrist Progress Note - Felipa 76 y o  male MRN: [id number]  Unit/Bed#: Boby Hernadez 791-35 Encounter: [id number]    The patient was seen for continuing care and reviewed with treatment team   Per staff patient been calm and cooperative  Patient reports difficulty sleeping despite improved mood due to chronic urological issues  He plans on going to his urologist soon when he is discharged  Denies suicidal thoughts  Mental Status Evaluation:  Appearance:  Limited grooming, fair hygiene, no abnormal involuntary movements noted, stable gait  Behavior:  Calm and cooperative, well engaged  Speech:   Within normal limits  Mood:  Improving  Affect:  Neutral, stable, reactive  Thought process:  Linear and logical  Thought content:  No delusions elicited, denies SI/HI  Perceptual disturbances:  Denies AH/VH  Cognition:  Well oriented    Insight:  Improving  Judgement:  Improved    Vitals:    09/21/20 2203 09/22/20 0630 09/22/20 1442 09/22/20 1645   BP: 99/58 99/52 92/50 121/54   BP Location: Left arm Right arm Left arm Left arm   Pulse: 90 93 86    Resp: 17 18 18    Temp: 97 6 °F (36 4 °C) 98 3 °F (36 8 °C) 99 2 °F (37 3 °C)    TempSrc: Temporal Temporal Temporal    SpO2:  93% 93%    Weight:       Height:           Current Facility-Administered Medications   Medication Dose Route Frequency Provider Last Rate    acetaminophen  650 mg Oral Q6H PRN Shelba Sarah, CRNP      acetaminophen  650 mg Oral Q4H PRN Shelba Sarah, CRNP      acetaminophen  975 mg Oral Q6H PRN Shelba Sarah, CRNP      calcium carbonate  500 mg Oral Daily PRN Naif Rocha MD      haloperidol  2 mg Oral Q8H PRN Shelba Sarah, CRNP      haloperidol lactate  2 mg Intramuscular Q8H PRN Shelba Sarah, CRNP      LORazepam  1 mg Intramuscular Q8H PRN Shelba Sarah, CRNP      LORazepam  0 5 mg Oral Q4H PRN Shelba Sarah, CRNP      mirtazapine  15 mg Oral HS Miguel Agosto MD      nicotine polacrilex 2 mg Oral Q2H PRN MANSI Cuello      pantoprazole  40 mg Oral Early Morning Rosina Broderick MD      polyethylene glycol  17 g Oral Daily PRN MANSI Cuello      senna-docusate sodium  1 tablet Oral Daily PRN Angela Glass MD         Labs:  Lab Results   Component Value Date    WBC 9 20 09/22/2020    HGB 9 0 (L) 09/22/2020    HCT 27 3 (L) 09/22/2020    MCV 93 09/22/2020     09/22/2020         Assessment/Plan    Principal Problem:    Depressive disorder  Active Problems:    Acute blood loss anemia    PUD (peptic ulcer disease)    Severe protein-calorie malnutrition (HCC)    Medical clearance for psychiatric admission      Progress Toward Goals:  Improving    Recommended Treatment:   - continue current medications  - Hemoglobin improving  - Continue with pharmacotherapy, group therapy, milieu therapy and occupational therapy  Risks, benefits and possible side effects of Medications:   Risks, benefits, and possible side effects of medications explained to patient and patient verbalizes understanding

## 2020-09-22 NOTE — CASE MANAGEMENT
Left a message with [facility]- [phone] requesting a call back to make a referral for service

## 2020-09-22 NOTE — PHYSICAL THERAPY NOTE
27' session     09/22/20 1440   PT Last Visit   PT Visit Date 09/22/20   Pain Assessment   Pain Assessment Tool 0-10   Pain Score 2   Pain Location/Orientation Orientation: Right;Location: Hip   Pain Onset/Description Onset: Ongoing;Frequency: Intermittent; Descriptor: Aching   Patient's Stated Pain Goal No pain   Hospital Pain Intervention(s) Ambulation/increased activity;Repositioned   Restrictions/Precautions   Other Precautions Fall Risk   General   Chart Reviewed Yes   Family/Caregiver Present No   Cognition   Overall Cognitive Status Impaired   Arousal/Participation Alert; Cooperative   Attention Attends with cues to redirect  (very talkative)   Following Commands Follows one step commands without difficulty   Subjective   Subjective i think I am going home tomorrow   Bed Mobility   Additional Comments pt reports that he sleeps on the sofa   Transfers   Sit to Stand 7  Independent   Stand to Sit 7  Independent   Stand pivot 6  Modified independent   Ambulation/Elevation   Gait pattern   (occ stagger witohut LOB)   Gait Assistance 5  Supervision   Assistive Device None  (reports he occ uses a cane)   Distance 380' with turns   Stair Management Assistance   (pt reports he does nsot  use 2nd story in his home)   Curbs   (up/down 4" step with no rail and  S)   Balance   Static Standing Fair +   Dynamic Standing Fair   Ambulatory Fair   Endurance Deficit   Endurance Deficit Yes   Activity Tolerance   Activity Tolerance Patient tolerated treatment well   Exercises   Knee AROM Long Arc Quad Bilateral;AROM;20 reps   Balance training  standing with no UE support for heel raisess x 15 with close S to occ CG, repeated STS with hands on knees  and CG tostart then S  x 10 reps, sidestepping nad retro amb with close S    Assessment   Prognosis Good   Problem List Decreased strength;Decreased endurance; Impaired balance   Assessment Pt walking around room on arrival- gt with occ slight stagger with longer distances but no overt LOB - pt attributes this to a " bad hip" and reports that he sometimes uses a cane  Pt denies use of FF of steps in his home and reports one CONSTANCE in the front ( practiced on 4" and states " even smaller"   Pt in good spirits and joking around  Denies any dizziness and reports low Bps "all my life"   Barriers to Discharge None   Goals   Patient Goals go home tomorrow - start doing more walking and ex   STG Expiration Date 10/05/20   PT Treatment Day 1   Plan   Treatment/Interventions   (cont as per POC)   Progress Progressing toward goals   PT Frequency 2-3x/wk   Recommendation   PT Discharge Recommendation Return to previous environment with social support   PT - OK to Discharge Yes

## 2020-09-22 NOTE — QUICK NOTE
[address]  Inpatient Psychiatry  Medical Student Progress Note    Patient Name: Leslee Corw  MRN: [id number]  DOS: 09/22/20     Chief Complaint:  Major depressive disorder with suicidal thoughts  Interval History:   On exam, patient is a 76year old man, who is pleasant, conversational and able to explain his thought processes and emotions  Today, patient states that he is doing well emotionally  He said that he realized that he has been hiding from his problems, and that he has decided to "get his life in gear " He denies active or passive suicidal ideation, saying that he can't change what happened previously  He expresses that he doesn't want to end up despondent and have nothing going for him in life  Patient does complain about difficulty sleeping last night due to urinary difficulties  He said he frequently had urge to urinate when trying to sleep, but failed to get much urine out when urinating, and so ended up staying awake  Patient suspects his urethra has been injured by prior surgery  When asked about what the patient plans to do next, he says that he plans to get his finances in order, as he has had difficulty paying his rent over the past few months, and reconnect with his family  Per staff, patient is social, pleasant and interacts with other patients  Patient is medication compliant  No adverse reactions reported  Mental Status Exam [Per above +]  Appearance: Age appropriate, disheveled  Behavior: Cooperative, good eye contact, slow movements at times  Speech: Normal rate and rhythm, sometimes slow to find words, good articulation  Mood: Sad and grieving but improved from yesterday  Affect: Improved, affect seems more positive  Thought process: Logical and goal directed  Thought content: Denies SI/HI, no delusions noted  Perceptual disturbances: None reported  Cognition: Alert and oriented to all domains     Insight: Good, patient expresses understanding of his current hospitalization and moving through his grief process  Judgement: Improved  Patient shows good understanding of his need to deal with outstanding financial difficulties and get help from his family        Current Facility-Administered Medications:     acetaminophen (TYLENOL) tablet 650 mg, 650 mg, Oral, Q6H PRN, Carlene Phillip, CRNP    acetaminophen (TYLENOL) tablet 650 mg, 650 mg, Oral, Q4H PRN, Carlene Phillip, CRNP    acetaminophen (TYLENOL) tablet 975 mg, 975 mg, Oral, Q6H PRN, Carlene Phillip, CRNP    calcium carbonate (TUMS) chewable tablet 500 mg, 500 mg, Oral, Daily PRN, Sam Epperson MD    haloperidol (HALDOL) tablet 2 mg, 2 mg, Oral, Q8H PRN, Carlene Phillip, CRNP    haloperidol lactate (HALDOL) injection 2 mg, 2 mg, Intramuscular, Q8H PRN, Carlene Phillip, CRNP    LORazepam (ATIVAN) injection 1 mg, 1 mg, Intramuscular, Q8H PRN, Carlene Phillip, CRNP    LORazepam (ATIVAN) tablet 0 5 mg, 0 5 mg, Oral, Q4H PRN, Carlene Phillip, CRNP    mirtazapine (REMERON) tablet 15 mg, 15 mg, Oral, HS, Татьяна Paz MD, 15 mg at 09/21/20 2116    nicotine polacrilex (NICORETTE) gum 2 mg, 2 mg, Oral, Q2H PRN, Carlene Phillip, CRNP    pantoprazole (PROTONIX) EC tablet 40 mg, 40 mg, Oral, Early Morning, Sam Epperson MD, 40 mg at 09/22/20 0393    polyethylene glycol (MIRALAX) packet 17 g, 17 g, Oral, Daily PRN, Carlene Phillip, CRNP    senna-docusate sodium (SENOKOT S) 8 6-50 mg per tablet 1 tablet, 1 tablet, Oral, Daily PRN, Luna Torres MD    Vitals:    09/21/20 1455 09/21/20 2114 09/21/20 2203 09/22/20 0630   BP: (!) 84/60  99/58 99/52   BP Location: Right arm  Left arm Right arm   Pulse: 60  90 93   Resp:   17 18   Temp: 97 8 °F (36 6 °C) 98 2 °F (36 8 °C) 97 6 °F (36 4 °C) 98 3 °F (36 8 °C)   TempSrc: Temporal Temporal Temporal Temporal   SpO2: 93%   93%   Weight:       Height:           ASSESSMENT:   Principle Problem: Major depressive disorder with suicidal thoughts  Other Medical Problems: Acute blood loss anemia, peptic ulcer disease, severe protein-calorie malnutrition  Patient is a 76year old man admitted for major depressive disorder with suicidal thoughts  Currently, patient denies active or passive suicidal ideation  He states that he wants to move forward and get his life back on track  Patient complained of urinary urgency overnight with difficulties starting stream and getting urine out  Patient has a history of peptic ulcer disease that causes him significant discomfort  No new symptoms noted  Plan:   1  Plan for discharge tomorrow, Wednesday 9/23/2020  Patient will call family to arrange for pickup  2  Observe course on Mirtazapine and adjust dosage as needed  3  Monitor blood pressure for abnormalities  4  Suggest outpatient urologist follow up for urinary difficulties  4  Monitor for future depressive symptoms        NAME Getachew Power, MS3   09/22/20

## 2020-09-22 NOTE — TREATMENT TEAM
Pt did not attend groups when prompted or offered         09/22/20 1100   Activity/Group Checklist   Group Other (Comment)  ( education: depression and self esteem)   Attendance Did not attend

## 2020-09-22 NOTE — PLAN OF CARE
Problem: PHYSICAL THERAPY ADULT  Goal: Performs mobility at highest level of function for planned discharge setting  See evaluation for individualized goals  Description: Treatment/Interventions: Functional transfer training, Elevations, Endurance training, Patient/family training, Spoke to nursing, OT          See flowsheet documentation for full assessment, interventions and recommendations  Outcome: Progressing  Note: Prognosis: Good  Problem List: Decreased strength, Decreased endurance, Impaired balance  Assessment: Pt walking around room on arrival- gt with occ slight stagger with longer distances but no overt LOB - pt attributes this to a " bad hip" and reports that he sometimes uses a cane  Pt denies use of FF of steps in his home and reports one CONSTANCE in the front ( practiced on 4" and states " even smaller"   Pt in good spirits and joking around  Denies any dizziness and reports low Bps "all my life"  Barriers to Discharge: None     PT Discharge Recommendation: Return to previous environment with social support     PT - OK to Discharge: Yes    See flowsheet documentation for full assessment

## 2020-09-22 NOTE — PLAN OF CARE
Problem: Risk for Self Injury/Neglect  Goal: Treatment Goal: Remain safe during length of stay, learn and adopt new coping skills, and be free of self-injurious ideation, impulses and acts at the time of discharge  Outcome: Progressing  Goal: Verbalize thoughts and feelings  Description: Interventions:  - Assess and re-assess patient's lethality and potential for self-injury  - Engage patient in 1:1 interactions, daily, for a minimum of 15 minutes  - Encourage patient to express feelings, fears, frustrations, hopes  - Establish rapport/trust with patient   Outcome: Progressing  Goal: Refrain from harming self  Description: Interventions:  - Monitor patient closely, per order  - Develop a trusting relationship  - Supervise medication ingestion, monitor effects and side effects   Outcome: Progressing  Goal: Attend and participate in unit activities, including therapeutic, recreational, and educational groups  Description: Interventions:  - Provide therapeutic and educational activities daily, encourage attendance and participation, and document same in the medical record  - Obtain collateral information, encourage visitation and family involvement in care   Outcome: Progressing  Goal: Recognize maladaptive responses and adopt new coping mechanisms  Outcome: Progressing  Goal: Complete daily ADLs, including personal hygiene independently, as able  Description: Interventions:  - Observe, teach, and assist patient with ADLS  - Monitor and promote a balance of rest/activity, with adequate nutrition and elimination  Outcome: Progressing     Problem: Depression  Goal: Treatment Goal: Demonstrate behavioral control of depressive symptoms, verbalize feelings of improved mood/affect, and adopt new coping skills prior to discharge  Outcome: Progressing  Goal: Verbalize thoughts and feelings  Description: Interventions:  - Assess and re-assess patient's level of risk   - Engage patient in 1:1 interactions, daily, for a minimum of 15 minutes   - Encourage patient to express feelings, fears, frustrations, hopes   Outcome: Progressing  Goal: Refrain from harming self  Description: Interventions:  - Monitor patient closely, per order   - Supervise medication ingestion, monitor effects and side effects   Outcome: Progressing  Goal: Refrain from isolation  Description: Interventions:  - Develop a trusting relationship   - Encourage socialization   Outcome: Progressing  Goal: Refrain from self-neglect  Outcome: Progressing  Goal: Attend and participate in unit activities, including therapeutic, recreational, and educational groups  Description: Interventions:  - Provide therapeutic and educational activities daily, encourage attendance and participation, and document same in the medical record   Outcome: Progressing  Goal: Complete daily ADLs, including personal hygiene independently, as able  Description: Interventions:  - Observe, teach, and assist patient with ADLS  -  Monitor and promote a balance of rest/activity, with adequate nutrition and elimination   Outcome: Progressing     Problem: Nutrition/Hydration-ADULT  Goal: Nutrient/Hydration intake appropriate for improving, restoring or maintaining nutritional needs  Description: Monitor and assess patient's nutrition/hydration status for malnutrition  Collaborate with interdisciplinary team and initiate plan and interventions as ordered  Monitor patient's weight and dietary intake as ordered or per policy  Utilize nutrition screening tool and intervene as necessary  Determine patient's food preferences and provide high-protein, high-caloric foods as appropriate       INTERVENTIONS:  - Monitor oral intake, urinary output, labs, and treatment plans  - Assess nutrition and hydration status and recommend course of action  - Evaluate amount of meals eaten  - Assist patient with eating if necessary   - Allow adequate time for meals  - Recommend/ encourage appropriate diets, oral nutritional supplements, and vitamin/mineral supplements  - Order, calculate, and assess calorie counts as needed  - Recommend, monitor, and adjust tube feedings and TPN/PPN based on assessed needs  - Assess need for intravenous fluids  - Provide specific nutrition/hydration education as appropriate  - Include patient/family/caregiver in decisions related to nutrition  Outcome: Progressing     Problem: DISCHARGE PLANNING  Goal: Discharge to home or other facility with appropriate resources  Description: INTERVENTIONS:  - Identify barriers to discharge w/patient and caregiver  - Arrange for needed discharge resources and transportation as appropriate  - Identify discharge learning needs (meds, wound care, etc )  - Arrange for interpretive services to assist at discharge as needed  - Refer to Case Management Department for coordinating discharge planning if the patient needs post-hospital services based on physician/advanced practitioner order or complex needs related to functional status, cognitive ability, or social support system  Outcome: Progressing     Problem: Ineffective Coping  Goal: Participates in unit activities  Description: Interventions:  - Provide therapeutic environment   - Provide required programming   - Redirect inappropriate behaviors   Outcome: Progressing

## 2020-09-22 NOTE — NURSING NOTE
Patient is visible on the unit  Pleasant and cooperative  He brightens on approach and loves to talk about his late wife  He stated he is depressed 2/10 and no anxiety   Denies SI, but stated "When that maria ines up there calls me , I have no problem to go"

## 2020-09-22 NOTE — PLAN OF CARE
Pt  Did not attend groups    Problem: Ineffective Coping  Goal: Participates in unit activities  Description: Interventions:  - Provide therapeutic environment   - Provide required programming   - Redirect inappropriate behaviors   Outcome: Not Progressing

## 2020-09-22 NOTE — PROGRESS NOTES
09/22/20 3920   Team Meeting   Meeting Type Daily Rounds   Initial Conference Date 09/22/20   Next Conference Date 09/23/20   Team Members Present   Team Members Present Physician;Nurse;   Physician Team Member Marleny   Nursing Team Member Nurys De [facility]   Social Work Team Member Shaista Samuels   Patient/Family Present   Patient Present No   Patient's Family Present No   Pt is pleasant, social, denies SI  Slept well

## 2020-09-22 NOTE — CASE MANAGEMENT
Met with pt on 9/21 in a meeting room  Pt was very pleasant and cooperative, though rather disheveled  He was forthcoming with his background and having recent lost his s/o of 18 years, and her name was Tonya  Pt reports he is a [place], and was  many years ago  He has 3 birth children, but lost his daughter to cancer years ago  He still has contact with one of his two sons  Pt lives in a [place] home with his "Cally Montiel - Praveen Yris who is 32years old  Pt still drives, but states that if Ranjana Ward needs the car - pt will enlist the assist of his neighbors to get to appointments  He gets his scripts filled at [facility]  on [address] in [facility] and would like us to sed the scripts there @ [place]  Pt has no POA, denies access to firearms  Is a retired  and gets about $1350 SSI mo  He does not get food stamps  He reports he is 1 of 11 children and only 5 siblings are still alive  Pt served in [employer] for 10 yrs- but never got involved with VA services as he feels many others need there help more than he does  Pt reports his PCP is Robin Ott of [facility] - however when I called to inform them of this admit - they report he was dropped from the practice in January of 2020 and is not welcome back  They did not elaborate  Pt reports this admission is related to having lost his s/o about a month ago to cancer  He misses her dearly and feels his life can end anyday now  He reports he lived a very full life and though he denies SI, he does not fear death  IMM Rights signed  JENNI: Praveen Arndt, and [facility]

## 2020-09-23 NOTE — CASE MANAGEMENT
Pt has signed JENNI for [facility] [facility] and he also completed the Medical History form which has been faxed back to [facility] [phone]    Pt then signed his IMM and plans to call his friend for a ride home once all is set up

## 2020-09-23 NOTE — NURSING NOTE
Lets move her to 1:30 pm on Feb 27 .    Patient vitals checked at [phone]  Pt irritable with writer when went to recheck manually  Pt stated staff had been in there 5 times already to check it, and stated he will just sit in his chair all night so we can check it whenever we want  Pt counseled  Pt apologized  Pt cooperative with bp being taken  SLIM made aware of pt low bp  Pt calm, sleeping in bed at this time  No signs of distress noted  Will continue to monitor frequently

## 2020-09-23 NOTE — CASE MANAGEMENT
Made two additional attempt to contact [facility] to make a referral and have now received a call back from Sly boehringer- [phone],  who will have a supervisor  Review clinical I am faxing her and have someone get back to me

## 2020-09-23 NOTE — PROGRESS NOTES
Met with pt 1:1 to complete  relapse prevention plan  Pt signed and copy in chart  Warmline and crisis phone numbers provided  Pt noted his symptoms on admission were suicidal thoughts and depression  Pt mentioned his grief and medical condition were triggers  Pt was hopeful about d/c today  Bright affect and positive eye contact  09/23/20 0900   Activity/Group Checklist   Group Admission/Discharge   Attendance Attended   Attendance Duration (min) 16-30   Interactions Interacted appropriately   Affect/Mood Appropriate   Goals Achieved Identified feelings; Identified relapse prevention strategies; Identified resources and support systems; Able to listen to others; Able to engage in interactions

## 2020-09-23 NOTE — CASE MANAGEMENT
Continue to make calls and leave requests for call back, and sent an email to [email or web address]  I have still not been able to complete this referral process       Pt has signed his IMM

## 2020-09-23 NOTE — BH TRANSITION RECORD
Contact Information: If you have any questions, concerns, pended studies, tests and/or procedures, or emergencies regarding your inpatient behavioral health visit  Please contact [facility] older adult behavioral health unit [facility] [phone] and ask to speak to a , nurse or physician  A contact is available 24 hours/ 7 days a week at this number  Summary of Procedures Performed During your Stay:  Below is a list of major procedures performed during your hospital stay and a summary of results:  - No major procedures performed  Pending Studies (From admission, onward)    None        If studies are pending at discharge, follow up with your PCP and/or referring provider

## 2020-09-23 NOTE — QUICK NOTE
[facility] Inpatient Psychiatry  Medical Student Progress Note    Patient Name: Quita Elizabeth  MRN: [id number]  DOS: 09/23/20     Chief Complaint:  Major depressive disorder with suicidal thoughts  Interval History:   On exam, patient is a 76year old man, who is pleasant, conversational and happy, a significant improvement in attitude from previous days  Patient is ready to be discharged today  He says that he is doing "so much better" than previously, that he feels happy, and that he has moved past his previous suicidal thoughts  He is excited to leave the hospital and get on with his life  When discussing previous difficulties urinating, patient seemed less concerned, saying that it "wasn't usually bad" and that he wasn't sure if he would follow up with a urologist, as he blames them for his difficulties in the first place  Per staff, patient is social, pleasant and interacts with other patients  Patient is medication compliant  No adverse reactions reported  Mental Status Exam [Per above +]  Appearance: Age appropriate  Behavior: Cooperative, good eye contact  Patient is obviously more energetic than previously  Speech: Normal rate and rhythm, speech seems more enthusiastic today  Mood: "Terrific "  Affect: Good, patient seems happy  Thought process: Logical and goal directed  Thought content: Denies SI/HI, no delusions noted  Perceptual disturbances: None reported  Cognition: Alert and oriented to all domains  Insight: Good, patient expresses understanding of emotional state and hospital course  Judgement: Improved  Patient shows good understanding of current situation and a willingness to improve his life      Current Facility-Administered Medications:     acetaminophen (TYLENOL) tablet 650 mg, 650 mg, Oral, Q6H PRN, García Na, CRNP    acetaminophen (TYLENOL) tablet 650 mg, 650 mg, Oral, Q4H PRN, García Na, CRNP   acetaminophen (TYLENOL) tablet 975 mg, 975 mg, Oral, Q6H PRN, MANSI Tobias    calcium carbonate (TUMS) chewable tablet 500 mg, 500 mg, Oral, Daily PRN, Char Brown MD    haloperidol (HALDOL) tablet 2 mg, 2 mg, Oral, Q8H PRN, MANSI Tobias    haloperidol lactate (HALDOL) injection 2 mg, 2 mg, Intramuscular, Q8H PRN, MANSI Tobias    LORazepam (ATIVAN) injection 1 mg, 1 mg, Intramuscular, Q8H PRN, MANSI Tobias    LORazepam (ATIVAN) tablet 0 5 mg, 0 5 mg, Oral, Q4H PRN, MANSI Tobias    mirtazapine (REMERON) tablet 15 mg, 15 mg, Oral, HS, Gaviota Alcantara MD, 15 mg at 09/22/20 2225    nicotine polacrilex (NICORETTE) gum 2 mg, 2 mg, Oral, Q2H PRN, MANSI Tobias    pantoprazole (PROTONIX) EC tablet 40 mg, 40 mg, Oral, Early Morning, Marcos Lagunas MD, 40 mg at 09/23/20 0526    polyethylene glycol (MIRALAX) packet 17 g, 17 g, Oral, Daily PRN, MANSI Tobias    senna-docusate sodium (SENOKOT S) 8 6-50 mg per tablet 1 tablet, 1 tablet, Oral, Daily PRN, Yan Koehler MD    Vitals:    09/22/20 2217 09/22/20 2330 09/22/20 2332 09/23/20 0900   BP: 98/58 (!) 80/46 (!) 88/52 90/54   BP Location: Right arm Right arm Right arm Left arm   Pulse:  86     Resp:       Temp:       TempSrc:       SpO2:       Weight:       Height:           ASSESSMENT:   Principle Problem: Major depressive disorder with suicidal thoughts  Other Medical Problems: Acute blood loss anemia, peptic ulcer disease, severe protein-calorie malnutrition  Patient is a 76year old man admitted for major depressive disorder with suicidal thoughts  Currently, patient denies active or passive suicidal ideation  He states that he is feeling well and doing so much better  He is ready to leave the hospital and excited to move forward with his life  Patient downplayed prior difficulties urinating, saying it was not a major concern for him at home      Patient has a prior history of blood loss anemia and was encouraged to take iron and vitamin B12 supplementation outpatient  Plan:   1  Plan for discharge today, 9/23/2020, in the afternoon  2  Encourage taking vitamin B12 and iron supplements due to prior anemia history      NAME Joi Samuel, MS3   09/23/20

## 2020-09-23 NOTE — PROGRESS NOTES
09/23/20 1412   Team Meeting   Meeting Type Daily Rounds   Initial Conference Date 09/23/20   Next Conference Date 09/24/20   Team Members Present   Team Members Present Physician;Nurse;   Physician Team Member [facility]   Nursing Team Member Lily Wall, [facility]   Care Management Team Member [address] [place] [address]   Social Work Team Member Radha Bermudez   Patient/Family Present   Patient Present No   Patient's Family Present No   Takes his meds, not attending groups, is wanting to be discharged and denies S/S  PCP found for pt and trying to set up [facility]

## 2020-09-23 NOTE — DISCHARGE INSTRUCTIONS
Mirtazapine (By mouth)   Mirtazapine (gcp-[id number]-a-peen)  Treats depression  Brand Name(s): Remeron, Remeron Soltab   There may be other brand names for this medicine  When This Medicine Should Not Be Used: This medicine is not right for everyone  Do not use it if you had an allergic reaction to mirtazapine  How to Use This Medicine:   Tablet, Dissolving Tablet  · Take your medicine as directed  Your dose may need to be changed several times to find what works best for you  Your doctor may tell you to take this medicine at bedtime, because it can make you sleepy  · You may need to take this medicine for several weeks before you begin to feel better  · Make sure your hands are dry before you handle the disintegrating tablet  Peel back the foil from the blister pack, then remove the tablet  Do not push the tablet through the foil  Place the tablet in your mouth  After it has melted, swallow or take a drink of water  Do not crush, split, or break the tablet  · This medicine should come with a Medication Guide  Ask your pharmacist for a copy if you do not have one  · Missed dose: Take a dose as soon as you remember  If it is almost time for your next dose, wait until then and take a regular dose  Do not take extra medicine to make up for a missed dose  · Store the medicine in a closed container at room temperature, away from heat, moisture, and direct light  Keep the orally disintegrating tablet in the original package until you are ready to take it  Drugs and Foods to Avoid:   Ask your doctor or pharmacist before using any other medicine, including over-the-counter medicines, vitamins, and herbal products  · Do not use this medicine and an MAO inhibitor within 14 days of each other  · Some medicines can affect how mirtazapine works   Tell your doctor if you are using any of the following:  ¨ Buspirone, carbamazepine, cimetidine, diazepam, fentanyl, lithium, nefazodone, phenytoin, rifampicin, Sarah's wort, tramadol, or tryptophan  ¨ Other medicine to treat depression, a triptan medicine to treat migraine headaches, medicine to treat an infection, medicine to treat HIV, or a blood thinner (such as warfarin)  · Do not drink alcohol while you are using this medicine  Warnings While Using This Medicine:   · Tell your doctor if you are pregnant or breastfeeding, or if you have kidney disease, liver disease, glaucoma, high cholesterol, heart or blood vessel disease, or a history of seizures, heart attack, or stroke  Tell your doctor if you have phenylketonuria  · For some children, teenagers, and young adults, this medicine may increase mental or emotional problems  This may lead to thoughts of suicide and violence  Talk with your doctor right away if you have any thoughts or behavior changes that concern you  Tell your doctor if you or anyone in your family has a history of bipolar disorder or suicide attempts  · This medicine may cause the following problems:  ¨ Serotonin syndrome (may be life-threatening)  ¨ Decreased white blood cells, which can affect your body's ability to fight an infection  ¨ Low sodium levels in the blood  · Do not stop using this medicine suddenly  Your doctor will need to slowly decrease your dose before you stop it completely  · This medicine may make you dizzy or drowsy  Do not drive or do anything else that could be dangerous until you know how this medicine affects you  Stand or sit up slowly if you feel lightheaded or dizzy  · Your doctor will do lab tests at regular visits to check on the effects of this medicine  Keep all appointments  · Keep all medicine out of the reach of children  Never share your medicine with anyone    Possible Side Effects While Using This Medicine:   Call your doctor right away if you notice any of these side effects:  · Allergic reaction: Itching or hives, swelling in your face or hands, swelling or tingling in your mouth or throat, chest tightness, trouble breathing  · Anxiety, restlessness, fast heartbeat, fever, sweating, muscle spasms, nausea, vomiting, diarrhea, seeing or hearing things that are not there  · Blistering, peeling, red skin rash  · Eye pain, vision changes, seeing halos around lights  · Confusion, weakness, muscle twitching  · Feeling more excited or energetic than usual  · Fever, chills, cough, sore throat, body aches  · Thoughts of hurting yourself or others, worsening depression, unusual behaviors  If you notice these less serious side effects, talk with your doctor:   · Dry mouth, constipation  · Increased appetite or weight gain  · Sleepiness, tiredness  If you notice other side effects that you think are caused by this medicine, tell your doctor  Call your doctor for medical advice about side effects  You may report side effects to FDA at 0-524-FDA-6519  © 2017 [address] [employer] Information is for End User's use only and may not be sold, redistributed or otherwise used for commercial purposes  The above information is an  only  It is not intended as medical advice for individual conditions or treatments  Talk to your doctor, nurse or pharmacist before following any medical regimen to see if it is safe and effective for you

## 2020-09-23 NOTE — NURSING NOTE
Patient is for discharge today  His belongings and medication was reviewed with the patient and his discharge instructions

## 2020-09-23 NOTE — CASE MANAGEMENT
PLaced a call to [facility] and left a 2nd message requesting a call back to make this referral     Spoke to [facility] and he is supportive of dc and will be here at 2:00 to  pt and they are both aware the scripts will be faxed to [facility] on [address] [facility]  ,

## 2020-09-23 NOTE — TREATMENT TEAM
09/22/20 5085   Provider Notification   Reason for Communication   (low bp)   Provider Name Sanders   Provider Role Hospitalist   Method of Communication   (tiger text)   Response Waiting for response   Notification Time 2134   Pt has low bp, manual 88/52  No other complaints noted, no distress noted at this time  Provider made aware  Will continue to monitor frequently

## 2020-09-25 NOTE — PROGRESS NOTES
In basket notification received of discharge from inpatient  Chart reviewed  Outreach attempt  I left a message on patient's voicemail with my contact information

## 2020-09-27 NOTE — DISCHARGE SUMMARY
[address] [facility] Inpatient Geriatric Psychiatry  Psychiatrists Discharge Summary      Patient Name: Dia Coe  MRN: [id number]  DOS: 09/27/20     Date of Admission: 9/18/2020  Date of Discharge: 9/23/2020      Principal Problem:    Depressive disorder  Active Problems:    Acute blood loss anemia    PUD (peptic ulcer disease)    Severe protein-calorie malnutrition ([address] )    Medical clearance for psychiatric admission      DISCHARGE MEDICATIONS:      B Complex Vitamins 1 capsule Oral Daily      Ferrous Sulfate 324 mg Oral Daily      Mirtazapine 15 mg Oral Daily at bedtime      Pantoprazole Sodium 40 mg Oral Daily (early morning)       HOME MEDICATIONS RECONCILED ON ADMISSION:  Prior to Admission Medications   Prescriptions Last Dose Informant Patient Reported? Taking? Esomeprazole Magnesium (NEXIUM PO) 9/18/2020 at Unknown time  Yes Yes   Sig: Take 20 mg by mouth every 12 (twelve) hours     sucralfate (CARAFATE) 1 g/10 mL suspension   No Yes   Sig: Take 10 mL (1,000 mg total) by mouth every 6 (six) hours for 90 days      Facility-Administered Medications: None       REASON FOR ADMISSION    Per admission h&p:    Patient is a 76 y o  male presents with Signs of suicidal potential   Patient was admitted to psychiatric unit on a voluntarily 201 commitment basis      Primary complaints include: feeling depressed and feeling suicidal   Onset of symptoms was gradual starting several weeks ago with gradually improving course since that time   Psychosocial Stressors: family, health and marital      [facility] to [facility] due to generalized weakness, fatigue, poor appetite, lack of motivation and suicidal ideation with plan to shoot himself   Precipitating event is death of his fiancee    Since that of he has been increasingly depressed and no longer take care of himself   He reported over the last month low energy, anhedonia, hopelessness, and passive death wishes  [facility] did report low levels of anxiety    He denies hx of gus or agitation   Denied psychosis  Denied history of PTSD  Mora Santiago was admitted to the medical floor to treat bleeding ulcer and anemia      He stated he has been feeling better after he called 911 and was brought in the hospital, he denies SI now or plans to kill himself  He stated he has been learning to accept the loss of his GF  He doesn't feels he needs treatment for depression at this time       Psychosocial Stressors:  Death of fiance last month  Psychiatric Review Of Systems:  sleep: no  appetite changes: yes  weight changes: yes  energy/anergy: yes  interest/pleasure/anhedonia: yes  somatic symptoms: no  anxiety/panic: no  gus: no  guilty/hopeless: no  self injurious behavior/risky behavior: no    Past Psychiatric History: In Patient once many years ago for depression and SI was admitted for only 3 days no medications were prescribed  Past Suicide attempts: denies  Past Violent behavior: denies  Past Psychiatric medication trial: None    HOSPITAL COURSE    1  Treatment and response: Patient was started on mirtazapine to treat depression, insomnia, and low appetite  Patient tolerated medications well but did not want to increase dose higher than 15mg/day stating he was already feeling better  Risks/benefits of medications discussed with patient/family who verbalized understanding and agreed with plan  Is patient being discharged on more than 1 antipsychotic? no    2  Behavior/diagnostic clarification: r/o adjustment d/o  Patient was noted to have improved quite significantly  He appeared to have good coping skills and had good internal resources despite the difficulties he has had  He attributed most of his depressed mood and hopelessness to grief and social isolation compounded by feeling very weak from blood loss due to a bleeding ulcer  He reported that he had plans to be closer to family and this made him more hopeful for the future  3  Medical comorbidities: continued on protonix started on internal medicine  H/h improved  Discharge on vit B complex and iron supplementation  4  Case management: attempted to arrange outpatient follow up with psychiatry but there was no success in getting a return call from the clinic  Patient was informed to continue reaching out after discharge and that should a return call be received at the unit,  will reach out to patient  MSE ON DISCHARGE    Appearance: fair grooming, intact hygiene  Gait/station: stable  Behavior: calm, cooperative  Motor activity: no psychomotor retardation/agitation  Muscle strength and tone: intact  Speech: wnl  Language: intact  Mood: euthymic  Affect: neutral, stable, reactive  Thought process: linear, logical  Thought content: no delusions elicited  Risk Potential: denies SI/HI  Perceptual disturbances: denies AH/VH  Consciousness: alert  Sensorium:  oriented to all domains  Memory: intact  Intellect: average  Fund of knowledge: intact  Cognition:  grossly intact   Insight: fair  Judgement: adequate    Patient is being discharged due to having had complete resolution of suicidal thoughts  Patient is forward thinking  Is no longer considered an acute danger to self or others  Discussed with treatment team      Discharge plan/instructions: Patient is being discharged to home  [facility]    See after visit summary for additional details of outpatient follow up arrangements  MOST RECENT LABS AND OTHER WORKUP PERFORMED DURING THIS ADMISSION:    I have personally reviewed all pertinent laboratory/tests results    CBC:   Lab Results   Component Value Date    WBC 9 20 09/22/2020    RBC 2 93 (L) 09/22/2020    HGB 9 0 (L) 09/22/2020    HCT 27 3 (L) 09/22/2020    MCV 93 09/22/2020     09/22/2020    MCH 30 8 09/22/2020    MCHC 33 0 09/22/2020    RDW 15 3 (H) 09/22/2020    MPV 8 2 (L) 09/22/2020    NRBC 0 09/18/2020    NEUTROABS 6 20 09/22/2020     CMP:   Lab Results   Component Value Date    SODIUM 138 09/17/2020    K 4 6 09/17/2020     09/17/2020    CO2 28 09/17/2020    AGAP 4 09/17/2020    BUN 19 09/17/2020    CREATININE 0 82 09/17/2020    GLUC 77 09/17/2020    CALCIUM 7 9 (L) 09/17/2020    AST 14 09/16/2020    ALT 13 09/16/2020    ALKPHOS 51 09/16/2020    TP 5 3 (L) 09/16/2020    ALB 2 4 (L) 09/16/2020    TBILI 0 40 09/16/2020    EGFR 87 09/17/2020     Lipid Profile:   Lab Results   Component Value Date    CHOLESTEROL 96 09/19/2020    HDL 29 (L) 09/19/2020    TRIG 76 09/19/2020    LDLCALC 52 09/19/2020    [place] 67 09/19/2020     Thyroid Studies: No results found for: [id number], [address], [address], G2KZTHF, B0QDVPC  COVID19:   Lab Results   Component Value Date    SARSCOV2 Negative 09/15/2020     Drug Screen: No results found for: Wan Feller, BDZUR, THCUR, COCAINEUR, METHADONEUR, OPIATEUR, PCPUR, ECSTASYUR  Medication Drug Levels: No results found for: CBMZFREE, PHENOBARB, PHENYTOIN, VALPROICTOT, CARBAMAZEPIN, LAMOTRIGINE, LEVETIRACETA, TOPIRAMATE  Medical alcohol level   Lab Results   Component Value Date    ETOH <3 09/15/2020     Urinalysis   Lab Results   Component Value Date    COLORU Yellow 02/17/2015    CLARITYU Cloudy 02/17/2015    SPECGRAV 1 025 02/17/2015    PHUR 6 5 02/17/2015    LEUKOCYTESUR Trace (A) 02/17/2015    NITRITE Negative 02/17/2015    GLUCOSEU Negative 02/17/2015    KETONESU Negative 02/17/2015    UROBILINOGEN 0 2 02/17/2015    BILIRUBINUR Negative 02/17/2015    BLOODU Large (A) 02/17/2015    RBCUA 30-40 02/17/2015    WBCUA 10-20 02/17/2015    EPIS Occasional 02/17/2015    BACTERIA Occasional 02/17/2015     Ext Breath Alcohol No results found for: BREATHALC  EKG   Lab Results   Component Value Date    VENTRATE 82 09/15/2020    ATRIALRATE 82 09/15/2020    PRINT 140 09/15/2020    QRSDINT 114 09/15/2020    QTINT 406 09/15/2020    QTCINT 474 09/15/2020    PAXIS 35 09/15/2020    QRSAXIS -36 09/15/2020 morbid obesity, H/o sleep spnea on cpap at night  chronic back pain   incision site abscess with staph.  blood culture negative.    plan: HOLLY 68 09/15/2020         25 minutes spent on discharge       Maggi Tom MD 51 y/o m with interrupted urinary stream, LUTS,   wound infection  chronic back pain  Ileo-inguinal nerve pain    wound care as per team  IV abx as per ID  renal/bladder sonogram with PVR( retroperitoneal complete)  Start Flomax 04 mg po q 24 if no contraindication  Pt will need OP evaluation with uroflow once acute condition resolves  will d/w Dr. Alberto IMPRESSION  Hx spinal stimulator, morbid obesity, disc disease and RUSSELL    Back wound growing   Moderate Staphylococcus aureus      SUGGESTIONs    D/C ciprofloxacin   IVPB 400 milliGRAM(s) IV Intermittent every 12 hours  D/C metroNIDAZOLE  IVPB 500 milliGRAM(s) IV Intermittent every 8 hours    Continue vancomycin  IVPB 1500 milliGRAM(s) IV Intermittent every 12 hours for now pending susceptibilities of Staph    Monitor Vanco Trough    Try to obtain MRI. Need Pain Consult    ESR, CRP IMPRESSION: Rehab of gait dysfunction/wound dehiscence    PRECAUTIONS: [  ] Cardiac  [  ] Respiratory  [  ] Seizures [  ] Contact Isolation  [  ] Droplet Isolation  [  ] Other    Weight Bearing Status:     RECOMMENDATION:    Out of Bed to Chair     DVT/Decubiti Prophylaxis    REHAB PLAN:     [ x  ] Bedside P/T 3-5 times a week   [   ]   Bedside O/T  2-3 times a week             [   ] No Rehab Therapy Indicated                   [   ]  Speech Therapy   Conditioning/ROM                                    ADL  Bed Mobility                                               Conditioning/ROM  Transfers                                                     Bed Mobility  Sitting /Standing Balance                         Transfers                                        Gait Training                                               Sitting/Standing Balance  Stair Training [   ]Applicable                    Home equipment Eval                                                                        Splinting  [   ] Only      GOALS:   ADL   [  x ]   Independent                    Transfers  [ x  ] Independent                          Ambulation  [ x  ] Independent     [  x  ] With device                            [   ]  CG                                                         [   ]  CG                                                                  [   ] CG                            [    ] Min A                                                   [   ] Min A                                                              [   ] Min  A          DISCHARGE PLAN:   [   ]  Good candidate for Intensive Rehabilitation/Hospital based-4A [facility]                                             Will tolerate 3hrs Intensive Rehab Daily                                       [ x   ]  Short Term Rehab in Skilled Nursing Facility                          vs             [  x  ]  Home with Outpatient or  services                                         [    ]  Possible Candidate for Intensive Hospital based Rehab Worsening pedal edema - multifactorial - obesity, r chf?, ccb, constant sitting, CVI...  Spinal abscess  morbid obesity  HTN  RUSSELL    plan:  check ua and u p/c  elevate legs if possible  ACE wraps to legs   avoid nsaid  d/c norvasc  agree with iv lasix, but monitor renal function and lytes closely  will follow 50 year old male spinal cord stimulator implanted with complications in past including infection treated with antibiotics with worsening low back pain and thoracic pain.    Infection needs to be ruled out for this patient. Although the generator site and lead sites are non erythematous and non tender essentially, the history of having and infected stimulator (which was given to me by the patient) and now worsening pain is concerning. The midline incision has a small opening which he states was leaking and in now on antibiotics for. At this point he has been on 4 days of antibiotics.    He should have a thoracic and lumbar MRI with and without contrast if possible. The problem is that he is morbidly obese and overall a huge gentleman. If he does not fit in the MRI then another test needs to be performed to evaluate the thoracic and lumbar spine as well as the subcutaneous tissue. The history of a past infection and the stimulator not being removed raises the concern that it was seeded so infection needs to be ruled out. If there is signs of infection the stimulator needs to be explanted and appropriate IV antibiotics continued for the appropriate time.    For now continue with current pain medications. RUSSELL  Morbid Obesity  Wound infection-back    cont cpap at night  pt stable from pulm standpoint to undergo mri with light conscious sedation  iv abx per id  dvt px  discussed with housestaff

## 2020-09-28 NOTE — PROGRESS NOTES
Second outreach attempted  I left a message on patient's voicemail with my contact information requesting a return call

## 2020-09-29 NOTE — PROGRESS NOTES
Third outreach attempt  I left a message on patient's voicemail with my contact information requesting a return call

## 2021-01-01 ENCOUNTER — HOSPITAL ENCOUNTER (INPATIENT)
Facility: HOSPITAL | Age: 76
LOS: 12 days | DRG: 064 | End: 2021-05-18
Attending: EMERGENCY MEDICINE | Admitting: INTERNAL MEDICINE
Payer: MEDICARE

## 2021-01-01 ENCOUNTER — APPOINTMENT (INPATIENT)
Dept: RADIOLOGY | Facility: HOSPITAL | Age: 76
DRG: 064 | End: 2021-01-01
Payer: MEDICARE

## 2021-01-01 ENCOUNTER — APPOINTMENT (EMERGENCY)
Dept: CT IMAGING | Facility: HOSPITAL | Age: 76
DRG: 064 | End: 2021-01-01
Payer: MEDICARE

## 2021-01-01 ENCOUNTER — APPOINTMENT (INPATIENT)
Dept: MRI IMAGING | Facility: HOSPITAL | Age: 76
DRG: 064 | End: 2021-01-01
Payer: MEDICARE

## 2021-01-01 ENCOUNTER — APPOINTMENT (INPATIENT)
Dept: CT IMAGING | Facility: HOSPITAL | Age: 76
DRG: 064 | End: 2021-01-01
Payer: MEDICARE

## 2021-01-01 ENCOUNTER — APPOINTMENT (INPATIENT)
Dept: NON INVASIVE DIAGNOSTICS | Facility: HOSPITAL | Age: 76
DRG: 064 | End: 2021-01-01
Payer: MEDICARE

## 2021-01-01 ENCOUNTER — APPOINTMENT (EMERGENCY)
Dept: RADIOLOGY | Facility: HOSPITAL | Age: 76
DRG: 064 | End: 2021-01-01
Payer: MEDICARE

## 2021-01-01 VITALS
HEART RATE: 75 BPM | DIASTOLIC BLOOD PRESSURE: 95 MMHG | HEIGHT: 71 IN | RESPIRATION RATE: 20 BRPM | OXYGEN SATURATION: 90 % | TEMPERATURE: 97.9 F | WEIGHT: 155.42 LBS | BODY MASS INDEX: 21.76 KG/M2 | SYSTOLIC BLOOD PRESSURE: 126 MMHG

## 2021-01-01 DIAGNOSIS — J96.01 ACUTE RESPIRATORY FAILURE WITH HYPOXIA (HCC): ICD-10-CM

## 2021-01-01 DIAGNOSIS — G93.40 ACUTE ENCEPHALOPATHY: ICD-10-CM

## 2021-01-01 DIAGNOSIS — R41.82 ALTERED MENTAL STATUS: Primary | ICD-10-CM

## 2021-01-01 DIAGNOSIS — R77.8 ELEVATED TROPONIN: ICD-10-CM

## 2021-01-01 DIAGNOSIS — I48.91 NEW ONSET ATRIAL FIBRILLATION (HCC): ICD-10-CM

## 2021-01-01 DIAGNOSIS — I50.33 ACUTE ON CHRONIC DIASTOLIC (CONGESTIVE) HEART FAILURE (HCC): ICD-10-CM

## 2021-01-01 DIAGNOSIS — I95.9 HYPOTENSIVE EPISODE: ICD-10-CM

## 2021-01-01 DIAGNOSIS — I63.9 CVA (CEREBRAL VASCULAR ACCIDENT) (HCC): ICD-10-CM

## 2021-01-01 DIAGNOSIS — I48.91 NEW ONSET A-FIB (HCC): ICD-10-CM

## 2021-01-01 DIAGNOSIS — F32.A DEPRESSIVE DISORDER: Chronic | ICD-10-CM

## 2021-01-01 LAB
ALBUMIN SERPL BCP-MCNC: 2.9 G/DL (ref 3.5–5)
ALBUMIN SERPL BCP-MCNC: 2.9 G/DL (ref 3.5–5)
ALBUMIN SERPL BCP-MCNC: 3.2 G/DL (ref 3.5–5)
ALBUMIN SERPL BCP-MCNC: 3.6 G/DL (ref 3.5–5)
ALP SERPL-CCNC: 105 U/L (ref 46–116)
ALP SERPL-CCNC: 69 U/L (ref 46–116)
ALP SERPL-CCNC: 74 U/L (ref 46–116)
ALP SERPL-CCNC: 96 U/L (ref 46–116)
ALT SERPL W P-5'-P-CCNC: 113 U/L (ref 12–78)
ALT SERPL W P-5'-P-CCNC: 139 U/L (ref 12–78)
ALT SERPL W P-5'-P-CCNC: 188 U/L (ref 12–78)
ALT SERPL W P-5'-P-CCNC: 95 U/L (ref 12–78)
AMMONIA PLAS-SCNC: <10 UMOL/L (ref 11–35)
AMORPH URATE CRY URNS QL MICRO: ABNORMAL /HPF
AMPHETAMINES SERPL QL SCN: NEGATIVE
ANION GAP SERPL CALCULATED.3IONS-SCNC: 11 MMOL/L (ref 4–13)
ANION GAP SERPL CALCULATED.3IONS-SCNC: 13 MMOL/L (ref 4–13)
ANION GAP SERPL CALCULATED.3IONS-SCNC: 2 MMOL/L (ref 4–13)
ANION GAP SERPL CALCULATED.3IONS-SCNC: 3 MMOL/L (ref 4–13)
ANION GAP SERPL CALCULATED.3IONS-SCNC: 3 MMOL/L (ref 4–13)
ANION GAP SERPL CALCULATED.3IONS-SCNC: 5 MMOL/L (ref 4–13)
ANION GAP SERPL CALCULATED.3IONS-SCNC: 6 MMOL/L (ref 4–13)
ANION GAP SERPL CALCULATED.3IONS-SCNC: 6 MMOL/L (ref 4–13)
ANISOCYTOSIS BLD QL SMEAR: PRESENT
APAP SERPL-MCNC: <2 UG/ML (ref 10–20)
APTT PPP: 36 SECONDS (ref 23–37)
APTT PPP: 42 SECONDS (ref 23–37)
AST SERPL W P-5'-P-CCNC: 106 U/L (ref 5–45)
AST SERPL W P-5'-P-CCNC: 214 U/L (ref 5–45)
AST SERPL W P-5'-P-CCNC: 59 U/L (ref 5–45)
AST SERPL W P-5'-P-CCNC: 63 U/L (ref 5–45)
ATRIAL RATE: 159 BPM
ATRIAL RATE: 80 BPM
ATRIAL RATE: 92 BPM
ATRIAL RATE: 96 BPM
BACTERIA BLD CULT: NORMAL
BACTERIA BLD CULT: NORMAL
BACTERIA UR CULT: NORMAL
BACTERIA UR QL AUTO: ABNORMAL /HPF
BARBITURATES UR QL: NEGATIVE
BASE EXCESS BLDA CALC-SCNC: 9 MMOL/L (ref -2–3)
BASOPHILS # BLD AUTO: 0.13 THOUSANDS/ΜL (ref 0–0.1)
BASOPHILS # BLD AUTO: 0.16 THOUSANDS/ΜL (ref 0–0.1)
BASOPHILS # BLD MANUAL: 0 THOUSAND/UL (ref 0–0.1)
BASOPHILS # BLD MANUAL: 0.13 THOUSAND/UL (ref 0–0.1)
BASOPHILS # BLD MANUAL: 0.29 THOUSAND/UL (ref 0–0.1)
BASOPHILS NFR BLD AUTO: 1 % (ref 0–1)
BASOPHILS NFR BLD AUTO: 1 % (ref 0–1)
BASOPHILS NFR MAR MANUAL: 0 % (ref 0–1)
BASOPHILS NFR MAR MANUAL: 1 % (ref 0–1)
BASOPHILS NFR MAR MANUAL: 2 % (ref 0–1)
BENZODIAZ UR QL: NEGATIVE
BILIRUB DIRECT SERPL-MCNC: 0.21 MG/DL (ref 0–0.2)
BILIRUB SERPL-MCNC: 0.4 MG/DL (ref 0.2–1)
BILIRUB SERPL-MCNC: 0.5 MG/DL (ref 0.2–1)
BILIRUB SERPL-MCNC: 0.6 MG/DL (ref 0.2–1)
BILIRUB SERPL-MCNC: 0.7 MG/DL (ref 0.2–1)
BILIRUB UR QL STRIP: NEGATIVE
BUN SERPL-MCNC: 35 MG/DL (ref 5–25)
BUN SERPL-MCNC: 38 MG/DL (ref 5–25)
BUN SERPL-MCNC: 39 MG/DL (ref 5–25)
BUN SERPL-MCNC: 42 MG/DL (ref 5–25)
BUN SERPL-MCNC: 44 MG/DL (ref 5–25)
BUN SERPL-MCNC: 45 MG/DL (ref 5–25)
BUN SERPL-MCNC: 46 MG/DL (ref 5–25)
BUN SERPL-MCNC: 56 MG/DL (ref 5–25)
BUN SERPL-MCNC: 59 MG/DL (ref 5–25)
BUN SERPL-MCNC: 60 MG/DL (ref 5–25)
CA-I BLD-SCNC: 1.12 MMOL/L (ref 1.12–1.32)
CALCIUM ALBUM COR SERPL-MCNC: 8.4 MG/DL (ref 8.3–10.1)
CALCIUM ALBUM COR SERPL-MCNC: 8.8 MG/DL (ref 8.3–10.1)
CALCIUM SERPL-MCNC: 7.8 MG/DL (ref 8.3–10.1)
CALCIUM SERPL-MCNC: 7.9 MG/DL (ref 8.3–10.1)
CALCIUM SERPL-MCNC: 8 MG/DL (ref 8.3–10.1)
CALCIUM SERPL-MCNC: 8 MG/DL (ref 8.3–10.1)
CALCIUM SERPL-MCNC: 8.3 MG/DL (ref 8.3–10.1)
CALCIUM SERPL-MCNC: 8.8 MG/DL (ref 8.3–10.1)
CALCIUM SERPL-MCNC: 8.9 MG/DL (ref 8.3–10.1)
CALCIUM SERPL-MCNC: 9.1 MG/DL (ref 8.3–10.1)
CALCIUM SERPL-MCNC: 9.2 MG/DL (ref 8.3–10.1)
CALCIUM SERPL-MCNC: 9.3 MG/DL (ref 8.3–10.1)
CHLORIDE SERPL-SCNC: 100 MMOL/L (ref 100–108)
CHLORIDE SERPL-SCNC: 96 MMOL/L (ref 100–108)
CHLORIDE SERPL-SCNC: 98 MMOL/L (ref 100–108)
CHLORIDE SERPL-SCNC: 99 MMOL/L (ref 100–108)
CHOLEST SERPL-MCNC: 79 MG/DL (ref 50–200)
CK SERPL-CCNC: 86 U/L (ref 39–308)
CLARITY UR: ABNORMAL
CO2 SERPL-SCNC: 32 MMOL/L (ref 21–32)
CO2 SERPL-SCNC: 32 MMOL/L (ref 21–32)
CO2 SERPL-SCNC: 37 MMOL/L (ref 21–32)
CO2 SERPL-SCNC: 37 MMOL/L (ref 21–32)
CO2 SERPL-SCNC: 38 MMOL/L (ref 21–32)
CO2 SERPL-SCNC: 38 MMOL/L (ref 21–32)
CO2 SERPL-SCNC: 39 MMOL/L (ref 21–32)
CO2 SERPL-SCNC: 41 MMOL/L (ref 21–32)
COCAINE UR QL: NEGATIVE
COLOR UR: YELLOW
CREAT SERPL-MCNC: 1.1 MG/DL (ref 0.6–1.3)
CREAT SERPL-MCNC: 1.17 MG/DL (ref 0.6–1.3)
CREAT SERPL-MCNC: 1.22 MG/DL (ref 0.6–1.3)
CREAT SERPL-MCNC: 1.27 MG/DL (ref 0.6–1.3)
CREAT SERPL-MCNC: 1.3 MG/DL (ref 0.6–1.3)
CREAT SERPL-MCNC: 1.33 MG/DL (ref 0.6–1.3)
CREAT SERPL-MCNC: 1.33 MG/DL (ref 0.6–1.3)
CREAT SERPL-MCNC: 1.34 MG/DL (ref 0.6–1.3)
CREAT SERPL-MCNC: 1.39 MG/DL (ref 0.6–1.3)
CREAT SERPL-MCNC: 1.42 MG/DL (ref 0.6–1.3)
EOSINOPHIL # BLD AUTO: 0.05 THOUSAND/ΜL (ref 0–0.61)
EOSINOPHIL # BLD AUTO: 0.38 THOUSAND/ΜL (ref 0–0.61)
EOSINOPHIL # BLD MANUAL: 0.57 THOUSAND/UL (ref 0–0.4)
EOSINOPHIL # BLD MANUAL: 0.77 THOUSAND/UL (ref 0–0.4)
EOSINOPHIL # BLD MANUAL: 0.93 THOUSAND/UL (ref 0–0.4)
EOSINOPHIL NFR BLD AUTO: 0 % (ref 0–6)
EOSINOPHIL NFR BLD AUTO: 3 % (ref 0–6)
EOSINOPHIL NFR BLD MANUAL: 4 % (ref 0–6)
EOSINOPHIL NFR BLD MANUAL: 6 % (ref 0–6)
EOSINOPHIL NFR BLD MANUAL: 7 % (ref 0–6)
ERYTHROCYTE [DISTWIDTH] IN BLOOD BY AUTOMATED COUNT: 18.2 % (ref 11.6–15.1)
ERYTHROCYTE [DISTWIDTH] IN BLOOD BY AUTOMATED COUNT: 18.3 % (ref 11.6–15.1)
ERYTHROCYTE [DISTWIDTH] IN BLOOD BY AUTOMATED COUNT: 18.6 % (ref 11.6–15.1)
ERYTHROCYTE [DISTWIDTH] IN BLOOD BY AUTOMATED COUNT: 18.8 % (ref 11.6–15.1)
ERYTHROCYTE [DISTWIDTH] IN BLOOD BY AUTOMATED COUNT: 19.4 % (ref 11.6–15.1)
ERYTHROCYTE [DISTWIDTH] IN BLOOD BY AUTOMATED COUNT: 19.4 % (ref 11.6–15.1)
ERYTHROCYTE [DISTWIDTH] IN BLOOD BY AUTOMATED COUNT: 19.5 % (ref 11.6–15.1)
ERYTHROCYTE [DISTWIDTH] IN BLOOD BY AUTOMATED COUNT: 19.5 % (ref 11.6–15.1)
ERYTHROCYTE [DISTWIDTH] IN BLOOD BY AUTOMATED COUNT: 19.6 % (ref 11.6–15.1)
ERYTHROCYTE [DISTWIDTH] IN BLOOD BY AUTOMATED COUNT: 19.7 % (ref 11.6–15.1)
EST. AVERAGE GLUCOSE BLD GHB EST-MCNC: 123 MG/DL
ETHANOL SERPL-MCNC: 3 MG/DL (ref 0–3)
FERRITIN SERPL-MCNC: 6 NG/ML (ref 8–388)
GFR SERPL CREATININE-BSD FRML MDRD: 48 ML/MIN/1.73SQ M
GFR SERPL CREATININE-BSD FRML MDRD: 49 ML/MIN/1.73SQ M
GFR SERPL CREATININE-BSD FRML MDRD: 51 ML/MIN/1.73SQ M
GFR SERPL CREATININE-BSD FRML MDRD: 52 ML/MIN/1.73SQ M
GFR SERPL CREATININE-BSD FRML MDRD: 52 ML/MIN/1.73SQ M
GFR SERPL CREATININE-BSD FRML MDRD: 53 ML/MIN/1.73SQ M
GFR SERPL CREATININE-BSD FRML MDRD: 55 ML/MIN/1.73SQ M
GFR SERPL CREATININE-BSD FRML MDRD: 57 ML/MIN/1.73SQ M
GFR SERPL CREATININE-BSD FRML MDRD: 60 ML/MIN/1.73SQ M
GFR SERPL CREATININE-BSD FRML MDRD: 65 ML/MIN/1.73SQ M
GLUCOSE SERPL-MCNC: 105 MG/DL (ref 65–140)
GLUCOSE SERPL-MCNC: 106 MG/DL (ref 65–140)
GLUCOSE SERPL-MCNC: 114 MG/DL (ref 65–140)
GLUCOSE SERPL-MCNC: 115 MG/DL (ref 65–140)
GLUCOSE SERPL-MCNC: 119 MG/DL (ref 65–140)
GLUCOSE SERPL-MCNC: 119 MG/DL (ref 65–140)
GLUCOSE SERPL-MCNC: 121 MG/DL (ref 65–140)
GLUCOSE SERPL-MCNC: 125 MG/DL (ref 65–140)
GLUCOSE SERPL-MCNC: 128 MG/DL (ref 65–140)
GLUCOSE SERPL-MCNC: 132 MG/DL (ref 65–140)
GLUCOSE SERPL-MCNC: 134 MG/DL (ref 65–140)
GLUCOSE SERPL-MCNC: 145 MG/DL (ref 65–140)
GLUCOSE UR STRIP-MCNC: NEGATIVE MG/DL
HAV IGM SER QL: NORMAL
HBA1C MFR BLD: 5.9 %
HBV CORE IGM SER QL: NORMAL
HBV SURFACE AG SER QL: NORMAL
HCO3 BLDA-SCNC: 37.3 MMOL/L (ref 24–30)
HCT VFR BLD AUTO: 33.8 % (ref 36.5–49.3)
HCT VFR BLD AUTO: 34.8 % (ref 36.5–49.3)
HCT VFR BLD AUTO: 36.1 % (ref 36.5–49.3)
HCT VFR BLD AUTO: 36.6 % (ref 36.5–49.3)
HCT VFR BLD AUTO: 36.9 % (ref 36.5–49.3)
HCT VFR BLD AUTO: 37 % (ref 36.5–49.3)
HCT VFR BLD AUTO: 37.7 % (ref 36.5–49.3)
HCT VFR BLD AUTO: 38.2 % (ref 36.5–49.3)
HCT VFR BLD AUTO: 38.3 % (ref 36.5–49.3)
HCT VFR BLD AUTO: 40 % (ref 36.5–49.3)
HCT VFR BLD CALC: 40 % (ref 36.5–49.3)
HCV AB SER QL: NORMAL
HDLC SERPL-MCNC: 28 MG/DL
HGB BLD-MCNC: 10 G/DL (ref 12–17)
HGB BLD-MCNC: 10 G/DL (ref 12–17)
HGB BLD-MCNC: 10.6 G/DL (ref 12–17)
HGB BLD-MCNC: 9 G/DL (ref 12–17)
HGB BLD-MCNC: 9.3 G/DL (ref 12–17)
HGB BLD-MCNC: 9.5 G/DL (ref 12–17)
HGB BLD-MCNC: 9.7 G/DL (ref 12–17)
HGB BLD-MCNC: 9.7 G/DL (ref 12–17)
HGB BLD-MCNC: 9.8 G/DL (ref 12–17)
HGB BLD-MCNC: 9.9 G/DL (ref 12–17)
HGB BLDA-MCNC: 13.6 G/DL (ref 12–17)
HGB UR QL STRIP.AUTO: ABNORMAL
IMM GRANULOCYTES # BLD AUTO: 0.05 THOUSAND/UL (ref 0–0.2)
IMM GRANULOCYTES # BLD AUTO: 0.1 THOUSAND/UL (ref 0–0.2)
IMM GRANULOCYTES NFR BLD AUTO: 0 % (ref 0–2)
IMM GRANULOCYTES NFR BLD AUTO: 1 % (ref 0–2)
INR PPP: 1.17 (ref 0.84–1.19)
INR PPP: 1.21 (ref 0.84–1.19)
INR PPP: 1.22 (ref 0.84–1.19)
INR PPP: 1.29 (ref 0.84–1.19)
IRON SATN MFR SERPL: 3 %
IRON SERPL-MCNC: 17 UG/DL (ref 65–175)
KETONES UR STRIP-MCNC: NEGATIVE MG/DL
L PNEUMO1 AG UR QL IA.RAPID: NEGATIVE
LACTATE SERPL-SCNC: 2 MMOL/L (ref 0.5–2)
LDLC SERPL CALC-MCNC: 30 MG/DL (ref 0–100)
LEUKOCYTE ESTERASE UR QL STRIP: ABNORMAL
LG PLATELETS BLD QL SMEAR: PRESENT
LG PLATELETS BLD QL SMEAR: PRESENT
LYMPHOCYTES # BLD AUTO: 0.79 THOUSAND/UL (ref 0.6–4.47)
LYMPHOCYTES # BLD AUTO: 0.86 THOUSAND/UL (ref 0.6–4.47)
LYMPHOCYTES # BLD AUTO: 0.87 THOUSANDS/ΜL (ref 0.6–4.47)
LYMPHOCYTES # BLD AUTO: 1.11 THOUSANDS/ΜL (ref 0.6–4.47)
LYMPHOCYTES # BLD AUTO: 1.41 THOUSAND/UL (ref 0.6–4.47)
LYMPHOCYTES # BLD AUTO: 11 % (ref 14–44)
LYMPHOCYTES # BLD AUTO: 6 % (ref 14–44)
LYMPHOCYTES # BLD AUTO: 6 % (ref 14–44)
LYMPHOCYTES NFR BLD AUTO: 6 % (ref 14–44)
LYMPHOCYTES NFR BLD AUTO: 7 % (ref 14–44)
MAGNESIUM SERPL-MCNC: 2 MG/DL (ref 1.6–2.6)
MAGNESIUM SERPL-MCNC: 2.1 MG/DL (ref 1.6–2.6)
MAGNESIUM SERPL-MCNC: 2.1 MG/DL (ref 1.6–2.6)
MAGNESIUM SERPL-MCNC: 2.2 MG/DL (ref 1.6–2.6)
MCH RBC QN AUTO: 20 PG (ref 26.8–34.3)
MCH RBC QN AUTO: 20.1 PG (ref 26.8–34.3)
MCH RBC QN AUTO: 20.2 PG (ref 26.8–34.3)
MCH RBC QN AUTO: 20.3 PG (ref 26.8–34.3)
MCH RBC QN AUTO: 20.5 PG (ref 26.8–34.3)
MCHC RBC AUTO-ENTMCNC: 26.1 G/DL (ref 31.4–37.4)
MCHC RBC AUTO-ENTMCNC: 26.2 G/DL (ref 31.4–37.4)
MCHC RBC AUTO-ENTMCNC: 26.2 G/DL (ref 31.4–37.4)
MCHC RBC AUTO-ENTMCNC: 26.3 G/DL (ref 31.4–37.4)
MCHC RBC AUTO-ENTMCNC: 26.3 G/DL (ref 31.4–37.4)
MCHC RBC AUTO-ENTMCNC: 26.5 G/DL (ref 31.4–37.4)
MCHC RBC AUTO-ENTMCNC: 26.5 G/DL (ref 31.4–37.4)
MCHC RBC AUTO-ENTMCNC: 26.6 G/DL (ref 31.4–37.4)
MCHC RBC AUTO-ENTMCNC: 26.6 G/DL (ref 31.4–37.4)
MCHC RBC AUTO-ENTMCNC: 26.7 G/DL (ref 31.4–37.4)
MCV RBC AUTO: 76 FL (ref 82–98)
MCV RBC AUTO: 77 FL (ref 82–98)
METHADONE UR QL: NEGATIVE
MONOCYTES # BLD AUTO: 0 THOUSAND/UL (ref 0–1.22)
MONOCYTES # BLD AUTO: 1.06 THOUSAND/UL (ref 0–1.22)
MONOCYTES # BLD AUTO: 1.37 THOUSAND/ΜL (ref 0.17–1.22)
MONOCYTES # BLD AUTO: 1.58 THOUSAND/UL (ref 0–1.22)
MONOCYTES # BLD AUTO: 1.6 THOUSAND/ΜL (ref 0.17–1.22)
MONOCYTES NFR BLD AUTO: 12 % (ref 4–12)
MONOCYTES NFR BLD AUTO: 9 % (ref 4–12)
MONOCYTES NFR BLD: 0 % (ref 4–12)
MONOCYTES NFR BLD: 11 % (ref 4–12)
MONOCYTES NFR BLD: 8 % (ref 4–12)
NEUTROPHILS # BLD AUTO: 10.79 THOUSANDS/ΜL (ref 1.85–7.62)
NEUTROPHILS # BLD AUTO: 12.54 THOUSANDS/ΜL (ref 1.85–7.62)
NEUTROPHILS # BLD MANUAL: 10.31 THOUSAND/UL (ref 1.85–7.62)
NEUTROPHILS # BLD MANUAL: 10.66 THOUSAND/UL (ref 1.85–7.62)
NEUTROPHILS # BLD MANUAL: 11.04 THOUSAND/UL (ref 1.85–7.62)
NEUTS BAND NFR BLD MANUAL: 1 % (ref 0–8)
NEUTS BAND NFR BLD MANUAL: 1 % (ref 0–8)
NEUTS SEG NFR BLD AUTO: 76 % (ref 43–75)
NEUTS SEG NFR BLD AUTO: 77 % (ref 43–75)
NEUTS SEG NFR BLD AUTO: 78 % (ref 43–75)
NEUTS SEG NFR BLD AUTO: 82 % (ref 43–75)
NEUTS SEG NFR BLD AUTO: 83 % (ref 43–75)
NITRITE UR QL STRIP: NEGATIVE
NON-SQ EPI CELLS URNS QL MICRO: ABNORMAL /HPF
NRBC BLD AUTO-RTO: 0 /100 WBCS
NRBC BLD AUTO-RTO: 1 /100 WBCS
NT-PROBNP SERPL-MCNC: ABNORMAL PG/ML
OPIATES UR QL SCN: NEGATIVE
OTHER STN SPEC: ABNORMAL
OVALOCYTES BLD QL SMEAR: PRESENT
OXYCODONE+OXYMORPHONE UR QL SCN: NEGATIVE
P AXIS: 24 DEGREES
P AXIS: 28 DEGREES
P AXIS: 30 DEGREES
PCO2 BLD: 39 MMOL/L (ref 21–32)
PCO2 BLD: 71 MM HG (ref 42–50)
PCP UR QL: NEGATIVE
PH BLD: 7.33 [PH] (ref 7.3–7.4)
PH UR STRIP.AUTO: 6 [PH]
PHOSPHATE SERPL-MCNC: 4.5 MG/DL (ref 2.3–4.1)
PLATELET # BLD AUTO: 322 THOUSANDS/UL (ref 149–390)
PLATELET # BLD AUTO: 341 THOUSANDS/UL (ref 149–390)
PLATELET # BLD AUTO: 393 THOUSANDS/UL (ref 149–390)
PLATELET # BLD AUTO: 394 THOUSANDS/UL (ref 149–390)
PLATELET # BLD AUTO: 419 THOUSANDS/UL (ref 149–390)
PLATELET # BLD AUTO: 423 THOUSANDS/UL (ref 149–390)
PLATELET # BLD AUTO: 453 THOUSANDS/UL (ref 149–390)
PLATELET # BLD AUTO: 466 THOUSANDS/UL (ref 149–390)
PLATELET # BLD AUTO: 473 THOUSANDS/UL (ref 149–390)
PLATELET # BLD AUTO: 533 THOUSANDS/UL (ref 149–390)
PLATELET BLD QL SMEAR: ABNORMAL
PLATELET BLD QL SMEAR: ABNORMAL
PLATELET BLD QL SMEAR: ADEQUATE
PMV BLD AUTO: 10.1 FL (ref 8.9–12.7)
PMV BLD AUTO: 10.3 FL (ref 8.9–12.7)
PMV BLD AUTO: 10.8 FL (ref 8.9–12.7)
PMV BLD AUTO: 9.4 FL (ref 8.9–12.7)
PMV BLD AUTO: 9.6 FL (ref 8.9–12.7)
PMV BLD AUTO: 9.7 FL (ref 8.9–12.7)
PMV BLD AUTO: 9.8 FL (ref 8.9–12.7)
PMV BLD AUTO: 9.9 FL (ref 8.9–12.7)
PO2 BLD: 22 MM HG (ref 35–45)
POLYCHROMASIA BLD QL SMEAR: PRESENT
POTASSIUM BLD-SCNC: 4.4 MMOL/L (ref 3.5–5.3)
POTASSIUM SERPL-SCNC: 3.6 MMOL/L (ref 3.5–5.3)
POTASSIUM SERPL-SCNC: 3.8 MMOL/L (ref 3.5–5.3)
POTASSIUM SERPL-SCNC: 3.8 MMOL/L (ref 3.5–5.3)
POTASSIUM SERPL-SCNC: 4 MMOL/L (ref 3.5–5.3)
POTASSIUM SERPL-SCNC: 4.1 MMOL/L (ref 3.5–5.3)
POTASSIUM SERPL-SCNC: 4.1 MMOL/L (ref 3.5–5.3)
POTASSIUM SERPL-SCNC: 4.8 MMOL/L (ref 3.5–5.3)
POTASSIUM SERPL-SCNC: 5.1 MMOL/L (ref 3.5–5.3)
POTASSIUM SERPL-SCNC: 5.2 MMOL/L (ref 3.5–5.3)
POTASSIUM SERPL-SCNC: 5.6 MMOL/L (ref 3.5–5.3)
PR INTERVAL: 118 MS
PR INTERVAL: 138 MS
PR INTERVAL: 148 MS
PROCALCITONIN SERPL-MCNC: 0.12 NG/ML
PROCALCITONIN SERPL-MCNC: 0.14 NG/ML
PROCALCITONIN SERPL-MCNC: 0.19 NG/ML
PROCALCITONIN SERPL-MCNC: 0.25 NG/ML
PROT SERPL-MCNC: 7.5 G/DL (ref 6.4–8.2)
PROT SERPL-MCNC: 7.5 G/DL (ref 6.4–8.2)
PROT SERPL-MCNC: 7.6 G/DL (ref 6.4–8.2)
PROT SERPL-MCNC: 8.6 G/DL (ref 6.4–8.2)
PROT UR STRIP-MCNC: ABNORMAL MG/DL
PROTHROMBIN TIME: 14.9 SECONDS (ref 11.6–14.5)
PROTHROMBIN TIME: 15.3 SECONDS (ref 11.6–14.5)
PROTHROMBIN TIME: 15.4 SECONDS (ref 11.6–14.5)
PROTHROMBIN TIME: 16.1 SECONDS (ref 11.6–14.5)
QRS AXIS: -25 DEGREES
QRS AXIS: -29 DEGREES
QRS AXIS: -38 DEGREES
QRS AXIS: -48 DEGREES
QRSD INTERVAL: 106 MS
QRSD INTERVAL: 108 MS
QRSD INTERVAL: 114 MS
QRSD INTERVAL: 114 MS
QT INTERVAL: 312 MS
QT INTERVAL: 388 MS
QT INTERVAL: 392 MS
QT INTERVAL: 402 MS
QTC INTERVAL: 463 MS
QTC INTERVAL: 484 MS
QTC INTERVAL: 490 MS
QTC INTERVAL: 494 MS
RBC # BLD AUTO: 4.4 MILLION/UL (ref 3.88–5.62)
RBC # BLD AUTO: 4.59 MILLION/UL (ref 3.88–5.62)
RBC # BLD AUTO: 4.7 MILLION/UL (ref 3.88–5.62)
RBC # BLD AUTO: 4.8 MILLION/UL (ref 3.88–5.62)
RBC # BLD AUTO: 4.84 MILLION/UL (ref 3.88–5.62)
RBC # BLD AUTO: 4.85 MILLION/UL (ref 3.88–5.62)
RBC # BLD AUTO: 4.9 MILLION/UL (ref 3.88–5.62)
RBC # BLD AUTO: 4.95 MILLION/UL (ref 3.88–5.62)
RBC # BLD AUTO: 4.98 MILLION/UL (ref 3.88–5.62)
RBC # BLD AUTO: 5.24 MILLION/UL (ref 3.88–5.62)
RBC #/AREA URNS AUTO: ABNORMAL /HPF
RBC MORPH BLD: PRESENT
S PNEUM AG UR QL: NEGATIVE
SALICYLATES SERPL-MCNC: <3 MG/DL (ref 3–20)
SAO2 % BLD FROM PO2: 32 % (ref 60–85)
SARS-COV-2 RNA RESP QL NAA+PROBE: NEGATIVE
SARS-COV-2 RNA RESP QL NAA+PROBE: NEGATIVE
SODIUM BLD-SCNC: 137 MMOL/L (ref 136–145)
SODIUM SERPL-SCNC: 141 MMOL/L (ref 136–145)
SODIUM SERPL-SCNC: 142 MMOL/L (ref 136–145)
SODIUM SERPL-SCNC: 143 MMOL/L (ref 136–145)
SODIUM SERPL-SCNC: 144 MMOL/L (ref 136–145)
SP GR UR STRIP.AUTO: 1.02 (ref 1–1.03)
SPECIMEN SOURCE: ABNORMAL
STOMATOCYTES BLD QL SMEAR: PRESENT
STOMATOCYTES BLD QL SMEAR: PRESENT
T WAVE AXIS: 34 DEGREES
T WAVE AXIS: 39 DEGREES
T WAVE AXIS: 70 DEGREES
T WAVE AXIS: 90 DEGREES
THC UR QL: NEGATIVE
TIBC SERPL-MCNC: 541 UG/DL (ref 250–450)
TOTAL CELLS COUNTED SPEC: 100
TRIGL SERPL-MCNC: 106 MG/DL
TROPONIN I SERPL-MCNC: 0.09 NG/ML
TROPONIN I SERPL-MCNC: 0.14 NG/ML
TROPONIN I SERPL-MCNC: 0.16 NG/ML
UROBILINOGEN UR QL STRIP.AUTO: 0.2 E.U./DL
VENTRICULAR RATE: 151 BPM
VENTRICULAR RATE: 80 BPM
VENTRICULAR RATE: 92 BPM
VENTRICULAR RATE: 96 BPM
WBC # BLD AUTO: 12.68 THOUSAND/UL (ref 4.31–10.16)
WBC # BLD AUTO: 12.84 THOUSAND/UL (ref 4.31–10.16)
WBC # BLD AUTO: 13.22 THOUSAND/UL (ref 4.31–10.16)
WBC # BLD AUTO: 13.22 THOUSAND/UL (ref 4.31–10.16)
WBC # BLD AUTO: 13.82 THOUSAND/UL (ref 4.31–10.16)
WBC # BLD AUTO: 14.34 THOUSAND/UL (ref 4.31–10.16)
WBC # BLD AUTO: 15.33 THOUSAND/UL (ref 4.31–10.16)
WBC # BLD AUTO: 15.93 THOUSAND/UL (ref 4.31–10.16)
WBC # BLD AUTO: 18.96 THOUSAND/UL (ref 4.31–10.16)
WBC # BLD AUTO: 21.82 THOUSAND/UL (ref 4.31–10.16)
WBC #/AREA URNS AUTO: ABNORMAL /HPF

## 2021-01-01 PROCEDURE — 84132 ASSAY OF SERUM POTASSIUM: CPT

## 2021-01-01 PROCEDURE — 80053 COMPREHEN METABOLIC PANEL: CPT | Performed by: PHYSICIAN ASSISTANT

## 2021-01-01 PROCEDURE — 99291 CRITICAL CARE FIRST HOUR: CPT | Performed by: NURSE PRACTITIONER

## 2021-01-01 PROCEDURE — 99223 1ST HOSP IP/OBS HIGH 75: CPT | Performed by: PHYSICIAN ASSISTANT

## 2021-01-01 PROCEDURE — 85025 COMPLETE CBC W/AUTO DIFF WBC: CPT | Performed by: INTERNAL MEDICINE

## 2021-01-01 PROCEDURE — 85027 COMPLETE CBC AUTOMATED: CPT | Performed by: NURSE PRACTITIONER

## 2021-01-01 PROCEDURE — 92610 EVALUATE SWALLOWING FUNCTION: CPT

## 2021-01-01 PROCEDURE — 80307 DRUG TEST PRSMV CHEM ANLYZR: CPT | Performed by: EMERGENCY MEDICINE

## 2021-01-01 PROCEDURE — 96365 THER/PROPH/DIAG IV INF INIT: CPT

## 2021-01-01 PROCEDURE — 94669 MECHANICAL CHEST WALL OSCILL: CPT

## 2021-01-01 PROCEDURE — 80053 COMPREHEN METABOLIC PANEL: CPT | Performed by: INTERNAL MEDICINE

## 2021-01-01 PROCEDURE — 94002 VENT MGMT INPAT INIT DAY: CPT

## 2021-01-01 PROCEDURE — 94640 AIRWAY INHALATION TREATMENT: CPT

## 2021-01-01 PROCEDURE — G1004 CDSM NDSC: HCPCS

## 2021-01-01 PROCEDURE — 93010 ELECTROCARDIOGRAM REPORT: CPT | Performed by: INTERNAL MEDICINE

## 2021-01-01 PROCEDURE — 74230 X-RAY XM SWLNG FUNCJ C+: CPT

## 2021-01-01 PROCEDURE — 83605 ASSAY OF LACTIC ACID: CPT | Performed by: EMERGENCY MEDICINE

## 2021-01-01 PROCEDURE — 71045 X-RAY EXAM CHEST 1 VIEW: CPT

## 2021-01-01 PROCEDURE — 93005 ELECTROCARDIOGRAM TRACING: CPT

## 2021-01-01 PROCEDURE — 84484 ASSAY OF TROPONIN QUANT: CPT | Performed by: EMERGENCY MEDICINE

## 2021-01-01 PROCEDURE — 94760 N-INVAS EAR/PLS OXIMETRY 1: CPT

## 2021-01-01 PROCEDURE — 85610 PROTHROMBIN TIME: CPT | Performed by: NURSE PRACTITIONER

## 2021-01-01 PROCEDURE — 82728 ASSAY OF FERRITIN: CPT | Performed by: PHYSICIAN ASSISTANT

## 2021-01-01 PROCEDURE — 82947 ASSAY GLUCOSE BLOOD QUANT: CPT

## 2021-01-01 PROCEDURE — 85027 COMPLETE CBC AUTOMATED: CPT | Performed by: INTERNAL MEDICINE

## 2021-01-01 PROCEDURE — 97116 GAIT TRAINING THERAPY: CPT

## 2021-01-01 PROCEDURE — 83540 ASSAY OF IRON: CPT | Performed by: PHYSICIAN ASSISTANT

## 2021-01-01 PROCEDURE — 99232 SBSQ HOSP IP/OBS MODERATE 35: CPT | Performed by: INTERNAL MEDICINE

## 2021-01-01 PROCEDURE — 83735 ASSAY OF MAGNESIUM: CPT | Performed by: PHYSICIAN ASSISTANT

## 2021-01-01 PROCEDURE — 83735 ASSAY OF MAGNESIUM: CPT | Performed by: NURSE PRACTITIONER

## 2021-01-01 PROCEDURE — 85027 COMPLETE CBC AUTOMATED: CPT | Performed by: PHYSICIAN ASSISTANT

## 2021-01-01 PROCEDURE — 82803 BLOOD GASES ANY COMBINATION: CPT

## 2021-01-01 PROCEDURE — 97167 OT EVAL HIGH COMPLEX 60 MIN: CPT

## 2021-01-01 PROCEDURE — 99232 SBSQ HOSP IP/OBS MODERATE 35: CPT | Performed by: NURSE PRACTITIONER

## 2021-01-01 PROCEDURE — 80076 HEPATIC FUNCTION PANEL: CPT | Performed by: PHYSICIAN ASSISTANT

## 2021-01-01 PROCEDURE — 87449 NOS EACH ORGANISM AG IA: CPT | Performed by: PHYSICIAN ASSISTANT

## 2021-01-01 PROCEDURE — 85610 PROTHROMBIN TIME: CPT | Performed by: PHYSICIAN ASSISTANT

## 2021-01-01 PROCEDURE — 83550 IRON BINDING TEST: CPT | Performed by: PHYSICIAN ASSISTANT

## 2021-01-01 PROCEDURE — 92611 MOTION FLUOROSCOPY/SWALLOW: CPT

## 2021-01-01 PROCEDURE — 87040 BLOOD CULTURE FOR BACTERIA: CPT | Performed by: EMERGENCY MEDICINE

## 2021-01-01 PROCEDURE — 84295 ASSAY OF SERUM SODIUM: CPT

## 2021-01-01 PROCEDURE — 99222 1ST HOSP IP/OBS MODERATE 55: CPT | Performed by: INTERNAL MEDICINE

## 2021-01-01 PROCEDURE — 85610 PROTHROMBIN TIME: CPT | Performed by: EMERGENCY MEDICINE

## 2021-01-01 PROCEDURE — 83036 HEMOGLOBIN GLYCOSYLATED A1C: CPT | Performed by: NURSE PRACTITIONER

## 2021-01-01 PROCEDURE — 82140 ASSAY OF AMMONIA: CPT | Performed by: EMERGENCY MEDICINE

## 2021-01-01 PROCEDURE — 84145 PROCALCITONIN (PCT): CPT | Performed by: INTERNAL MEDICINE

## 2021-01-01 PROCEDURE — 82330 ASSAY OF CALCIUM: CPT

## 2021-01-01 PROCEDURE — 80048 BASIC METABOLIC PNL TOTAL CA: CPT | Performed by: INTERNAL MEDICINE

## 2021-01-01 PROCEDURE — 4A133J1 MONITORING OF ARTERIAL PULSE, PERIPHERAL, PERCUTANEOUS APPROACH: ICD-10-PCS | Performed by: INTERNAL MEDICINE

## 2021-01-01 PROCEDURE — 85007 BL SMEAR W/DIFF WBC COUNT: CPT | Performed by: INTERNAL MEDICINE

## 2021-01-01 PROCEDURE — 36620 INSERTION CATHETER ARTERY: CPT | Performed by: NURSE PRACTITIONER

## 2021-01-01 PROCEDURE — 80048 BASIC METABOLIC PNL TOTAL CA: CPT | Performed by: NURSE PRACTITIONER

## 2021-01-01 PROCEDURE — 99223 1ST HOSP IP/OBS HIGH 75: CPT | Performed by: PSYCHIATRY & NEUROLOGY

## 2021-01-01 PROCEDURE — 80053 COMPREHEN METABOLIC PANEL: CPT | Performed by: EMERGENCY MEDICINE

## 2021-01-01 PROCEDURE — 84145 PROCALCITONIN (PCT): CPT | Performed by: PHYSICIAN ASSISTANT

## 2021-01-01 PROCEDURE — 94664 DEMO&/EVAL PT USE INHALER: CPT

## 2021-01-01 PROCEDURE — 99239 HOSP IP/OBS DSCHRG MGMT >30: CPT | Performed by: INTERNAL MEDICINE

## 2021-01-01 PROCEDURE — 82948 REAGENT STRIP/BLOOD GLUCOSE: CPT

## 2021-01-01 PROCEDURE — 85730 THROMBOPLASTIN TIME PARTIAL: CPT | Performed by: EMERGENCY MEDICINE

## 2021-01-01 PROCEDURE — U0003 INFECTIOUS AGENT DETECTION BY NUCLEIC ACID (DNA OR RNA); SEVERE ACUTE RESPIRATORY SYNDROME CORONAVIRUS 2 (SARS-COV-2) (CORONAVIRUS DISEASE [COVID-19]), AMPLIFIED PROBE TECHNIQUE, MAKING USE OF HIGH THROUGHPUT TECHNOLOGIES AS DESCRIBED BY CMS-2020-01-R: HCPCS | Performed by: EMERGENCY MEDICINE

## 2021-01-01 PROCEDURE — 71250 CT THORAX DX C-: CPT

## 2021-01-01 PROCEDURE — 71275 CT ANGIOGRAPHY CHEST: CPT

## 2021-01-01 PROCEDURE — 99231 SBSQ HOSP IP/OBS SF/LOW 25: CPT | Performed by: INTERNAL MEDICINE

## 2021-01-01 PROCEDURE — U0005 INFEC AGEN DETEC AMPLI PROBE: HCPCS | Performed by: EMERGENCY MEDICINE

## 2021-01-01 PROCEDURE — 96366 THER/PROPH/DIAG IV INF ADDON: CPT

## 2021-01-01 PROCEDURE — U0005 INFEC AGEN DETEC AMPLI PROBE: HCPCS | Performed by: INTERNAL MEDICINE

## 2021-01-01 PROCEDURE — 84100 ASSAY OF PHOSPHORUS: CPT | Performed by: PHYSICIAN ASSISTANT

## 2021-01-01 PROCEDURE — 70450 CT HEAD/BRAIN W/O DYE: CPT

## 2021-01-01 PROCEDURE — 80061 LIPID PANEL: CPT | Performed by: NURSE PRACTITIONER

## 2021-01-01 PROCEDURE — 36415 COLL VENOUS BLD VENIPUNCTURE: CPT | Performed by: PHYSICIAN ASSISTANT

## 2021-01-01 PROCEDURE — 93306 TTE W/DOPPLER COMPLETE: CPT | Performed by: INTERNAL MEDICINE

## 2021-01-01 PROCEDURE — 83880 ASSAY OF NATRIURETIC PEPTIDE: CPT | Performed by: EMERGENCY MEDICINE

## 2021-01-01 PROCEDURE — 99233 SBSQ HOSP IP/OBS HIGH 50: CPT | Performed by: INTERNAL MEDICINE

## 2021-01-01 PROCEDURE — 80074 ACUTE HEPATITIS PANEL: CPT | Performed by: PHYSICIAN ASSISTANT

## 2021-01-01 PROCEDURE — 80179 DRUG ASSAY SALICYLATE: CPT | Performed by: EMERGENCY MEDICINE

## 2021-01-01 PROCEDURE — 99285 EMERGENCY DEPT VISIT HI MDM: CPT

## 2021-01-01 PROCEDURE — 97530 THERAPEUTIC ACTIVITIES: CPT

## 2021-01-01 PROCEDURE — NC001 PR NO CHARGE: Performed by: NURSE PRACTITIONER

## 2021-01-01 PROCEDURE — 80143 DRUG ASSAY ACETAMINOPHEN: CPT | Performed by: EMERGENCY MEDICINE

## 2021-01-01 PROCEDURE — 85025 COMPLETE CBC W/AUTO DIFF WBC: CPT | Performed by: EMERGENCY MEDICINE

## 2021-01-01 PROCEDURE — 93306 TTE W/DOPPLER COMPLETE: CPT

## 2021-01-01 PROCEDURE — 81001 URINALYSIS AUTO W/SCOPE: CPT | Performed by: EMERGENCY MEDICINE

## 2021-01-01 PROCEDURE — 82077 ASSAY SPEC XCP UR&BREATH IA: CPT | Performed by: EMERGENCY MEDICINE

## 2021-01-01 PROCEDURE — 99285 EMERGENCY DEPT VISIT HI MDM: CPT | Performed by: EMERGENCY MEDICINE

## 2021-01-01 PROCEDURE — 85730 THROMBOPLASTIN TIME PARTIAL: CPT | Performed by: PHYSICIAN ASSISTANT

## 2021-01-01 PROCEDURE — 96367 TX/PROPH/DG ADDL SEQ IV INF: CPT

## 2021-01-01 PROCEDURE — 97535 SELF CARE MNGMENT TRAINING: CPT

## 2021-01-01 PROCEDURE — 70551 MRI BRAIN STEM W/O DYE: CPT

## 2021-01-01 PROCEDURE — 94660 CPAP INITIATION&MGMT: CPT

## 2021-01-01 PROCEDURE — 82550 ASSAY OF CK (CPK): CPT | Performed by: EMERGENCY MEDICINE

## 2021-01-01 PROCEDURE — 36415 COLL VENOUS BLD VENIPUNCTURE: CPT | Performed by: EMERGENCY MEDICINE

## 2021-01-01 PROCEDURE — 84484 ASSAY OF TROPONIN QUANT: CPT | Performed by: PHYSICIAN ASSISTANT

## 2021-01-01 PROCEDURE — 85014 HEMATOCRIT: CPT

## 2021-01-01 PROCEDURE — 97163 PT EVAL HIGH COMPLEX 45 MIN: CPT

## 2021-01-01 PROCEDURE — 99497 ADVNCD CARE PLAN 30 MIN: CPT | Performed by: INTERNAL MEDICINE

## 2021-01-01 PROCEDURE — 87086 URINE CULTURE/COLONY COUNT: CPT | Performed by: EMERGENCY MEDICINE

## 2021-01-01 PROCEDURE — 03HY32Z INSERTION OF MONITORING DEVICE INTO UPPER ARTERY, PERCUTANEOUS APPROACH: ICD-10-PCS | Performed by: INTERNAL MEDICINE

## 2021-01-01 PROCEDURE — U0003 INFECTIOUS AGENT DETECTION BY NUCLEIC ACID (DNA OR RNA); SEVERE ACUTE RESPIRATORY SYNDROME CORONAVIRUS 2 (SARS-COV-2) (CORONAVIRUS DISEASE [COVID-19]), AMPLIFIED PROBE TECHNIQUE, MAKING USE OF HIGH THROUGHPUT TECHNOLOGIES AS DESCRIBED BY CMS-2020-01-R: HCPCS | Performed by: INTERNAL MEDICINE

## 2021-01-01 PROCEDURE — 94668 MNPJ CHEST WALL SBSQ: CPT

## 2021-01-01 PROCEDURE — 84145 PROCALCITONIN (PCT): CPT | Performed by: EMERGENCY MEDICINE

## 2021-01-01 PROCEDURE — 4A133B1 MONITORING OF ARTERIAL PRESSURE, PERIPHERAL, PERCUTANEOUS APPROACH: ICD-10-PCS | Performed by: INTERNAL MEDICINE

## 2021-01-01 RX ORDER — MIRTAZAPINE 15 MG/1
15 TABLET, FILM COATED ORAL
Status: DISCONTINUED | OUTPATIENT
Start: 2021-01-01 | End: 2021-01-01

## 2021-01-01 RX ORDER — BENZONATATE 100 MG/1
100 CAPSULE ORAL 3 TIMES DAILY PRN
Status: DISCONTINUED | OUTPATIENT
Start: 2021-01-01 | End: 2021-01-01 | Stop reason: HOSPADM

## 2021-01-01 RX ORDER — LEVALBUTEROL 1.25 MG/.5ML
1.25 SOLUTION, CONCENTRATE RESPIRATORY (INHALATION) EVERY 8 HOURS PRN
Status: DISCONTINUED | OUTPATIENT
Start: 2021-01-01 | End: 2021-01-01

## 2021-01-01 RX ORDER — TORSEMIDE 20 MG/1
20 TABLET ORAL DAILY
Status: DISCONTINUED | OUTPATIENT
Start: 2021-01-01 | End: 2021-01-01

## 2021-01-01 RX ORDER — PANTOPRAZOLE SODIUM 40 MG/1
40 TABLET, DELAYED RELEASE ORAL
Status: DISCONTINUED | OUTPATIENT
Start: 2021-01-01 | End: 2021-01-01

## 2021-01-01 RX ORDER — HEPARIN SODIUM 10000 [USP'U]/100ML
3-20 INJECTION, SOLUTION INTRAVENOUS
Status: DISCONTINUED | OUTPATIENT
Start: 2021-01-01 | End: 2021-01-01

## 2021-01-01 RX ORDER — HEPARIN SODIUM 1000 [USP'U]/ML
2000 INJECTION, SOLUTION INTRAVENOUS; SUBCUTANEOUS
Status: DISCONTINUED | OUTPATIENT
Start: 2021-01-01 | End: 2021-01-01

## 2021-01-01 RX ORDER — AMIODARONE HYDROCHLORIDE 200 MG/1
200 TABLET ORAL 2 TIMES DAILY WITH MEALS
Status: DISCONTINUED | OUTPATIENT
Start: 2021-01-01 | End: 2021-01-01

## 2021-01-01 RX ORDER — SODIUM CHLORIDE FOR INHALATION 0.9 %
3 VIAL, NEBULIZER (ML) INHALATION
Status: DISCONTINUED | OUTPATIENT
Start: 2021-01-01 | End: 2021-01-01

## 2021-01-01 RX ORDER — LEVALBUTEROL 1.25 MG/.5ML
1.25 SOLUTION, CONCENTRATE RESPIRATORY (INHALATION)
Status: DISCONTINUED | OUTPATIENT
Start: 2021-01-01 | End: 2021-01-01

## 2021-01-01 RX ORDER — ALBUMIN, HUMAN INJ 5% 5 %
SOLUTION INTRAVENOUS
Status: COMPLETED
Start: 2021-01-01 | End: 2021-01-01

## 2021-01-01 RX ORDER — ALBUMIN (HUMAN) 12.5 G/50ML
12.5 SOLUTION INTRAVENOUS ONCE
Status: COMPLETED | OUTPATIENT
Start: 2021-01-01 | End: 2021-01-01

## 2021-01-01 RX ORDER — ACETAMINOPHEN 325 MG/1
650 TABLET ORAL EVERY 6 HOURS PRN
Status: DISCONTINUED | OUTPATIENT
Start: 2021-01-01 | End: 2021-01-01

## 2021-01-01 RX ORDER — FUROSEMIDE 10 MG/ML
40 INJECTION INTRAMUSCULAR; INTRAVENOUS ONCE
Status: COMPLETED | OUTPATIENT
Start: 2021-01-01 | End: 2021-01-01

## 2021-01-01 RX ORDER — METOPROLOL TARTRATE 5 MG/5ML
5 INJECTION INTRAVENOUS ONCE
Status: COMPLETED | OUTPATIENT
Start: 2021-01-01 | End: 2021-01-01

## 2021-01-01 RX ORDER — CEFEPIME HYDROCHLORIDE 2 G/50ML
2000 INJECTION, SOLUTION INTRAVENOUS ONCE
Status: COMPLETED | OUTPATIENT
Start: 2021-01-01 | End: 2021-01-01

## 2021-01-01 RX ORDER — PHENYLEPHRINE HYDROCHLORIDE 10 MG/ML
INJECTION INTRAVENOUS
Status: DISPENSED
Start: 2021-01-01 | End: 2021-01-01

## 2021-01-01 RX ORDER — LEVALBUTEROL 1.25 MG/.5ML
1.25 SOLUTION, CONCENTRATE RESPIRATORY (INHALATION) EVERY 6 HOURS PRN
Status: DISCONTINUED | OUTPATIENT
Start: 2021-01-01 | End: 2021-01-01 | Stop reason: HOSPADM

## 2021-01-01 RX ORDER — AMIODARONE HYDROCHLORIDE 50 MG/ML
INJECTION, SOLUTION INTRAVENOUS
Status: DISPENSED
Start: 2021-01-01 | End: 2021-01-01

## 2021-01-01 RX ORDER — ALBUMIN, HUMAN INJ 5% 5 %
25 SOLUTION INTRAVENOUS ONCE
Status: COMPLETED | OUTPATIENT
Start: 2021-01-01 | End: 2021-01-01

## 2021-01-01 RX ORDER — FUROSEMIDE 10 MG/ML
40 INJECTION INTRAMUSCULAR; INTRAVENOUS
Status: DISCONTINUED | OUTPATIENT
Start: 2021-01-01 | End: 2021-01-01

## 2021-01-01 RX ORDER — CEFTRIAXONE 1 G/50ML
1000 INJECTION, SOLUTION INTRAVENOUS EVERY 24 HOURS
Status: DISCONTINUED | OUTPATIENT
Start: 2021-01-01 | End: 2021-01-01

## 2021-01-01 RX ORDER — POTASSIUM CHLORIDE 14.9 MG/ML
20 INJECTION INTRAVENOUS
Status: COMPLETED | OUTPATIENT
Start: 2021-01-01 | End: 2021-01-01

## 2021-01-01 RX ORDER — SENNOSIDES 8.6 MG
1 TABLET ORAL
Status: DISCONTINUED | OUTPATIENT
Start: 2021-01-01 | End: 2021-01-01 | Stop reason: HOSPADM

## 2021-01-01 RX ORDER — HEPARIN SODIUM 1000 [USP'U]/ML
4000 INJECTION, SOLUTION INTRAVENOUS; SUBCUTANEOUS
Status: DISCONTINUED | OUTPATIENT
Start: 2021-01-01 | End: 2021-01-01

## 2021-01-01 RX ORDER — CEFEPIME HYDROCHLORIDE 2 G/50ML
2000 INJECTION, SOLUTION INTRAVENOUS EVERY 12 HOURS
Status: DISCONTINUED | OUTPATIENT
Start: 2021-01-01 | End: 2021-01-01

## 2021-01-01 RX ORDER — AMIODARONE HYDROCHLORIDE 900 MG/18ML
INJECTION, SOLUTION INTRAVENOUS
Status: DISPENSED
Start: 2021-01-01 | End: 2021-01-01

## 2021-01-01 RX ORDER — DOCUSATE SODIUM 100 MG/1
100 CAPSULE, LIQUID FILLED ORAL 2 TIMES DAILY
Status: DISCONTINUED | OUTPATIENT
Start: 2021-01-01 | End: 2021-01-01 | Stop reason: HOSPADM

## 2021-01-01 RX ORDER — METOPROLOL TARTRATE 5 MG/5ML
5 INJECTION INTRAVENOUS ONCE
Status: DISCONTINUED | OUTPATIENT
Start: 2021-01-01 | End: 2021-01-01

## 2021-01-01 RX ORDER — FERROUS SULFATE 325(65) MG
325 TABLET ORAL
Status: DISCONTINUED | OUTPATIENT
Start: 2021-01-01 | End: 2021-01-01

## 2021-01-01 RX ORDER — HYDROMORPHONE HCL IN WATER/PF 6 MG/30 ML
0.2 PATIENT CONTROLLED ANALGESIA SYRINGE INTRAVENOUS EVERY 6 HOURS PRN
Status: DISCONTINUED | OUTPATIENT
Start: 2021-01-01 | End: 2021-01-01 | Stop reason: HOSPADM

## 2021-01-01 RX ORDER — LANOLIN ALCOHOL/MO/W.PET/CERES
6 CREAM (GRAM) TOPICAL
Status: DISCONTINUED | OUTPATIENT
Start: 2021-01-01 | End: 2021-01-01 | Stop reason: HOSPADM

## 2021-01-01 RX ORDER — LORAZEPAM 0.5 MG/1
0.5 TABLET ORAL EVERY 6 HOURS PRN
Status: DISCONTINUED | OUTPATIENT
Start: 2021-01-01 | End: 2021-01-01 | Stop reason: HOSPADM

## 2021-01-01 RX ORDER — GUAIFENESIN 600 MG
600 TABLET, EXTENDED RELEASE 12 HR ORAL EVERY 12 HOURS SCHEDULED
Status: DISCONTINUED | OUTPATIENT
Start: 2021-01-01 | End: 2021-01-01 | Stop reason: HOSPADM

## 2021-01-01 RX ORDER — MIDODRINE HYDROCHLORIDE 5 MG/1
5 TABLET ORAL
Status: DISCONTINUED | OUTPATIENT
Start: 2021-01-01 | End: 2021-01-01

## 2021-01-01 RX ADMIN — MIDODRINE HYDROCHLORIDE 5 MG: 5 TABLET ORAL at 09:22

## 2021-01-01 RX ADMIN — MIRTAZAPINE 15 MG: 15 TABLET, FILM COATED ORAL at 21:03

## 2021-01-01 RX ADMIN — CEFTRIAXONE 1000 MG: 1 INJECTION, SOLUTION INTRAVENOUS at 08:52

## 2021-01-01 RX ADMIN — POTASSIUM CHLORIDE 20 MEQ: 200 INJECTION, SOLUTION INTRAVENOUS at 02:36

## 2021-01-01 RX ADMIN — GUAIFENESIN 600 MG: 600 TABLET ORAL at 21:48

## 2021-01-01 RX ADMIN — AMIODARONE HYDROCHLORIDE 150 MG: 50 INJECTION, SOLUTION INTRAVENOUS at 01:42

## 2021-01-01 RX ADMIN — METOPROLOL TARTRATE 25 MG: 25 TABLET, FILM COATED ORAL at 08:51

## 2021-01-01 RX ADMIN — GUAIFENESIN 600 MG: 600 TABLET ORAL at 20:03

## 2021-01-01 RX ADMIN — DOCUSATE SODIUM 100 MG: 100 CAPSULE, LIQUID FILLED ORAL at 17:25

## 2021-01-01 RX ADMIN — CEFEPIME HYDROCHLORIDE 2000 MG: 2 INJECTION, SOLUTION INTRAVENOUS at 17:56

## 2021-01-01 RX ADMIN — PANTOPRAZOLE SODIUM 40 MG: 40 TABLET, DELAYED RELEASE ORAL at 05:15

## 2021-01-01 RX ADMIN — LEVALBUTEROL HYDROCHLORIDE 1.25 MG: 1.25 SOLUTION, CONCENTRATE RESPIRATORY (INHALATION) at 08:40

## 2021-01-01 RX ADMIN — IPRATROPIUM BROMIDE 0.5 MG: 0.5 SOLUTION RESPIRATORY (INHALATION) at 22:40

## 2021-01-01 RX ADMIN — METOPROLOL TARTRATE 25 MG: 25 TABLET, FILM COATED ORAL at 21:35

## 2021-01-01 RX ADMIN — METOPROLOL TARTRATE 25 MG: 25 TABLET, FILM COATED ORAL at 08:19

## 2021-01-01 RX ADMIN — VANCOMYCIN HYDROCHLORIDE 1250 MG: 1 INJECTION, POWDER, LYOPHILIZED, FOR SOLUTION INTRAVENOUS at 18:21

## 2021-01-01 RX ADMIN — MIDODRINE HYDROCHLORIDE 5 MG: 5 TABLET ORAL at 05:51

## 2021-01-01 RX ADMIN — FUROSEMIDE 40 MG: 10 INJECTION, SOLUTION INTRAVENOUS at 08:20

## 2021-01-01 RX ADMIN — SENNOSIDES 8.6 MG: 8.6 TABLET, FILM COATED ORAL at 21:03

## 2021-01-01 RX ADMIN — CEFEPIME HYDROCHLORIDE 2000 MG: 2 INJECTION, SOLUTION INTRAVENOUS at 18:37

## 2021-01-01 RX ADMIN — AMIODARONE HYDROCHLORIDE 200 MG: 200 TABLET ORAL at 09:26

## 2021-01-01 RX ADMIN — LEVALBUTEROL HYDROCHLORIDE 1.25 MG: 1.25 SOLUTION, CONCENTRATE RESPIRATORY (INHALATION) at 13:51

## 2021-01-01 RX ADMIN — ALBUMIN (HUMAN) 12.5 G: 0.25 INJECTION, SOLUTION INTRAVENOUS at 00:44

## 2021-01-01 RX ADMIN — MIRTAZAPINE 15 MG: 15 TABLET, FILM COATED ORAL at 21:16

## 2021-01-01 RX ADMIN — APIXABAN 5 MG: 5 TABLET, FILM COATED ORAL at 10:04

## 2021-01-01 RX ADMIN — AMIODARONE HYDROCHLORIDE 200 MG: 200 TABLET ORAL at 16:25

## 2021-01-01 RX ADMIN — APIXABAN 5 MG: 5 TABLET, FILM COATED ORAL at 09:22

## 2021-01-01 RX ADMIN — MIDODRINE HYDROCHLORIDE 5 MG: 5 TABLET ORAL at 16:38

## 2021-01-01 RX ADMIN — MIDODRINE HYDROCHLORIDE 5 MG: 5 TABLET ORAL at 10:56

## 2021-01-01 RX ADMIN — GUAIFENESIN 600 MG: 600 TABLET ORAL at 11:35

## 2021-01-01 RX ADMIN — MIDODRINE HYDROCHLORIDE 5 MG: 5 TABLET ORAL at 11:21

## 2021-01-01 RX ADMIN — GUAIFENESIN 600 MG: 600 TABLET ORAL at 21:35

## 2021-01-01 RX ADMIN — MIRTAZAPINE 15 MG: 15 TABLET, FILM COATED ORAL at 21:48

## 2021-01-01 RX ADMIN — AMIODARONE HYDROCHLORIDE 150 MG: 50 INJECTION, SOLUTION INTRAVENOUS at 01:05

## 2021-01-01 RX ADMIN — MELATONIN TAB 3 MG 6 MG: 3 TAB at 21:16

## 2021-01-01 RX ADMIN — APIXABAN 5 MG: 5 TABLET, FILM COATED ORAL at 17:03

## 2021-01-01 RX ADMIN — AMIODARONE HYDROCHLORIDE 0.5 MG/MIN: 50 INJECTION, SOLUTION INTRAVENOUS at 00:41

## 2021-01-01 RX ADMIN — MIDODRINE HYDROCHLORIDE 5 MG: 5 TABLET ORAL at 17:25

## 2021-01-01 RX ADMIN — METOPROLOL TARTRATE 5 MG: 5 INJECTION INTRAVENOUS at 23:45

## 2021-01-01 RX ADMIN — LEVALBUTEROL HYDROCHLORIDE 1.25 MG: 1.25 SOLUTION, CONCENTRATE RESPIRATORY (INHALATION) at 14:26

## 2021-01-01 RX ADMIN — GUAIFENESIN 600 MG: 600 TABLET ORAL at 09:26

## 2021-01-01 RX ADMIN — LEVALBUTEROL HYDROCHLORIDE 1.25 MG: 1.25 SOLUTION, CONCENTRATE RESPIRATORY (INHALATION) at 11:46

## 2021-01-01 RX ADMIN — MIDODRINE HYDROCHLORIDE 5 MG: 5 TABLET ORAL at 12:53

## 2021-01-01 RX ADMIN — ISODIUM CHLORIDE 3 ML: 0.03 SOLUTION RESPIRATORY (INHALATION) at 13:13

## 2021-01-01 RX ADMIN — APIXABAN 5 MG: 5 TABLET, FILM COATED ORAL at 09:26

## 2021-01-01 RX ADMIN — TORSEMIDE 20 MG: 20 TABLET ORAL at 16:38

## 2021-01-01 RX ADMIN — DOCUSATE SODIUM 100 MG: 100 CAPSULE, LIQUID FILLED ORAL at 18:36

## 2021-01-01 RX ADMIN — GUAIFENESIN 600 MG: 600 TABLET ORAL at 21:09

## 2021-01-01 RX ADMIN — ISODIUM CHLORIDE 3 ML: 0.03 SOLUTION RESPIRATORY (INHALATION) at 20:15

## 2021-01-01 RX ADMIN — DOCUSATE SODIUM 100 MG: 100 CAPSULE, LIQUID FILLED ORAL at 08:19

## 2021-01-01 RX ADMIN — SENNOSIDES 8.6 MG: 8.6 TABLET, FILM COATED ORAL at 21:09

## 2021-01-01 RX ADMIN — AMIODARONE HYDROCHLORIDE 200 MG: 200 TABLET ORAL at 17:12

## 2021-01-01 RX ADMIN — VANCOMYCIN HYDROCHLORIDE 1250 MG: 5 INJECTION, POWDER, LYOPHILIZED, FOR SOLUTION INTRAVENOUS at 19:31

## 2021-01-01 RX ADMIN — MIDODRINE HYDROCHLORIDE 5 MG: 5 TABLET ORAL at 05:37

## 2021-01-01 RX ADMIN — PANTOPRAZOLE SODIUM 40 MG: 40 TABLET, DELAYED RELEASE ORAL at 05:37

## 2021-01-01 RX ADMIN — FUROSEMIDE 40 MG: 10 INJECTION, SOLUTION INTRAVENOUS at 22:57

## 2021-01-01 RX ADMIN — MIDODRINE HYDROCHLORIDE 5 MG: 5 TABLET ORAL at 16:05

## 2021-01-01 RX ADMIN — FUROSEMIDE 40 MG: 10 INJECTION, SOLUTION INTRAMUSCULAR; INTRAVENOUS at 08:17

## 2021-01-01 RX ADMIN — ALBUMIN (HUMAN) 12.5 G: 0.25 INJECTION, SOLUTION INTRAVENOUS at 00:00

## 2021-01-01 RX ADMIN — ISODIUM CHLORIDE 3 ML: 0.03 SOLUTION RESPIRATORY (INHALATION) at 08:40

## 2021-01-01 RX ADMIN — DOCUSATE SODIUM 100 MG: 100 CAPSULE, LIQUID FILLED ORAL at 15:00

## 2021-01-01 RX ADMIN — LORAZEPAM 0.5 MG: 0.5 TABLET ORAL at 11:21

## 2021-01-01 RX ADMIN — SENNOSIDES 8.6 MG: 8.6 TABLET, FILM COATED ORAL at 20:03

## 2021-01-01 RX ADMIN — ISODIUM CHLORIDE 3 ML: 0.03 SOLUTION RESPIRATORY (INHALATION) at 20:09

## 2021-01-01 RX ADMIN — MIDODRINE HYDROCHLORIDE 5 MG: 5 TABLET ORAL at 06:05

## 2021-01-01 RX ADMIN — MIDODRINE HYDROCHLORIDE 5 MG: 5 TABLET ORAL at 06:14

## 2021-01-01 RX ADMIN — PANTOPRAZOLE SODIUM 40 MG: 40 TABLET, DELAYED RELEASE ORAL at 05:51

## 2021-01-01 RX ADMIN — AMIODARONE HYDROCHLORIDE 200 MG: 200 TABLET ORAL at 09:22

## 2021-01-01 RX ADMIN — ISODIUM CHLORIDE 3 ML: 0.03 SOLUTION RESPIRATORY (INHALATION) at 20:10

## 2021-01-01 RX ADMIN — APIXABAN 5 MG: 5 TABLET, FILM COATED ORAL at 08:20

## 2021-01-01 RX ADMIN — APIXABAN 5 MG: 5 TABLET, FILM COATED ORAL at 08:19

## 2021-01-01 RX ADMIN — ISODIUM CHLORIDE 3 ML: 0.03 SOLUTION RESPIRATORY (INHALATION) at 11:46

## 2021-01-01 RX ADMIN — IPRATROPIUM BROMIDE 0.5 MG: 0.5 SOLUTION RESPIRATORY (INHALATION) at 14:26

## 2021-01-01 RX ADMIN — SENNOSIDES 8.6 MG: 8.6 TABLET, FILM COATED ORAL at 21:35

## 2021-01-01 RX ADMIN — LEVALBUTEROL HYDROCHLORIDE 1.25 MG: 1.25 SOLUTION, CONCENTRATE RESPIRATORY (INHALATION) at 19:37

## 2021-01-01 RX ADMIN — FUROSEMIDE 40 MG: 10 INJECTION, SOLUTION INTRAVENOUS at 18:33

## 2021-01-01 RX ADMIN — FUROSEMIDE 40 MG: 10 INJECTION, SOLUTION INTRAMUSCULAR; INTRAVENOUS at 19:22

## 2021-01-01 RX ADMIN — IPRATROPIUM BROMIDE 0.5 MG: 0.5 SOLUTION RESPIRATORY (INHALATION) at 08:13

## 2021-01-01 RX ADMIN — AMIODARONE HYDROCHLORIDE 1 MG/MIN: 50 INJECTION, SOLUTION INTRAVENOUS at 06:51

## 2021-01-01 RX ADMIN — GUAIFENESIN 600 MG: 600 TABLET ORAL at 21:19

## 2021-01-01 RX ADMIN — LEVALBUTEROL HYDROCHLORIDE 1.25 MG: 1.25 SOLUTION, CONCENTRATE RESPIRATORY (INHALATION) at 13:13

## 2021-01-01 RX ADMIN — FUROSEMIDE 40 MG: 10 INJECTION, SOLUTION INTRAMUSCULAR; INTRAVENOUS at 10:04

## 2021-01-01 RX ADMIN — APIXABAN 5 MG: 5 TABLET, FILM COATED ORAL at 17:25

## 2021-01-01 RX ADMIN — AMIODARONE HYDROCHLORIDE 200 MG: 200 TABLET ORAL at 16:10

## 2021-01-01 RX ADMIN — SENNOSIDES 8.6 MG: 8.6 TABLET, FILM COATED ORAL at 21:16

## 2021-01-01 RX ADMIN — GUAIFENESIN 600 MG: 600 TABLET ORAL at 21:03

## 2021-01-01 RX ADMIN — MIRTAZAPINE 15 MG: 15 TABLET, FILM COATED ORAL at 21:35

## 2021-01-01 RX ADMIN — MIDODRINE HYDROCHLORIDE 5 MG: 5 TABLET ORAL at 12:12

## 2021-01-01 RX ADMIN — AMIODARONE HYDROCHLORIDE 200 MG: 200 TABLET ORAL at 08:20

## 2021-01-01 RX ADMIN — APIXABAN 5 MG: 5 TABLET, FILM COATED ORAL at 17:12

## 2021-01-01 RX ADMIN — ALBUMIN, HUMAN INJ 5% 25 G: 5 SOLUTION at 02:35

## 2021-01-01 RX ADMIN — METOPROLOL TARTRATE 25 MG: 25 TABLET, FILM COATED ORAL at 09:17

## 2021-01-01 RX ADMIN — IOHEXOL 85 ML: 350 INJECTION, SOLUTION INTRAVENOUS at 20:34

## 2021-01-01 RX ADMIN — DOCUSATE SODIUM 100 MG: 100 CAPSULE, LIQUID FILLED ORAL at 18:28

## 2021-01-01 RX ADMIN — AMIODARONE HYDROCHLORIDE 200 MG: 200 TABLET ORAL at 10:04

## 2021-01-01 RX ADMIN — GUAIFENESIN 600 MG: 600 TABLET ORAL at 08:19

## 2021-01-01 RX ADMIN — CEFTRIAXONE 1000 MG: 1 INJECTION, SOLUTION INTRAVENOUS at 09:33

## 2021-01-01 RX ADMIN — FUROSEMIDE 40 MG: 10 INJECTION, SOLUTION INTRAMUSCULAR; INTRAVENOUS at 23:05

## 2021-01-01 RX ADMIN — DOCUSATE SODIUM 100 MG: 100 CAPSULE, LIQUID FILLED ORAL at 17:03

## 2021-01-01 RX ADMIN — DOCUSATE SODIUM 100 MG: 100 CAPSULE, LIQUID FILLED ORAL at 08:51

## 2021-01-01 RX ADMIN — APIXABAN 5 MG: 5 TABLET, FILM COATED ORAL at 08:51

## 2021-01-01 RX ADMIN — MELATONIN TAB 3 MG 6 MG: 3 TAB at 21:35

## 2021-01-01 RX ADMIN — FERROUS SULFATE TAB 325 MG (65 MG ELEMENTAL FE) 325 MG: 325 (65 FE) TAB at 08:20

## 2021-01-01 RX ADMIN — AMIODARONE HYDROCHLORIDE 200 MG: 200 TABLET ORAL at 08:19

## 2021-01-01 RX ADMIN — AMIODARONE HYDROCHLORIDE 200 MG: 200 TABLET ORAL at 16:05

## 2021-01-01 RX ADMIN — ISODIUM CHLORIDE 3 ML: 0.03 SOLUTION RESPIRATORY (INHALATION) at 08:25

## 2021-01-01 RX ADMIN — LEVALBUTEROL HYDROCHLORIDE 1.25 MG: 1.25 SOLUTION, CONCENTRATE RESPIRATORY (INHALATION) at 08:13

## 2021-01-01 RX ADMIN — GUAIFENESIN 600 MG: 600 TABLET ORAL at 09:22

## 2021-01-01 RX ADMIN — FUROSEMIDE 40 MG: 10 INJECTION, SOLUTION INTRAMUSCULAR; INTRAVENOUS at 16:05

## 2021-01-01 RX ADMIN — DOCUSATE SODIUM 100 MG: 100 CAPSULE, LIQUID FILLED ORAL at 17:12

## 2021-01-01 RX ADMIN — MIDODRINE HYDROCHLORIDE 5 MG: 5 TABLET ORAL at 06:22

## 2021-01-01 RX ADMIN — APIXABAN 5 MG: 5 TABLET, FILM COATED ORAL at 18:36

## 2021-01-01 RX ADMIN — GUAIFENESIN 600 MG: 600 TABLET ORAL at 08:51

## 2021-01-01 RX ADMIN — ISODIUM CHLORIDE 3 ML: 0.03 SOLUTION RESPIRATORY (INHALATION) at 07:42

## 2021-01-01 RX ADMIN — GUAIFENESIN 600 MG: 600 TABLET ORAL at 08:16

## 2021-01-01 RX ADMIN — ALBUMIN (HUMAN) 25 G: 12.5 INJECTION, SOLUTION INTRAVENOUS at 08:05

## 2021-01-01 RX ADMIN — ALBUMIN (HUMAN) 25 G: 12.5 INJECTION, SOLUTION INTRAVENOUS at 02:35

## 2021-01-01 RX ADMIN — MIDODRINE HYDROCHLORIDE 5 MG: 5 TABLET ORAL at 16:10

## 2021-01-01 RX ADMIN — POTASSIUM CHLORIDE 20 MEQ: 200 INJECTION, SOLUTION INTRAVENOUS at 04:45

## 2021-01-01 RX ADMIN — LEVALBUTEROL HYDROCHLORIDE 1.25 MG: 1.25 SOLUTION, CONCENTRATE RESPIRATORY (INHALATION) at 13:45

## 2021-01-01 RX ADMIN — ISODIUM CHLORIDE 3 ML: 0.03 SOLUTION RESPIRATORY (INHALATION) at 13:45

## 2021-01-01 RX ADMIN — ALBUMIN (HUMAN) 12.5 G: 0.25 INJECTION, SOLUTION INTRAVENOUS at 18:17

## 2021-01-01 RX ADMIN — LEVALBUTEROL HYDROCHLORIDE 1.25 MG: 1.25 SOLUTION, CONCENTRATE RESPIRATORY (INHALATION) at 20:15

## 2021-01-01 RX ADMIN — MIDODRINE HYDROCHLORIDE 5 MG: 5 TABLET ORAL at 17:12

## 2021-01-01 RX ADMIN — SENNOSIDES 8.6 MG: 8.6 TABLET, FILM COATED ORAL at 21:48

## 2021-01-01 RX ADMIN — PANTOPRAZOLE SODIUM 40 MG: 40 TABLET, DELAYED RELEASE ORAL at 06:14

## 2021-01-01 RX ADMIN — MIDODRINE HYDROCHLORIDE 5 MG: 5 TABLET ORAL at 16:25

## 2021-01-01 RX ADMIN — FUROSEMIDE 40 MG: 10 INJECTION, SOLUTION INTRAMUSCULAR; INTRAVENOUS at 16:10

## 2021-01-01 RX ADMIN — DOCUSATE SODIUM 100 MG: 100 CAPSULE, LIQUID FILLED ORAL at 08:20

## 2021-01-01 RX ADMIN — LEVALBUTEROL HYDROCHLORIDE 1.25 MG: 1.25 SOLUTION, CONCENTRATE RESPIRATORY (INHALATION) at 20:09

## 2021-01-01 RX ADMIN — GUAIFENESIN 600 MG: 600 TABLET ORAL at 21:16

## 2021-01-01 RX ADMIN — ISODIUM CHLORIDE 3 ML: 0.03 SOLUTION RESPIRATORY (INHALATION) at 07:07

## 2021-01-01 RX ADMIN — AMIODARONE HYDROCHLORIDE 200 MG: 200 TABLET ORAL at 08:56

## 2021-01-01 RX ADMIN — GUAIFENESIN 600 MG: 600 TABLET ORAL at 08:20

## 2021-01-01 RX ADMIN — PANTOPRAZOLE SODIUM 40 MG: 40 TABLET, DELAYED RELEASE ORAL at 06:05

## 2021-01-01 RX ADMIN — LEVALBUTEROL HYDROCHLORIDE 1.25 MG: 1.25 SOLUTION, CONCENTRATE RESPIRATORY (INHALATION) at 07:42

## 2021-01-01 RX ADMIN — PANTOPRAZOLE SODIUM 40 MG: 40 TABLET, DELAYED RELEASE ORAL at 06:22

## 2021-01-01 RX ADMIN — GUAIFENESIN 600 MG: 600 TABLET ORAL at 10:08

## 2021-01-01 RX ADMIN — AMIODARONE HYDROCHLORIDE 200 MG: 200 TABLET ORAL at 17:25

## 2021-01-01 RX ADMIN — MIDODRINE HYDROCHLORIDE 5 MG: 5 TABLET ORAL at 10:46

## 2021-01-01 RX ADMIN — TORSEMIDE 20 MG: 20 TABLET ORAL at 10:23

## 2021-01-01 RX ADMIN — ISODIUM CHLORIDE 3 ML: 0.03 SOLUTION RESPIRATORY (INHALATION) at 19:37

## 2021-01-01 RX ADMIN — ISODIUM CHLORIDE 3 ML: 0.03 SOLUTION RESPIRATORY (INHALATION) at 13:51

## 2021-01-01 RX ADMIN — MELATONIN TAB 3 MG 6 MG: 3 TAB at 00:25

## 2021-01-01 RX ADMIN — FERROUS SULFATE TAB 325 MG (65 MG ELEMENTAL FE) 325 MG: 325 (65 FE) TAB at 08:56

## 2021-01-01 RX ADMIN — DOCUSATE SODIUM 100 MG: 100 CAPSULE, LIQUID FILLED ORAL at 10:04

## 2021-01-01 RX ADMIN — DOCUSATE SODIUM 100 MG: 100 CAPSULE, LIQUID FILLED ORAL at 09:22

## 2021-01-01 RX ADMIN — FERROUS SULFATE TAB 325 MG (65 MG ELEMENTAL FE) 325 MG: 325 (65 FE) TAB at 09:21

## 2021-01-01 RX ADMIN — LEVALBUTEROL HYDROCHLORIDE 1.25 MG: 1.25 SOLUTION, CONCENTRATE RESPIRATORY (INHALATION) at 20:10

## 2021-01-01 RX ADMIN — AMIODARONE HYDROCHLORIDE 0.5 MG/MIN: 50 INJECTION, SOLUTION INTRAVENOUS at 01:55

## 2021-01-01 RX ADMIN — LEVALBUTEROL HYDROCHLORIDE 1.25 MG: 1.25 SOLUTION, CONCENTRATE RESPIRATORY (INHALATION) at 07:07

## 2021-01-01 RX ADMIN — CEFEPIME HYDROCHLORIDE 2000 MG: 2 INJECTION, SOLUTION INTRAVENOUS at 05:02

## 2021-01-01 RX ADMIN — CEFEPIME HYDROCHLORIDE 2000 MG: 2 INJECTION, SOLUTION INTRAVENOUS at 06:27

## 2021-01-01 RX ADMIN — LEVALBUTEROL HYDROCHLORIDE 1.25 MG: 1.25 SOLUTION, CONCENTRATE RESPIRATORY (INHALATION) at 08:25

## 2021-01-01 RX ADMIN — AMIODARONE HYDROCHLORIDE 200 MG: 200 TABLET ORAL at 16:38

## 2021-01-01 RX ADMIN — METOPROLOL TARTRATE 25 MG: 25 TABLET, FILM COATED ORAL at 21:51

## 2021-01-01 RX ADMIN — LEVALBUTEROL HYDROCHLORIDE 1.25 MG: 1.25 SOLUTION, CONCENTRATE RESPIRATORY (INHALATION) at 22:40

## 2021-01-01 RX ADMIN — APIXABAN 5 MG: 5 TABLET, FILM COATED ORAL at 18:28

## 2021-01-01 RX ADMIN — MIDODRINE HYDROCHLORIDE 5 MG: 5 TABLET ORAL at 12:48

## 2021-05-06 PROBLEM — I63.9 CVA (CEREBRAL VASCULAR ACCIDENT) (HCC): Status: ACTIVE | Noted: 2021-01-01

## 2021-05-06 PROBLEM — G93.40 ACUTE ENCEPHALOPATHY: Status: ACTIVE | Noted: 2020-01-01

## 2021-05-06 PROBLEM — N30.90 CYSTITIS: Status: ACTIVE | Noted: 2021-01-01

## 2021-05-06 PROBLEM — R74.01 TRANSAMINITIS: Status: ACTIVE | Noted: 2021-01-01

## 2021-05-06 PROBLEM — J18.9 CAP (COMMUNITY ACQUIRED PNEUMONIA): Status: ACTIVE | Noted: 2021-01-01

## 2021-05-06 PROBLEM — I50.33 ACUTE ON CHRONIC DIASTOLIC (CONGESTIVE) HEART FAILURE (HCC): Status: ACTIVE | Noted: 2021-01-01

## 2021-05-06 PROBLEM — D50.9 IDA (IRON DEFICIENCY ANEMIA): Status: ACTIVE | Noted: 2021-01-01

## 2021-05-06 PROBLEM — R77.8 ELEVATED TROPONIN LEVEL NOT DUE MYOCARDIAL INFARCTION: Status: ACTIVE | Noted: 2021-01-01

## 2021-05-06 PROBLEM — J96.01 ACUTE RESPIRATORY FAILURE WITH HYPOXIA (HCC): Status: ACTIVE | Noted: 2021-01-01

## 2021-05-06 PROBLEM — R41.82 AMS (ALTERED MENTAL STATUS): Status: ACTIVE | Noted: 2021-01-01

## 2021-05-06 PROBLEM — A41.9 SEPSIS (HCC): Status: ACTIVE | Noted: 2021-01-01

## 2021-05-06 PROBLEM — G93.40 ACUTE ENCEPHALOPATHY: Status: ACTIVE | Noted: 2021-01-01

## 2021-05-06 PROBLEM — N17.9 AKI (ACUTE KIDNEY INJURY) (HCC): Status: ACTIVE | Noted: 2021-01-01

## 2021-05-06 NOTE — ED PROVIDER NOTES
History  Chief Complaint   Patient presents with    Altered Mental Status     pt presents to er via ems from home for complaints from him step son that patient has been experiencing increase in confusion and weakness with some uti symptoms (dark urine)     department of aging is involved and family requesting placement        Altered Mental Status  Presenting symptoms: confusion and disorientation    Severity:  Moderate  Most recent episode: Today  Episode history:  Multiple  Timing:  Constant  Progression:  Worsening  Chronicity:  New  Associated symptoms: bladder incontinence    Associated symptoms: no fever        Prior to Admission Medications   Prescriptions Last Dose Informant Patient Reported? Taking?   b complex vitamins capsule   No No   Sig: Take 1 capsule by mouth daily   ferrous sulfate 324 (65 Fe) mg   No No   Sig: Take 1 tablet (324 mg total) by mouth daily   mirtazapine (REMERON) 15 mg tablet   No No   Sig: Take 1 tablet (15 mg total) by mouth daily at bedtime   pantoprazole (PROTONIX) 40 mg tablet   No No   Sig: Take 1 tablet (40 mg total) by mouth daily in the early morning      Facility-Administered Medications: None       Past Medical History:   Diagnosis Date    Bladder cancer [facility])        Past Surgical History:   Procedure Laterality Date    ESOPHAGOGASTRODUODENOSCOPY N/A 2019    Procedure: ESOPHAGOGASTRODUODENOSCOPY (EGD); Surgeon: Ino Aguilera MD;  Location:  MAIN OR;  Service: Gastroenterology       Family History   Problem Relation Age of Onset    No Known Problems Mother     No Known Problems Father      I have reviewed and agree with the history as documented      E-Cigarette/Vaping     E-Cigarette/Vaping Substances     Social History     Tobacco Use    Smoking status: Former Smoker     Packs/day: 0 25     Types: Cigarettes     Quit date: 2020     Years since quittin 6    Smokeless tobacco: Former User    Tobacco comment: stopped smoking 2 weeks ago   Substance Use Topics    Alcohol use: Not Currently     Frequency: Monthly or less     Comment: Socially    Drug use: No       Review of Systems   Constitutional: Negative for fever  Genitourinary: Positive for bladder incontinence, decreased urine volume and frequency  Psychiatric/Behavioral: Positive for confusion  All other systems reviewed and are negative  Physical Exam  Physical Exam  Vitals signs and nursing note reviewed  Constitutional:       General: He is not in acute distress  Appearance: He is well-developed  HENT:      Head: Normocephalic and atraumatic  Right Ear: External ear normal       Left Ear: External ear normal       Nose: Nose normal    Eyes:      General: No scleral icterus  Neck:      Musculoskeletal: Normal range of motion and neck supple  Pulmonary:      Effort: Pulmonary effort is normal  No respiratory distress  Abdominal:      General: There is no distension  Palpations: Abdomen is soft  Musculoskeletal: Normal range of motion  General: Swelling present  No deformity  Comments: BL LE pitting edema  Able to move all 4 extremities  Skin:     General: Skin is warm  Findings: No rash  Neurological:      General: No focal deficit present  Mental Status: He is alert  Gait: Gait normal       Comments: Oriented to self and location   Says year is 2002   Psychiatric:         Mood and Affect: Mood normal          Vital Signs  ED Triage Vitals   Temperature Pulse Respirations Blood Pressure SpO2   05/06/21 1649 05/06/21 1717 05/06/21 1649 05/06/21 1649 05/06/21 1732   97 9 °F (36 6 °C) 91 18 129/70 97 %      Temp Source Heart Rate Source Patient Position - Orthostatic VS BP Location FiO2 (%)   05/06/21 1649 05/06/21 1649 05/06/21 1649 05/06/21 1649 --   Temporal Monitor Lying Right arm       Pain Score       --                  Vitals:    05/06/21 1649 05/06/21 1717 05/06/21 1800 05/06/21 1830   BP: 129/70  136/84 118/77   Pulse:  91 93 94 Patient Position - Orthostatic VS: Lying            Visual Acuity  Visual Acuity      Most Recent Value   L Pupil Size (mm)  2   R Pupil Size (mm)  2          ED Medications  Medications   furosemide (LASIX) injection 40 mg (has no administration in time range)   cefepime (MAXIPIME) IVPB (premix in dextrose) 2,000 mg 50 mL (0 mg Intravenous Stopped 5/6/21 1823)   vancomycin (VANCOCIN) 1250 mg in sodium chloride 0 9% 250 mL IVPB (1,250 mg Intravenous New Bag 5/6/21 1821)   iohexol (OMNIPAQUE) 350 MG/ML injection (SINGLE-DOSE) 100 mL (85 mL Intravenous Given 5/6/21 2034)       Diagnostic Studies  Results Reviewed     Procedure Component Value Units Date/Time    Troponin I [[id number]]     Lab Status: No result Specimen: Blood     Procalcitonin with AM Reflex [[id number]]  (Normal) Collected: 05/06/21 1737    Lab Status: Final result Specimen: Blood from Arm, Left Updated: 05/06/21 2052     Procalcitonin 0 12 ng/ml     Procalcitonin Reflex [[id number]]     Lab Status: No result Specimen: Blood     Novel Coronavirus (Covid-19),PCR [facility] - 2 Hour Stat [[id number]]  (Normal) Collected: 05/06/21 1729    Lab Status: Final result Specimen: Nares from Nasopharyngeal Swab Updated: 05/06/21 1906     SARS-CoV-2 Negative    Narrative: The specimen collection materials, transport medium, and/or testing methodology utilized in the production of these test results have been proven to be reliable in a limited validation with an abbreviated program under the Emergency Utilization Authorization provided by the FDA  Testing reported as "Presumptive positive" will be confirmed with secondary testing to ensure result accuracy  Clinical caution and judgement should be used with the interpretation of these results with consideration of the clinical impression and other laboratory testing  Testing reported as "Positive" or "Negative" has been proven to be accurate according to standard laboratory validation requirements    All testing is performed with control materials showing appropriate reactivity at standard intervals  Lactic acid, plasma [[id number]]  (Normal) Collected: 05/06/21 1737    Lab Status: Final result Specimen: Blood from Arm, Left Updated: 05/06/21 1828     LACTIC ACID 2 0 mmol/L     Narrative:      Result may be elevated if tourniquet was used during collection  Protime-INR [[id number]]  (Abnormal) Collected: 05/06/21 1737    Lab Status: Final result Specimen: Blood from Arm, Left Updated: 05/06/21 1828     Protime 15 3 seconds      INR 1 21    APTT [[id number]]  (Normal) Collected: 05/06/21 1737    Lab Status: Final result Specimen: Blood from Arm, Left Updated: 05/06/21 1828     PTT 36 seconds     Urine Microscopic [[id number]]  (Abnormal) Collected: 05/06/21 1748    Lab Status: Final result Specimen: Urine, Clean Catch Updated: 05/06/21 1820     RBC, UA 1-2 /hpf      WBC, UA 10-20 /hpf      Epithelial Cells None Seen /hpf      Bacteria, UA Occasional /hpf      AMORPH URATES Occasional /hpf      OTHER OBSERVATIONS WBCs Clumped    Urine culture [[id number]] Collected: 05/06/21 1748    Lab Status:  In process Specimen: Urine, Clean Catch Updated: 05/06/21 1820    UA w Reflex to Microscopic w Reflex to Culture [[id number]]  (Abnormal) Collected: 05/06/21 1748    Lab Status: Final result Specimen: Urine, Clean Catch Updated: 05/06/21 1817     Color, UA Yellow     Clarity, UA Hazy     Specific [place], UA 1 025     pH, UA 6 0     Leukocytes, UA Small     Nitrite, UA Negative     Protein,  (2+) mg/dl      Glucose, UA Negative mg/dl      Ketones, UA Negative mg/dl      Urobilinogen, UA 0 2 E U /dl      Bilirubin, UA Negative     Blood, UA Small    Rapid drug screen, urine [[id number]]  (Normal) Collected: 05/06/21 1748    Lab Status: Final result Specimen: Urine, Clean Catch Updated: 05/06/21 1815     Amph/Meth UR Negative     Barbiturate Ur Negative     Benzodiazepine Urine Negative     Cocaine Urine Negative     Methadone Urine Negative     Opiate Urine Negative     PCP Ur Negative     THC Urine Negative     Oxycodone Urine Negative    Narrative:      FOR MEDICAL PURPOSES ONLY  IF CONFIRMATION NEEDED PLEASE CONTACT THE LAB WITHIN 5 DAYS  Drug Screen Cutoff Levels:  AMPHETAMINE/METHAMPHETAMINES  1000 ng/mL  BARBITURATES     200 ng/mL  BENZODIAZEPINES     200 ng/mL  COCAINE      300 ng/mL  METHADONE      300 ng/mL  OPIATES      300 ng/mL  PHENCYCLIDINE     25 ng/mL  THC       50 ng/mL  OXYCODONE      100 ng/mL    Blood culture #1 [[id number]] Collected: 05/06/21 1737    Lab Status: In process Specimen: Blood from Arm, Left Updated: 05/06/21 1802    Blood culture #2 [[id number]] Collected: 05/06/21 1737    Lab Status: In process Specimen: Blood from Arm, Right Updated: 05/06/21 1802    NT-BNP PRO [[id number]]  (Abnormal) Collected: 05/06/21 1711    Lab Status: Final result Specimen: Blood from Arm, Left Updated: 05/06/21 1801     NT-proBNP 12,875 pg/mL     Salicylate level [[id number]]  (Abnormal) Collected: 05/06/21 1711    Lab Status: Final result Specimen: Blood from Arm, Left Updated: 89/79/31 9500     Salicylate Lvl <3 mg/dL     Acetaminophen level-If concentration is detectable, please discuss with medical  on call   [[id number]]  (Abnormal) Collected: 05/06/21 1711    Lab Status: Final result Specimen: Blood from Arm, Left Updated: 05/06/21 1801     Acetaminophen Level <2 0 ug/mL     Ammonia [[id number]]  (Abnormal) Collected: 05/06/21 1711    Lab Status: Final result Specimen: Blood from Arm, Left Updated: 05/06/21 1800     Ammonia <10 umol/L     CK [[id number]]  (Normal) Collected: 05/06/21 1711    Lab Status: Final result Specimen: Blood from Arm, Left Updated: 05/06/21 1748     Total CK 86 U/L     Troponin I [[id number]]  (Abnormal) Collected: 05/06/21 1711    Lab Status: Final result Specimen: Blood from Arm, Left Updated: 05/06/21 1743     Troponin I 0 09 ng/mL     Comprehensive metabolic panel [[id number]] (Abnormal) Collected: 05/06/21 1711    Lab Status: Final result Specimen: Blood from Arm, Left Updated: 05/06/21 1741     Sodium 141 mmol/L      Potassium 5 1 mmol/L      Chloride 96 mmol/L      CO2 32 mmol/L      ANION GAP 13 mmol/L      BUN 38 mg/dL      Creatinine 1 42 mg/dL      Glucose 106 mg/dL      Calcium 9 1 mg/dL       U/L       U/L      Alkaline Phosphatase 105 U/L      Total Protein 8 6 g/dL      Albumin 3 6 g/dL      Total Bilirubin 0 60 mg/dL      eGFR 48 ml/min/1 73sq m     Narrative:      [facility] guidelines for Chronic Kidney Disease (CKD):     Stage 1 with normal or high GFR (GFR > 90 mL/min/1 73 square meters)    Stage 2 Mild CKD (GFR = 60-89 mL/min/1 73 square meters)    Stage 3A Moderate CKD (GFR = 45-59 mL/min/1 73 square meters)    Stage 3B Moderate CKD (GFR = 30-44 mL/min/1 73 square meters)    Stage 4 Severe CKD (GFR = 15-29 mL/min/1 73 square meters)    Stage 5 End Stage CKD (GFR <15 mL/min/1 73 square meters)  Note: GFR calculation is accurate only with a steady state creatinine    Ethanol [[id number]]  (Normal) Collected: 05/06/21 1711    Lab Status: Final result Specimen: Blood from Arm, Left Updated: 05/06/21 1736     Ethanol Lvl 3 mg/dL     POCT Blood Gas (CG8+) [[id number]]  (Abnormal) Collected: 05/06/21 1716    Lab Status: Final result Specimen: Venous Updated: 05/06/21 1721     ph, [place] ISTAT 7 329     pCO2, [place] i-STAT 71 0 mm HG      pO2, [place] i-STAT 22 0 mm HG      BE, i-STAT 9 mmol/L      HCO3, [place] i-STAT 37 3 mmol/L      CO2, i-STAT 39 mmol/L      O2 Sat, i-STAT 32 %      SODIUM, I-STAT 137 mmol/l      Potassium, i-STAT 4 4 mmol/L      Calcium, Ionized i-STAT 1 12 mmol/L      Hct, i-STAT 40 %      Hgb, i-STAT 13 6 g/dl      Glucose, i-STAT 114 mg/dl      Specimen Type VENOUS    CBC and differential [[id number]]  (Abnormal) Collected: 05/06/21 1711    Lab Status: Final result Specimen: Blood from Arm, Left Updated: 05/06/21 1721     WBC 15 33 Thousand/uL      RBC 5 24 Million/uL      Hemoglobin 10 6 g/dL      Hematocrit 40 0 %      MCV 76 fL      MCH 20 2 pg      MCHC 26 5 g/dL      RDW 19 5 %      MPV 9 7 fL      Platelets 992 Thousands/uL      nRBC 1 /100 WBCs      Neutrophils Relative 82 %      Immat GRANS % 1 %      Lymphocytes Relative 7 %      Monocytes Relative 9 %      Eosinophils Relative 0 %      Basophils Relative 1 %      Neutrophils Absolute 12 54 Thousands/µL      Immature Grans Absolute 0 10 Thousand/uL      Lymphocytes Absolute 1 11 Thousands/µL      Monocytes Absolute 1 37 Thousand/µL      Eosinophils Absolute 0 05 Thousand/µL      Basophils Absolute 0 16 Thousands/µL                  CTA ED chest PE Study   Final Result by Yesi Olson MD (05/06 2108)      1  No evidence of pulmonary embolus  2   Subsegmental atelectasis and fibrosis in the bases of both lower lobes  3   Small bibasilar pleural effusions  4   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, unchanged since a CTA from 1/10/2019    5   Gastric dilatation  Workstation performed: [id number]         CT head wo contrast   Final Result by Gary Mai MD (05/06 1730)         Small cortical and subcortical infarct in the left posterior parietal region, possibly acute to subacute  Questionable focus of subacute microhemorrhage within the infarct     No mass effect  MRI may be helpful for further evaluation  The study was marked in [facility] for immediate notification  Workstation performed: [id number]         XR chest 1 view portable    (Results Pending)              Procedures  Procedures         ED Course                             SBIRT 20yo+      Most Recent Value   SBIRT (22 yo +)   In order to provide better care to our patients, we are screening all of our patients for alcohol and drug use  Would it be okay to ask you these screening questions?   Yes Filed at: 05/06/2021 5253   Initial Alcohol Screen: US AUDIT-C    1  How often do you have a drink containing alcohol?  0 Filed at: 05/06/2021 1721   2  How many drinks containing alcohol do you have on a typical day you are drinking? 0 Filed at: 05/06/2021 1721   3a  Male UNDER 65: How often do you have five or more drinks on one occasion? 0 Filed at: 05/06/2021 1721   3b  FEMALE Any Age, or MALE 65+: How often do you have 4 or more drinks on one occassion? 0 Filed at: 05/06/2021 1721   Audit-C Score  0 Filed at: 05/06/2021 1721   SABRINA: How many times in the past year have you    Used an illegal drug or used a prescription medication for non-medical reasons? Never Filed at: 05/06/2021 1721                    MDM  Number of Diagnoses or Management Options  Acute encephalopathy: new and requires workup  Altered mental status: new and requires workup  CVA (cerebral vascular accident) [facility]): new and requires workup  Elevated troponin: new and requires workup  Diagnosis management comments: 68 yom with worsening confusion, LE edema, urinary incontinence  Obtain labs, UA, CTH  Amount and/or Complexity of Data Reviewed  Clinical lab tests: ordered and reviewed  Tests in the radiology section of CPT®: ordered and reviewed  Tests in the medicine section of CPT®: ordered and reviewed  Decide to obtain previous medical records or to obtain history from someone other than the patient: yes  Review and summarize past medical records: yes  Independent visualization of images, tracings, or specimens: yes        Disposition  Final diagnoses:    Altered mental status   CVA (cerebral vascular accident) ([facility] [address] )   Acute encephalopathy   Elevated troponin     Time reflects when diagnosis was documented in both MDM as applicable and the Disposition within this note     Time User Action Codes Description Comment    5/6/2021  9:37 PM Tricia Nugent [R41 82] Altered mental status     5/6/2021  9:37 PM Tricia Nugent [I63 9] [facility]     5/6/2021  9:37 PM Robbie Angulo Add [G93 40] Acute encephalopathy     5/6/2021  9:37 PM Robbie Angulo Add [R77 8] Elevated troponin       ED Disposition     ED Disposition Condition Date/Time Comment    Admit Stable Thu May 6, 2021  9:37 PM Case was discussed with SAMANTHA and the patient's admission status was agreed to be Admission Status: inpatient status to the service of Dr Emanuel Mosqueda   Follow-up Information    None         Patient's Medications   Discharge Prescriptions    No medications on file     No discharge procedures on file      PDMP Review     None          ED Provider  Electronically Signed by           Katja Rosales DO  05/06/21 4194

## 2021-05-07 NOTE — OCCUPATIONAL THERAPY NOTE
Occupational Therapy Evaluation     Patient Name: Kassie Kaur  YRVKG'[id number] Date: 5/7/2021  Problem List  Principal Problem:    Acute on chronic diastolic (congestive) heart failure ([facility] )  Active Problems: Aortic disorder (HCC)    PUD (peptic ulcer disease)    Depressive disorder    Elevated troponin level not due myocardial infarction    Acute encephalopathy    CVA (cerebral vascular accident) ([facility] )    DEYSI (acute kidney injury) ([facility] )    Cystitis    CAP (community acquired pneumonia)    Transaminitis    Sepsis ([facility] )    ALVIN (iron deficiency anemia)    Acute respiratory failure with hypoxia [facility])    Past Medical History  Past Medical History:   Diagnosis Date    Bladder cancer [facility])      Past Surgical History  Past Surgical History:   Procedure Laterality Date    ESOPHAGOGASTRODUODENOSCOPY N/A 1/13/2019    Procedure: ESOPHAGOGASTRODUODENOSCOPY (EGD); Surgeon: Charlotte Jefferson MD;  Location:  MAIN OR;  Service: Gastroenterology             05/07/21 1403   OT Last Visit   OT Visit Date 05/07/21   Note Type   Note type Evaluation   Restrictions/Precautions   Weight Bearing Precautions Per Order No   Other Precautions Impulsive;Cognitive; Chair Alarm; Bed Alarm;O2;Fall Risk  (6L O2; R side neglect? )   Pain Assessment   Pain Assessment Tool Pain Assessment not indicated - pt denies pain   Home Living   Type of Home Apartment  ([facility] )   Home Layout One level   Bathroom Shower/Tub Tub/shower unit   Bathroom Toilet Standard   Bathroom Equipment   (None )   Bathroom Accessibility Accessible   Home Equipment Other (Comment)  (None per pt )   Additional Comments Unable to obtain history from pt  Therefore info obtained from chart  Prior Function   Lives With Family  (RENAN)   Receives Help From Family  (RENAN)   ADL Assistance Independent   IADLs Needs assistance   Falls in the last 6 months   (Unable to state)   Comments Unable to obtain PLOF from pt  Info obtained from chart      Lifestyle   Autonomy Pt w/ unintelligible and scattered thoughts  Decreased attention and inability to follow commands  Therefore hsitory obtained from chart  Pt was (I) w/ ADLs and had assist w/ IADLS  Ambulates without AD  Functional decline since wife passed  RENAN is moving to [place] for job next week and cannot care for pt  Reciprocal Relationships RENAN    Intrinsic Gratification None stated    Psychosocial   Psychosocial (WDL) X   Patient Behaviors/Mood Cooperative  (Scattered thoughts; Decreased attention )   Subjective   Subjective I'm clocking out    ADL   Eating Assistance 5  Supervision/Setup   Eating Deficit Setup   Grooming Assistance 5  Supervision/Setup   Grooming Deficit Setup;Verbal cueing;Supervision/safety;Brushing hair   UB Bathing Assistance 4  Minimal Assistance   UB Bathing Deficit Setup   LB Bathing Assistance 3  Moderate Assistance   LB Bathing Deficit Setup   UB Dressing Assistance 4  Minimal Assistance   UB Dressing Deficit Setup   LB Dressing Assistance 2  Maximal Assistance   LB Dressing Deficit Setup   Toileting Assistance  2  Maximal Assistance   Toileting Deficit   (Incontinent of urine )   Bed Mobility   Supine to Sit 3  Moderate assistance   Additional items HOB elevated; Bedrails; Increased time required;Verbal cues;LE management  (Required repeat of instructions multiple times )   Additional Comments Remains OOB in recliner w/ all needs and alarm engaged    Transfers   Sit to Stand 4  Minimal assistance   Additional items Armrests; Increased time required;Verbal cues   Stand to Sit 4  Minimal assistance   Additional items Armrests; Increased time required;Verbal cues   Stand pivot 4  Minimal assistance   Additional items Increased time required  (RW- pushed away toward end of session )   Balance   Static Sitting Fair +   Dynamic Sitting Fair   Static Standing Fair -   Dynamic Standing Poor +   Ambulatory Poor +   Activity Tolerance   Activity Tolerance Treatment limited secondary to medical complications (Comment)  (Decreased attention and inability to follow commands )   Medical Staff Made Aware Barbara LUNA   Nurse Made Aware RNFreddy    RUE Assessment   RUE Assessment WFL  (4-/5 t/o all planes)   LUE Assessment   LUE Assessment WFL  (4-/5 t/o all planes)   Hand Function   Gross Motor Coordination Functional   Fine Motor Coordination Impaired  (B/L w/ R worse than L )   Sensation   Light Touch No apparent deficits  (Denies numbness)   Cognition   Overall Cognitive Status Impaired   Arousal/Participation Alert   Attention Difficulty attending to directions   Orientation Level Oriented to person   Memory Decreased short term memory;Decreased recall of recent events;Decreased recall of precautions;Decreased recall of biographical information   Following Commands Follows one step commands inconsistently   Comments Pt w/ unintelligble speech, scattered thoughts, decreased attention, and difficulties following commands  Assessment   Limitation Decreased ADL status; Decreased UE strength;Decreased cognition;Decreased endurance;Decreased self-care trans;Decreased high-level ADLs   Prognosis Fair   Assessment Pt admit 21 with acute on chronic heart failure, DEYSI, CAP, Sepsis, and CVA   CT Head revealed: Small cortical and subcortical infarct in the left posterior parietal region, possibly acute to subacute  Questionable focus of subacute microhemorrhage within the infarct  OT completed extensive review of pt's medical and social history  Pt with h/o bladder CA, depression, and CHF  Prior to admit was living w/ RENAN in a [address] [place] [address]  However, RENAN is moving to [place] next week and cannot care for pt  (I) w/ ADLS and ambulation without AD  Assist for IADLS  Functional decline since his wife   Pt presents to OT below baseline due to the following performance deficits: strength;  Coordination; balance; stand tolerance; functional mobility; problem solving; sequencing; attention; awareness; coping; community integration; and self care  Therefore, pt would benefit from OT services to achieve optimal level of performance  Occupational performance areas to be addressed include: grooming, bathing, toileting, dressing, activity tolerance, functional mobility, and clothing management  Pt required Mod for bed mobility and Min for transfers using RW  However, pt pushes RW to the side because he didn't use at home  Unable to follow commands  Scattered thoughts and decreased focus  Decreased coordination noted in both hands w/ R being worse than L  Appears w/ R side neglect  Based on findings, pt is of high complexity  The patient's raw score on the AM-PAC Daily Activity inpatient short form is 15, standardized score is 34 69, less than 39 4  Patients at this level are likely to benefit from discharge to post-acute rehabilitation services  Recommend STR to achieve optimal performance w/ potential placement due to decrease social situation and inability to care for self at this time  Goals   Patient Goals None stated    Plan   Treatment Interventions ADL retraining;Functional transfer training;UE strengthening/ROM; Endurance training;Cognitive reorientation;Patient/family training;Equipment evaluation/education; Activityengagement; Energy conservation   Goal Expiration Date 05/21/21   OT Treatment Day 0   OT Frequency 3-5x/wk   Recommendation   OT Discharge Recommendation Post acute rehabilitation services   AM-[facility] Daily Activity Inpatient   Lower Body Dressing 2   Bathing 2   Toileting 2   Upper Body Dressing 3   Grooming 3   Eating 3   Daily Activity Raw Score 15   Daily Activity Standardized Score (Calc for Raw Score >=11) 34 69   AM-[facility] Applied Cognition Inpatient   Following a Speech/Presentation 2   Understanding Ordinary Conversation 2   Taking Medications 1   Remembering Where Things Are Placed or Put Away 1   Remembering List of 4-5 Errands 1   Taking Care of Complicated Tasks 1   Applied Cognition Raw Score 8   Applied Cognition Standardized Score 19 32     GOALS:     · Pt will complete grooming tasks w/ (S)     Pt will complete UB ADL's w/ (S)      Pt will complete LB ADL's w/ (S)      Pt will complete toileting including hygiene and clothing management w/ (S)      Pt will complete functional transfers w/ (S) using most appropriate method      Pt will complete dynamic stand balance w/ F for safe clothing management      Pt will improve stand tolerance to 5-10 mins for safety w/ ADL tasks      Pt will maintain attention to task greater than 10 mins for ADL tasks      Pt will achieve UE MMT 4+/5 to increase independence w/ ADL txfs      Pt will increase B/L hand FMC to Good for grooming and ADL tasks      Pt will participate in cognitive assessment to determine level of safety for disposition      SouleymaneSherman Birdie CASTLE, OTR/L

## 2021-05-07 NOTE — ASSESSMENT & PLAN NOTE
· Present on admission as evidence by an SpO2 of 83% on room air  · Improvement of SpO2 to 96% on 4 L supplemental oxygen via nasal cannula   · Will titrate oxygen as tolerated   · Of note, patient did have a desaturation requiring 9 L supplemental oxygen, however suspect that this was in the setting of fluid overload s/p vancoymin administration   · On my evaluation pt was on 4 5L NC with an SpO2 of 94%   · Will monitor oxygen requirements s/p lasix administration

## 2021-05-07 NOTE — ASSESSMENT & PLAN NOTE
Yaz Vasques is a 68 y o  male with major depressive disorder, gastric outlet obstruction, bladder cancer, CHF, and peptic ulcer disease who presents from home after the Department of Aging checked on him on 05/06 insist that he should be sent to the ED for altered mental status  Per chart review,  step son had reported that patient has been experiencing increased confusion and weakness  Work Up:   CT head in ED revealed small cortical and subcortical infarcts in the left posterior parietal region likely acute to subacute  In addition a questionable focus of subacute microhemorrhage within the infarct  No mass effect  Transthoracic echocardiogram revealed an EF of 55% with wall thickness mildly increased and an abnormal left ventricular relaxation consistent with a grade 1 2 diastolic dysfunction  Patient refusing MRI at this time  Will repeat head CT tonight and depending on findings will consider initiating anti-platelet therapy  No CTA at this time  Plan:   - Stroke pathway   Patient refusing MRI brain, will repeat CT head tonight   Hold anti-platelet/anticoagulation in the setting of concern by hemorrhagic conversion within the ischemic infarct until repeat CT completed   Lipid Panel pending   Hemoglobin A1c pending   Recommend Atorvastatin 40 mg   Continue telemetry   PT/OT/ST   Frequent neuro checks  Continue to monitor and notify neurology with any changes  - Medical management and supportive care per primary team  Correction of any metabolic or infectious disturbances

## 2021-05-07 NOTE — ASSESSMENT & PLAN NOTE
· Troponin at 0 09 on admission, will trend for total of 3   · No EKG changes   · Suspect that this is non MI troponin elevation in the setting of acute heart failure

## 2021-05-07 NOTE — ASSESSMENT & PLAN NOTE
· Pt with productive cough - no fever  · CXR with ? infiltrate   · Started on vancomycin and cefepime in the ED, will continue  · Initial procalcitonin negative, will continue antibiotics unless 2nd returns negative  · Urine strep and Legionella studies   · Sputum culture and Gram stain

## 2021-05-07 NOTE — ASSESSMENT & PLAN NOTE
· Hgb at 10 6 on admission   · Baseline Hgb 8-10  · Indices c/w ALVIN - microcytic with anisocytosis and hypochromic   · Check iron panel   · Transfuse for Hgb <7

## 2021-05-07 NOTE — ASSESSMENT & PLAN NOTE
Wt Readings from Last 3 Encounters:   09/21/20 63 kg (138 lb 14 2 oz)   09/18/20 62 5 kg (137 lb 12 8 oz)   01/14/19 63 5 kg (140 lb)     · Last echo January 2019: "LVEF 60%  Mild hypokinesis of the mid inferoseptal and basal mid inferior walls  Wall thickness mildly increased  Grade 1 diastolic dysfunction  No significant valvular disease  Possible mild pulmonary hypertension "  · No home diuretic use reported  · CT chest: "small bibasilar pleural effusions"  · BNP 74540  · Troponin 0 09  · On admission - coarse breath sounds throughout, however limited based on patient's inability to follow all commands, 4+ pitting edema to b/l LEs   · Lasix 40 mg IV given in the ED - if urine output > 400 cc in the 1st 4 hours, will maintain on Lasix 40 b i d    · Updated echo  · Strict I/Os  · Daily standing weights  · Cardiac diet - sodium and fluid restriction  · Will monitor on tele until updated EF is known

## 2021-05-07 NOTE — PLAN OF CARE
Problem: PHYSICAL THERAPY ADULT  Goal: Performs mobility at highest level of function for planned discharge setting  See evaluation for individualized goals  Description: Treatment/Interventions: ADL retraining, Functional transfer training, LE strengthening/ROM, Therapeutic exercise, Endurance training, Cognitive reorientation, Patient/family training, Equipment eval/education, Gait training, Spoke to nursing, Spoke to case management, OT  Equipment Recommended: Joan Haley       See flowsheet documentation for full assessment, interventions and recommendations  Note: Prognosis: Good  Problem List: Decreased strength, Decreased endurance, Impaired balance, Decreased mobility, Decreased coordination, Impaired judgement, Decreased safety awareness  Assessment: Pt presetns with harsh cough, some dysarthria, Gen wekaness adn R inattention, decreased coordination, balance and gait  Can benefit from skilled PT to regain stregnth, coordination balance and gait  Recommend in-pt rehab at d/c to maximize funciotnal recovery  Will follow see goals  Barriers to Discharge: (medical status  mobility)        PT Discharge Recommendation: Post acute rehabilitation services     PT - OK to Discharge: No    See flowsheet documentation for full assessment

## 2021-05-07 NOTE — ED NOTES
Condom cath applied for accurate I and O as patient is incontinent of both bowel and bladder  Simple mask at 6L o2 applied with physician approval as when patient falls asleep patient desats to 80's  Patient habitually mouth breaths making nasal cannula ineffective        Meme Song RN  05/07/21 [id number]

## 2021-05-07 NOTE — ASSESSMENT & PLAN NOTE
· , , T bili 0 60, PT 15 3, INR 1 21  · No imaging performed of the liver  · Obtain hepatic function panel and acute hepatitis panel  · Consideration for right upper quadrant ultrasound

## 2021-05-07 NOTE — PHYSICAL THERAPY NOTE
PT eval   05/07/21 1405   PT Last Visit   PT Visit Date 05/07/21   Note Type   Note type Evaluation   Pain Assessment   Pain Assessment Tool Pain Assessment not indicated - pt denies pain   Pain Score No Pain   Home Living   Additional Comments pt denies AD pta   Prior Function   Level of [place] Independent with ADLs and functional mobility   Lives With Son   Receives Help From Family   ADL Assistance Independent   IADLs Needs assistance   Falls in the last 6 months 0   Vocational Retired   Restrictions/Precautions   [facility] Bearing Precautions Per Order No   Other Precautions Chair Alarm; Bed Alarm;Cognitive; Fall Risk;O2  (02 6L)   General   Additional Pertinent History quit smoking 2 weeks ago, adm with CHF, hypoxia, r/o uti adn cva on Ct of head, MRI pending, sltered mental status on admission and prior suicide attempt   Family/Caregiver Present No   Cognition   Overall Cognitive Status WFL   Arousal/Participation Alert   Orientation Level Oriented to person   Memory Decreased recall of precautions;Decreased recall of recent events;Decreased short term memory   Following Commands Follows one step commands with increased time or repetition   Comments delayed processing   RUE Assessment   RUE Assessment WFL   LUE Assessment   LUE Assessment WFL   RLE Assessment   RLE Assessment WFL  (3+/5)   LLE Assessment   LLE Assessment WFL  (3+/5)   Coordination   Movements are Fluid and Coordinated 0   Coordination and Movement Description marita kinetic decreased coordination R>L some R neglect   Proprioception   RLE Proprioception Grossly intact   LLE Proprioception Grossly Intact   Bed Mobility   Rolling L 4  Minimal assistance   Additional items Assist x 1;Bedrails; Increased time required;Verbal cues;LE management   Supine to Sit 3  Moderate assistance   Additional items Assist x 1; Increased time required; Bedrails;LE management;Verbal cues   Transfers   Sit to Stand 4  Minimal assistance   Additional items Assist x 1;Bedrails;Armrests; Increased time required;Verbal cues   Stand to Sit 4  Minimal assistance   Additional items Assist x 1; Armrests; Increased time required;Verbal cues   Stand pivot 4  Minimal assistance   Additional items Assist x 1;Bedrails;Armrests; Increased time required;Verbal cues  (rw assist to grasp walker in R hand)   Ambulation/Elevation   Gait pattern Shuffling;Decreased foot clearance;Narrow AURY; Forward Flexion; Inconsistent khadar; Short stride; Step to   Gait Assistance 4  Minimal assist   Additional items Assist x 1   Assistive Device Rolling walker   Distance 5'   Balance   Static Sitting Fair   Dynamic Sitting Fair -   Static Standing Fair   Dynamic Standing Fair -   Ambulatory Fair -   Endurance Deficit   Endurance Deficit Yes   Endurance Deficit Description 02 dips to 80s at times then returns to 92% on 6L   Activity Tolerance   Activity Tolerance Patient limited by fatigue   Medical Staff Made Aware OT [facility]   Assessment   Prognosis Good   Problem List Decreased strength;Decreased endurance; Impaired balance;Decreased mobility; Decreased coordination; Impaired judgement;Decreased safety awareness   Assessment Pt presetns with harsh cough, some dysarthria, Gen wekaness adn R inattention, decreased coordination, balance and gait  Can benefit from skilled PT to regain stregnth, coordination balance and gait  Recommend in-pt rehab at d/c to maximize funciotnal recovery  Will follow see goals   Barriers to Discharge   (medical status  mobility)   Goals   Patient Goals not stated cooperative but sad re wifes passing   STG Expiration Date 05/17/21   Short Term Goal #1 1) safe ind transfers 2) safe ind amb with rw 100' level 3) improve strength 1/2 grade B les 4) imrpvoe balance to good   Plan   Treatment/Interventions ADL retraining;Functional transfer training;LE strengthening/ROM; Therapeutic exercise; Endurance training;Cognitive reorientation;Patient/family training;Equipment eval/education;Gait training;Spoke to nursing;Spoke to case management;OT   PT Frequency 5x/wk   Recommendation   PT Discharge Recommendation Post acute rehabilitation services   Equipment Recommended [facility] Recommended Wheeled walker   PT - OK to Discharge No   AM-PAC Basic Mobility Inpatient   Turning in Bed Without Bedrails 3   Lying on Back to Sitting on Edge of Flat Bed 3   Moving Bed to Chair 3   Standing Up From Chair 3   Walk in Room 2   Climb 3-5 Stairs 1   Basic Mobility Inpatient Raw Score 15   Basic Mobility Standardized Score 36 97   Modified Eliecer Scale   Modified Eliecer Scale 4   Barbara Gillespie, PT

## 2021-05-07 NOTE — CONSULTS
Consultation - Neurology   Annettefranky Golden 68 y o  male MRN: [id number]  Unit/Bed#: -01 Encounter: [id number]      Assessment/Plan     CVA (cerebral vascular accident) [facility])  Rick Murphy is a 68 y o  male with major depressive disorder, gastric outlet obstruction, bladder cancer, CHF, and peptic ulcer disease who presents from home after the Department of Aging checked on him on 05/06 insist that he should be sent to the ED for altered mental status  Per chart review,  step son had reported that patient has been experiencing increased confusion and weakness  Work Up:   CT head in ED revealed small cortical and subcortical infarcts in the left posterior parietal region likely acute to subacute  In addition a questionable focus of subacute microhemorrhage within the infarct  No mass effect  Transthoracic echocardiogram revealed an EF of 55% with wall thickness mildly increased and an abnormal left ventricular relaxation consistent with a grade 1 2 diastolic dysfunction  Patient refusing MRI at this time  Will repeat head CT tonight and depending on findings will consider initiating anti-platelet therapy  No CTA at this time  Plan:   - Stroke pathway   Patient refusing MRI brain, will repeat CT head tonight   Hold anti-platelet/anticoagulation in the setting of concern by hemorrhagic conversion within the ischemic infarct until repeat CT completed   Lipid Panel pending   Hemoglobin A1c pending   Recommend Atorvastatin 40 mg   Continue telemetry   PT/OT/ST   Frequent neuro checks  Continue to monitor and notify neurology with any changes  - Medical management and supportive care per primary team  Correction of any metabolic or infectious disturbances  Acute encephalopathy  Assessment & Plan  Patient was brought in from home at the recommendation of the Department of Aging secondary to altered mental status    At that time patient found to have met SIRS criteria  Source of infection unclear, UTI versus pneumonia  Encephalopathy likely multifactorial, in the presence of sepsis/metabolic disturbances, and in the presence of acute/subacute parietal infarcts  Plan:   - Medical management and correction of metabolic and infectious disturbances per primary team   - Avoid CNS altering medications if possible  - Continue supportive care   - Continue to monitor neurologic status  Notify neurology with any changes in exam    - fall precautions        Sepsis [facility])  Assessment & Plan  Patient met SIRS criteria on admission with a leukocytosis of 15,000 and tachycardia  Suspected source pneumonia versus cystitis  COVID-19 negative  Antibiotic treatment per primary team  Blood cultures and urine cultures pending  Recommendations for outpatient neurological follow up have yet to be determined  History of Present Illness     Reason for Consult / Principal Problem:  Abnormal CT head  HPI: Christ Simmons is a 68 y o  male with major depressive disorder, gastric outlet obstruction, bladder cancer, CHF, suicidal ideationand peptic ulcer disease who presents from home after the Department of Aging checked on him on 05/06 and insisted that he should be sent to the ED for altered mental status  Per chart review,  step son had reported that patient has been experiencing increased confusion and weakness for some time  On arrival to the ED, patient found to have met SIRS criteria on admission with leukocytosis of 15,000 and tachycardia  Suspected source pneumonia versus cystitis  COVID-19 negative  Empirical antibiotics initiated by primary team   Blood cultures pending, urine cultures pending  In addition patient found with acute on chronic heart failure  Patient found with +4 pitting edema in bilateral lower extremities, Lasix trial given      CT head in ED revealed small cortical and subcortical infarcts in the left posterior parietal region likely acute to subacute  In addition a questionable focus of subacute microhemorrhage within the infarct  No mass effect  Antiplatelets held at this time given concern for microhemorrhage  Patient refusing MRI this time, therefore we will repeat head CT  Depending on findings may consider anti-platelet therapy  Today on exam patient reports that he is feeling fine, and "just done with all of this"  Patient is able to follow 2 step commands, patient is oriented to place and self  Patient is disoriented to time and situation  Denies headache, weakness, speech difficulty, nausea, or vomiting  Inpatient consult to Neurology  Consult performed by: MANSI Powell  Consult ordered by: Linda Cowart DO        Review of Systems   Unable to perform ROS: Mental status change       Historical Information   Past Medical History:   Diagnosis Date    Bladder cancer [facility])      Past Surgical History:   Procedure Laterality Date    ESOPHAGOGASTRODUODENOSCOPY N/A 2019    Procedure: ESOPHAGOGASTRODUODENOSCOPY (EGD); Surgeon: Jewell Dickey MD;  Location:  MAIN OR;  Service: Gastroenterology     Social History   Social History     Substance and Sexual Activity   Alcohol Use Not Currently    Frequency: Monthly or less    Comment: Socially     Social History     Substance and Sexual Activity   Drug Use No     E-Cigarette/Vaping     E-Cigarette/Vaping Substances     Social History     Tobacco Use   Smoking Status Former Smoker    Packs/day: 0 25    Types: Cigarettes    Quit date: 2020    Years since quittin 6   Smokeless Tobacco Former Dirk    stopped smoking 2 weeks ago     Family History:   Family History   Problem Relation Age of Onset    No Known Problems Mother     No Known Problems Father        Review of previous medical records was  completed      Meds/Allergies   all current active meds have been reviewed, current meds:   Current Facility-Administered Medications   Medication Dose Route Frequency    acetaminophen (TYLENOL) tablet 650 mg  650 mg Oral Q6H PRN    cefepime (MAXIPIME) IVPB (premix in dextrose) 2,000 mg 50 mL  2,000 mg Intravenous Q12H    furosemide (LASIX) injection 40 mg  40 mg Intravenous BID (diuretic)    guaiFENesin (MUCINEX) 12 hr tablet 600 mg  600 mg Oral Q12H [facility]    vancomycin (VANCOCIN) 1250 mg in sodium chloride 0 9% 250 mL IVPB  20 mg/kg Intravenous Q24H    and PTA meds:   Prior to Admission Medications   Prescriptions Last Dose Informant Patient Reported? Taking?   b complex vitamins capsule   No No   Sig: Take 1 capsule by mouth daily   ferrous sulfate 324 (65 Fe) mg   No No   Sig: Take 1 tablet (324 mg total) by mouth daily   mirtazapine (REMERON) 15 mg tablet   No No   Sig: Take 1 tablet (15 mg total) by mouth daily at bedtime   pantoprazole (PROTONIX) 40 mg tablet   No No   Sig: Take 1 tablet (40 mg total) by mouth daily in the early morning      Facility-Administered Medications: None       No Known Allergies    Objective   Vitals:Blood pressure 104/64, pulse 90, temperature 97 8 °F (36 6 °C), temperature source Axillary, resp  rate 20, SpO2 96 %  ,There is no height or weight on file to calculate BMI  No intake or output data in the 24 hours ending 05/07/21 1650    Invasive Devices: Invasive Devices     Peripheral Intravenous Line            Peripheral IV 05/07/21 Left;Ventral (anterior) Forearm less than 1 day                Physical Exam  Vitals signs and nursing note reviewed  Constitutional:       Appearance: He is ill-appearing  Comments: BMI 19 37   HENT:      Head: Normocephalic  Right Ear: External ear normal       Left Ear: External ear normal       Nose: Nose normal       Mouth/Throat:      Mouth: Mucous membranes are moist    Eyes:      General: No scleral icterus  Extraocular Movements: Extraocular movements intact and EOM normal       Pupils: Pupils are equal, round, and reactive to light     Neck:      Musculoskeletal: Normal range of motion  Cardiovascular:      Pulses: Normal pulses  Pulmonary:      Effort: Pulmonary effort is normal  No respiratory distress  Musculoskeletal: Normal range of motion  General: No swelling, tenderness, deformity or signs of injury  Skin:     General: Skin is warm and dry  Neurological:      Mental Status: He is alert  Coordination: Finger-Nose-Finger Test normal       Deep Tendon Reflexes: Strength normal       Reflex Scores:       Bicep reflexes are 1+ on the right side and 1+ on the left side  Brachioradialis reflexes are 1+ on the right side and 1+ on the left side  Patellar reflexes are 1+ on the right side and 1+ on the left side  Comments: Detailed below  Psychiatric:      Comments: Patient is impulsive       Neurologic Exam     Mental Status   Oriented to person  Oriented to place  Disoriented to time  Follows 2 step commands  Attention: normal    Able to repeat  Patient is oriented to self and place  Unable to state date  Believes it to be 2012  Does state that it is "early summer or late spring"  Able to follow 2 step commands  Speech is clear  Able to repeat phrases  Cranial Nerves   Cranial nerves II through XII intact  CN II   Visual fields full to confrontation  CN III, IV, VI   Pupils are equal, round, and reactive to light  Extraocular motions are normal    Right pupil: Size: 3 mm  Left pupil: Size: 3 mm  Nystagmus: none   Diplopia: none  Upgaze: normal  Downgaze: normal  Conjugate gaze: present    CN V   Facial sensation intact  CN VII   Facial expression full, symmetric  CN VIII   Hearing: impaired    CN XI   CN XI normal      CN XII   CN XII normal      Motor Exam   Muscle bulk: normal  Overall muscle tone: normal  Right arm pronator drift: present  Left arm pronator drift: absent    Strength   Strength 5/5 throughout       Sensory Exam   Light touch normal      Gait, Coordination, and Reflexes     Coordination Finger to nose coordination: normal    Tremor   Resting tremor: absent  Intention tremor: absent    Reflexes   Right brachioradialis: 1+  Left brachioradialis: 1+  Right biceps: 1+  Left biceps: 1+  Right patellar: 1+  Left patellar: 1+  Right plantar: equivocal  Left plantar: equivocal  Gait appears unsteady           Lab Results:   CBC:   Results from last 7 days   Lab Units 05/07/21  0419 05/06/21  1716 05/06/21  1711   WBC Thousand/uL 21 82*  --  15 33*   RBC Million/uL 4 85  --  5 24   HEMOGLOBIN g/dL 9 8*  --  10 6*   I STAT HEMOGLOBIN g/dl  --  13 6  --    HEMATOCRIT % 36 9  --  40 0   HEMATOCRIT, ISTAT %  --  40  --    MCV fL 76*  --  76*   PLATELETS Thousands/uL 453*  --  533*   , BMP/CMP:   Results from last 7 days   Lab Units 05/07/21  1545 05/07/21 0419 05/06/21  1716 05/06/21  1711   SODIUM mmol/L 142  --   --  141   POTASSIUM mmol/L 3 8  --   --  5 1   CHLORIDE mmol/L 98*  --   --  96*   CO2 mmol/L 39*  --   --  32   CO2, I-STAT mmol/L  --   --  39*  --    BUN mg/dL 42*  --   --  38*   CREATININE mg/dL 1 39*  --   --  1 42*   GLUCOSE, ISTAT mg/dl  --   --  114  --    CALCIUM mg/dL 8 0*  --   --  9 1   AST U/L  --  106*  --  214*   ALT U/L  --  139*  --  188*   ALK PHOS U/L  --  96  --  105   EGFR ml/min/1 73sq m 49  --   --  48   , Vitamin B12:   , HgBA1C:   , TSH:   , Coagulation:   Results from last 7 days   Lab Units 05/07/21  0419   INR  1 17   , Lipid Profile:   Results from last 7 days   Lab Units 05/07/21  1453   HDL mg/dL 28*   LDL CALC mg/dL 30   TRIGLYCERIDES mg/dL 106   , Ammonia:   Results from last 7 days   Lab Units 05/06/21  1711   AMMONIA umol/L <10*   , Urinalysis:   Results from last 7 days   Lab Units 05/06/21  1748   COLOR UA  Yellow   CLARITY UA  Hazy   SPEC GRAV UA  1 025   PH UA  6 0   LEUKOCYTES UA  Small*   NITRITE UA  Negative   GLUCOSE UA mg/dl Negative   KETONES UA mg/dl Negative   BILIRUBIN UA  Negative   BLOOD UA  Small*   , Drug Screen:   Results from last 7 days   Lab Units 05/06/21  1748   BARBITURATE UR  Negative   BENZODIAZEPINE UR  Negative   THC UR  Negative   COCAINE UR  Negative   METHADONE URINE  Negative   OPIATE UR  Negative   PCP UR  Negative     Imaging Studies: I have personally reviewed pertinent reports  and I have personally reviewed pertinent films in PACS  EKG, Pathology, and Other Studies: I have personally reviewed pertinent reports  and I have personally reviewed pertinent films in PACS  VTE Prophylaxis: Sequential compression device (Venodyne)     Code Status: Level 1 - Full Code    Counseling / Coordination of Care  Total time spent today 57 minutes  Greater than 50% of total time was spent with the patient and/or family counseling and/or coordination of care  A description of the counseling/coordination of care:  Patient was seen and evaluated  Discussed with attending  Chart reviewed thoroughly including laboratory and imaging studies    Plan of care discussed with patient and primary team

## 2021-05-07 NOTE — PLAN OF CARE
Problem: Potential for Falls  Goal: Patient will remain free of falls  Description: INTERVENTIONS:  - Assess patient frequently for physical needs  -  Identify cognitive and physical deficits and behaviors that affect risk of falls  -  [place] fall precautions as indicated by assessment   - Educate patient/family on patient safety including physical limitations  - Instruct patient to call for assistance with activity based on assessment  - Modify environment to reduce risk of injury  - Consider OT/PT consult to assist with strengthening/mobility  Outcome: Progressing     Problem: Prexisting or High Potential for Compromised Skin Integrity  Goal: Skin integrity is maintained or improved  Description: INTERVENTIONS:  - Identify patients at risk for skin breakdown  - Assess and monitor skin integrity  - Assess and monitor nutrition and hydration status  - Monitor labs   - Assess for incontinence   - Turn and reposition patient  - Assist with mobility/ambulation  - Relieve pressure over bony prominences  - Avoid friction and shearing  - Provide appropriate hygiene as needed including keeping skin clean and dry  - Evaluate need for skin moisturizer/barrier cream  - Collaborate with interdisciplinary team   - Patient/family teaching  - Consider wound care consult   Outcome: Progressing     Problem: Neurological Deficit  Goal: Neurological status is stable or improving  Description: Interventions:  - Monitor and assess patient's level of consciousness, motor function, sensory function, and level of assistance needed for ADLs  - Monitor and report changes from baseline  Collaborate with interdisciplinary team to initiate plan and implement interventions as ordered  - Provide and maintain a safe environment  - Consider seizure precautions  - Consider fall precautions  - Consider aspiration precautions  - Consider bleeding precautions  Outcome: Progressing     Problem:  Activity Intolerance/Impaired Mobility  Goal: Mobility/activity is maintained at optimum level for patient  Description: Interventions:  - Assess and monitor patient  barriers to mobility and need for assistive/adaptive devices  - Assess patient's emotional response to limitations  - Collaborate with interdisciplinary team and initiate plans and interventions as ordered  - Encourage independent activity per ability   - Maintain proper body alignment  - Perform active/passive rom as tolerated/ordered  - Plan activities to conserve energy   - Turn patient as appropriate  Outcome: Progressing     Problem: Communication Impairment  Goal: Ability to express needs and understand communication  Description: Assess patient's communication skills and ability to understand information  Patient will demonstrate use of effective communication techniques, alternative methods of communication and understanding even if not able to speak  - Encourage communication and provide alternate methods of communication as needed  - Collaborate with case management/ for discharge needs  - Include patient/family/caregiver in decisions related to communication  Outcome: Progressing     Problem: Potential for Aspiration  Goal: Non-ventilated patient's risk of aspiration is minimized  Description: Assess and monitor vital signs, respiratory status, and labs (WBC)  Monitor for signs of aspiration (tachypnea, cough, rales, wheezing, cyanosis, fever)  - Assess and monitor patient's ability to swallow  - Place patient up in chair to eat if possible  - HOB up at 90 degrees to eat if unable to get patient up into chair   - Supervise patient during oral intake  - Instruct patient/ family to take small bites  - Instruct patient/ family to take small single sips when taking liquids    - Follow patient-specific strategies generated by speech pathologist   Outcome: Progressing  Goal: Ventilated patient's risk of aspiration is minimized  Description: Assess and monitor vital signs, respiratory status, airway cuff pressure, and labs (WBC)  Monitor for signs of aspiration (tachypnea, cough, rales, wheezing, cyanosis, fever)  - Elevate head of bed 30 degrees if patient has tube feeding   - Monitor tube feeding  Outcome: Progressing     Problem: Nutrition  Goal: Nutrition/Hydration status is improving  Description: Monitor and assess patient's nutrition/hydration status for malnutrition (ex- brittle hair, bruises, dry skin, pale skin and conjunctiva, muscle wasting, smooth red tongue, and disorientation)  Collaborate with interdisciplinary team and initiate plan and interventions as ordered  Monitor patient's weight and dietary intake as ordered or per policy  Utilize nutrition screening tool and intervene per policy  Determine patient's food preferences and provide high-protein, high-caloric foods as appropriate  - Assist patient with eating   - Allow adequate time for meals   - Encourage patient to take dietary supplement as ordered  - Collaborate with clinical nutritionist   - Include patient/family/caregiver in decisions related to nutrition    Outcome: Progressing     Problem: PAIN - ADULT  Goal: Verbalizes/displays adequate comfort level or baseline comfort level  Description: Interventions:  - Encourage patient to monitor pain and request assistance  - Assess pain using appropriate pain scale  - Administer analgesics based on type and severity of pain and evaluate response  - Implement non-pharmacological measures as appropriate and evaluate response  - Consider cultural and social influences on pain and pain management  - Notify physician/advanced practitioner if interventions unsuccessful or patient reports new pain  Outcome: Progressing     Problem: INFECTION - ADULT  Goal: Absence or prevention of progression during hospitalization  Description: INTERVENTIONS:  - Assess and monitor for signs and symptoms of infection  - Monitor lab/diagnostic results  - Monitor all insertion sites, i e  indwelling lines, tubes, and drains  - Monitor endotracheal if appropriate and nasal secretions for changes in amount and color  - [place] appropriate cooling/warming therapies per order  - Administer medications as ordered  - Instruct and encourage patient and family to use good hand hygiene technique  - Identify and instruct in appropriate isolation precautions for identified infection/condition  Outcome: Progressing  Goal: Absence of fever/infection during neutropenic period  Description: INTERVENTIONS:  - Monitor WBC    Outcome: Progressing     Problem: SAFETY ADULT  Goal: Patient will remain free of falls  Description: INTERVENTIONS:  - Assess patient frequently for physical needs  -  Identify cognitive and physical deficits and behaviors that affect risk of falls    -  [place] fall precautions as indicated by assessment   - Educate patient/family on patient safety including physical limitations  - Instruct patient to call for assistance with activity based on assessment  - Modify environment to reduce risk of injury  - Consider OT/PT consult to assist with strengthening/mobility  Outcome: Progressing  Goal: Maintain or return to baseline ADL function  Description: INTERVENTIONS:  -  Assess patient's ability to carry out ADLs; assess patient's baseline for ADL function and identify physical deficits which impact ability to perform ADLs (bathing, care of mouth/teeth, toileting, grooming, dressing, etc )  - Assess/evaluate cause of self-care deficits   - Assess range of motion  - Assess patient's mobility; develop plan if impaired  - Assess patient's need for assistive devices and provide as appropriate  - Encourage maximum independence but intervene and supervise when necessary  - Involve family in performance of ADLs  - Assess for home care needs following discharge   - Consider OT consult to assist with ADL evaluation and planning for discharge  - Provide patient education as appropriate  Outcome: Progressing  Goal: Maintain or return mobility status to optimal level  Description: INTERVENTIONS:  - Assess patient's baseline mobility status (ambulation, transfers, stairs, etc )    - Identify cognitive and physical deficits and behaviors that affect mobility  - Identify mobility aids required to assist with transfers and/or ambulation (gait belt, sit-to-stand, lift, walker, cane, etc )  - [place] fall precautions as indicated by assessment  - Record patient progress and toleration of activity level on Mobility SBAR; progress patient to next Phase/Stage  - Instruct patient to call for assistance with activity based on assessment  - Consider rehabilitation consult to assist with strengthening/weightbearing, etc   Outcome: Progressing     Problem: DISCHARGE PLANNING  Goal: Discharge to home or other facility with appropriate resources  Description: INTERVENTIONS:  - Identify barriers to discharge w/patient and caregiver  - Arrange for needed discharge resources and transportation as appropriate  - Identify discharge learning needs (meds, wound care, etc )  - Arrange for interpretive services to assist at discharge as needed  - Refer to Case Management Department for coordinating discharge planning if the patient needs post-hospital services based on physician/advanced practitioner order or complex needs related to functional status, cognitive ability, or social support system  Outcome: Progressing     Problem: Knowledge Deficit  Goal: Patient/family/caregiver demonstrates understanding of disease process, treatment plan, medications, and discharge instructions  Description: Complete learning assessment and assess knowledge base    Interventions:  - Provide teaching at level of understanding  - Provide teaching via preferred learning methods  Outcome: Progressing     Problem: CARDIOVASCULAR - ADULT  Goal: Maintains optimal cardiac output and hemodynamic stability  Description: INTERVENTIONS:  - Monitor I/O, vital signs and rhythm  - Monitor for S/S and trends of decreased cardiac output  - Administer and titrate ordered vasoactive medications to optimize hemodynamic stability  - Assess quality of pulses, skin color and temperature  - Assess for signs of decreased coronary artery perfusion  - Instruct patient to report change in severity of symptoms  Outcome: Progressing  Goal: Absence of cardiac dysrhythmias or at baseline rhythm  Description: INTERVENTIONS:  - Continuous cardiac monitoring, vital signs, obtain 12 lead EKG if ordered  - Administer antiarrhythmic and heart rate control medications as ordered  - Monitor electrolytes and administer replacement therapy as ordered  Outcome: Progressing     Problem: METABOLIC, FLUID AND ELECTROLYTES - ADULT  Goal: Electrolytes maintained within normal limits  Description: INTERVENTIONS:  - Monitor labs and assess patient for signs and symptoms of electrolyte imbalances  - Administer electrolyte replacement as ordered  - Monitor response to electrolyte replacements, including repeat lab results as appropriate  - Instruct patient on fluid and nutrition as appropriate  Outcome: Progressing  Goal: Fluid balance maintained  Description: INTERVENTIONS:  - Monitor labs   - Monitor I/O and WT  - Instruct patient on fluid and nutrition as appropriate  - Assess for signs & symptoms of volume excess or deficit  Outcome: Progressing

## 2021-05-07 NOTE — ASSESSMENT & PLAN NOTE
Patient met SIRS criteria on admission with a leukocytosis of 15,000 and tachycardia  Suspected source pneumonia versus cystitis  COVID-19 negative  Antibiotic treatment per primary team  Blood cultures and urine cultures pending

## 2021-05-07 NOTE — ASSESSMENT & PLAN NOTE
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

## 2021-05-07 NOTE — ASSESSMENT & PLAN NOTE
· Patient was discharged from hospital in September 2020 to inpatient psych unit after patient expressed suicidal ideation with a plan  This was a voluntary commitment  · After being discharged family has moved in with him to help take care of them, however [facility] checked on him today and felt that he was no longer safe to be at home    · Home regimen:  Remeron 15 mg hs  · Will hold in the setting of acute encephalopathy

## 2021-05-07 NOTE — QUICK NOTE
Met with patient's friend, Nila Pulido, updated phone number in chart  However prefers to be called Ruth Beal  He is moving to [place] next week and will no longer be able to help care for [place]  He did provide patient's sons number, Nela Mckeon should be able to help make decisions  Anticipate transition to long term care from rehab

## 2021-05-07 NOTE — ASSESSMENT & PLAN NOTE
· Alert and oriented to self and year, which family states is new  · Unable to do NIHSS on admission as pt does not follow all commands (will not do EOM test or aphasia/dysarthria testing)  Does move all extremities on command with good strength and sensation  Unable to test coordination due to encephalopathy  · CT head: "Small cortical and subcortical infarct in the left posterior parietal region, possibly acute to subacute  Questionable focus of subacute microhemorrhage within the infarct     No mass effect "  · Goal of normotension due to concern for microhemorrhage  · Neuro consult  · Neuro checks q2h   · MRI brain   · Updated echo

## 2021-05-07 NOTE — ED NOTES
Received call from [facility] [phone]  from [facility] he reports involved  Requests case management to call             Anish Killian RN  05/07/21 8227

## 2021-05-07 NOTE — SEPSIS NOTE
Sepsis Note   [facility] 68 y o  male MRN: [id number]  Unit/Bed#: ED 08 Encounter: [id number]      qSOFA     Row Name 05/06/21 2309 05/06/21 2300 05/06/21 2200 05/06/21 2100 05/06/21 2000    Altered mental status GCS < 15  --  --  --  --  --    Respiratory Rate > / =22  0  1  1  1  --    Systolic BP < / =304  0  0  0  0  0    Q Sofa Score  0  1  1  2  1    Row Name 05/06/21 1900 05/06/21 1830 05/06/21 1800 05/06/21 1721 05/06/21 1649    Altered mental status GCS < 15  --  --  --  1  --    Respiratory Rate > / =22  1  0  0  --  0    Systolic BP < / =071  0  0  0  --  0    Q Sofa Score  2  1  0  1  0        Initial Sepsis Screening     Row Name 05/06/21 4238                Is the patient's history suggestive of a new or worsening infection? (!) Yes (Proceed)  -NS        Suspected source of infection  urinary tract infection;pneumonia  -NS        Are two or more of the following signs & symptoms of infection both present and new to the patient? (!) Yes (Proceed)  -NS        Indicate SIRS criteria  Tachycardia > 90 bpm;Leukocytosis (WBC > 75103 IJL)  -NS        If the answer is yes to both questions, suspicion of sepsis is present  --        If severe sepsis is present AND tissue hypoperfusion perists in the hour after fluid resuscitation or lactate > 4, the patient meets criteria for SEPTIC SHOCK  --        Are any of the following organ dysfunction criteria present within 6 hours of suspected infection and SIRS criteria that are NOT considered to be chronic conditions?   No  -NS        Organ dysfunction  --        Date of presentation of severe sepsis  --        Time of presentation of severe sepsis  --        Tissue hypoperfusion persists in the hour after crystalloid fluid administration, evidenced, by either:  --        Was hypotension present within one hour of the conclusion of crystalloid fluid administration?  --        Date of presentation of septic shock  --        Time of presentation of septic shock  --          User Key  (r) = Recorded By, (t) = Taken By, (c) = Cosigned By    [address] Name Provider Type    CAITLYN Sanchez PA-C Physician Assistant

## 2021-05-07 NOTE — ASSESSMENT & PLAN NOTE
· SIRS criteria met on admission: tachycardia with a pulse of 94bpm and leukocytosis at 15  33K  · Suspected source is PNA vs cystitis   · No severe sepsis criteria met   · COVID 19 PCR negative   · UA consistent with infection  · CXR ? infilatrate   · Started on vanc and cefepime - will continue   · Blood cultures x2, pending   · U/C, pending  · Repeat procal, pending   · BP stable

## 2021-05-07 NOTE — PROGRESS NOTES
Progress Note - Leslee Crow 68 y o  male MRN: [id number]    Unit/Bed#: ED 10 Encounter: [id number]      Assessment:  Principal Problem:    Acute on chronic diastolic (congestive) heart failure (HCC)  Active Problems:    Elevated troponin level not due myocardial infarction    Acute encephalopathy    DEYSI (acute kidney injury) ([facility] )    Cystitis    CAP (community acquired pneumonia)    Transaminitis    Sepsis ([facility] )    Acute respiratory failure with hypoxia (HCC)    Aortic disorder (HCC)    PUD (peptic ulcer disease)    CVA (cerebral vascular accident) ([facility] )    ALVIN (iron deficiency anemia)    Depressive disorder  Resolved Problems:    * No resolved hospital problems  *        Plan:  · Acute on chronic CHF-patient presented with shortness of breath-now on IV Lasix-H CT of the chest shows small bibasilar pleural effusions-patient on cardiac diet-for echo to assess LV function  · Altered mental status-concern over new CVA-CT of the brain showed small cortical and subcortical left posterior parietal area-neuro was consulted-difficult historian  · Acute kidney injury-creatinine on admission 1 42 will repeat creatinine and follow serial  · Elevated troponin-0 14-will consult Cardiology and await echo check serial  EKG  · Possible community-acquired pneumonia-I am more suspicious that the changes are due to CHF-procalcitonin pending was started on empiric Vanco and cefepime in the ER-repeat chest x-ray in the a m  · Sepsis criteria with Possible cystitis-day 2  Of empiric antibiotic therapy urine cultures pending  Subjective:   Patient still in ER waiting bed up stairs-some mild confusion to time in place-he denies chest pain or shortness of breath at present  ROS  Comprehensive system review negative other than noted above    Objective:     Vitals: Blood pressure 104/62, pulse 98, temperature 98 °F (36 7 °C), temperature source Temporal, resp  rate (!) 33, SpO2 100 %  ,There is no height or weight on file to calculate BMI   Current Facility-Administered Medications   Medication Dose Route Frequency Provider Last Rate Last Admin    acetaminophen (TYLENOL) tablet 650 mg  650 mg Oral Q6H PRN Jamee Chery PA-C        cefepime (MAXIPIME) IVPB (premix in dextrose) 2,000 mg 50 mL  2,000 mg Intravenous Q12H Jamee Chery PA-C 100 mL/hr at 05/07/21 0502 2,000 mg at 05/07/21 0502    furosemide (LASIX) injection 40 mg  40 mg Intravenous BID (diuretic) Jamee Chery PA-C   40 mg at 05/07/21 0817    guaiFENesin (MUCINEX) 12 hr tablet 600 mg  600 mg Oral Q12H [address] Jamee Chery PA-C   600 mg at 05/07/21 0816    vancomycin (VANCOCIN) 1250 mg in sodium chloride 0 9% 250 mL IVPB  20 mg/kg Intravenous Q24H Jamee Chery PA-C         Current Outpatient Medications   Medication Sig Dispense Refill    b complex vitamins capsule Take 1 capsule by mouth daily 30 capsule 0    ferrous sulfate 324 (65 Fe) mg Take 1 tablet (324 mg total) by mouth daily 30 tablet 0    mirtazapine (REMERON) 15 mg tablet Take 1 tablet (15 mg total) by mouth daily at bedtime 30 tablet 0    pantoprazole (PROTONIX) 40 mg tablet Take 1 tablet (40 mg total) by mouth daily in the early morning 30 tablet 0         No intake or output data in the 24 hours ending 05/07/21 0947    Physical Exam:  General appearance: cooperative and distracted  Neck: no adenopathy, no carotid bruit, no JVD, supple, symmetrical, trachea midline and thyroid not enlarged, symmetric, no tenderness/mass/nodules  Lungs: Rales in the bases  Heart: regular rate and rhythm, S1, S2 normal, no murmur, click, rub or gallop  Abdomen: soft, non-tender; bowel sounds normal; no masses,  no organomegaly  Extremities: extremities normal, warm and well-perfused; no cyanosis, clubbing, or edema  Skin: Skin color, texture, turgor normal  No rashes or lesions  Neurologic:  Limited recall of recent events       Lab, Imaging and other studies: I have personally reviewed pertinent reports  Results from last 7 days   Lab Units 05/07/21 0419 05/06/21 1716 05/06/21  1711   WBC Thousand/uL 21 82*  --  15 33*   HEMOGLOBIN g/dL 9 8*  --  10 6*   I STAT HEMOGLOBIN g/dl  --  13 6  --    HEMATOCRIT % 36 9  --  40 0   HEMATOCRIT, ISTAT %  --  40  --    PLATELETS Thousands/uL 453*  --  533*   NEUTROS PCT %  --   --  82*   LYMPHS PCT %  --   --  7*   MONOS PCT %  --   --  9   EOS PCT %  --   --  0     Results from last 7 days   Lab Units 05/07/21 0419 05/06/21 1716 05/06/21  1711   POTASSIUM mmol/L  --   --  5 1   CHLORIDE mmol/L  --   --  96*   CO2 mmol/L  --   --  32   CO2, I-STAT mmol/L  --  39*  --    BUN mg/dL  --   --  38*   CREATININE mg/dL  --   --  1 42*   CALCIUM mg/dL  --   --  9 1   ALK PHOS U/L 96  --  105   ALT U/L 139*  --  188*   AST U/L 106*  --  214*   GLUCOSE, ISTAT mg/dl  --  114  --      Lab Results   Component Value Date    TROPONINI 0 14 (H) 05/07/2021    TROPONINI 0 16 (H) 05/06/2021    TROPONINI 0 09 (H) 05/06/2021    CKTOTAL 86 05/06/2021     Results from last 7 days   Lab Units 05/07/21 0419 05/06/21  1737   INR  1 17 1 21*     Lab Results   Component Value Date    BLOODCX Received in Microbiology Lab  Culture in Progress  05/06/2021    BLOODCX Received in Microbiology Lab  Culture in Progress  05/06/2021       Imaging:  Results for orders placed during the hospital encounter of 05/06/21   XR chest 1 view portable    Narrative CHEST     INDICATION:   Shortness of breath  COMPARISON:  January 13, 2019    EXAM PERFORMED/VIEWS:  XR CHEST PORTABLE      FINDINGS:    Heart shadow is enlarged but unchanged from prior exam     There are trace costophrenic angle pleural effusions  Bibasilar atelectasis is noted  Prominence of pulmonary vascular and interstitial markings suggesting mild pulmonary vascular congestion  No pneumothorax  Osseous structures appear within normal limits for patient age        Impression Unchanged cardiomegaly with suggestion of mild pulmonary vascular congestion and trace costophrenic angle effusions  Workstation performed: [id number]       Results for orders placed during the hospital encounter of 01/10/19   X-ray chest 2 views    Narrative CHEST     INDICATION:   chest pain  Dizziness  Syncopal episode  Vomiting  COMPARISON:  None    EXAM PERFORMED/VIEWS:  XR CHEST PA & LATERAL  Images: 3    FINDINGS:    Cardiomediastinal silhouette appears unremarkable  Patchy apical interstitial and airspace opacities bilaterally  No effusions  No congestive failure  No pneumothorax  No acute osseous abnormality  Stomach appears significantly distended with a large air-fluid level present best seen on the lateral view  Impression Apical interstitial and airspace opacities  Stomach is markedly distended with a large amount of fluid layering, best seen on the lateral view  CT of the chest, abdomen and pelvis has already been obtained  Workstation performed: [id number]         PATIENT CENTERED ROUNDS: I have performed rounds with the nursing staff            Brandon Mosher DO

## 2021-05-07 NOTE — ASSESSMENT & PLAN NOTE
· UA with 10-20 wbc's and occasional bacteria  · Urine culture, pending   · Continue vancomycin and cefepime for now

## 2021-05-07 NOTE — H&P
[place]  H&P- Carly Jeffrey 1945, 68 y o  male MRN: [id number]  Unit/Bed#: ED 08 Encounter: [id number]  Primary Care Provider: No primary care provider on file  Date and time admitted to hospital: 5/6/2021  4:56 PM    * Acute on chronic diastolic (congestive) heart failure [facility])  Assessment & Plan  Wt Readings from Last 3 Encounters:   09/21/20 63 kg (138 lb 14 2 oz)   09/18/20 62 5 kg (137 lb 12 8 oz)   01/14/19 63 5 kg (140 lb)     · Last echo January 2019: "LVEF 60%  Mild hypokinesis of the mid inferoseptal and basal mid inferior walls  Wall thickness mildly increased  Grade 1 diastolic dysfunction  No significant valvular disease  Possible mild pulmonary hypertension "  · No home diuretic use reported  · CT chest: "small bibasilar pleural effusions"  · BNP 90462  · Troponin 0 09  · On admission - coarse breath sounds throughout, however limited based on patient's inability to follow all commands, 4+ pitting edema to b/l LEs   · Lasix 40 mg IV given in the ED - if urine output > 400 cc in the 1st 4 hours, will maintain on Lasix 40 b i d  · Updated echo  · Strict I/Os  · Daily standing weights  · Cardiac diet - sodium and fluid restriction  · Will monitor on tele until updated EF is known     CVA (cerebral vascular accident) [facility])  Assessment & Plan  · Alert and oriented to self and year, which family states is new  · Unable to do NIHSS on admission as pt does not follow all commands (will not do EOM test or aphasia/dysarthria testing)  Does move all extremities on command with good strength and sensation  Unable to test coordination due to encephalopathy  · CT head: "Small cortical and subcortical infarct in the left posterior parietal region, possibly acute to subacute  Questionable focus of subacute microhemorrhage within the infarct     No mass effect "  · Goal of normotension due to concern for microhemorrhage  · Neuro consult  · Neuro checks q2h   · MRI brain   · Updated echo     DEYSI (acute kidney injury) ([facility] )  Assessment & Plan  · Creatinine on admission 1 42   · Last creatinine on file 0 82   · Suspected etiology:  Hypervolemia in the setting of acute heart failure  · Will trend BMP daily in the setting of IV diuresis    Elevated troponin level not due myocardial infarction  Assessment & Plan  · Troponin at 0 09 on admission, will trend for total of 3   · No EKG changes   · Suspect that this is non MI troponin elevation in the setting of acute heart failure    Cystitis  Assessment & Plan  · UA with 10-20 wbc's and occasional bacteria  · Urine culture, pending   · Continue vancomycin and cefepime for now    CAP (community acquired pneumonia)  Assessment & Plan  · Pt with productive cough - no fever  · CXR with ? infiltrate   · Started on vancomycin and cefepime in the ED, will continue  · Initial procalcitonin negative, will continue antibiotics unless 2nd returns negative  · Urine strep and Legionella studies   · Sputum culture and Gram stain    Transaminitis  Assessment & Plan  · , , T bili 0 60, PT 15 3, INR 1 21  · No imaging performed of the liver  · Obtain hepatic function panel and acute hepatitis panel  · Consideration for right upper quadrant ultrasound    Sepsis ([facility] )  Assessment & Plan  · SIRS criteria met on admission: tachycardia with a pulse of 94bpm and leukocytosis at 15  33K  · Suspected source is PNA vs cystitis   · No severe sepsis criteria met   · COVID 19 PCR negative   · UA consistent with infection  · CXR ? infilatrate   · Started on vanc and cefepime - will continue   · Blood cultures x2, pending   · U/C, pending  · Repeat procal, pending   · BP stable     Acute encephalopathy  Assessment & Plan  · Only alert oriented to self and year - follows some simple commands, exhibits tangential thinking   · UDS negative   · Coma panel negative   · Suspected etiology:  Infection secondary to cystitis or pneumonia vs acute heart failure ALVIN (iron deficiency anemia)  Assessment & Plan  · Hgb at 10 6 on admission   · Baseline Hgb 8-10  · Indices c/w ALVIN - microcytic with anisocytosis and hypochromic   · Check iron panel   · Transfuse for Hgb <7     Acute respiratory failure with hypoxia (HCC)  Assessment & Plan  · Present on admission as evidence by an SpO2 of 83% on room air  · Improvement of SpO2 to 96% on 4 L supplemental oxygen via nasal cannula   · Will titrate oxygen as tolerated   · Of note, patient did have a desaturation requiring 9 L supplemental oxygen, however suspect that this was in the setting of fluid overload s/p vancoymin administration   · On my evaluation pt was on 4 5L NC with an SpO2 of 94%   · Will monitor oxygen requirements s/p lasix administration     Aortic disorder (HCC)  Assessment & Plan  · CT chest: "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, unchanged since a CTA from 1/10/2019 "    PUD (peptic ulcer disease)  Assessment & Plan  · Patient admitted in September 2020 due to acute GI bleed secondary to duodenal ulcer  · After receiving 2 units of PRBCs patient's hemoglobin was stable at 8 2 when he was discharged to an inpatient psychiatric unit due to reports of suicidal ideation with plan    Depressive disorder  Assessment & Plan  · Patient was discharged from hospital in September 2020 to inpatient psych unit after patient expressed suicidal ideation with a plan  This was a voluntary commitment  · After being discharged family has moved in with him to help take care of them, however Department of Aging checked on him today and felt that he was no longer safe to be at home    · Home regimen:  Remeron 15 mg hs  · Will hold in the setting of acute encephalopathy    VTE Prophylaxis: Held in setting of questionable microhemorrhagic CVA on CT head  / sequential compression device   Code Status: Level 1 Full Code be default as pt it too encephalopathic to answer   POLST: POLST form is not discussed and not completed at this time  Discussion with family: Attempted to call the patient's family to obtain more hx and review medications, however the home number listed on the chart went to voicemail and the "friend" number is not a working number  EMS told ED that macy expressed concern about confusion and UTI, however there is no number listed in the chart for stepson  Anticipated Length of Stay:  Patient will be admitted on an Inpatient basis with an anticipated length of stay of  > 2 midnights  Justification for Hospital Stay:  Acute heart failure, acute injury, acute versus subacute CVA, acute encephalopathy, sepsis, transaminitis, acute respiratory failure with hypoxia    Total Time for Visit, including Counseling / Coordination of Care: 60 minutes  Greater than 50% of this total time spent on direct patient counseling and coordination of care  Chief Complaint:   "I feel like crap"    History of Present Illness:  Limited secondary to patient's acute encephalopathy     Amena Rivera is a 68 y o  male with past medical history of PUD, major depressive disorder, and gastric outlet obstruction who presents from home after the Department of Aging checked on him today and felt that he should be seen in the ED  Pt reportedly lives with family after being d/c from inpatient psych unit in Fall 2020, however family was unable to be reached for any further history  Family reportedly requesting placement  Review of Systems:    Review of Systems   Unable to perform ROS: Mental status change       Past Medical and Surgical History:     Past Medical History:   Diagnosis Date    Bladder cancer [facility])        Past Surgical History:   Procedure Laterality Date    ESOPHAGOGASTRODUODENOSCOPY N/A 1/13/2019    Procedure: ESOPHAGOGASTRODUODENOSCOPY (EGD);   Surgeon: Alan Howard MD;  Location: [facility];  Service: Gastroenterology       Meds/Allergies:    Prior to Admission medications Medication Sig Start Date End Date Taking? Authorizing Provider   b complex vitamins capsule Take 1 capsule by mouth daily 20   Alvaro Kelley MD   ferrous sulfate 324 (65 Fe) mg Take 1 tablet (324 mg total) by mouth daily 20   Alvaro Kelley MD   mirtazapine (REMERON) 15 mg tablet Take 1 tablet (15 mg total) by mouth daily at bedtime 20   Alvaro Kelley MD   pantoprazole (PROTONIX) 40 mg tablet Take 1 tablet (40 mg total) by mouth daily in the early morning 20   Alvaro Kelley MD     unable to verify medications, as I was unable to reach any family     Allergies: No Known Allergies    Social History:     Marital Status:    Occupation: retired   Patient Pre-hospital Living Situation: lives with family   Patient Pre-hospital Level of Mobility: occasionally uses walker   Patient Pre-hospital Diet Restrictions: none   Substance Use History:   Social History     Substance and Sexual Activity   Alcohol Use Not Currently    Frequency: Monthly or less    Comment: Socially     Social History     Tobacco Use   Smoking Status Former Smoker    Packs/day: 0 25    Types: Cigarettes    Quit date: 2020    Years since quittin 6   Smokeless Tobacco Former Dirk    stopped smoking 2 weeks ago     Social History     Substance and Sexual Activity   Drug Use No       Family History:    Family History   Problem Relation Age of Onset    No Known Problems Mother     No Known Problems Father        Physical Exam:     Vitals:   Blood Pressure: 134/80 (21)  Pulse: 98 (21)  Temperature: 98 4 °F (36 9 °C) (21)  Temp Source: Oral (21)  Respirations: 20 (21)  SpO2: 97 % (21)    Physical Exam  Vitals signs and nursing note reviewed  Constitutional:       Appearance: He is ill-appearing  Comments: Disheveled  Unkept   HENT:      Nose: Nose normal       Mouth/Throat:      Mouth: Mucous membranes are dry        Comments: Poor dentition  Eyes:      General: No scleral icterus  Conjunctiva/sclera: Conjunctivae normal       Comments: Pt refuses to participate in EOM testing however dose track provider intermittently around the room  PERRL b/l    Neck:      Musculoskeletal: Normal range of motion and neck supple  Cardiovascular:      Rate and Rhythm: Regular rhythm  Tachycardia present  Pulses: Normal pulses  Heart sounds: No murmur  Pulmonary:      Comments: Coarse breath sounds throughout  No appreciable rales  Limited exam secondary to patient's inability to follow commands  Abdominal:      General: Abdomen is flat  Bowel sounds are normal  There is no distension  Palpations: Abdomen is soft  Tenderness: There is no abdominal tenderness  There is no guarding or rebound  Musculoskeletal:      Comments: 4+ pitting edema to bilateral lower extremities  No signs of erythema or drainage  No open wounds  Skin:     General: Skin is warm and dry  Coloration: Skin is pale  Neurological:      Mental Status: He is alert  Comments: Patient is only oriented to self and that he is in a hospital   He is unable to answer month, season year, or president  He is unclear of the current situation  NIH stroke scale unable to be obtained on admission secondary to patient only following occasional commands  He does move all extremities on command with good strength  Sensation is equal in upper and lower extremities  Unable to evaluate coordination as patient does follow commands  Patient does not exhibit any dysarthria while talking, however unable to fully evaluate aphasia  Psychiatric:      Comments: Patient is very fidgety in bed  He exhibits tangential thinking  Additional Data:     Lab Results: I have personally reviewed pertinent reports        Results from last 7 days   Lab Units 05/06/21  1716 05/06/21  1711   WBC Thousand/uL  --  15 33*   HEMOGLOBIN g/dL  --  10 6*   I STAT HEMOGLOBIN g/dl 13  6  --    HEMATOCRIT %  --  40 0   HEMATOCRIT, ISTAT % 40  --    PLATELETS Thousands/uL  --  533*   NEUTROS PCT %  --  82*   LYMPHS PCT %  --  7*   MONOS PCT %  --  9   EOS PCT %  --  0     Results from last 7 days   Lab Units 05/06/21  1716 05/06/21  1711   SODIUM mmol/L  --  141   POTASSIUM mmol/L  --  5 1   CHLORIDE mmol/L  --  96*   CO2 mmol/L  --  32   CO2, I-STAT mmol/L 39*  --    BUN mg/dL  --  38*   CREATININE mg/dL  --  1 42*   ANION GAP mmol/L  --  13   CALCIUM mg/dL  --  9 1   ALBUMIN g/dL  --  3 6   TOTAL BILIRUBIN mg/dL  --  0 60   ALK PHOS U/L  --  105   ALT U/L  --  188*   AST U/L  --  214*   GLUCOSE RANDOM mg/dL  --  106     Results from last 7 days   Lab Units 05/06/21  1737   INR  1 21*             Results from last 7 days   Lab Units 05/06/21  1737   LACTIC ACID mmol/L 2 0   PROCALCITONIN ng/ml 0 12       Imaging: I have personally reviewed pertinent reports  and I have personally reviewed pertinent films in PACS    CTA ED chest PE Study   Final Result by Alfred Blackman MD (05/06 2108)      1  No evidence of pulmonary embolus  2   Subsegmental atelectasis and fibrosis in the bases of both lower lobes  3   Small bibasilar pleural effusions  4   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, unchanged since a CTA from 1/10/2019    5   Gastric dilatation  Workstation performed: [id number]         CT head wo contrast   Final Result by Mohan Birmingham MD (05/06 1730)         Small cortical and subcortical infarct in the left posterior parietal region, possibly acute to subacute  Questionable focus of subacute microhemorrhage within the infarct     No mass effect  MRI may be helpful for further evaluation  The study was marked in [facility] for immediate notification                    Workstation performed: [id number]         XR chest 1 view portable    (Results Pending)       EKG, Pathology, and Other Studies Reviewed on Admission:   · EKG: Normal sinus rhythm at a rate of 92 beats per minute  New T-wave inversion noted in V1 and V2 when compared to prior from September 2021  Incomplete right bundle-branch block  Likely left atrial enlargement  Prolonged QT at 484 milliseconds  No acute ST segment depressions or elevations  Allscripts / Epic Records Reviewed: Yes     ** Please Note: This note has been constructed using a voice recognition system   **

## 2021-05-07 NOTE — ASSESSMENT & PLAN NOTE
UA with 10-20 wbc's and occasional bacteria, urine cultures checked in with mixed contaminants  Antibiotics initially started were discontinued

## 2021-05-07 NOTE — ASSESSMENT & PLAN NOTE
Patient was brought in from home at the recommendation of the Department of Aging secondary to altered mental status  At that time patient found to have met SIRS criteria  Source of infection unclear, UTI versus pneumonia  Encephalopathy likely multifactorial, in the presence of sepsis/metabolic disturbances, and in the presence of acute/subacute parietal infarcts  Plan:   - Medical management and correction of metabolic and infectious disturbances per primary team   - Avoid CNS altering medications if possible  - Continue supportive care   - Continue to monitor neurologic status   Notify neurology with any changes in exam    - fall precautions

## 2021-05-07 NOTE — PROGRESS NOTES
Vancomycin Assessment    Jessica Fontanez is a 68 y o  male who is currently receiving vancomycin 1250mg IV q24h for urinary tract infection, Pneumonia     Relevant clinical data and objective history reviewed:  Creatinine   Date Value Ref Range Status   05/06/2021 1 42 (H) 0 60 - 1 30 mg/dL Final     Comment:     Standardized to IDMS reference method   09/17/2020 0 82 0 60 - 1 30 mg/dL Final     Comment:     Standardized to IDMS reference method   09/16/2020 0 89 0 60 - 1 30 mg/dL Final     Comment:     Standardized to IDMS reference method     /80 (BP Location: Right arm)   Pulse 98   Temp 98 4 °F (36 9 °C) (Oral)   Resp 20   SpO2 97%   No intake/output data recorded  Lab Results   Component Value Date/Time    BUN 38 (H) 05/06/2021 05:11 PM    WBC 15 33 (H) 05/06/2021 05:11 PM    HGB 13 6 05/06/2021 05:16 PM    HGB 10 6 (L) 05/06/2021 05:11 PM    HCT 40 05/06/2021 05:16 PM    HCT 40 0 05/06/2021 05:11 PM    MCV 76 (L) 05/06/2021 05:11 PM     (H) 05/06/2021 05:11 PM     Temp Readings from Last 3 Encounters:   05/06/21 98 4 °F (36 9 °C) (Oral)   09/22/20 98 3 °F (36 8 °C) (Temporal)   09/18/20 98 5 °F (36 9 °C)     Vancomycin Days of Therapy: 1    Assessment/Plan  The patient is currently on vancomycin utilizing scheduled dosing based on actual body weight  Baseline risks associated with therapy include: pre-existing renal impairment and advanced age  The patient is currently receiving 1250mg IV q24h and is clinically appropriate and dose will be continued  Pharmacy will also follow closely for s/sx of nephrotoxicity, infusion reactions, and appropriateness of therapy  BMP and CBC will be ordered per protocol  Plan for trough as patient approaches steady state, prior to the 4th  dose at approximately 1730 on 5/9  Due to infection severity, will target a trough of 15-20 (appropriate for most indications)     Pharmacy will continue to follow the patients culture results and clinical progress daily      Yandel Peña, Pharmacist

## 2021-05-07 NOTE — CASE MANAGEMENT
Message received via TT from ED Doctor informing CM that Albert Randhawa from [facility] is requesting a call back from CM today re: patient  Call placed to Wily([phone]), left message  Anticipate return call  CM to follow

## 2021-05-07 NOTE — PLAN OF CARE
Problem: OCCUPATIONAL THERAPY ADULT  Goal: Performs self-care activities at highest level of function for planned discharge setting  See evaluation for individualized goals  Description: Treatment Interventions: ADL retraining, Functional transfer training, UE strengthening/ROM, Endurance training, Cognitive reorientation, Patient/family training, Equipment evaluation/education, Activityengagement, Energy conservation          See flowsheet documentation for full assessment, interventions and recommendations  Note: Limitation: Decreased ADL status, Decreased UE strength, Decreased cognition, Decreased endurance, Decreased self-care trans, Decreased high-level ADLs  Prognosis: Fair  Assessment: Pt admit 21 with acute on chronic heart failure, DEYSI, CAP, Sepsis, and CVA   CT Head revealed: Small cortical and subcortical infarct in the left posterior parietal region, possibly acute to subacute  Questionable focus of subacute microhemorrhage within the infarct  OT completed extensive review of pt's medical and social history  Pt with h/o bladder [place], depression, and CHF  Prior to admit was living w/ RENAN in a [address] [place] [address]  However, RENAN is moving to [place] next week and cannot care for pt  (I) w/ ADLS and ambulation without AD  Assist for IADLS  Functional decline since his wife   Pt presents to OT below baseline due to the following performance deficits: strength; Coordination; balance; stand tolerance; functional mobility; problem solving; sequencing; attention; awareness; coping; community integration; and self care  Therefore, pt would benefit from OT services to achieve optimal level of performance  Occupational performance areas to be addressed include: grooming, bathing, toileting, dressing, activity tolerance, functional mobility, and clothing management  Pt required Mod for bed mobility and Min for transfers using RW  However, pt pushes RW to the side because he didn't use at home   Unable to follow commands  Scattered thoughts and decreased focus  Decreased coordination noted in both hands w/ R being worse than L  Appears w/ R side neglect  Based on findings, pt is of high complexity  The patient's raw score on the AM-PAC Daily Activity inpatient short form is 15, standardized score is 34 69, less than 39 4  Patients at this level are likely to benefit from discharge to post-acute rehabilitation services  Recommend STR to achieve optimal performance w/ potential placement due to decrease social situation and inability to care for self at this time        OT Discharge Recommendation: Post acute rehabilitation services

## 2021-05-07 NOTE — ASSESSMENT & PLAN NOTE
· Only alert oriented to self and year - follows some simple commands, exhibits tangential thinking   · UDS negative   · Coma panel negative   · Suspected etiology:  Infection secondary to cystitis or pneumonia vs acute heart failure

## 2021-05-07 NOTE — CASE MANAGEMENT
Continue to follow  Call received from Nigel at [facility]  Nigel informed CM that he went out to see Pt yesterday and Pt was not making sense  Pt reported to Nigel "I'm crumbling " Nigel reports that Pt was living will his stepson since his wife passed away  Nigel reports that macy will be relocating to [place] for a job late next week and cannot care for Pt  Pt will need placement as Pt has no where else to go  Nigel reports that he left a message for Marielena Justin at the [place] office to see what assistance they can provide Pt in regards to placement  Wily informed CM that Pt also has biological son(Roamrio Guzman: [phone]) who lives in [place] and has been estranged from Pt  Nigel informed CM that he was also going to try to contact Pt's son(Romario)  Await PT/OT  Per old chart records, acknowledge  that Pt had psych stay in Sept 2020 at [facility] [place] [facility] [address] for suicidal ideation and will need [facility] for rehab/nursing home placement

## 2021-05-07 NOTE — ASSESSMENT & PLAN NOTE
· Patient admitted in September 2020 due to acute GI bleed secondary to duodenal ulcer  · After receiving 2 units of PRBCs patient's hemoglobin was stable at 8 2 when he was discharged to an inpatient psychiatric unit due to reports of suicidal ideation with plan

## 2021-05-07 NOTE — ASSESSMENT & PLAN NOTE
· Creatinine on admission 1 42   · Last creatinine on file 0 82   · Suspected etiology:  Hypervolemia in the setting of acute heart failure  · Will trend BMP daily in the setting of IV diuresis

## 2021-05-08 PROBLEM — I48.91 NEW ONSET A-FIB (HCC): Status: ACTIVE | Noted: 2021-01-01

## 2021-05-08 PROBLEM — I95.9 HYPOTENSIVE EPISODE: Status: ACTIVE | Noted: 2021-01-01

## 2021-05-08 NOTE — ASSESSMENT & PLAN NOTE
· Urinalysis on admission with 10-20 wbc's and occasional bacteria  · Urine culture showed mixed contaminants  · Continue current antibiotics for now

## 2021-05-08 NOTE — ASSESSMENT & PLAN NOTE
· No fever, however does have productive cough  · Chest x-ray shows infiltrates  · Current antibiotics cefepime/vancomycin day 3 - consider discontinuing since procalcitonin have been negative  · Blood cultures negative  · Urine strep and Legionella negative

## 2021-05-08 NOTE — RESPIRATORY THERAPY NOTE
RT Ventilator Management Note  Annetter Chico 68 y o  male MRN: [id number]  Unit/Bed#: -01 SDU Encounter: [id number]      Daily Screen     No data found from available encounters  Physical Exam: Pt  tranfered to ICU/Stepdown from MedSurg tachycardic and hypoxic  Pt  Expressed his wish to be DNR/DNI  Placed on BiPAP at 16/8, 75%, rate of 16  Vt 350-450, SpO2 95%  Will continue to monitor

## 2021-05-08 NOTE — ASSESSMENT & PLAN NOTE
· Patient was discharged from the hospital in September 2020 to inpatient psych unit after he expressed suicidal ideation with a plan - he had a voluntary commitment  · On 05/07, the Department of Aging checked on him and felt that he was no longer safe to be home  · Remeron 15 mg HS on hold in the setting of acute encephalopathy

## 2021-05-08 NOTE — PLAN OF CARE
Problem: Potential for Falls  Goal: Patient will remain free of falls  Description: INTERVENTIONS:  - Assess patient frequently for physical needs  -  Identify cognitive and physical deficits and behaviors that affect risk of falls  -  [place] fall precautions as indicated by assessment   - Educate patient/family on patient safety including physical limitations  - Instruct patient to call for assistance with activity based on assessment  - Modify environment to reduce risk of injury  - Consider OT/PT consult to assist with strengthening/mobility  Outcome: Progressing     Problem: Prexisting or High Potential for Compromised Skin Integrity  Goal: Skin integrity is maintained or improved  Description: INTERVENTIONS:  - Identify patients at risk for skin breakdown  - Assess and monitor skin integrity  - Assess and monitor nutrition and hydration status  - Monitor labs   - Assess for incontinence   - Turn and reposition patient  - Assist with mobility/ambulation  - Relieve pressure over bony prominences  - Avoid friction and shearing  - Provide appropriate hygiene as needed including keeping skin clean and dry  - Evaluate need for skin moisturizer/barrier cream  - Collaborate with interdisciplinary team   - Patient/family teaching  - Consider wound care consult   Outcome: Progressing     Problem: Neurological Deficit  Goal: Neurological status is stable or improving  Description: Interventions:  - Monitor and assess patient's level of consciousness, motor function, sensory function, and level of assistance needed for ADLs  - Monitor and report changes from baseline  Collaborate with interdisciplinary team to initiate plan and implement interventions as ordered  - Provide and maintain a safe environment  - Consider seizure precautions  - Consider fall precautions  - Consider aspiration precautions  - Consider bleeding precautions  Outcome: Progressing     Problem:  Activity Intolerance/Impaired Mobility  Goal: Mobility/activity is maintained at optimum level for patient  Description: Interventions:  - Assess and monitor patient  barriers to mobility and need for assistive/adaptive devices  - Assess patient's emotional response to limitations  - Collaborate with interdisciplinary team and initiate plans and interventions as ordered  - Encourage independent activity per ability   - Maintain proper body alignment  - Perform active/passive rom as tolerated/ordered  - Plan activities to conserve energy   - Turn patient as appropriate  Outcome: Progressing     Problem: Communication Impairment  Goal: Ability to express needs and understand communication  Description: Assess patient's communication skills and ability to understand information  Patient will demonstrate use of effective communication techniques, alternative methods of communication and understanding even if not able to speak  - Encourage communication and provide alternate methods of communication as needed  - Collaborate with case management/ for discharge needs  - Include patient/family/caregiver in decisions related to communication  Outcome: Progressing     Problem: Potential for Aspiration  Goal: Non-ventilated patient's risk of aspiration is minimized  Description: Assess and monitor vital signs, respiratory status, and labs (WBC)  Monitor for signs of aspiration (tachypnea, cough, rales, wheezing, cyanosis, fever)  - Assess and monitor patient's ability to swallow  - Place patient up in chair to eat if possible  - HOB up at 90 degrees to eat if unable to get patient up into chair   - Supervise patient during oral intake  - Instruct patient/ family to take small bites  - Instruct patient/ family to take small single sips when taking liquids    - Follow patient-specific strategies generated by speech pathologist   Outcome: Progressing  Goal: Ventilated patient's risk of aspiration is minimized  Description: Assess and monitor vital signs, respiratory status, airway cuff pressure, and labs (WBC)  Monitor for signs of aspiration (tachypnea, cough, rales, wheezing, cyanosis, fever)  - Elevate head of bed 30 degrees if patient has tube feeding   - Monitor tube feeding  Outcome: Progressing     Problem: Nutrition  Goal: Nutrition/Hydration status is improving  Description: Monitor and assess patient's nutrition/hydration status for malnutrition (ex- brittle hair, bruises, dry skin, pale skin and conjunctiva, muscle wasting, smooth red tongue, and disorientation)  Collaborate with interdisciplinary team and initiate plan and interventions as ordered  Monitor patient's weight and dietary intake as ordered or per policy  Utilize nutrition screening tool and intervene per policy  Determine patient's food preferences and provide high-protein, high-caloric foods as appropriate  - Assist patient with eating   - Allow adequate time for meals   - Encourage patient to take dietary supplement as ordered  - Collaborate with clinical nutritionist   - Include patient/family/caregiver in decisions related to nutrition    Outcome: Progressing     Problem: PAIN - ADULT  Goal: Verbalizes/displays adequate comfort level or baseline comfort level  Description: Interventions:  - Encourage patient to monitor pain and request assistance  - Assess pain using appropriate pain scale  - Administer analgesics based on type and severity of pain and evaluate response  - Implement non-pharmacological measures as appropriate and evaluate response  - Consider cultural and social influences on pain and pain management  - Notify physician/advanced practitioner if interventions unsuccessful or patient reports new pain  Outcome: Progressing     Problem: INFECTION - ADULT  Goal: Absence or prevention of progression during hospitalization  Description: INTERVENTIONS:  - Assess and monitor for signs and symptoms of infection  - Monitor lab/diagnostic results  - Monitor all insertion sites, i e  indwelling lines, tubes, and drains  - Monitor endotracheal if appropriate and nasal secretions for changes in amount and color  - [place] appropriate cooling/warming therapies per order  - Administer medications as ordered  - Instruct and encourage patient and family to use good hand hygiene technique  - Identify and instruct in appropriate isolation precautions for identified infection/condition  Outcome: Progressing  Goal: Absence of fever/infection during neutropenic period  Description: INTERVENTIONS:  - Monitor WBC    Outcome: Progressing     Problem: SAFETY ADULT  Goal: Patient will remain free of falls  Description: INTERVENTIONS:  - Assess patient frequently for physical needs  -  Identify cognitive and physical deficits and behaviors that affect risk of falls    -  [place] fall precautions as indicated by assessment   - Educate patient/family on patient safety including physical limitations  - Instruct patient to call for assistance with activity based on assessment  - Modify environment to reduce risk of injury  - Consider OT/PT consult to assist with strengthening/mobility  Outcome: Progressing  Goal: Maintain or return to baseline ADL function  Description: INTERVENTIONS:  -  Assess patient's ability to carry out ADLs; assess patient's baseline for ADL function and identify physical deficits which impact ability to perform ADLs (bathing, care of mouth/teeth, toileting, grooming, dressing, etc )  - Assess/evaluate cause of self-care deficits   - Assess range of motion  - Assess patient's mobility; develop plan if impaired  - Assess patient's need for assistive devices and provide as appropriate  - Encourage maximum independence but intervene and supervise when necessary  - Involve family in performance of ADLs  - Assess for home care needs following discharge   - Consider OT consult to assist with ADL evaluation and planning for discharge  - Provide patient education as appropriate  Outcome: Progressing  Goal: Maintain or return mobility status to optimal level  Description: INTERVENTIONS:  - Assess patient's baseline mobility status (ambulation, transfers, stairs, etc )    - Identify cognitive and physical deficits and behaviors that affect mobility  - Identify mobility aids required to assist with transfers and/or ambulation (gait belt, sit-to-stand, lift, walker, cane, etc )  - [place] fall precautions as indicated by assessment  - Record patient progress and toleration of activity level on Mobility SBAR; progress patient to next Phase/Stage  - Instruct patient to call for assistance with activity based on assessment  - Consider rehabilitation consult to assist with strengthening/weightbearing, etc   Outcome: Progressing     Problem: DISCHARGE PLANNING  Goal: Discharge to home or other facility with appropriate resources  Description: INTERVENTIONS:  - Identify barriers to discharge w/patient and caregiver  - Arrange for needed discharge resources and transportation as appropriate  - Identify discharge learning needs (meds, wound care, etc )  - Arrange for interpretive services to assist at discharge as needed  - Refer to Case Management Department for coordinating discharge planning if the patient needs post-hospital services based on physician/advanced practitioner order or complex needs related to functional status, cognitive ability, or social support system  Outcome: Progressing     Problem: Knowledge Deficit  Goal: Patient/family/caregiver demonstrates understanding of disease process, treatment plan, medications, and discharge instructions  Description: Complete learning assessment and assess knowledge base    Interventions:  - Provide teaching at level of understanding  - Provide teaching via preferred learning methods  Outcome: Progressing     Problem: CARDIOVASCULAR - ADULT  Goal: Maintains optimal cardiac output and hemodynamic stability  Description: INTERVENTIONS:  - Monitor I/O, vital signs and rhythm  - Monitor for S/S and trends of decreased cardiac output  - Administer and titrate ordered vasoactive medications to optimize hemodynamic stability  - Assess quality of pulses, skin color and temperature  - Assess for signs of decreased coronary artery perfusion  - Instruct patient to report change in severity of symptoms  Outcome: Progressing  Goal: Absence of cardiac dysrhythmias or at baseline rhythm  Description: INTERVENTIONS:  - Continuous cardiac monitoring, vital signs, obtain 12 lead EKG if ordered  - Administer antiarrhythmic and heart rate control medications as ordered  - Monitor electrolytes and administer replacement therapy as ordered  Outcome: Progressing     Problem: METABOLIC, FLUID AND ELECTROLYTES - ADULT  Goal: Electrolytes maintained within normal limits  Description: INTERVENTIONS:  - Monitor labs and assess patient for signs and symptoms of electrolyte imbalances  - Administer electrolyte replacement as ordered  - Monitor response to electrolyte replacements, including repeat lab results as appropriate  - Instruct patient on fluid and nutrition as appropriate  Outcome: Progressing  Goal: Fluid balance maintained  Description: INTERVENTIONS:  - Monitor labs   - Monitor I/O and WT  - Instruct patient on fluid and nutrition as appropriate  - Assess for signs & symptoms of volume excess or deficit  Outcome: Progressing

## 2021-05-08 NOTE — PROGRESS NOTES
Patient change in status on telemetry monitor  Previously sinus tachycardia, beginning at 2330 on 5/7/21 patient rhythm changed to afib with RVR  Highest heart rate noted to be in the 190's  On call MAGALYS Merida notified immediately, EKG performed, labs drawn  We proceeded to give 5mg/5ml metoprolol IV, Albumin 25% 50 ml x2 doses  Patients blood pressures systolic in 85'Q - 37'A  Increased O2 need from 4L nasal cannula to 12 L simple mask  Respiratory consulted  Patient transferred to ICU due to increased level of care need  Report given to ST [facility] NIELSEN

## 2021-05-08 NOTE — ASSESSMENT & PLAN NOTE
· Initially presented with oxygen saturation 83% on room air  · Was placed on 4 L nasal cannula  · On 05/08 patient was hypoxic with increased work of breathing in the setting of atrial fibrillation - placed on BiPAP with improvement  · Attempt to transfer patient back to nasal cannula

## 2021-05-08 NOTE — ASSESSMENT & PLAN NOTE
· As evidence by heart rate in the 160s confirmed by EKG on 05/07/2021  · New onset, patient has no history of atrial fibrillation  · On the medical-surgical floor, the patient received Lopressor 5 mg IV x1, 25% albumin, 5% albumin 500 mL with no improvement of heart rate in systolic blood pressure in the 70s  · Patient transferred to step-down level of care and cardiology notified  · As per Cardiology, patient received amiodarone 150 mg IV x2 and was initiated on amiodarone infusion  · Cardiology recommended Lopressor 5 mg IV x1 and Lasix 40 mg IV x1 however the systolic blood pressures remained in the 70s and medications were not given  · Patient was placed on BiPAP for increased work of breathing and hypoxia with improvement  · Jigar-Synephrine infusion was initiated to keep MAP greater than 65  · Will hold on anticoagulation at this time per Neurology since patient has a questionable focus of a subacute microhemorrhage

## 2021-05-08 NOTE — QUICK NOTE
1) stroke:  - MRI confirms ischemia in the left parietal region initially seen on head CT  Despite small foci of hyper density on head CT, there was no associated calcium or blood finding on MRI imaging  CT scan foci was likely artifactual   - patient with new onset AFib, called on tele this admission   - recommend starting Eliquis 5 mg b i d  On post stroke day 3 (05/09/2021)  -no additional AP indicated  -echocardiogram demonstrates EF 55%, no gross dyskinesis, biatrial dilatation mild  -FLP unremarkable:  79/106/28/30  -A1c is 5 9   - PT OT eval and treat   -w no additional neurologic recommendations at this time  -office will call patient to arrange outpatient follow-up   -please call questions/concerns    Recommend case management check price of Eliquis for patient  If cost is prohibitive, may substitute alternative DOAC or Coumadin  (prefer DOAC)

## 2021-05-08 NOTE — ASSESSMENT & PLAN NOTE
· As evidence by CT the chest, patient has a 3 5 cm aneurysm that has been unchanged since the last CTA on January 10, 2019

## 2021-05-08 NOTE — PROGRESS NOTES
[facility]  ICU Acceptance Note - Olga Mckeon 1945, 68 y o  male MRN: [id number]  Unit/Bed#: -01 SDU Encounter: [id number]  Primary Care Provider: No primary care provider on file     Date and time admitted to hospital: 5/6/2021  4:56 PM    * Acute on chronic diastolic (congestive) heart failure (HCC)  Assessment & Plan  Wt Readings from Last 3 Encounters:   05/08/21 76 9 kg (169 lb 8 5 oz)   09/18/20 62 5 kg (137 lb 12 8 oz)   01/14/19 63 5 kg (140 lb)       · As evidence by coarse breath sounds throughout  · CT chest showed small bilateral pleural effusions on admission  · BNP 57546  · Troponin 0 09  · Patient received Lasix 40 mg IV in the ER - subsequent doses of Lasix being held since patient remained hypotensive  · Patient has bilateral lower extremity edema  · Echocardiogram on 05/07/2021 showed EF 55% with grade 1 diastolic dysfunction      New onset a-fib [facility])  Assessment & Plan  · As evidence by heart rate in the 160s confirmed by EKG on 05/07/2021  · New onset, patient has no history of atrial fibrillation  · On the medical-surgical floor, the patient received Lopressor 5 mg IV x1, 25% albumin, 5% albumin 500 mL with no improvement of heart rate in systolic blood pressure in the 70s  · Patient transferred to step-down level of care and cardiology notified  · As per Cardiology, patient received amiodarone 150 mg IV x2 and was initiated on amiodarone infusion  · Cardiology recommended Lopressor 5 mg IV x1 and Lasix 40 mg IV x1 however the systolic blood pressures remained in the 70s and medications were not given  · Patient was placed on BiPAP for increased work of breathing and hypoxia with improvement  · Jigar-Synephrine infusion was initiated to keep MAP greater than 65  · Will hold on anticoagulation at this time per Neurology since patient has a questionable focus of a subacute microhemorrhage     Hypotensive episode  Assessment & Plan  · In the setting of atrial fibrillation as evidenced by systolic blood pressures in the 70s  · Patient received IV Lopressor 5 mg x 1 secondary to rapid AFib which made hypotension worse  · Given 25% albumin, 5% albumin 500 mL with no improvement in blood pressure  · Patient started on Jigar-Synephrine infusion to keep MAP > 65  · Will continue to attempt heart rate control to improve blood pressure  · Cardiology updated however formal cardiology consult pending    CVA (cerebral vascular accident) [facility])  Assessment & Plan  · Baseline neuro exam alert oriented to self and year  · CT head on admission showed small cortical and subcortical infarcts in the left posterior parietal region, acute to subacute  Questionable focus of subacute microhemorrhage within the infarct    No mass effect  · Neurology following  · MRI brain pending  · Hold on heparin infusion at this time as per Neurology until MRI can be done  · Trend neuro exams    Acute encephalopathy  Assessment & Plan  · Secondary to infection, cystitis versus pneumonia versus heart failure as evidenced by change in mental status the patient only being oriented to self  · Urine drug screen negative  · Coma panel negative  · Able to follow commands  · Ammonia level negative    DEYSI (acute kidney injury) ([facility] [address] )  Assessment & Plan  · Creatinine on admission 1 42  · Current creatinine 1 33  · Hypervolemia in the setting of acute heart failure  · Continue to monitor urine output    Elevated troponin level not due myocardial infarction  Assessment & Plan  · Troponin 0 09 on admission  · No EKG changes noted  · Suspect elevated troponin is non MI troponin elevation in the setting of acute heart failure    CAP (community acquired pneumonia)  Assessment & Plan  · No fever, however does have productive cough  · Chest x-ray shows infiltrates  · Current antibiotics cefepime/vancomycin day 3 - consider discontinuing since procalcitonin have been negative  · Blood cultures negative  · Urine strep and Legionella negative    Cystitis  Assessment & Plan  · Urinalysis on admission with 10-20 wbc's and occasional bacteria  · Urine culture showed mixed contaminants  · Continue current antibiotics for now    Transaminitis  Assessment & Plan  · LFTs down trending  · Hepatitis panel negative    Sepsis ([facility] [address] )  Assessment & Plan  · As evidence by SIRS criteria on admission with tachycardia and leukocytosis  · Suspected source pneumonia versus cystitis  · COVID-19 negative  · Urinalysis positive  · Chest x-ray showed possible infiltrate  · Continue IV antibiotics for now, cefepime/vancomycin day 3  · Blood cultures negative  · Strep and Legionella negative  · Procalcitonin is negative    ALVIN (iron deficiency anemia)  Assessment & Plan  · Hemoglobin 10 6 on admission  · Baseline hemoglobin 8-10  · Transfuse PRBCs for hemoglobin less than 7    Acute respiratory failure with hypoxia (HCC)  Assessment & Plan  · Initially presented with oxygen saturation 83% on room air  · Was placed on 4 L nasal cannula  · On 05/08 patient was hypoxic with increased work of breathing in the setting of atrial fibrillation - placed on BiPAP with improvement  · Attempt to transfer patient back to nasal cannula     Depressive disorder  Assessment & Plan  · Patient was discharged from the hospital in September 2020 to inpatient psych unit after he expressed suicidal ideation with a plan - he had a voluntary commitment  · On 05/07, the Department of Aging checked on him and felt that he was no longer safe to be home  · Remeron 15 mg HS on hold in the setting of acute encephalopathy    PUD (peptic ulcer disease)  Assessment & Plan  · Admitted in September 2020 due to acute GI bleed secondary to duodenal ulcer - at that time patient received 2 units packed RBCs  · Current hemoglobin stable 9 7    Aortic disorder ([facility] )  Assessment & Plan  · As evidence by CT the chest, patient has a 3 5 cm aneurysm that has been unchanged since the last CTA on January 10, 2019        ----------------------------------------------------------------------------------------  HPI/24hr events:  Patient presented to the emergency room on 05/06/2021 via EMS from home  His stepson has been taking care of him and has noticed an increasing confusion and weakness with some possible UTI symptoms as evidenced by dark urine  The Department of Aging was also following with the patient and the family is requesting placement  His past medical history includes peptic ulcer disease, iron deficiency anemia, and depression with recent suicidal ideations in the psychiatric inpatient admission in October of 2020  In the ER, the patient met sepsis criteria and was started on empiric antibiotics for possible community-acquired pneumonia  In addition, a CT head was done which showed a possible acute to subacute with a questionable micro hemorrhage within the infarct  The patient was admitted to the medical-surgical floor with a DEYSI, non elevated MI troponin, possible pneumonia versus cystitis and acute heart failure requiring diuresis  However, the patient was unable to be diuresed appropriately on the floor considering he continued to have hypotension with blood pressures in the 90s and his Lasix doses were held  In the early morning of 5/8, the patient was found to be hypotensive with systolic blood pressures in the 70s, hypoxic with oxygen saturation 80% on 4 L nasal cannula, and with new onset rapid AFib with heart rates 140s to 160s  On exam he was noted to have bilateral crackles in the bases and increased work of breathing  He was oriented to self but was confused to situation and time  Patient initially received Lopressor 5 mg IV a 25% albumin on the floor before being transferred to the step-down unit for further monitoring/workup        On arrival to the step-down unit, patient continued to be in atrial fibrillation with heart rate in the 140s and hypotensive with systolic blood pressures in the 70s  Patient was placed on BiPAP with immediate improvement of his oxygenation and increased work of breathing  On-call cardiologist was updated on patient's status, and recommended amiodarone bolus and infusion in addition to Lasix and Lopressor despite hypotension  The patient was given 5% albumin 500 mL, amiodarone 150 mg bolus x2, and initiated on amiodarone infusion  Despite those efforts, the patient remained hypotensive with blood pressures in the 70s  He had no change in his mentation, continued to be verbal about wanting to die and letting him go   A detailed discussion was had with him and the other provide bedside regarding his status  He was adamant about not having a tube down his mouth and never getting CPR "  He stated I am okay, just let me go "  Multiple attempts were made to reach the son with no success  However the patient was made a DNR DNI per his wishes  The patient continued to be hypotensive and was started on Jigar-Synephrine drip to keep MAP 65 or greater  Will hold off on central line placement at this time since the patient continues to state over and over again how he just wants to be let go "  In the meantime, will continue to reach out to the son to discuss goals of care  Disposition: Admit to Stepdown Level 1  Code Status: Level 3 - DNAR and DNI  ---------------------------------------------------------------------------------------  SUBJECTIVE  Just let me go  I am ready to die      Review of Systems   Constitutional: Positive for fatigue  HENT: Negative  Eyes: Negative  Respiratory: Positive for shortness of breath and wheezing  Cardiovascular: Negative  Gastrointestinal: Negative  Endocrine: Negative  Genitourinary: Negative  Musculoskeletal: Negative  Skin: Negative  Allergic/Immunologic: Negative  Neurological: Positive for weakness  Hematological: Negative  Psychiatric/Behavioral: Negative  Review of systems was reviewed and negative unless stated above in HPI/24-hour events   ---------------------------------------------------------------------------------------  OBJECTIVE    Vitals   Vitals:    21 0300 21 0310 21 0320 21 0322   BP: (!) 59/41 (!) 68/52 (!) 65/47 (!) 78/55   BP Location:       Pulse: (!) 117 (!) 122 (!) 118 78   Resp: (!) 24 (!) 23 22 (!) 28   Temp:       TempSrc:       SpO2: 95% 95% 90% 95%   Weight:         Temp (24hrs), Av 3 °F (36 8 °C), Min:97 8 °F (36 6 °C), Max:98 6 °F (37 °C)  Current: Temperature: 98 6 °F (37 °C)          Respiratory:  SpO2: SpO2: 95 %  Nasal Cannula O2 Flow Rate (L/min): 5 L/min    Invasive/non-invasive ventilation settings   Respiratory    Lab Data (Last 4 hours)    None         O2/Vent Data (Last 4 hours)       0100          Non-Invasive Ventilation Mode BiPAP                   Physical Exam  Vitals signs and nursing note reviewed  Constitutional:       Appearance: He is ill-appearing and toxic-appearing  HENT:      Head: Normocephalic and atraumatic  Right Ear: Tympanic membrane, ear canal and external ear normal       Left Ear: Tympanic membrane, ear canal and external ear normal       Nose: Nose normal       Mouth/Throat:      Mouth: Mucous membranes are dry  Pharynx: Oropharynx is clear  Eyes:      Extraocular Movements: Extraocular movements intact  Conjunctiva/sclera: Conjunctivae normal       Pupils: Pupils are equal, round, and reactive to light  Neck:      Musculoskeletal: Normal range of motion  Cardiovascular:      Rate and Rhythm: Rhythm irregular  Pulses: Normal pulses  Heart sounds: Normal heart sounds  Comments: Atrial fibrillation  Pulmonary:      Comments: On BiPAP  Bilateral breath sounds diminished  Fine crackles in the bases  Abdominal:      Comments:  Bowel sounds hypoactive   Genitourinary:     Comments: Incontinent of urine  Musculoskeletal:      Comments: Generalized weakness   Skin:     General: Skin is warm and dry  Capillary Refill: Capillary refill takes less than 2 seconds  Neurological:      Mental Status: He is alert        Comments: Oriented to person  Does follow commands   Psychiatric:         Mood and Affect: Mood normal          Laboratory and Diagnostics:  Results from last 7 days   Lab Units 05/08/21 0006 05/07/21 0419 05/06/21 1716 05/06/21  1711   WBC Thousand/uL 18 96* 21 82*  --  15 33*   HEMOGLOBIN g/dL 9 7* 9 8*  --  10 6*   I STAT HEMOGLOBIN g/dl  --   --  13 6  --    HEMATOCRIT % 36 6 36 9  --  40 0   HEMATOCRIT, ISTAT %  --   --  40  --    PLATELETS Thousands/uL 423* 453*  --  533*   NEUTROS PCT %  --   --   --  82*   MONOS PCT %  --   --   --  9     Results from last 7 days   Lab Units 05/08/21 0006 05/07/21  1545 05/07/21 0419 05/06/21  1716 05/06/21  1711   SODIUM mmol/L 142 142  --   --  141   POTASSIUM mmol/L 3 6 3 8  --   --  5 1   CHLORIDE mmol/L 99* 98*  --   --  96*   CO2 mmol/L 37* 39*  --   --  32   CO2, I-STAT mmol/L  --   --   --  39*  --    ANION GAP mmol/L 6 5  --   --  13   BUN mg/dL 45* 42*  --   --  38*   CREATININE mg/dL 1 33* 1 39*  --   --  1 42*   CALCIUM mg/dL 7 9* 8 0*  --   --  9 1   GLUCOSE RANDOM mg/dL 128 125  --   --  106   ALT U/L 113*  --  139*  --  188*   AST U/L 63*  --  106*  --  214*   ALK PHOS U/L 74  --  96  --  105   ALBUMIN g/dL 2 9*  --  2 9*  --  3 6   TOTAL BILIRUBIN mg/dL 0 40  --  0 50  --  0 60     Results from last 7 days   Lab Units 05/08/21 0006 05/07/21  0419   MAGNESIUM mg/dL 2 1 2 1   PHOSPHORUS mg/dL  --  4 5*      Results from last 7 days   Lab Units 05/08/21 0006 05/07/21 0419 05/06/21  1737   INR  1 29* 1 17 1 21*   PTT seconds 42*  --  36      Results from last 7 days   Lab Units 05/07/21  0419 05/06/21  2338 05/06/21  1711   TROPONIN I ng/mL 0 14* 0 16* 0 09*     Results from last 7 days   Lab Units 05/06/21  1737   LACTIC ACID mmol/L 2 0     ABG:    VBG:    Results from last 7 days   Lab Units 05/07/21  0424 05/06/21  1737   PROCALCITONIN ng/ml 0 19 0 12       Micro  Results from last 7 days   Lab Units 05/06/21  1748 05/06/21  1737   BLOOD CULTURE   --  No Growth at 24 hrs  No Growth at 24 hrs  URINE CULTURE  80,000-89,000 cfu/ml   --    LEGIONELLA URINARY ANTIGEN  Negative  --    STREP PNEUMONIAE ANTIGEN, URINE  Negative  --        EKG:  Atrial fibrillation heart rate 122  Imaging: I have personally reviewed pertinent reports  Intake and Output  I/O       05/06 0701 - 05/07 0700 05/07 0701 - 05/08 0700    P  O   240    IV Piggyback  500    Total Intake(mL/kg)  740 (9 6)    Urine (mL/kg/hr)  336 (0 2)    Total Output  336    Net  +404          Unmeasured Urine Occurrence 3 x 2 x    Unmeasured Stool Occurrence  1 x          Height and Weights         Body mass index is 23 65 kg/m²  Weight (last 2 days)     Date/Time   Weight    05/08/21 0220   76 9 (169 53)                Nutrition       Diet Orders   (From admission, onward)             Start     Ordered    05/07/21 0402  Diet Cardiovascular; Cardiac; Fluid Restriction 1500 ML  Diet effective now     Question Answer Comment   Diet Type Cardiovascular    Cardiac Cardiac    Other Restriction(s): Fluid Restriction 1500 ML    RD to adjust diet per protocol?  Yes        05/07/21 0401                  Active Medications  Scheduled Meds:  Current Facility-Administered Medications   Medication Dose Route Frequency Provider Last Rate    acetaminophen  650 mg Oral Q6H PRN MAGALYS Patel-CRISTIANE      amiodarone  0 5 mg/min Intravenous Continuous MAGALYS Patel-CRISTIANE 1 mg/min (05/08/21 0215)    amiodarone           amiodarone           amiodarone           cefepime  2,000 mg Intravenous Q12H Leann Dash PA-C 2,000 mg (05/07/21 1837)    furosemide  40 mg Intravenous BID (diuretic) Leann Dash PA-C      guaiFENesin  600 mg Oral Q12H [address] Leann Dash PA-CRISTIANE      metoprolol  5 mg Intravenous Once [facility] L Lucas Seals PA-C      phenylephine   mcg/min Intravenous Titrated MANSI Agrawal 45 mcg/min (05/08/21 0320)    phenylephrine HCl           phenylephrine HCl           potassium chloride  20 mEq Intravenous Q2H Donel Pu, PA-C 20 mEq (05/08/21 0236)    vancomycin  20 mg/kg Intravenous Q24H Donel Pu, PA-C       Continuous Infusions:  amiodarone, 0 5 mg/min, Last Rate: 1 mg/min (05/08/21 0215)  phenylephine,  mcg/min, Last Rate: 45 mcg/min (05/08/21 0320)      PRN Meds:   acetaminophen, 650 mg, Q6H PRN        Invasive Devices Review  Invasive Devices     Peripheral Intravenous Line            Peripheral IV 05/07/21 Left;Ventral (anterior) Forearm 1 day    Peripheral IV 05/08/21 Right Antecubital less than 1 day          Arterial Line            Arterial Line 05/08/21 Radial less than 1 day              ---------------------------------------------------------------------------------------  Care Time Delivered:   Upon my evaluation, this patient had a high probability of imminent or life-threatening deterioration due to Acute hypoxic respiratory failure, atrial fibrillation, hypotension, sepsis, which required my direct attention, intervention, and personal management  I have personally provided 60 minutes (0230 to 0330) of critical care time, exclusive of procedures, teaching, family meetings, and any prior time recorded by providers other than myself  Alex Shelley CRNP      Portions of the record may have been created with voice recognition software  Occasional wrong word or "sound a like" substitutions may have occurred due to the inherent limitations of voice recognition software    Read the chart carefully and recognize, using context, where substitutions have occurred

## 2021-05-08 NOTE — ASSESSMENT & PLAN NOTE
· Secondary to infection, cystitis versus pneumonia versus heart failure as evidenced by change in mental status the patient only being oriented to self  · Urine drug screen negative  · Coma panel negative  · Able to follow commands  · Ammonia level negative

## 2021-05-08 NOTE — QUICK NOTE
Notified by RN that patient was in Afib with a rate of 140-150  /65  Pt seen and evaluated at bedside  Heart tachy and irregularly irregular  3+ pitting edema in b/l LEs with bibasilar crackles  SBP confirmed to be in 100s  Metoprolol 5mg IV given  Rate with transient improvement to 120-130s  Repeat BP with SBP in 80s  BP without improvement s/p albumin 25% x2 with rate remaining 120-130s  Pt placed on BiPAP and upgraded to SD2  Spoke with cardiology on call, Dr Scott Navarro  Recommendations:   · amiodarone 150mg slow push   · Brief improvement with rate, however returned to 120-150s  · Additional amiodarone 150mg bolus given with start of infusion at 0 5mg/min, as well as lopressor 5mg IV and lasix 40mg IV      K 20mEq IV x2 given for K of 3 6  SBP remained 60-70s  Hesistant to give lopressor and lasix with current BP so Albumin 5%, 500mL ordered in an attempt to increase BP to enable lopressor to be given, however BP remained with SBP in 60-70  Pt will upgraded to ICU level of care as vasopressors are needed to maintain BP  I spoke with neurology on call, Dr Denise Escobedo, about initiating anticoagulation with heparin and they requested we wait for day team to review the CT scans  CODE STATUS UPDATE:  Pt verbalized desire to not have intubation or chest compressions if needed  He expressed understanding that if these things were not performed if the situation required it, he would likely die  This was witnessed by myself, Maddy Maurice PA-C, and critical care [facility]  Code status updated to Level 3 DNR/DNI  [facility]

## 2021-05-08 NOTE — ASSESSMENT & PLAN NOTE
· In the setting of atrial fibrillation as evidenced by systolic blood pressures in the 70s  · Patient received IV Lopressor 5 mg x 1 secondary to rapid AFib which made hypotension worse  · Given 25% albumin, 5% albumin 500 mL with no improvement in blood pressure  · Patient started on Jigar-Synephrine infusion to keep MAP > 65  · Will continue to attempt heart rate control to improve blood pressure  · Cardiology updated however formal cardiology consult pending

## 2021-05-08 NOTE — ASSESSMENT & PLAN NOTE
· As evidence by SIRS criteria on admission with tachycardia and leukocytosis  · Suspected source pneumonia versus cystitis  · COVID-19 negative  · Urinalysis positive  · Chest x-ray showed possible infiltrate  · Continue IV antibiotics for now, cefepime/vancomycin day 3  · Blood cultures negative  · Strep and Legionella negative  · Procalcitonin is negative

## 2021-05-08 NOTE — CONSULTS
The Patient's vancomycin therapy has been discontinued  Thank you for this consult; Pharmacy will sign-off now

## 2021-05-08 NOTE — ACP (ADVANCE CARE PLANNING)
Phone call received from patient's son Jaja Oquendo lives in [place] and states he is unable to see his father  He states that his stepmother passed away approximately 6 months ago and since then his father has had significant depression and failure to thrive  He states he has made multiple attempts to get this followed come down to [place] but he does not want any part of it  He stated that he knows minimal information about his father's current health status and that up until last week, his father was living with a step brother  However now that stepbrother is moving to [place] and is no longer going to be able to be part of his father's care  He was updated on the fact that Cally Townsend stated he did not want any aggressive measures and agreed to be a DNR/DNI  He is in full agreement with this code status since he said that his father has made it very clear for many years that he did not want aggressive treatment  That being said, Jaja King is hopeful that the patient will be able to be medically managed and perhaps placed in a facility when he is stable  Explained to him that we will take it one step at a time and update him if any changes occur  He is requesting nursing will be able to get him on the phone so he could speak to his father at some point today  This request was passed onto nursing  Total critical care time spent 20 min from 0645 to 0705 on 5/8/2021

## 2021-05-08 NOTE — ASSESSMENT & PLAN NOTE
· Troponin 0 09 on admission  · No EKG changes noted  · Suspect elevated troponin is non MI troponin elevation in the setting of acute heart failure

## 2021-05-08 NOTE — ASSESSMENT & PLAN NOTE
· Baseline neuro exam alert oriented to self and year  · CT head on admission showed small cortical and subcortical infarcts in the left posterior parietal region, acute to subacute  Questionable focus of subacute microhemorrhage within the infarct    No mass effect  · Neurology following  · MRI brain pending  · Hold on heparin infusion at this time as per Neurology until MRI can be done  · Trend neuro exams

## 2021-05-08 NOTE — ASSESSMENT & PLAN NOTE
· Admitted in September 2020 due to acute GI bleed secondary to duodenal ulcer - at that time patient received 2 units packed RBCs  · Current hemoglobin stable 9 7

## 2021-05-08 NOTE — PROCEDURES
Arterial Line Insertion    Date/Time: 5/8/2021 3:54 AM  Performed by: MANSI Fung  Authorized by: MANSI Fung     Patient location:  ICU  Consent:     Consent obtained:  Emergent situation    Risks discussed:  Bleeding, ischemia, repeat procedure, infection and pain  Universal protocol:     Procedure explained and questions answered to patient or proxy's satisfaction: yes      Relevant documents present and verified: yes      Test results available and properly labeled: yes      Radiology Images displayed and confirmed  If images not available, report reviewed: yes      Required blood products, implants, devices, and special equipment available: yes      Site/side marked: yes      Immediately prior to procedure a time out was called: yes      Patient identity confirmed:  Arm band and hospital-assigned identification number  Indications:     Indications: hemodynamic monitoring, multiple ABGs and frequent labs / infusion    Pre-procedure details:     Skin preparation:  Chlorhexidine  Anesthesia (see MAR for exact dosages): Anesthesia method:  None  Procedure details:     Location / Tip of Catheter:  Radial    Laterality:  Left    Jozef's test performed: no      Needle gauge:  18 G    Placement technique:  Seldinger    Number of attempts:  1    Successful placement: yes      Transducer: waveform confirmed    Post-procedure details:     Post-procedure:  Secured with tape, sterile dressing applied, sutured and wrist guard applied    CMS:  Normal    Patient tolerance of procedure:   Tolerated well, no immediate complications

## 2021-05-08 NOTE — CONSULTS
Consultation - Cardiology   Inderjit Machuca 68 y o  male MRN: [id number]  Unit/Bed#: -01 SDU Encounter: [id number]    Inpatient consult to Cardiology  Consult performed by: Derek Castro MD  Consult ordered by: Ela Randall PA-C          History of Present Illness   Physician Requesting Consult: Esthela Posey DO  Reason for Consult / Principal Problem: Afib    HPI: Inderjit Machuca is a 68y o  year old male with no prior cardiac history, presented to [address] [place] [address] on 5/6/21 with confusion/weakness/possible UTI  Was started on abx, and CT head demonstrated possible acute CVA  Initially had some hypotension, DEYSI, pneumonia vs cystitis  Minimal troponin elevation  This AM, was found to be hypotensive/hypoxemic, and in afib with ventricular rates in 140s  Started on amio gtt, and angie gtt  Echo demonstrated preserved LV systolic function with no significant valvular abnormalities  Converted to NSR, and improved respiratory status  Still on pressors  Complains of congestion  Review of Systems   Constitution: Negative for chills, diaphoresis, fever and malaise/fatigue  HENT: Negative  Eyes: Negative  Cardiovascular: Negative for chest pain, dyspnea on exertion, leg swelling and palpitations  Respiratory: Positive for cough and shortness of breath  Negative for snoring and wheezing  Endocrine: Negative  Hematologic/Lymphatic: Negative  Skin: Negative for rash  Musculoskeletal: Negative  Gastrointestinal: Negative for abdominal pain, constipation and diarrhea  Genitourinary: Negative for bladder incontinence, dysuria and frequency  Neurological: Negative for dizziness and light-headedness  Psychiatric/Behavioral: Negative  All other systems reviewed and are negative      Historical Information   Past Medical History:   Diagnosis Date    Bladder cancer [facility])      Past Surgical History:   Procedure Laterality Date    ESOPHAGOGASTRODUODENOSCOPY N/A 2019    Procedure: ESOPHAGOGASTRODUODENOSCOPY (EGD); Surgeon: Alan Howard MD;  Location: [facility];  Service: Gastroenterology     Social History     Substance and Sexual Activity   Alcohol Use Not Currently    Frequency: Monthly or less    Comment: Socially     Social History     Substance and Sexual Activity   Drug Use No     Social History     Tobacco Use   Smoking Status Former Smoker    Packs/day: 0 25    Types: Cigarettes    Quit date: 2020    Years since quittin 6   Smokeless Tobacco Former User   Tobacco Comment    stopped smoking 2 weeks ago     Family History: non-contributory    Meds/Allergies   Current Facility-Administered Medications   Medication Dose Route Frequency Provider Last Rate    acetaminophen  650 mg Oral Q6H PRN Glennie Savers, PA-C      amiodarone  0 5 mg/min Intravenous Continuous Monie A Sedora, CRNP 0 5 mg/min (21 1059)    furosemide  40 mg Intravenous BID (diuretic) Glennie Savers, PA-C      guaiFENesin  600 mg Oral Q12H [address] Glennie Savers, PA-C      sodium chloride  3 mL Nebulization TID Monie A Sedora, CRNP      And    levalbuterol  1 25 mg Nebulization TID Monie A Sedora, CRNP      metoprolol  5 mg Intravenous Once Glennie Savers, PA-C      phenylephine   mcg/min Intravenous Titrated Dorlene Donato, CRNP 25 mcg/min (21 1602)     amiodarone, 0 5 mg/min, Last Rate: 0 5 mg/min (21 1059)  phenylephine,  mcg/min, Last Rate: 25 mcg/min (21 1602)      Medications Prior to Admission   Medication    b complex vitamins capsule    ferrous sulfate 324 (65 Fe) mg    mirtazapine (REMERON) 15 mg tablet    pantoprazole (PROTONIX) 40 mg tablet       No Known Allergies    Objective   Vitals: Blood pressure 91/55, pulse 83, temperature 98 5 °F (36 9 °C), temperature source Oral, resp  rate 22, weight 76 9 kg (169 lb 8 5 oz), SpO2 94 %  , Body mass index is 23 65 kg/m² ,   Orthostatic Blood Pressures      Most Recent Value   Blood Pressure  91/55 filed at 2021 1600   Patient Position - Orthostatic VS  Lying filed at 2021 2535        Systolic (05SOL), EEB:02 , Min:59 , JMM:855     Diastolic (68VRV), KN, Min:39, Max:69      Intake/Output Summary (Last 24 hours) at 2021 1640  Last data filed at 2021 1522  Gross per 24 hour   Intake 1649 83 ml   Output 848 ml   Net 801 83 ml       Weight (last 2 days)     Date/Time   Weight    21 0220   76 9 (169 53)              Invasive Devices     Peripheral Intravenous Line            Peripheral IV 21 Distal;Right;Ventral (anterior) Forearm less than 1 day    Peripheral IV 21 Left;Proximal;Ventral (anterior) Forearm less than 1 day                  Physical Exam  Constitutional:       Appearance: He is well-developed  HENT:      Head: Normocephalic and atraumatic  Neck:      Vascular: No JVD  Cardiovascular:      Rate and Rhythm: Normal rate and regular rhythm  Heart sounds: Normal heart sounds  No murmur  No friction rub  No gallop  Pulmonary:      Effort: Pulmonary effort is normal       Breath sounds: Normal breath sounds  No wheezing or rales  Abdominal:      General: Bowel sounds are normal  There is no distension  Palpations: Abdomen is soft  Tenderness: There is no abdominal tenderness  Skin:     General: Skin is warm and dry  Findings: No erythema or rash  Neurological:      Mental Status: He is alert and oriented to person, place, and time  Deep Tendon Reflexes: Reflexes are normal and symmetric               Laboratory Results:  Results from last 7 days   Lab Units 21  0419 21  2338 21  1711   CK TOTAL U/L  --   --  86   TROPONIN I ng/mL 0 14* 0 16* 0 09*       CBC with diff:   Results from last 7 days   Lab Units 21  0400 21  0006 21  0419 21  1716 21  1711   WBC Thousand/uL 15 93* 18 96* 21 82*  --  15 33*   HEMOGLOBIN g/dL 9 0* 9 7* 9 8*  --  10 6*   I STAT HEMOGLOBIN g/dl  --   --   --  13 6  --    HEMATOCRIT % 33 8* 36 6 36 9  --  40 0   HEMATOCRIT, ISTAT %  --   --   --  40  --    MCV fL 77* 76* 76*  --  76*   PLATELETS Thousands/uL 419* 423* 453*  --  533*   MCH pg 20 5* 20 2* 20 2*  --  20 2*   MCHC g/dL 26 6* 26 5* 26 6*  --  26 5*   RDW % 19 6* 19 7* 18 8*  --  19 5*   MPV fL 10 1 9 4 9 6  --  9 7   NRBC AUTO /100 WBCs  --   --   --   --  1         CMP:  Results from last 7 days   Lab Units 05/08/21  0400 05/08/21  0006 05/07/21  1545 05/07/21 0419 05/06/21 1716 05/06/21  1711   POTASSIUM mmol/L 4 0 3 6 3 8  --   --  5 1   CHLORIDE mmol/L 98* 99* 98*  --   --  96*   CO2 mmol/L 32 37* 39*  --   --  32   CO2, I-STAT mmol/L  --   --   --   --  39*  --    BUN mg/dL 44* 45* 42*  --   --  38*   CREATININE mg/dL 1 30 1 33* 1 39*  --   --  1 42*   GLUCOSE, ISTAT mg/dl  --   --   --   --  114  --    CALCIUM mg/dL 7 8* 7 9* 8 0*  --   --  9 1   AST U/L 59* 63*  --  106*  --  214*   ALT U/L 95* 113*  --  139*  --  188*   ALK PHOS U/L 69 74  --  96  --  105   EGFR ml/min/1 73sq m 53 52 49  --   --  48         BMP:  Results from last 7 days   Lab Units 05/08/21  0400 05/08/21  0006 05/07/21  1545 05/06/21  1716 05/06/21  1711   POTASSIUM mmol/L 4 0 3 6 3 8  --  5 1   CHLORIDE mmol/L 98* 99* 98*  --  96*   CO2 mmol/L 32 37* 39*  --  32   CO2, I-STAT mmol/L  --   --   --  39*  --    BUN mg/dL 44* 45* 42*  --  38*   CREATININE mg/dL 1 30 1 33* 1 39*  --  1 42*   GLUCOSE, ISTAT mg/dl  --   --   --  114  --    CALCIUM mg/dL 7 8* 7 9* 8 0*  --  9 1       BNP:     Magnesium:   Results from last 7 days   Lab Units 05/08/21  0006 05/07/21  0419   MAGNESIUM mg/dL 2 1 2 1       Coags:   Results from last 7 days   Lab Units 05/08/21  0006 05/07/21  0419 05/06/21  1737   PTT seconds 42*  --  36   INR  1 29* 1 17 1 21*       TSH:       Lipid Profile:   Results from last 7 days   Lab Units 05/07/21  1453   TRIGLYCERIDES mg/dL 106   HDL mg/dL 28*       Cardiac testing:   Results for orders placed during the hospital encounter of 21   Echo complete with contrast if indicated    Narrative [address]  [address] [facility], [address] [facility]    Transthoracic Echocardiogram  2D, M-mode, Doppler, and Color Doppler    Study date:  07-May-2021    Patient: Ro Lechuga  MR number: [id number]  Account number: [de-identified]  : 1945  Age: 68 years  Gender: Male  Status: Inpatient  Location: [address] [place] [address]  Height: 71 in  Weight: 138 lb  BP: 159/ 77 mmHg    Indications: Heart failure  Diagnoses: I50 9 - Heart failure, unspecified    Sonographer:  PATRIA Shetty RDCS RVT  Referring Physician:  Kp Mullen DO  Group:  [id number]  Interpreting Physician:  Sunil Greer MD    SUMMARY    LEFT VENTRICLE:  Systolic function was normal by visual assessment  Ejection fraction was estimated to be 55 %  There were no regional wall motion abnormalities  Wall thickness was mildly increased  Doppler parameters were consistent with abnormal left ventricular relaxation (grade 1 diastolic dysfunction)  RIGHT VENTRICLE:  The ventricle was mildly dilated  Systolic pressure was moderately increased  Estimated peak pressure was 50 mmHg  LEFT ATRIUM:  The atrium was mildly dilated  RIGHT ATRIUM:  The atrium was mildly dilated  MITRAL VALVE:  There was mild regurgitation  AORTIC VALVE:  The valve was trileaflet  Leaflets exhibited normal thickness, moderate calcification, and moderately reduced cuspal separation  Transaortic velocity was increased due to valvular stenosis  There was mild stenosis  TRICUSPID VALVE:  There was mild regurgitation  AORTA:  There was mild dilatation of the ascending aorta  HISTORY: PRIOR HISTORY: CHF, Ascending aorta dilation  PROCEDURE: The study was performed in the [address] [place] [address]  This was a routine study  The transthoracic approach was used   The study included complete 2D imaging, M-mode, complete spectral Doppler, and color Doppler  Images were  obtained from the parasternal, apical, subcostal, and suprasternal notch acoustic windows  Echocardiographic views were limited  This was a technically difficult study  LEFT VENTRICLE: Size was normal  Systolic function was normal by visual assessment  Ejection fraction was estimated to be 55 %  There were no regional wall motion abnormalities  Wall thickness was mildly increased  DOPPLER: There was an  increased relative contribution of atrial contraction to ventricular filling  Doppler parameters were consistent with abnormal left ventricular relaxation (grade 1 diastolic dysfunction)  RIGHT VENTRICLE: The ventricle was mildly dilated  Systolic function was normal  DOPPLER: Systolic pressure was moderately increased  Estimated peak pressure was 50 mmHg  LEFT ATRIUM: The atrium was mildly dilated  RIGHT ATRIUM: The atrium was mildly dilated  MITRAL VALVE: Valve structure was normal  There was normal leaflet separation  DOPPLER: The transmitral velocity was within the normal range  There was no evidence for stenosis  There was mild regurgitation  AORTIC VALVE: The valve was trileaflet  Leaflets exhibited normal thickness, moderate calcification, and moderately reduced cuspal separation  DOPPLER: Transaortic velocity was increased due to valvular stenosis  There was mild stenosis  There was no regurgitation  TRICUSPID VALVE: The valve structure was normal  There was normal leaflet separation  DOPPLER: The transtricuspid velocity was within the normal range  There was no evidence for stenosis  There was mild regurgitation  PULMONIC VALVE: Leaflets exhibited normal thickness, no calcification, and normal cuspal separation  DOPPLER: The transpulmonic velocity was within the normal range  There was no regurgitation  PERICARDIUM: There was no pericardial effusion  AORTA: The root exhibited normal size   There was mild dilatation of the ascending aorta  SYSTEMIC VEINS: IVC: The inferior vena cava was normal in size and course  Respirophasic changes were normal     SYSTEM MEASUREMENT TABLES    2D  %FS: 29 35 %  Ao Diam: 3 93 cm  EDV(Teich): 69 86 ml  EF(Teich): 56 82 %  ESV(Teich): 30 17 ml  IVSd: 1 14 cm  LA Area: 22 44 cm2  LA Diam: 3 71 cm  LVEDV MOD A4C: 123 67 ml  LVEF MOD A4C: 58 94 %  LVESV MOD A4C: 50 77 ml  LVIDd: 4 cm  LVIDs: 2 82 cm  LVLd A4C: 8 44 cm  LVLs A4C: 7 28 cm  LVOT Diam: 2 45 cm  LVPWd: 1 14 cm  RA Area: 21 79 cm2  RVIDd: 4 07 cm  SV MOD A4C: 72 9 ml  SV(Teich): 39 7 ml    CW  AV Env  Ti: 249 67 ms  AV VTI: 30 83 cm  AV Vmax: 1 93 m/s  AV Vmean: 1 24 m/s  AV maxP 92 mmHg  AV meanP 22 mmHg  TR Vmax: 3 12 m/s  TR maxP 88 mmHg    MM  TAPSE: 2 4 cm    PW  BRENDA (VTI): 2 58 cm2  BRENDA Vmax: 2 34 cm2  E' Sept: 0 1 m/s  E/E' Sept: 5 67  LVOT Env  Ti: 271 49 ms  LVOT VTI: 16 89 cm  LVOT Vmax: 0 96 m/s  LVOT Vmean: 0 62 m/s  LVOT maxPG: 3 72 mmHg  LVOT meanP 8 mmHg  LVSV Dopp: 79 44 ml  MV A Justin: 0 75 m/s  MV Dec [place]: 3 84 m/s2  MV DecT: 144 39 ms  MV E Justin: 0 55 m/s  MV E/A Ratio: 0 74  MV PHT: 41 87 ms  MVA By PHT: 5 25 cm2    [employer] Accredited Echocardiography Laboratory    Prepared and electronically signed by    Aad Esparza MD  Signed 07-May-2021 10:55:39       EKG reviewed personally: AFib 151 IVCD NSSTTWA  Telemetry reviewed personally: NSR    Assessment:  Principal Problem:    Acute on chronic diastolic (congestive) heart failure ([address] )  Active Problems:     Aortic disorder (HCC)    PUD (peptic ulcer disease)    Depressive disorder    Elevated troponin level not due myocardial infarction    Acute encephalopathy    CVA (cerebral vascular accident) ([facility] )    DEYSI (acute kidney injury) ([facility] )    Cystitis    CAP (community acquired pneumonia)    Transaminitis    Sepsis ([facility] )    ALVIN (iron deficiency anemia)    Acute respiratory failure with hypoxia (HCC)    Hypotensive episode    New onset a-fib (HCC)      Impression:  1  Afib with RVR - likely precipitated by underlying pulmonary process  Given hemodynamic lability, would opt for a rhythm control strategy with amio gtt  Furthermore, unclear if anticoagulation candidate at this time  2  Hypotension/sepsis - on pressors/abx  3  ? Acute CVA    Plan:  1  Continue amio gtt  2  Continue diuresis  3  Hold on anticoagulation at this time

## 2021-05-08 NOTE — ACP (ADVANCE CARE PLANNING)
Patient was transferred from medical-surgical floor with new onset afib, hypoxia, hypotension  He was placed on BiPAP with improvement of respiratory rate effort  He was oriented to person and place but not time  When asked about his wishes, he continued to state that he was [place] to go [place]  When asked to elaborate on that, further, he said I lived a good life and I am good   Detailed discussion was had with patient regarding need for intubation/CPR and patient declined  He stated he was willing for some medical treatment but did not want any aggressive measures  Conversation witnessed by myself, Jazmine Morris, PAC and Harish Treviño,   Patient continues to be hypotensive on amnio infusion with heart rates 120s to 100s  After albumin, amnio bolus x2, amio infusion, and KCl patient remains hypotensive  Will initiate peripheral Jigar-Synephrine infusion to help with blood pressure  Will hold off on central line placement at this time since patient was adamant about not having any aggressive measures  Attempts made to reach the son Betty Garcia, however no answer and voice mailbox was full  The patient stated that his son Victor Manuel Phillipser his wishes and understands he lived a good life   Total critical care time spent 30 min from 0200 to 0230 on 5/8/2021

## 2021-05-08 NOTE — ASSESSMENT & PLAN NOTE
· Creatinine on admission 1 42  · Current creatinine 1 33  · Hypervolemia in the setting of acute heart failure  · Continue to monitor urine output

## 2021-05-08 NOTE — ASSESSMENT & PLAN NOTE
Wt Readings from Last 3 Encounters:   05/08/21 76 9 kg (169 lb 8 5 oz)   09/18/20 62 5 kg (137 lb 12 8 oz)   01/14/19 63 5 kg (140 lb)       · As evidence by coarse breath sounds throughout  · CT chest showed small bilateral pleural effusions on admission  · BNP 54715  · Troponin 0 09  · Patient received Lasix 40 mg IV in the ER - subsequent doses of Lasix being held since patient remained hypotensive  · Patient has bilateral lower extremity edema  · Echocardiogram on 05/07/2021 showed EF 55% with grade 1 diastolic dysfunction

## 2021-05-09 NOTE — ASSESSMENT & PLAN NOTE
· Less likely CAP at this time  · CXR on admission shows infiltrates, though this may be 2/2 CHF   · Blood cultures negative  · Urine strep and Legionella negative  · Abx discontinued

## 2021-05-09 NOTE — PROGRESS NOTES
Cardiology Progress Note - Nicolás Silva 68 y o  male MRN: [id number]    Unit/Bed#: -01 SDU Encounter: [id number]        Subjective:    No significant events overnight  Weaned off pressors  Review of Systems   Cardiovascular: Negative for chest pain, leg swelling and palpitations  Respiratory: Negative for shortness of breath  Objective:   Vitals: Blood pressure 95/53, pulse 87, temperature 98 1 °F (36 7 °C), temperature source Oral, resp  rate (!) 28, height 5' 11" (1 803 m), weight 74 5 kg (164 lb 3 9 oz), SpO2 95 %  , Body mass index is 22 91 kg/m² ,   Orthostatic Blood Pressures      Most Recent Value   Blood Pressure  95/53 filed at 05/09/2021 1504   Patient Position - Orthostatic VS  Sitting filed at 05/09/2021 8811         Systolic (85QED), LYR:03 , Min:71 , WXP:193     Diastolic (56TUS), DHZ:70, Min:49, Max:73      Intake/Output Summary (Last 24 hours) at 5/9/2021 1511  Last data filed at 5/9/2021 1503  Gross per 24 hour   Intake 1914 75 ml   Output 1787 ml   Net 127 75 ml     Weight (last 2 days)     Date/Time   Weight    05/09/21 0431   74 5 (164 24)    05/08/21 0220   76 9 (169 53)                  Telemetry Review:     Physical Exam  Cardiovascular:      Rate and Rhythm: Normal rate and regular rhythm  Heart sounds: Normal heart sounds  No murmur  No friction rub  No gallop  Pulmonary:      Breath sounds: Normal breath sounds  No wheezing or rales             Laboratory Results:  Results from last 7 days   Lab Units 05/07/21  0419 05/06/21  2338 05/06/21  1711   CK TOTAL U/L  --   --  86   TROPONIN I ng/mL 0 14* 0 16* 0 09*       CBC with diff:   Results from last 7 days   Lab Units 05/09/21  0415 05/08/21  0400 05/08/21  0006 05/07/21  0419 05/06/21  1716 05/06/21  1711   WBC Thousand/uL 13 22* 15 93* 18 96* 21 82*  --  15 33*   HEMOGLOBIN g/dL 9 3* 9 0* 9 7* 9 8*  --  10 6*   I STAT HEMOGLOBIN g/dl  --   --   --   --  13 6  --    HEMATOCRIT % 34 8* 33 8* 36 6 36 9  -- 40 0   HEMATOCRIT, ISTAT %  --   --   --   --  40  --    MCV fL 76* 77* 76* 76*  --  76*   PLATELETS Thousands/uL 322 419* 423* 453*  --  533*   MCH pg 20 3* 20 5* 20 2* 20 2*  --  20 2*   MCHC g/dL 26 7* 26 6* 26 5* 26 6*  --  26 5*   RDW % 19 4* 19 6* 19 7* 18 8*  --  19 5*   MPV fL 9 4 10 1 9 4 9 6  --  9 7   NRBC AUTO /100 WBCs  --   --   --   --   --  1         CMP:  Results from last 7 days   Lab Units 05/09/21 0415 05/08/21 0400 05/08/21 0006 05/07/21  1545 05/07/21 0419 05/06/21 1716 05/06/21  1711   POTASSIUM mmol/L 3 8 4 0 3 6 3 8  --   --  5 1   CHLORIDE mmol/L 99* 98* 99* 98*  --   --  96*   CO2 mmol/L 37* 32 37* 39*  --   --  32   CO2, I-STAT mmol/L  --   --   --   --   --  39*  --    BUN mg/dL 39* 44* 45* 42*  --   --  38*   CREATININE mg/dL 1 17 1 30 1 33* 1 39*  --   --  1 42*   GLUCOSE, ISTAT mg/dl  --   --   --   --   --  114  --    CALCIUM mg/dL 8 0* 7 8* 7 9* 8 0*  --   --  9 1   AST U/L  --  59* 63*  --  106*  --  214*   ALT U/L  --  95* 113*  --  139*  --  188*   ALK PHOS U/L  --  69 74  --  96  --  105   EGFR ml/min/1 73sq m 60 53 52 49  --   --  48         BMP:  Results from last 7 days   Lab Units 05/09/21 0415 05/08/21 0400 05/08/21 0006 05/07/21  1545 05/06/21 1716 05/06/21  1711   POTASSIUM mmol/L 3 8 4 0 3 6 3 8  --  5 1   CHLORIDE mmol/L 99* 98* 99* 98*  --  96*   CO2 mmol/L 37* 32 37* 39*  --  32   CO2, I-STAT mmol/L  --   --   --   --  39*  --    BUN mg/dL 39* 44* 45* 42*  --  38*   CREATININE mg/dL 1 17 1 30 1 33* 1 39*  --  1 42*   GLUCOSE, ISTAT mg/dl  --   --   --   --  114  --    CALCIUM mg/dL 8 0* 7 8* 7 9* 8 0*  --  9 1       BNP:     Magnesium:   Results from last 7 days   Lab Units 05/09/21 0415 05/08/21  0006 05/07/21  0419   MAGNESIUM mg/dL 2 2 2 1 2 1       Coags:   Results from last 7 days   Lab Units 05/09/21 0415 05/08/21  0006 05/07/21  0419 05/06/21  1737   PTT seconds  --  42*  --  36   INR  1 22* 1 29* 1 17 1 21*       TSH:       Lipid Profile:   Results from last 7 days   Lab Units 21  1453   TRIGLYCERIDES mg/dL 106   HDL mg/dL 28*           Cardiac testing:   Results for orders placed during the hospital encounter of 21   Echo complete with contrast if indicated    Narrative [facility] [address]    Transthoracic Echocardiogram  2D, M-mode, Doppler, and Color Doppler    Study date:  07-May-2021    Patient: Genia Reid  MR number: [id number]  Account number: [de-identified]  : 1945  Age: 68 years  Gender: Male  Status: Inpatient  Location: [address] [place] [address]  Height: 71 in  Weight: 138 lb  BP: 159/ 77 mmHg    Indications: Heart failure  Diagnoses: I50 9 - Heart failure, unspecified    Sonographer:  PATRIA Cunningham [facility] RVT  Referring Physician:  Fiona Rodriguez DO  Group:  [id number]  Interpreting Physician:  Brice Perez MD    SUMMARY    LEFT VENTRICLE:  Systolic function was normal by visual assessment  Ejection fraction was estimated to be 55 %  There were no regional wall motion abnormalities  Wall thickness was mildly increased  Doppler parameters were consistent with abnormal left ventricular relaxation (grade 1 diastolic dysfunction)  RIGHT VENTRICLE:  The ventricle was mildly dilated  Systolic pressure was moderately increased  Estimated peak pressure was 50 mmHg  LEFT ATRIUM:  The atrium was mildly dilated  RIGHT ATRIUM:  The atrium was mildly dilated  MITRAL VALVE:  There was mild regurgitation  AORTIC VALVE:  The valve was trileaflet  Leaflets exhibited normal thickness, moderate calcification, and moderately reduced cuspal separation  Transaortic velocity was increased due to valvular stenosis  There was mild stenosis  TRICUSPID VALVE:  There was mild regurgitation  AORTA:  There was mild dilatation of the ascending aorta  HISTORY: PRIOR HISTORY: CHF, Ascending aorta dilation      PROCEDURE: The study was performed in the [address] [facility]  This was a routine study  The transthoracic approach was used  The study included complete 2D imaging, M-mode, complete spectral Doppler, and color Doppler  Images were  obtained from the parasternal, apical, subcostal, and suprasternal notch acoustic windows  Echocardiographic views were limited  This was a technically difficult study  LEFT VENTRICLE: Size was normal  Systolic function was normal by visual assessment  Ejection fraction was estimated to be 55 %  There were no regional wall motion abnormalities  Wall thickness was mildly increased  DOPPLER: There was an  increased relative contribution of atrial contraction to ventricular filling  Doppler parameters were consistent with abnormal left ventricular relaxation (grade 1 diastolic dysfunction)  RIGHT VENTRICLE: The ventricle was mildly dilated  Systolic function was normal  DOPPLER: Systolic pressure was moderately increased  Estimated peak pressure was 50 mmHg  LEFT ATRIUM: The atrium was mildly dilated  RIGHT ATRIUM: The atrium was mildly dilated  MITRAL VALVE: Valve structure was normal  There was normal leaflet separation  DOPPLER: The transmitral velocity was within the normal range  There was no evidence for stenosis  There was mild regurgitation  AORTIC VALVE: The valve was trileaflet  Leaflets exhibited normal thickness, moderate calcification, and moderately reduced cuspal separation  DOPPLER: Transaortic velocity was increased due to valvular stenosis  There was mild stenosis  There was no regurgitation  TRICUSPID VALVE: The valve structure was normal  There was normal leaflet separation  DOPPLER: The transtricuspid velocity was within the normal range  There was no evidence for stenosis  There was mild regurgitation  PULMONIC VALVE: Leaflets exhibited normal thickness, no calcification, and normal cuspal separation  DOPPLER: The transpulmonic velocity was within the normal range   There was no regurgitation  PERICARDIUM: There was no pericardial effusion  AORTA: The root exhibited normal size  There was mild dilatation of the ascending aorta  SYSTEMIC VEINS: IVC: The inferior vena cava was normal in size and course  Respirophasic changes were normal     SYSTEM MEASUREMENT TABLES    2D  %FS: 29 35 %  Ao Diam: 3 93 cm  EDV(Teich): 69 86 ml  EF(Teich): 56 82 %  ESV(Teich): 30 17 ml  IVSd: 1 14 cm  LA Area: 22 44 cm2  LA Diam: 3 71 cm  LVEDV MOD A4C: 123 67 ml  LVEF MOD A4C: 58 94 %  LVESV MOD A4C: 50 77 ml  LVIDd: 4 cm  LVIDs: 2 82 cm  LVLd A4C: 8 44 cm  LVLs A4C: 7 28 cm  LVOT Diam: 2 45 cm  LVPWd: 1 14 cm  RA Area: 21 79 cm2  RVIDd: 4 07 cm  SV MOD A4C: 72 9 ml  SV(Teich): 39 7 ml    CW  AV Env  Ti: 249 67 ms  AV VTI: 30 83 cm  AV Vmax: 1 93 m/s  AV Vmean: 1 24 m/s  AV maxP 92 mmHg  AV meanP 22 mmHg  TR Vmax: 3 12 m/s  TR maxP 88 mmHg    MM  TAPSE: 2 4 cm    PW  BRENDA (VTI): 2 58 cm2  BRENDA Vmax: 2 34 cm2  E' Sept: 0 1 m/s  E/E' Sept: 5 67  LVOT Env  Ti: 271 49 ms  LVOT VTI: 16 89 cm  LVOT Vmax: 0 96 m/s  LVOT Vmean: 0 62 m/s  LVOT maxPG: 3 72 mmHg  LVOT meanP 8 mmHg  LVSV Dopp: 79 44 ml  MV A Justin: 0 75 m/s  MV Dec Camas: 3 84 m/s2  MV DecT: 144 39 ms  MV E Justin: 0 55 m/s  MV E/A Ratio: 0 74  MV PHT: 41 87 ms  MVA By PHT: 5 25 cm2    [employer] Accredited Echocardiography [facility]    Prepared and electronically signed by    Karely Pabon MD  Signed 07-May-2021 10:55:39       No results found for this or any previous visit  No results found for this or any previous visit  No results found for this or any previous visit      Meds/Allergies   Current Facility-Administered Medications   Medication Dose Route Frequency Provider Last Rate    acetaminophen  650 mg Oral Q6H PRN Tadeo Form, PA-C      amiodarone  200 mg Oral BID With Meals MANSI Wesley      apixaban  5 mg Oral BID MANSI Wesley      docusate sodium  100 mg Oral BID Monie STORM MANSI Calderon      furosemide  40 mg Intravenous BID (diuretic) Ela Randall PA-C      guaiFENesin  600 mg Oral Q12H [facility] Ela Randall PA-C      sodium chloride  3 mL Nebulization TID MANSI Wesley      And    levalbuterol  1 25 mg Nebulization TID MANSI Wesley      metoprolol  5 mg Intravenous Once Ela Randall PA-C      midodrine  5 mg Oral TID AC Monie A MANSI Portillo      senna  1 tablet Oral HS Monie A MANSI Portillo          Medications Prior to Admission   Medication    b complex vitamins capsule    ferrous sulfate 324 (65 Fe) mg    mirtazapine (REMERON) 15 mg tablet    pantoprazole (PROTONIX) 40 mg tablet       Assessment:  Principal Problem:    Acute on chronic diastolic (congestive) heart failure (HCC)  Active Problems: Aortic disorder (HCC)    PUD (peptic ulcer disease)    Depressive disorder    Elevated troponin level not due myocardial infarction    Acute encephalopathy    CVA (cerebral vascular accident) ([facility] [address] )    DEYSI (acute kidney injury) ([facility] [address] )    Cystitis    CAP (community acquired pneumonia)    Transaminitis    Sepsis ([facility] [address] )    ALVIN (iron deficiency anemia)    Acute respiratory failure with hypoxia (HCC)    Hypotensive episode    New onset a-fib (HCC)      Impression:  1  Afib with RVR - likely precipitated by underlying pulmonary process  Was bradycardic this AM, and switched onto oral amiodarone  2  Hypotension/sepsis - resolving  Weaned off pressors  3  ? Acute CVA - Started on Eliquis       Plan:  1  Continue current medications  2  Agree with midodrine to allow for diuresis

## 2021-05-09 NOTE — ASSESSMENT & PLAN NOTE
· Admitted 9/2020 due to acute GI bleed secondary to duodenal ulcer - at that time patient received 2 units packed RBCs  · Current hemoglobin stable 9 3  · Cont home PPI

## 2021-05-09 NOTE — ASSESSMENT & PLAN NOTE
· In the setting of atrial fibrillation with SBP 70s   · Has previously required Jigar, however he remains off pressors   · Cardiology following

## 2021-05-09 NOTE — ASSESSMENT & PLAN NOTE
· As evidence by heart rate in the 160s confirmed by EKG on 05/07/2021  · New onset, patient has no history of atrial fibrillation  · On the medical-surgical floor, the patient received Lopressor 5 mg IV x1, 25% albumin, 5% albumin 500 mL with no improvement of heart rate in systolic blood pressure in the 70s  · Patient transferred to step-down level of care and cardiology notified on 5/8  · As per Cardiology, patient received amiodarone 150 mg IV x2 and was initiated on amiodarone infusion  · Cardiology recommended Lopressor 5 mg IV x1 and Lasix 40 mg IV x1 however the systolic blood pressures remained in the 70s and medications were not given  · Patient was placed on BiPAP for increased work of breathing and hypoxia with improvement  · Continue Jigar-Synephrine infusion was initiated to keep MAP greater than 65  · Will hold on anticoagulation at this time per Neurology since patient has a questionable focus of a subacute microhemorrhage

## 2021-05-09 NOTE — ASSESSMENT & PLAN NOTE
· Creatinine on admission 1 42  · Current creatinine 1 17  · Avoid nephrotoxic agents, hypotension   · Trend UO, renal indices

## 2021-05-09 NOTE — ASSESSMENT & PLAN NOTE
· Troponin peak 0 16  · No EKG changes noted  · Suspect elevated troponin is non MI troponin elevation in the setting of acute heart failure

## 2021-05-09 NOTE — ASSESSMENT & PLAN NOTE
Wt Readings from Last 3 Encounters:   05/08/21 76 9 kg (169 lb 8 5 oz)   09/18/20 62 5 kg (137 lb 12 8 oz)   01/14/19 63 5 kg (140 lb)       · As evidence by coarse breath sounds throughout - transfer from med surg on 5/8 for worsening shortness of breath  · CT chest showed small bilateral pleural effusions on admission  · On admission, BNP 14589  · On admission, Troponin 0 09  · Patient received Lasix 40 mg IV in the ER - subsequent doses of Lasix being held since patient remained hypotensive  · Patient required Jigar-Synephrine infusion - continue titrate to keep MAP > 65  · Patient has bilateral lower extremity edema  · Echocardiogram on 05/07/2021 showed EF 55% with grade 1 diastolic dysfunction

## 2021-05-09 NOTE — ASSESSMENT & PLAN NOTE
· New onset 5/7, transferred to ICU same day for amio gtt   · 5/9 bradycardia on amio gtt   Transitioned to PO amio per Cardiology   · Cardiology following  · AC: Eliquis initiated 5/9

## 2021-05-09 NOTE — ASSESSMENT & PLAN NOTE
· Baseline neuro exam alert oriented to self and year  · CT head on admission showed small cortical and subcortical infarcts in the left posterior parietal region, acute to subacute  Questionable focus of subacute microhemorrhage within the infarct    No mass effect  · Neurology following  · MRI brain showed ischemia in the left parietal region   · Serial neuro exams

## 2021-05-09 NOTE — PLAN OF CARE
Problem: Potential for Falls  Goal: Patient will remain free of falls  Description: INTERVENTIONS:  - Assess patient frequently for physical needs  -  Identify cognitive and physical deficits and behaviors that affect risk of falls  -  Comfort fall precautions as indicated by assessment   - Educate patient/family on patient safety including physical limitations  - Instruct patient to call for assistance with activity based on assessment  - Modify environment to reduce risk of injury  - Consider OT/PT consult to assist with strengthening/mobility  Outcome: Progressing     Problem: Prexisting or High Potential for Compromised Skin Integrity  Goal: Skin integrity is maintained or improved  Description: INTERVENTIONS:  - Identify patients at risk for skin breakdown  - Assess and monitor skin integrity  - Assess and monitor nutrition and hydration status  - Monitor labs   - Assess for incontinence   - Turn and reposition patient  - Assist with mobility/ambulation  - Relieve pressure over bony prominences  - Avoid friction and shearing  - Provide appropriate hygiene as needed including keeping skin clean and dry  - Evaluate need for skin moisturizer/barrier cream  - Collaborate with interdisciplinary team   - Patient/family teaching  - Consider wound care consult   Outcome: Progressing     Problem: Neurological Deficit  Goal: Neurological status is stable or improving  Description: Interventions:  - Monitor and assess patient's level of consciousness, motor function, sensory function, and level of assistance needed for ADLs  - Monitor and report changes from baseline  Collaborate with interdisciplinary team to initiate plan and implement interventions as ordered  - Provide and maintain a safe environment  - Consider seizure precautions  - Consider fall precautions  - Consider aspiration precautions  - Consider bleeding precautions  Outcome: Progressing     Problem:  Activity Intolerance/Impaired Mobility  Goal: Mobility/activity is maintained at optimum level for patient  Description: Interventions:  - Assess and monitor patient  barriers to mobility and need for assistive/adaptive devices  - Assess patient's emotional response to limitations  - Collaborate with interdisciplinary team and initiate plans and interventions as ordered  - Encourage independent activity per ability   - Maintain proper body alignment  - Perform active/passive rom as tolerated/ordered  - Plan activities to conserve energy   - Turn patient as appropriate  Outcome: Progressing     Problem: Communication Impairment  Goal: Ability to express needs and understand communication  Description: Assess patient's communication skills and ability to understand information  Patient will demonstrate use of effective communication techniques, alternative methods of communication and understanding even if not able to speak  - Encourage communication and provide alternate methods of communication as needed  - Collaborate with case management/ for discharge needs  - Include patient/family/caregiver in decisions related to communication  Outcome: Progressing     Problem: Potential for Aspiration  Goal: Non-ventilated patient's risk of aspiration is minimized  Description: Assess and monitor vital signs, respiratory status, and labs (WBC)  Monitor for signs of aspiration (tachypnea, cough, rales, wheezing, cyanosis, fever)  - Assess and monitor patient's ability to swallow  - Place patient up in chair to eat if possible  - HOB up at 90 degrees to eat if unable to get patient up into chair   - Supervise patient during oral intake  - Instruct patient/ family to take small bites  - Instruct patient/ family to take small single sips when taking liquids    - Follow patient-specific strategies generated by speech pathologist   Outcome: Progressing  Goal: Ventilated patient's risk of aspiration is minimized  Description: Assess and monitor vital signs, respiratory status, airway cuff pressure, and labs (WBC)  Monitor for signs of aspiration (tachypnea, cough, rales, wheezing, cyanosis, fever)  - Elevate head of bed 30 degrees if patient has tube feeding   - Monitor tube feeding  Outcome: Progressing     Problem: Nutrition  Goal: Nutrition/Hydration status is improving  Description: Monitor and assess patient's nutrition/hydration status for malnutrition (ex- brittle hair, bruises, dry skin, pale skin and conjunctiva, muscle wasting, smooth red tongue, and disorientation)  Collaborate with interdisciplinary team and initiate plan and interventions as ordered  Monitor patient's weight and dietary intake as ordered or per policy  Utilize nutrition screening tool and intervene per policy  Determine patient's food preferences and provide high-protein, high-caloric foods as appropriate  - Assist patient with eating   - Allow adequate time for meals   - Encourage patient to take dietary supplement as ordered  - Collaborate with clinical nutritionist   - Include patient/family/caregiver in decisions related to nutrition    Outcome: Progressing     Problem: PAIN - ADULT  Goal: Verbalizes/displays adequate comfort level or baseline comfort level  Description: Interventions:  - Encourage patient to monitor pain and request assistance  - Assess pain using appropriate pain scale  - Administer analgesics based on type and severity of pain and evaluate response  - Implement non-pharmacological measures as appropriate and evaluate response  - Consider cultural and social influences on pain and pain management  - Notify physician/advanced practitioner if interventions unsuccessful or patient reports new pain  Outcome: Progressing     Problem: INFECTION - ADULT  Goal: Absence or prevention of progression during hospitalization  Description: INTERVENTIONS:  - Assess and monitor for signs and symptoms of infection  - Monitor lab/diagnostic results  - Monitor all insertion sites, i e  indwelling lines, tubes, and drains  - Monitor endotracheal if appropriate and nasal secretions for changes in amount and color  - [place] appropriate cooling/warming therapies per order  - Administer medications as ordered  - Instruct and encourage patient and family to use good hand hygiene technique  - Identify and instruct in appropriate isolation precautions for identified infection/condition  Outcome: Progressing  Goal: Absence of fever/infection during neutropenic period  Description: INTERVENTIONS:  - Monitor WBC    Outcome: Progressing     Problem: SAFETY ADULT  Goal: Patient will remain free of falls  Description: INTERVENTIONS:  - Assess patient frequently for physical needs  -  Identify cognitive and physical deficits and behaviors that affect risk of falls    -  [place] fall precautions as indicated by assessment   - Educate patient/family on patient safety including physical limitations  - Instruct patient to call for assistance with activity based on assessment  - Modify environment to reduce risk of injury  - Consider OT/PT consult to assist with strengthening/mobility  Outcome: Progressing  Goal: Maintain or return to baseline ADL function  Description: INTERVENTIONS:  -  Assess patient's ability to carry out ADLs; assess patient's baseline for ADL function and identify physical deficits which impact ability to perform ADLs (bathing, care of mouth/teeth, toileting, grooming, dressing, etc )  - Assess/evaluate cause of self-care deficits   - Assess range of motion  - Assess patient's mobility; develop plan if impaired  - Assess patient's need for assistive devices and provide as appropriate  - Encourage maximum independence but intervene and supervise when necessary  - Involve family in performance of ADLs  - Assess for home care needs following discharge   - Consider OT consult to assist with ADL evaluation and planning for discharge  - Provide patient education as appropriate  Outcome: Progressing  Goal: Maintain or return mobility status to optimal level  Description: INTERVENTIONS:  - Assess patient's baseline mobility status (ambulation, transfers, stairs, etc )    - Identify cognitive and physical deficits and behaviors that affect mobility  - Identify mobility aids required to assist with transfers and/or ambulation (gait belt, sit-to-stand, lift, walker, cane, etc )  - [place] fall precautions as indicated by assessment  - Record patient progress and toleration of activity level on Mobility SBAR; progress patient to next Phase/Stage  - Instruct patient to call for assistance with activity based on assessment  - Consider rehabilitation consult to assist with strengthening/weightbearing, etc   Outcome: Progressing     Problem: DISCHARGE PLANNING  Goal: Discharge to home or other facility with appropriate resources  Description: INTERVENTIONS:  - Identify barriers to discharge w/patient and caregiver  - Arrange for needed discharge resources and transportation as appropriate  - Identify discharge learning needs (meds, wound care, etc )  - Arrange for interpretive services to assist at discharge as needed  - Refer to Case Management Department for coordinating discharge planning if the patient needs post-hospital services based on physician/advanced practitioner order or complex needs related to functional status, cognitive ability, or social support system  Outcome: Progressing     Problem: Knowledge Deficit  Goal: Patient/family/caregiver demonstrates understanding of disease process, treatment plan, medications, and discharge instructions  Description: Complete learning assessment and assess knowledge base    Interventions:  - Provide teaching at level of understanding  - Provide teaching via preferred learning methods  Outcome: Progressing     Problem: CARDIOVASCULAR - ADULT  Goal: Maintains optimal cardiac output and hemodynamic stability  Description: INTERVENTIONS:  - Monitor I/O, vital signs and rhythm  - Monitor for S/S and trends of decreased cardiac output  - Administer and titrate ordered vasoactive medications to optimize hemodynamic stability  - Assess quality of pulses, skin color and temperature  - Assess for signs of decreased coronary artery perfusion  - Instruct patient to report change in severity of symptoms  Outcome: Progressing  Goal: Absence of cardiac dysrhythmias or at baseline rhythm  Description: INTERVENTIONS:  - Continuous cardiac monitoring, vital signs, obtain 12 lead EKG if ordered  - Administer antiarrhythmic and heart rate control medications as ordered  - Monitor electrolytes and administer replacement therapy as ordered  Outcome: Progressing     Problem: METABOLIC, FLUID AND ELECTROLYTES - ADULT  Goal: Electrolytes maintained within normal limits  Description: INTERVENTIONS:  - Monitor labs and assess patient for signs and symptoms of electrolyte imbalances  - Administer electrolyte replacement as ordered  - Monitor response to electrolyte replacements, including repeat lab results as appropriate  - Instruct patient on fluid and nutrition as appropriate  Outcome: Progressing  Goal: Fluid balance maintained  Description: INTERVENTIONS:  - Monitor labs   - Monitor I/O and WT  - Instruct patient on fluid and nutrition as appropriate  - Assess for signs & symptoms of volume excess or deficit  Outcome: Progressing

## 2021-05-09 NOTE — ASSESSMENT & PLAN NOTE
· Initially presented with oxygen saturation 83% on room air  · Was placed on 4 L nasal cannula  · 5/8 bipap for increased WOB 2/2 CHF 2/2 AF RVR   · 4LNC throughout the day yesterday   · bipap HS, however patient refused last pm and instead remained on NC   · Goal to maintain spo2 >90%

## 2021-05-09 NOTE — ASSESSMENT & PLAN NOTE
· No fever, however does have productive cough  · Chest x-ray shows infiltrates  · Antibiotics discontinued  · Blood cultures negative  · Urine strep and Legionella negative

## 2021-05-09 NOTE — ASSESSMENT & PLAN NOTE
· CT the chest, patient has a 3 5 cm aneurysm that has been unchanged since the last CTA on January 10, 2019

## 2021-05-09 NOTE — ASSESSMENT & PLAN NOTE
· POA AEB SIRS criteria on admission with tachycardia and leukocytosis  · Suspected source pneumonia versus cystitis  · COVID-19 negative  · Urinalysis positive  · Chest x-ray showed possible infiltrate  · Antibiotics discontinued   · Cultures negative

## 2021-05-09 NOTE — ASSESSMENT & PLAN NOTE
· Hemoglobin 10 6 on admission  · Baseline hemoglobin 8-10  · Transfuse PRBCs for hemoglobin less than 7

## 2021-05-09 NOTE — PROGRESS NOTES
[place]  Progress Note - Meena Evans 1945, 68 y o  male MRN: [id number]  Unit/Bed#: -01 SDU Encounter: [id number]  Primary Care Provider: No primary care provider on file     Date and time admitted to hospital: 5/6/2021  4:56 PM    * Acute on chronic diastolic (congestive) heart failure (HCC)  Assessment & Plan  Wt Readings from Last 3 Encounters:   05/09/21 74 5 kg (164 lb 3 9 oz)   09/18/20 62 5 kg (137 lb 12 8 oz)   01/14/19 63 5 kg (140 lb)       · As evidence by coarse breath sounds throughout - transfer from med surg on 5/8 for worsening shortness of breath  · CT chest showed small bilateral pleural effusions on admission  · On admission, BNP 53166  · On admission, Troponin 0 09  · Patient received Lasix 40 mg IV in the ER - subsequent doses of Lasix being held since patient remained hypotensive  · Patient required Jigar-Synephrine infusion - continue titrate to keep MAP > 65  · Patient has bilateral lower extremity edema  · Echocardiogram on 05/07/2021 showed EF 55% with grade 1 diastolic dysfunction      New onset a-fib [facility])  Assessment & Plan  · As evidence by heart rate in the 160s confirmed by EKG on 05/07/2021  · New onset, patient has no history of atrial fibrillation  · On the medical-surgical floor, the patient received Lopressor 5 mg IV x1, 25% albumin, 5% albumin 500 mL with no improvement of heart rate in systolic blood pressure in the 70s  · Patient transferred to step-down level of care and cardiology notified on 5/8  · As per Cardiology, patient received amiodarone 150 mg IV x2 and was initiated on amiodarone infusion  · Cardiology recommended Lopressor 5 mg IV x1 and Lasix 40 mg IV x1 however the systolic blood pressures remained in the 70s and medications were not given  · Patient was placed on BiPAP for increased work of breathing and hypoxia with improvement  · Continue Jigar-Synephrine infusion was initiated to keep MAP greater than 65  · Will hold on anticoagulation at this time per Neurology since patient has a questionable focus of a subacute microhemorrhage     Hypotensive episode  Assessment & Plan  · In the setting of atrial fibrillation as evidenced by systolic blood pressures in the 70s  · Patient received IV Lopressor 5 mg x 1 secondary to rapid AFib which made hypotension worse  · Given 25% albumin, 5% albumin 500 mL with no improvement in blood pressure  · Continue on Jigar-Synephrine infusion to keep MAP > 65  · Continue on amiodarone infusion  · Cardiology following     CVA (cerebral vascular accident) [facility])  Assessment & Plan  · Baseline neuro exam alert oriented to self and year  · CT head on admission showed small cortical and subcortical infarcts in the left posterior parietal region, acute to subacute  Questionable focus of subacute microhemorrhage within the infarct    No mass effect  · Neurology following  · MRI brain showed ischemia in the left parietal region   · Hold on heparin infusion at this time as per Neurology   · Trend neuro exams    Acute encephalopathy  Assessment & Plan  · Secondary to infection, cystitis versus pneumonia versus heart failure as evidenced by change in mental status the patient only being oriented to self  · Urine drug screen negative  · Coma panel negative  · Able to follow commands  · Ammonia level negative    DEYSI (acute kidney injury) ([facility] [address] )  Assessment & Plan  · Creatinine on admission 1 42  · Current creatinine 1 33  · Hypervolemia in the setting of acute heart failure  · Continue to monitor urine output    Elevated troponin level not due myocardial infarction  Assessment & Plan  · Troponin 0 09 on admission  · No EKG changes noted  · Suspect elevated troponin is non MI troponin elevation in the setting of acute heart failure    CAP (community acquired pneumonia)  Assessment & Plan  · No fever, however does have productive cough  · Chest x-ray shows infiltrates  · Antibiotics discontinued  · Blood cultures negative  · Urine strep and Legionella negative    Cystitis  Assessment & Plan  · Urinalysis on admission with 10-20 wbc's and occasional bacteria  · Urine culture showed mixed contaminants  · Antibiotics discontinued     Transaminitis  Assessment & Plan  · LFTs down trending  · Hepatitis panel negative    Sepsis ([facility] [address] )  Assessment & Plan  · As evidence by SIRS criteria on admission with tachycardia and leukocytosis  · Suspected source pneumonia versus cystitis  · COVID-19 negative  · Urinalysis positive  · Chest x-ray showed possible infiltrate  · Antibiotics discontinued   · Blood cultures negative  · Strep and Legionella negative  · Procalcitonin is negative    ALVIN (iron deficiency anemia)  Assessment & Plan  · Hemoglobin 10 6 on admission  · Baseline hemoglobin 8-10  · Transfuse PRBCs for hemoglobin less than 7    Acute respiratory failure with hypoxia (HCC)  Assessment & Plan  · Initially presented with oxygen saturation 83% on room air  · Was placed on 4 L nasal cannula  · On 05/08 patient was hypoxic with increased work of breathing in the setting of atrial fibrillation - placed on BiPAP with improvement  · Attempt to transfer patient back to nasal cannula     Depressive disorder  Assessment & Plan  · Patient was discharged from the hospital in September 2020 to inpatient psych unit after he expressed suicidal ideation with a plan - he had a voluntary commitment  · On 05/07, the Department of Aging checked on him and felt that he was no longer safe to be home  · Remeron 15 mg HS on hold in the setting of acute encephalopathy    PUD (peptic ulcer disease)  Assessment & Plan  · Admitted in September 2020 due to acute GI bleed secondary to duodenal ulcer - at that time patient received 2 units packed RBCs  · Current hemoglobin stable 9 7    [facility] )  Assessment & Plan  · As evidence by CT the chest, patient has a 3 5 cm aneurysm that has been unchanged since the last CTA on January 10,       ----------------------------------------------------------------------------------------  HPI/24hr events: No acute events overnight  Patient was placed on BiPap for sleep and remained in NSR on amiodarone infusion  Lasix was given for diuresis which required the patient to remain on low dose Jigar-Synephrine infusion  Disposition: Continue Critical Care   Code Status: Level 3 - DNAR and DNI  ---------------------------------------------------------------------------------------  SUBJECTIVE  "I feel ok but I'm ready to go"     Review of Systems   Constitutional: Positive for fatigue  HENT: Negative  Eyes: Negative  Respiratory: Positive for shortness of breath  Cardiovascular: Negative  Gastrointestinal: Negative  Endocrine: Negative  Genitourinary: Negative  Musculoskeletal: Negative  Skin: Negative  Allergic/Immunologic: Negative  Neurological: Positive for weakness  Hematological: Negative  Psychiatric/Behavioral: Positive for confusion       Review of systems was reviewed and negative unless stated above in HPI/24-hour events   ---------------------------------------------------------------------------------------  OBJECTIVE    Vitals   Vitals:    21 0410 21 0430 21 04321 0500   BP: 106/61 101/57  106/59   BP Location:  Right arm     Pulse: 74 71  75   Resp: 22 (!) 24  (!) 25   Temp:  (!) 97 4 °F (36 3 °C)     TempSrc:  Axillary     SpO2: 93% 96%  92%   Weight:   74 5 kg (164 lb 3 9 oz)      Temp (24hrs), Av °F (36 7 °C), Min:97 4 °F (36 3 °C), Max:98 5 °F (36 9 °C)  Current: Temperature: (!) 97 4 °F (36 3 °C)          Respiratory:  SpO2: SpO2: 92 %  Nasal Cannula O2 Flow Rate (L/min): 5 L/min    Invasive/non-invasive ventilation settings   Respiratory    Lab Data (Last 4 hours)    None         O2/Vent Data (Last 4 hours)       0439          Non-Invasive Ventilation Mode BiPAP                   Physical Exam  Vitals signs and nursing note reviewed  Constitutional:       Appearance: Normal appearance  He is ill-appearing and toxic-appearing  HENT:      Head: Normocephalic and atraumatic  Right Ear: Tympanic membrane, ear canal and external ear normal       Left Ear: Tympanic membrane, ear canal and external ear normal       Mouth/Throat:      Mouth: Mucous membranes are dry  Pharynx: Oropharynx is clear  Eyes:      Conjunctiva/sclera: Conjunctivae normal       Pupils: Pupils are equal, round, and reactive to light  Neck:      Musculoskeletal: Normal range of motion and neck supple  Cardiovascular:      Rate and Rhythm: Normal rate and regular rhythm  Pulses: Normal pulses  Heart sounds: Normal heart sounds  Pulmonary:      Comments: On bipap  B/L breath sounds diminished/coarse    Abdominal:      General: Bowel sounds are normal       Palpations: Abdomen is soft  Genitourinary:     Comments: Voids in the urinal   Musculoskeletal: Normal range of motion  Skin:     General: Skin is warm and dry  Capillary Refill: Capillary refill takes less than 2 seconds  Neurological:      Mental Status: He is alert        Comments: Oriented to person and place, not time  Does follow commands    Psychiatric:         Mood and Affect: Mood normal          Behavior: Behavior normal          Laboratory and Diagnostics:  Results from last 7 days   Lab Units 05/09/21 0415 05/08/21  0400 05/08/21  0006 05/07/21  0419 05/06/21  1716 05/06/21  1711   WBC Thousand/uL 13 22* 15 93* 18 96* 21 82*  --  15 33*   HEMOGLOBIN g/dL 9 3* 9 0* 9 7* 9 8*  --  10 6*   I STAT HEMOGLOBIN g/dl  --   --   --   --  13 6  --    HEMATOCRIT % 34 8* 33 8* 36 6 36 9  --  40 0   HEMATOCRIT, ISTAT %  --   --   --   --  40  --    PLATELETS Thousands/uL 322 419* 423* 453*  --  533*   NEUTROS PCT %  --   --   --   --   --  82*   MONOS PCT %  --   --   --   --   --  9     Results from last 7 days   Lab Units 05/09/21 0415 05/08/21  0400 05/08/21  0006 05/07/21  1545 05/07/21  0419 05/06/21  1716 05/06/21  1711   SODIUM mmol/L 141 141 142 142  --   --  141   POTASSIUM mmol/L 3 8 4 0 3 6 3 8  --   --  5 1   CHLORIDE mmol/L 99* 98* 99* 98*  --   --  96*   CO2 mmol/L 37* 32 37* 39*  --   --  32   CO2, I-STAT mmol/L  --   --   --   --   --  39*  --    ANION GAP mmol/L 5 11 6 5  --   --  13   BUN mg/dL 39* 44* 45* 42*  --   --  38*   CREATININE mg/dL 1 17 1 30 1 33* 1 39*  --   --  1 42*   CALCIUM mg/dL 8 0* 7 8* 7 9* 8 0*  --   --  9 1   GLUCOSE RANDOM mg/dL 132 145* 128 125  --   --  106   ALT U/L  --  95* 113*  --  139*  --  188*   AST U/L  --  59* 63*  --  106*  --  214*   ALK PHOS U/L  --  69 74  --  96  --  105   ALBUMIN g/dL  --  3 2* 2 9*  --  2 9*  --  3 6   TOTAL BILIRUBIN mg/dL  --  0 70 0 40  --  0 50  --  0 60     Results from last 7 days   Lab Units 05/09/21 0415 05/08/21  0006 05/07/21  0419   MAGNESIUM mg/dL 2 2 2 1 2 1   PHOSPHORUS mg/dL  --   --  4 5*      Results from last 7 days   Lab Units 05/09/21 0415 05/08/21  0006 05/07/21  0419 05/06/21  1737   INR  1 22* 1 29* 1 17 1 21*   PTT seconds  --  42*  --  36      Results from last 7 days   Lab Units 05/07/21 0419 05/06/21  2338 05/06/21  1711   TROPONIN I ng/mL 0 14* 0 16* 0 09*     Results from last 7 days   Lab Units 05/06/21  1737   LACTIC ACID mmol/L 2 0     ABG:    VBG:    Results from last 7 days   Lab Units 05/08/21  0400 05/07/21  0424 05/06/21  1737   PROCALCITONIN ng/ml 0 25 0 19 0 12       Micro  Results from last 7 days   Lab Units 05/06/21  1748 05/06/21  1737   BLOOD CULTURE   --  No Growth at 48 hrs  No Growth at 48 hrs  URINE CULTURE  80,000-89,000 cfu/ml   --    LEGIONELLA URINARY ANTIGEN  Negative  --    STREP PNEUMONIAE ANTIGEN, URINE  Negative  --        EKG: NSR HR 76  Imaging: I have personally reviewed pertinent reports       Xr Chest 1 View Portable    Result Date: 5/7/2021  Impression: Unchanged cardiomegaly with suggestion of mild pulmonary vascular congestion and trace costophrenic angle effusions  Workstation performed: [id number]     Ct Head Wo Contrast    Result Date: 5/8/2021  Impression: Stable small hypodense area in the left posterior parietal region suspicious for acute to subacute infarct  Punctate mildly hyperdense area in the region of infarct suspected to represent a vessel versus microhemorrhage is less conspicuous  Correlation  with MRI may be helpful for further evaluation  Workstation performed: [id number]     Ct Head Wo Contrast    Result Date: 5/6/2021  Impression: Small cortical and subcortical infarct in the left posterior parietal region, possibly acute to subacute  Questionable focus of subacute microhemorrhage within the infarct     No mass effect  MRI may be helpful for further evaluation  The study was marked in [facility] for immediate notification  Workstation performed: [id number]     Mri Brain Wo Contrast    Result Date: 5/8/2021  Impression: 1  Acute infarct involving left postcentral gyrus  No significant edema or mass effect  No hemorrhagic transformation  2   Scattered chronic lacunar infarcts and microangiopathic changes  Workstation performed: [id number]     Cta Ed Chest Pe Study    Result Date: 5/6/2021  Impression: 1  No evidence of pulmonary embolus  2   Subsegmental atelectasis and fibrosis in the bases of both lower lobes  3   Small bibasilar pleural effusions  4   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, unchanged since a CTA from 1/10/2019  5   Gastric dilatation  Workstation performed: [id number]     Intake and Output  I/O       05/07 0701 - 05/08 0700 05/08 0701 - 05/09 0700    P  O  240 480    I V  (mL/kg) 196 1 (2 5) 526 6 (6 8)    IV Piggyback 600 300    Total Intake(mL/kg) 1036 1 (13 5) 1306 6 (17)    Urine (mL/kg/hr) 336 (0 2) 1303 (0 7)    Stool  0    Total Output 336 1303    Net +700 1 +3 6          Unmeasured Urine Occurrence 2 x 1 x    Unmeasured Stool Occurrence 1 x 0 x Height and Weights         Body mass index is 22 91 kg/m²  Weight (last 2 days)     Date/Time   Weight    05/09/21 0431   74 5 (164 24)    05/08/21 0220   76 9 (169 53)                Nutrition       Diet Orders   (From admission, onward)             Start     Ordered    05/07/21 0402  Diet Cardiovascular; Cardiac; Fluid Restriction 1500 ML  Diet effective now     Question Answer Comment   Diet Type Cardiovascular    Cardiac Cardiac    Other Restriction(s): Fluid Restriction 1500 ML    RD to adjust diet per protocol?  Yes        05/07/21 0401                  Active Medications  Scheduled Meds:  Current Facility-Administered Medications   Medication Dose Route Frequency Provider Last Rate    acetaminophen  650 mg Oral Q6H PRN Josi Ziegler PA-C      amiodarone  0 5 mg/min Intravenous Continuous Monie A Sedora, CRNP 0 5 mg/min (05/09/21 0041)    apixaban  5 mg Oral BID Monie A Sedora, CRNP      furosemide  40 mg Intravenous BID (diuretic) Josi Ziegler PA-C      guaiFENesin  600 mg Oral Q12H [address] Josi Ziegler PA-C      sodium chloride  3 mL Nebulization TID Monie A Sedora, CRNP      And    levalbuterol  1 25 mg Nebulization TID Monie A Sedora, CRNP      metoprolol  5 mg Intravenous Once Josi Ziegler PA-C      phenylephine   mcg/min Intravenous Titrated Suzanne Forth, CRNP Stopped (05/09/21 8169)     Continuous Infusions:  amiodarone, 0 5 mg/min, Last Rate: 0 5 mg/min (05/09/21 0041)  phenylephine,  mcg/min, Last Rate: Stopped (05/09/21 0042)      PRN Meds:   acetaminophen, 650 mg, Q6H PRN        Invasive Devices Review  Invasive Devices     Peripheral Intravenous Line            Peripheral IV 05/08/21 Distal;Right;Ventral (anterior) Forearm 1 day    Peripheral IV 05/08/21 Left;Proximal;Ventral (anterior) Forearm less than 1 day                ---------------------------------------------------------------------------------------  Care Time Delivered:   No Critical Care time spent       MANSI Mayorga      Portions of the record may have been created with voice recognition software  Occasional wrong word or "sound a like" substitutions may have occurred due to the inherent limitations of voice recognition software    Read the chart carefully and recognize, using context, where substitutions have occurred

## 2021-05-09 NOTE — ASSESSMENT & PLAN NOTE
· As evidence by SIRS criteria on admission with tachycardia and leukocytosis  · Suspected source pneumonia versus cystitis  · COVID-19 negative  · Urinalysis positive  · Chest x-ray showed possible infiltrate  · Antibiotics discontinued   · Blood cultures negative  · Strep and Legionella negative  · Procalcitonin is negative

## 2021-05-09 NOTE — ASSESSMENT & PLAN NOTE
Wt Readings from Last 3 Encounters:   05/09/21 74 5 kg (164 lb 3 9 oz)   09/18/20 62 5 kg (137 lb 12 8 oz)   01/14/19 63 5 kg (140 lb)       · Tx from med surg on 5/8 for worsening shortness of breath  · CT chest showed small bilateral pleural effusions on admission  · On admission, BNP 31920, troponin 0 09  · Patient required Jigar-Synephrine infusion - continue titrate to keep MAP > 65, which is now off   · Echo on 05/07/2021 showed EF 55% with grade 1 diastolic dysfunction  · Lasix 40mg BID to improve diuresis

## 2021-05-09 NOTE — ASSESSMENT & PLAN NOTE
· Baseline neuro exam alert oriented to self and year  · CT head on admission showed small cortical and subcortical infarcts in the left posterior parietal region, acute to subacute  Questionable focus of subacute microhemorrhage within the infarct    No mass effect  · Neurology following  · MRI brain showed ischemia in the left parietal region   · Hold on heparin infusion at this time as per Neurology   · Trend neuro exams

## 2021-05-09 NOTE — ASSESSMENT & PLAN NOTE
· In the setting of atrial fibrillation as evidenced by systolic blood pressures in the 70s  · Patient received IV Lopressor 5 mg x 1 secondary to rapid AFib which made hypotension worse  · Given 25% albumin, 5% albumin 500 mL with no improvement in blood pressure  · Continue on Jigar-Synephrine infusion to keep MAP > 65  · Continue on amiodarone infusion  · Cardiology following

## 2021-05-09 NOTE — ASSESSMENT & PLAN NOTE
· Urinalysis on admission with 10-20 wbc's and occasional bacteria  · Urine culture showed mixed contaminants  · Antibiotics discontinued

## 2021-05-10 PROBLEM — D50.9 IDA (IRON DEFICIENCY ANEMIA): Chronic | Status: ACTIVE | Noted: 2021-01-01

## 2021-05-10 PROBLEM — F32.A DEPRESSIVE DISORDER: Chronic | Status: ACTIVE | Noted: 2020-01-01

## 2021-05-10 PROBLEM — I95.9 HYPOTENSIVE EPISODE: Status: RESOLVED | Noted: 2021-01-01 | Resolved: 2021-01-01

## 2021-05-10 PROBLEM — E43 SEVERE PROTEIN-CALORIE MALNUTRITION (HCC): Chronic | Status: ACTIVE | Noted: 2020-01-01

## 2021-05-10 PROBLEM — D62 ACUTE BLOOD LOSS ANEMIA: Status: RESOLVED | Noted: 2019-01-13 | Resolved: 2021-01-01

## 2021-05-10 PROBLEM — K92.0 GASTROINTESTINAL HEMORRHAGE WITH HEMATEMESIS: Status: RESOLVED | Noted: 2019-01-10 | Resolved: 2021-01-01

## 2021-05-10 PROBLEM — K27.9 PUD (PEPTIC ULCER DISEASE): Chronic | Status: ACTIVE | Noted: 2020-01-01

## 2021-05-10 PROBLEM — I77.9: Chronic | Status: ACTIVE | Noted: 2020-01-01

## 2021-05-10 PROBLEM — Z00.8 MEDICAL CLEARANCE FOR PSYCHIATRIC ADMISSION: Status: RESOLVED | Noted: 2020-01-01 | Resolved: 2021-01-01

## 2021-05-10 PROBLEM — K92.2 GI BLEED: Status: RESOLVED | Noted: 2019-01-13 | Resolved: 2021-01-01

## 2021-05-10 NOTE — OCCUPATIONAL THERAPY NOTE
OccupationalTherapy Progress Note     Patient Name: Jessica Fontanez  [id number] Date: 5/10/2021  Problem List  Principal Problem:    Acute on chronic diastolic (congestive) heart failure ([place] [address] )  Active Problems: Aortic disorder (HCC)    PUD (peptic ulcer disease)    Depressive disorder    Elevated troponin level not due myocardial infarction    Acute encephalopathy    CVA (cerebral vascular accident) ([facility] )    DEYSI (acute kidney injury) ([place] [address] )    Cystitis    CAP (community acquired pneumonia)    Transaminitis    Sepsis ([place] [address] )    ALVIN (iron deficiency anemia)    Acute respiratory failure with hypoxia ([place] [address] )    New onset a-fib ([place] [address] )          05/10/21 1440   OT Last Visit   OT Visit Date 05/10/21   Note Type   Note Type Treatment   Restrictions/Precautions   Weight Bearing Precautions Per Order No   Other Precautions Chair Alarm; Bed Alarm;Cognitive; Fall Risk;O2  (4 5L O2)   Pain Assessment   Pain Assessment Tool Pain Assessment not indicated - pt denies pain   ADL   LB Dressing Assistance 5  Supervision/Setup   LB Dressing Deficit Don/doff R sock; Don/doff L sock  (Tailor sit method )   LB Dressing Comments Required increased time and cuing due to decreased attention  Toileting Assistance  4  Minimal Assistance  (Min CM & balance; Mod backside)   Toileting Deficit Use of bedpan/urinal setup; Clothing management up   Toileting Comments Decreased balance during unsupported standing  Required Min for balance due to swaying  Also required Min to steady and hold gown to use urinal in stance  Mod for buttock management  Functional Standing Tolerance   Time 5 mins; 8 mins    Activity Functional standing for hygiene and mobility on unit    Bed Mobility   Additional Comments Recevied OOB in recliner  Remains there at end of session w/ all needs and alarm enagged  Transfers   Sit to Stand 4  Minimal assistance   Additional items Assist x 1; Armrests; Increased time required;Verbal cues   Stand to Sit 4  Minimal assistance Additional items Assist x 1; Increased time required;Verbal cues   Stand pivot 4  Minimal assistance   Additional items Assist x 1;Verbal cues  (RW; Cues to bring RW all the way back )   Functional Mobility   Functional Mobility 4  Minimal assistance   Additional Comments Min x 1 using RW  Cues to stay inside RW  Cognition   Arousal/Participation Cooperative   Attention Difficulty attending to directions   Orientation Level Oriented to person;Oriented to place;Oriented to time;Oriented to situation   Memory Decreased short term memory;Decreased recall of recent events;Decreased recall of precautions   Following Commands Follows one step commands with increased time or repetition   Comments Attention span ~ 2 mins today  Requires repeat of instructions several times before completing  Requires redirection  Activity Tolerance   Activity Tolerance Patient tolerated treatment well   Medical Staff Made Aware Wm BERNARDO    Assessment   Assessment Pt seen for OT tx session w/ focus on self care, functional transfers, stand tolerance, functional mobility, and activity tolerance  Denies pain  Requires repeat of instructions several times before completing tasks  Attention span slightly improved compared to IE, but only ~ 2 mins  Needs redirection and initiation to perform tasks  Continues to require O2, but less than IE  Min for transfers and ambulation using RW, but needs constant cuing to stay inside walker and keep walker with him during SPT  Uncontrolled descent to surfaces and frequent cuing for proper hand placement  Decreased carry over noted of education  Decreased balance noted during unsupported standing for clothing management  Reports he wants to go on hospice so he can be with his "soulmate " Educated pt on progress he's made since IE  Continue OT to achieve goals and independence   Recommendation for rehab remains appropriate due to decreased social environment, decreased support system, increased assist w/ ADLS, and increased assist for mobility  Plan   Treatment Interventions ADL retraining;Functional transfer training; Endurance training;Cognitive reorientation;Patient/family training;Equipment evaluation/education; Activityengagement   Goal Expiration Date 05/21/21   OT Treatment Day 1   OT Frequency 3-5x/wk   Recommendation   OT Discharge Recommendation Post acute rehabilitation services   AM-PAC Daily Activity Inpatient   Lower Body Dressing 2   Bathing 3   Toileting 2   Upper Body Dressing 3   Grooming 3   Eating 4   Daily Activity Raw Score 17   Daily Activity Standardized Score (Calc for Raw Score >=11) 37 26   AM-PAC Applied Cognition Inpatient   Following a Speech/Presentation 2   Understanding Ordinary Conversation 2   Taking Medications 1   Remembering Where Things Are Placed or Put Away 1   Remembering List of 4-5 Errands 1   Taking Care of Complicated Tasks 1   Applied Cognition Raw Score 8   Applied Cognition Standardized Score 19 32     Anshul Packer MS, OTR/L

## 2021-05-10 NOTE — PLAN OF CARE
Problem: PHYSICAL THERAPY ADULT  Goal: Performs mobility at highest level of function for planned discharge setting  See evaluation for individualized goals  Description: Treatment/Interventions: Functional transfer training, ADL retraining, LE strengthening/ROM, Therapeutic exercise, Endurance training, Cognitive reorientation, Patient/family training, Equipment eval/education, Bed mobility, Gait training, Spoke to nursing, Spoke to case management, OT  Equipment Recommended: Jenny Mandujano       See flowsheet documentation for full assessment, interventions and recommendations  Outcome: Progressing  Note: Prognosis: Good  Problem List: Decreased strength, Decreased endurance, Impaired balance, Decreased mobility, Decreased coordination, Decreased safety awareness, Impaired judgement  Assessment: Pt able to mobilize with RW adn 02 5-6L for gait training  Easily distractable and needs repeated cues for safe transfers and RW use  Will continue to benefit from skilled PT services to regain safe funcitonal mobility   Cont to recommend STR at d/c to maximize functional recovery  Barriers to Discharge: Inaccessible home environment        PT Discharge Recommendation: Post acute rehabilitation services     PT - OK to Discharge: Yes    See flowsheet documentation for full assessment

## 2021-05-10 NOTE — PLAN OF CARE
Problem: Potential for Falls  Goal: Patient will remain free of falls  Description: INTERVENTIONS:  - Assess patient frequently for physical needs  -  Identify cognitive and physical deficits and behaviors that affect risk of falls  -  [place] fall precautions as indicated by assessment   - Educate patient/family on patient safety including physical limitations  - Instruct patient to call for assistance with activity based on assessment  - Modify environment to reduce risk of injury  - Consider OT/PT consult to assist with strengthening/mobility  Outcome: Progressing     Problem: Prexisting or High Potential for Compromised Skin Integrity  Goal: Skin integrity is maintained or improved  Description: INTERVENTIONS:  - Identify patients at risk for skin breakdown  - Assess and monitor skin integrity  - Assess and monitor nutrition and hydration status  - Monitor labs   - Assess for incontinence   - Turn and reposition patient  - Assist with mobility/ambulation  - Relieve pressure over bony prominences  - Avoid friction and shearing  - Provide appropriate hygiene as needed including keeping skin clean and dry  - Evaluate need for skin moisturizer/barrier cream  - Collaborate with interdisciplinary team   - Patient/family teaching  - Consider wound care consult   Outcome: Progressing     Problem: Neurological Deficit  Goal: Neurological status is stable or improving  Description: Interventions:  - Monitor and assess patient's level of consciousness, motor function, sensory function, and level of assistance needed for ADLs  - Monitor and report changes from baseline  Collaborate with interdisciplinary team to initiate plan and implement interventions as ordered  - Provide and maintain a safe environment  - Consider seizure precautions  - Consider fall precautions  - Consider aspiration precautions  - Consider bleeding precautions  Outcome: Progressing     Problem:  Activity Intolerance/Impaired Mobility  Goal: Mobility/activity is maintained at optimum level for patient  Description: Interventions:  - Assess and monitor patient  barriers to mobility and need for assistive/adaptive devices  - Assess patient's emotional response to limitations  - Collaborate with interdisciplinary team and initiate plans and interventions as ordered  - Encourage independent activity per ability   - Maintain proper body alignment  - Perform active/passive rom as tolerated/ordered  - Plan activities to conserve energy   - Turn patient as appropriate  Outcome: Progressing     Problem: Communication Impairment  Goal: Ability to express needs and understand communication  Description: Assess patient's communication skills and ability to understand information  Patient will demonstrate use of effective communication techniques, alternative methods of communication and understanding even if not able to speak  - Encourage communication and provide alternate methods of communication as needed  - Collaborate with case management/ for discharge needs  - Include patient/family/caregiver in decisions related to communication  Outcome: Progressing     Problem: Potential for Aspiration  Goal: Non-ventilated patient's risk of aspiration is minimized  Description: Assess and monitor vital signs, respiratory status, and labs (WBC)  Monitor for signs of aspiration (tachypnea, cough, rales, wheezing, cyanosis, fever)  - Assess and monitor patient's ability to swallow  - Place patient up in chair to eat if possible  - HOB up at 90 degrees to eat if unable to get patient up into chair   - Supervise patient during oral intake  - Instruct patient/ family to take small bites  - Instruct patient/ family to take small single sips when taking liquids    - Follow patient-specific strategies generated by speech pathologist   Outcome: Progressing  Goal: Ventilated patient's risk of aspiration is minimized  Description: Assess and monitor vital signs, respiratory status, airway cuff pressure, and labs (WBC)  Monitor for signs of aspiration (tachypnea, cough, rales, wheezing, cyanosis, fever)  - Elevate head of bed 30 degrees if patient has tube feeding   - Monitor tube feeding  Outcome: Progressing     Problem: Nutrition  Goal: Nutrition/Hydration status is improving  Description: Monitor and assess patient's nutrition/hydration status for malnutrition (ex- brittle hair, bruises, dry skin, pale skin and conjunctiva, muscle wasting, smooth red tongue, and disorientation)  Collaborate with interdisciplinary team and initiate plan and interventions as ordered  Monitor patient's weight and dietary intake as ordered or per policy  Utilize nutrition screening tool and intervene per policy  Determine patient's food preferences and provide high-protein, high-caloric foods as appropriate  - Assist patient with eating   - Allow adequate time for meals   - Encourage patient to take dietary supplement as ordered  - Collaborate with clinical nutritionist   - Include patient/family/caregiver in decisions related to nutrition    Outcome: Progressing     Problem: PAIN - ADULT  Goal: Verbalizes/displays adequate comfort level or baseline comfort level  Description: Interventions:  - Encourage patient to monitor pain and request assistance  - Assess pain using appropriate pain scale  - Administer analgesics based on type and severity of pain and evaluate response  - Implement non-pharmacological measures as appropriate and evaluate response  - Consider cultural and social influences on pain and pain management  - Notify physician/advanced practitioner if interventions unsuccessful or patient reports new pain  Outcome: Progressing     Problem: INFECTION - ADULT  Goal: Absence or prevention of progression during hospitalization  Description: INTERVENTIONS:  - Assess and monitor for signs and symptoms of infection  - Monitor lab/diagnostic results  - Monitor all insertion sites, i e  indwelling lines, tubes, and drains  - Monitor endotracheal if appropriate and nasal secretions for changes in amount and color  - [place] appropriate cooling/warming therapies per order  - Administer medications as ordered  - Instruct and encourage patient and family to use good hand hygiene technique  - Identify and instruct in appropriate isolation precautions for identified infection/condition  Outcome: Progressing  Goal: Absence of fever/infection during neutropenic period  Description: INTERVENTIONS:  - Monitor WBC    Outcome: Progressing     Problem: SAFETY ADULT  Goal: Patient will remain free of falls  Description: INTERVENTIONS:  - Assess patient frequently for physical needs  -  Identify cognitive and physical deficits and behaviors that affect risk of falls    -  [place] fall precautions as indicated by assessment   - Educate patient/family on patient safety including physical limitations  - Instruct patient to call for assistance with activity based on assessment  - Modify environment to reduce risk of injury  - Consider OT/PT consult to assist with strengthening/mobility  Outcome: Progressing  Goal: Maintain or return to baseline ADL function  Description: INTERVENTIONS:  -  Assess patient's ability to carry out ADLs; assess patient's baseline for ADL function and identify physical deficits which impact ability to perform ADLs (bathing, care of mouth/teeth, toileting, grooming, dressing, etc )  - Assess/evaluate cause of self-care deficits   - Assess range of motion  - Assess patient's mobility; develop plan if impaired  - Assess patient's need for assistive devices and provide as appropriate  - Encourage maximum independence but intervene and supervise when necessary  - Involve family in performance of ADLs  - Assess for home care needs following discharge   - Consider OT consult to assist with ADL evaluation and planning for discharge  - Provide patient education as appropriate  Outcome: Progressing  Goal: Maintain or return mobility status to optimal level  Description: INTERVENTIONS:  - Assess patient's baseline mobility status (ambulation, transfers, stairs, etc )    - Identify cognitive and physical deficits and behaviors that affect mobility  - Identify mobility aids required to assist with transfers and/or ambulation (gait belt, sit-to-stand, lift, walker, cane, etc )  - [place] fall precautions as indicated by assessment  - Record patient progress and toleration of activity level on Mobility SBAR; progress patient to next Phase/Stage  - Instruct patient to call for assistance with activity based on assessment  - Consider rehabilitation consult to assist with strengthening/weightbearing, etc   Outcome: Progressing     Problem: DISCHARGE PLANNING  Goal: Discharge to home or other facility with appropriate resources  Description: INTERVENTIONS:  - Identify barriers to discharge w/patient and caregiver  - Arrange for needed discharge resources and transportation as appropriate  - Identify discharge learning needs (meds, wound care, etc )  - Arrange for interpretive services to assist at discharge as needed  - Refer to Case Management Department for coordinating discharge planning if the patient needs post-hospital services based on physician/advanced practitioner order or complex needs related to functional status, cognitive ability, or social support system  Outcome: Progressing     Problem: Knowledge Deficit  Goal: Patient/family/caregiver demonstrates understanding of disease process, treatment plan, medications, and discharge instructions  Description: Complete learning assessment and assess knowledge base    Interventions:  - Provide teaching at level of understanding  - Provide teaching via preferred learning methods  Outcome: Progressing     Problem: CARDIOVASCULAR - ADULT  Goal: Maintains optimal cardiac output and hemodynamic stability  Description: INTERVENTIONS:  - Monitor I/O, vital signs and rhythm  - Monitor for S/S and trends of decreased cardiac output  - Administer and titrate ordered vasoactive medications to optimize hemodynamic stability  - Assess quality of pulses, skin color and temperature  - Assess for signs of decreased coronary artery perfusion  - Instruct patient to report change in severity of symptoms  Outcome: Progressing  Goal: Absence of cardiac dysrhythmias or at baseline rhythm  Description: INTERVENTIONS:  - Continuous cardiac monitoring, vital signs, obtain 12 lead EKG if ordered  - Administer antiarrhythmic and heart rate control medications as ordered  - Monitor electrolytes and administer replacement therapy as ordered  Outcome: Progressing     Problem: METABOLIC, FLUID AND ELECTROLYTES - ADULT  Goal: Electrolytes maintained within normal limits  Description: INTERVENTIONS:  - Monitor labs and assess patient for signs and symptoms of electrolyte imbalances  - Administer electrolyte replacement as ordered  - Monitor response to electrolyte replacements, including repeat lab results as appropriate  - Instruct patient on fluid and nutrition as appropriate  Outcome: Progressing  Goal: Fluid balance maintained  Description: INTERVENTIONS:  - Monitor labs   - Monitor I/O and WT  - Instruct patient on fluid and nutrition as appropriate  - Assess for signs & symptoms of volume excess or deficit  Outcome: Progressing   Updated care plan

## 2021-05-10 NOTE — PLAN OF CARE
Problem: Potential for Falls  Goal: Patient will remain free of falls  Description: INTERVENTIONS:  - Assess patient frequently for physical needs  -  Identify cognitive and physical deficits and behaviors that affect risk of falls  -  [place] fall precautions as indicated by assessment   - Educate patient/family on patient safety including physical limitations  - Instruct patient to call for assistance with activity based on assessment  - Modify environment to reduce risk of injury  - Consider OT/PT consult to assist with strengthening/mobility  Outcome: Progressing     Problem: Prexisting or High Potential for Compromised Skin Integrity  Goal: Skin integrity is maintained or improved  Description: INTERVENTIONS:  - Identify patients at risk for skin breakdown  - Assess and monitor skin integrity  - Assess and monitor nutrition and hydration status  - Monitor labs   - Assess for incontinence   - Turn and reposition patient  - Assist with mobility/ambulation  - Relieve pressure over bony prominences  - Avoid friction and shearing  - Provide appropriate hygiene as needed including keeping skin clean and dry  - Evaluate need for skin moisturizer/barrier cream  - Collaborate with interdisciplinary team   - Patient/family teaching  - Consider wound care consult   Outcome: Progressing     Problem: Neurological Deficit  Goal: Neurological status is stable or improving  Description: Interventions:  - Monitor and assess patient's level of consciousness, motor function, sensory function, and level of assistance needed for ADLs  - Monitor and report changes from baseline  Collaborate with interdisciplinary team to initiate plan and implement interventions as ordered  - Provide and maintain a safe environment  - Consider seizure precautions  - Consider fall precautions  - Consider aspiration precautions  - Consider bleeding precautions  Outcome: Progressing     Problem:  Activity Intolerance/Impaired Mobility  Goal: Mobility/activity is maintained at optimum level for patient  Description: Interventions:  - Assess and monitor patient  barriers to mobility and need for assistive/adaptive devices  - Assess patient's emotional response to limitations  - Collaborate with interdisciplinary team and initiate plans and interventions as ordered  - Encourage independent activity per ability   - Maintain proper body alignment  - Perform active/passive rom as tolerated/ordered  - Plan activities to conserve energy   - Turn patient as appropriate  Outcome: Progressing     Problem: Communication Impairment  Goal: Ability to express needs and understand communication  Description: Assess patient's communication skills and ability to understand information  Patient will demonstrate use of effective communication techniques, alternative methods of communication and understanding even if not able to speak  - Encourage communication and provide alternate methods of communication as needed  - Collaborate with case management/ for discharge needs  - Include patient/family/caregiver in decisions related to communication  Outcome: Progressing     Problem: Potential for Aspiration  Goal: Non-ventilated patient's risk of aspiration is minimized  Description: Assess and monitor vital signs, respiratory status, and labs (WBC)  Monitor for signs of aspiration (tachypnea, cough, rales, wheezing, cyanosis, fever)  - Assess and monitor patient's ability to swallow  - Place patient up in chair to eat if possible  - HOB up at 90 degrees to eat if unable to get patient up into chair   - Supervise patient during oral intake  - Instruct patient/ family to take small bites  - Instruct patient/ family to take small single sips when taking liquids    - Follow patient-specific strategies generated by speech pathologist   Outcome: Progressing  Goal: Ventilated patient's risk of aspiration is minimized  Description: Assess and monitor vital signs, respiratory status, airway cuff pressure, and labs (WBC)  Monitor for signs of aspiration (tachypnea, cough, rales, wheezing, cyanosis, fever)  - Elevate head of bed 30 degrees if patient has tube feeding   - Monitor tube feeding  Outcome: Progressing     Problem: Nutrition  Goal: Nutrition/Hydration status is improving  Description: Monitor and assess patient's nutrition/hydration status for malnutrition (ex- brittle hair, bruises, dry skin, pale skin and conjunctiva, muscle wasting, smooth red tongue, and disorientation)  Collaborate with interdisciplinary team and initiate plan and interventions as ordered  Monitor patient's weight and dietary intake as ordered or per policy  Utilize nutrition screening tool and intervene per policy  Determine patient's food preferences and provide high-protein, high-caloric foods as appropriate  - Assist patient with eating   - Allow adequate time for meals   - Encourage patient to take dietary supplement as ordered  - Collaborate with clinical nutritionist   - Include patient/family/caregiver in decisions related to nutrition    Outcome: Progressing     Problem: PAIN - ADULT  Goal: Verbalizes/displays adequate comfort level or baseline comfort level  Description: Interventions:  - Encourage patient to monitor pain and request assistance  - Assess pain using appropriate pain scale  - Administer analgesics based on type and severity of pain and evaluate response  - Implement non-pharmacological measures as appropriate and evaluate response  - Consider cultural and social influences on pain and pain management  - Notify physician/advanced practitioner if interventions unsuccessful or patient reports new pain  Outcome: Progressing     Problem: INFECTION - ADULT  Goal: Absence or prevention of progression during hospitalization  Description: INTERVENTIONS:  - Assess and monitor for signs and symptoms of infection  - Monitor lab/diagnostic results  - Monitor all insertion sites, i e  indwelling lines, tubes, and drains  - Monitor endotracheal if appropriate and nasal secretions for changes in amount and color  - [place] appropriate cooling/warming therapies per order  - Administer medications as ordered  - Instruct and encourage patient and family to use good hand hygiene technique  - Identify and instruct in appropriate isolation precautions for identified infection/condition  Outcome: Progressing  Goal: Absence of fever/infection during neutropenic period  Description: INTERVENTIONS:  - Monitor WBC    Outcome: Progressing     Problem: SAFETY ADULT  Goal: Patient will remain free of falls  Description: INTERVENTIONS:  - Assess patient frequently for physical needs  -  Identify cognitive and physical deficits and behaviors that affect risk of falls    -  [place] fall precautions as indicated by assessment   - Educate patient/family on patient safety including physical limitations  - Instruct patient to call for assistance with activity based on assessment  - Modify environment to reduce risk of injury  - Consider OT/PT consult to assist with strengthening/mobility  Outcome: Progressing  Goal: Maintain or return to baseline ADL function  Description: INTERVENTIONS:  -  Assess patient's ability to carry out ADLs; assess patient's baseline for ADL function and identify physical deficits which impact ability to perform ADLs (bathing, care of mouth/teeth, toileting, grooming, dressing, etc )  - Assess/evaluate cause of self-care deficits   - Assess range of motion  - Assess patient's mobility; develop plan if impaired  - Assess patient's need for assistive devices and provide as appropriate  - Encourage maximum independence but intervene and supervise when necessary  - Involve family in performance of ADLs  - Assess for home care needs following discharge   - Consider OT consult to assist with ADL evaluation and planning for discharge  - Provide patient education as appropriate  Outcome: Progressing  Goal: Maintain or return mobility status to optimal level  Description: INTERVENTIONS:  - Assess patient's baseline mobility status (ambulation, transfers, stairs, etc )    - Identify cognitive and physical deficits and behaviors that affect mobility  - Identify mobility aids required to assist with transfers and/or ambulation (gait belt, sit-to-stand, lift, walker, cane, etc )  - [place] fall precautions as indicated by assessment  - Record patient progress and toleration of activity level on Mobility SBAR; progress patient to next Phase/Stage  - Instruct patient to call for assistance with activity based on assessment  - Consider rehabilitation consult to assist with strengthening/weightbearing, etc   Outcome: Progressing     Problem: DISCHARGE PLANNING  Goal: Discharge to home or other facility with appropriate resources  Description: INTERVENTIONS:  - Identify barriers to discharge w/patient and caregiver  - Arrange for needed discharge resources and transportation as appropriate  - Identify discharge learning needs (meds, wound care, etc )  - Arrange for interpretive services to assist at discharge as needed  - Refer to Case Management Department for coordinating discharge planning if the patient needs post-hospital services based on physician/advanced practitioner order or complex needs related to functional status, cognitive ability, or social support system  Outcome: Progressing     Problem: Knowledge Deficit  Goal: Patient/family/caregiver demonstrates understanding of disease process, treatment plan, medications, and discharge instructions  Description: Complete learning assessment and assess knowledge base    Interventions:  - Provide teaching at level of understanding  - Provide teaching via preferred learning methods  Outcome: Progressing     Problem: CARDIOVASCULAR - ADULT  Goal: Maintains optimal cardiac output and hemodynamic stability  Description: INTERVENTIONS:  - Monitor I/O, vital signs and rhythm  - Monitor for S/S and trends of decreased cardiac output  - Administer and titrate ordered vasoactive medications to optimize hemodynamic stability  - Assess quality of pulses, skin color and temperature  - Assess for signs of decreased coronary artery perfusion  - Instruct patient to report change in severity of symptoms  Outcome: Progressing  Goal: Absence of cardiac dysrhythmias or at baseline rhythm  Description: INTERVENTIONS:  - Continuous cardiac monitoring, vital signs, obtain 12 lead EKG if ordered  - Administer antiarrhythmic and heart rate control medications as ordered  - Monitor electrolytes and administer replacement therapy as ordered  Outcome: Progressing     Problem: METABOLIC, FLUID AND ELECTROLYTES - ADULT  Goal: Electrolytes maintained within normal limits  Description: INTERVENTIONS:  - Monitor labs and assess patient for signs and symptoms of electrolyte imbalances  - Administer electrolyte replacement as ordered  - Monitor response to electrolyte replacements, including repeat lab results as appropriate  - Instruct patient on fluid and nutrition as appropriate  Outcome: Progressing  Goal: Fluid balance maintained  Description: INTERVENTIONS:  - Monitor labs   - Monitor I/O and WT  - Instruct patient on fluid and nutrition as appropriate  - Assess for signs & symptoms of volume excess or deficit  Outcome: Progressing

## 2021-05-10 NOTE — CASE MANAGEMENT
Continue to follow  Emailed requested information to Nigel at [address] and Adults at [email or web address] per Nigel request      Pallavi Daily MA 46, PASRR, progress notes to [facility] on Aging([phone]) at [phone](WPU)  Will need to fax psych eval once completed to [facility] on Aging at [phone]  Await county eval for nursing home placement  Await SNF facility acceptance  Await return call from Pt's son(Romario)  CM to follow

## 2021-05-10 NOTE — PHYSICAL THERAPY NOTE
PT tx     05/10/21 1445   PT Last Visit   PT Visit Date 05/10/21   Note Type   Note Type Treatment   Pain Assessment   Pain Assessment Tool Pain Assessment not indicated - pt denies pain   Restrictions/Precautions   Weight Bearing Precautions Per Order No   Other Precautions O2;Fall Risk;Cognitive   General   Chart Reviewed Yes   Additional Pertinent History moving out of ICU, 5L 02   Family/Caregiver Present No   Cognition   Arousal/Participation Alert   Attention Difficulty attending to directions   Memory Decreased recall of precautions   Following Commands Follows one step commands with increased time or repetition   Subjective   Subjective Call dinah Winter   Bed Mobility   Additional Comments oob in chair   Transfers   Sit to Stand 4  Minimal assistance   Additional items Assist x 1   Stand to Sit 4  Minimal assistance   Additional items Assist x 1   Stand pivot 4  Minimal assistance   Additional items Assist x 1   Ambulation/Elevation   Gait pattern WNL   Gait Assistance 4  Minimal assist   Additional items Assist x 1   Assistive Device Rolling walker   Distance 50'x4   Balance   Static Sitting Good   Dynamic Sitting Fair   Static Standing Fair -   Dynamic Standing Fair -   Ambulatory Fair -   Endurance Deficit   Endurance Deficit Yes   Endurance Deficit Description   (requires 5-6L 02 occas sob with activity)   Activity Tolerance   Activity Tolerance Patient limited by fatigue   Medical Staff Made Aware OT [facility]   Nurse Made Aware RN    Exercises   Balance training  stood x 2min for usrinal use with rw   Assessment   Prognosis Good   Problem List Decreased strength;Decreased endurance; Impaired balance;Decreased mobility; Decreased coordination;Decreased safety awareness; Impaired judgement   Assessment Pt able to mobilize with RW adn 02 5-6L for gait training  Easily distractable and needs repeated cues for safe transfers and RW use    Will continue to benefit from skilled PT services to regain safe funcitonal mobility   Cont to recommend STR at d/c to maximize functional recovery  Barriers to Discharge Inaccessible home environment   Goals   Patient Goals get better   Plan   Treatment/Interventions Functional transfer training;ADL retraining;LE strengthening/ROM; Therapeutic exercise; Endurance training;Cognitive reorientation;Patient/family training;Equipment eval/education; Bed mobility;Gait training;Spoke to nursing;Spoke to case management;OT   Progress Progressing toward goals   PT Frequency 5x/wk   Recommendation   PT Discharge Recommendation Post acute rehabilitation services   Equipment Recommended [facility] Recommended Wheeled walker   PT - OK to Discharge Yes   Additional Comments   (with medical clearance)   AM-PAC Basic Mobility Inpatient   Turning in Bed Without Bedrails 3   Lying on Back to Sitting on Edge of Flat Bed 3   Moving Bed to Chair 3   Standing Up From Chair 3   Walk in Room 3   Climb 3-5 Stairs 2   Basic Mobility Inpatient Raw Score 17   Basic Mobility Standardized Score 39 67   Justin Fajardo, PT

## 2021-05-10 NOTE — CONSULTS
Consultation - Behavioral Health     Identification Data: Mario Alicia 68 y o  male MRN: [id number]  Unit/Bed#: -01 Encounter: [id number]    05/10/21  4:10 PM    Inpatient consult to Psychiatry  Consult performed by: Sena Gonzáles PA-C  Consult ordered by: MANSI Salinas        Physician Requesting Consult: Shani Low MD  Principal Problem:Acute on chronic diastolic (congestive) heart failure [facility])    Reason for Consult:  "I'm ready to die"    History of Present Illness     Mario Alicia is a 68 y o  male with a history of Major Depressive Disorder who was admitted to the medical service on 5/6/2021 due to acute encephalopathy in the setting of inability to self-care  Psychiatric consultation was requested due to patient being optioned for placement at a nursing home vs  pursuing hospice  Psychiatric symptoms prior to admission included passive death wish  Onset of symptoms was gradual starting 9 months ago with unchanged course since that time  Stressors preceding admission included no significant stressors  Department of Aging has been involved with this patient and the family is requesting placement  He requires a psychiatric evaluation because of his history of major depressive disorder with previous suicide attempt via cutting wrists in September 2020 after his wife passed away  On initial psychiatric evaluation Miguel Ángel Palmer appears to have a bright affect with pleasant mood  He is calm and cooperative and does not appear confused  Patient states I want to go to the [place]  I have nothing left to live for  I am tired, beat down, it's time for me  I have lived my life the way I wanted to and I am ready to go out now    The patient states that he is not suicidal, rather, he is at peace with his mortality and feels as if it is his time to go  He denies any clinical symptoms of depression  He denies any plan to actively harm himself, and he does not own guns at home  He denies any manic or hypomanic symptoms as well  He is not demonstrating any clinical stigmata of acute psychosis  He was apparently consulted by palliative care earlier in the day and believes that he will be placed at a long-term care facility to pursue hospice  He does not use any illicit substances  Though he served in [employer] [facility], he denies any PTSD symptoms        Psychiatric Review Of Systems:    sleep changes: no  appetite changes: no  weight changes: yes, decreased, unknown  energy/anergy: normal  interest/pleasure/anhedonia: no  somatic symptoms: no  anxiety/panic: no  gus: no  guilty/hopeless: no  self injurious behavior/risky behavior: no  Suicidal ideation: yes, passive death wish, no plan  Homicidal ideation: no  Auditory hallucinations: no  Visual hallucinations: no  Other hallucinations: no  Delusional thinking: no    Historical Information     Past Psychiatric History:     Past Inpatient Psychiatric Treatment:   Past inpatient psychiatric admission at [place] [address] in SEptember 2020  Past Outpatient Psychiatric Treatment:    No history of past outpatient psychiatric treatment  Past Suicide Attempts: no  Past Violent Behavior: no  Past Psychiatric Medication Trials: Remeron     Substance Abuse History:    Social History     Tobacco History     Smoking Status  Former Smoker Quit date  9/1/2020 Smoking Frequency  0 25 packs/day Smoking Tobacco Type  Cigarettes    Smokeless Tobacco Use  Former User    Tobacco Comment  stopped smoking 2 weeks ago          Alcohol History     Alcohol Use Status  Not Currently Comment  Socially          Drug Use     Drug Use Status  No          Sexual Activity     Sexually Active  Yes Partners  Female          Activities of Daily Living    Not Asked                 I have assessed this patient for substance use within the past 12 months    Alcohol use: denies use  Recreational drug use:   Cocaine:  denies use  Heroin:  denies use  Marijuana:  denies use  Other drugs: denies use     Longest clean time: not applicable  History of Inpatient/Outpatient rehabilitation program: no  Smoking history: former smoker    Family Psychiatric History:     Psychiatric Illness:  patient denies  Substance Abuse:  patient denies  Suicide Attempts:  patient denies    Social History:    Education: high school diploma/GED  Marital History: single  Children: 5- 3 sons, 2 daughters (1 )  Living Arrangement: The patient lives alone  Occupational History: retired  Legal History: none    Traumatic History:     Abuse: none  Other Traumatic Events:none     Past Medical History:    History of Seizures: no  History of Head injury with loss of consciousness: no    Past Medical History:   Diagnosis Date    Bladder cancer ([facility] [address] )      Past Surgical History:   Procedure Laterality Date    ESOPHAGOGASTRODUODENOSCOPY N/A 2019    Procedure: ESOPHAGOGASTRODUODENOSCOPY (EGD); Surgeon: Mellissa Solares MD;  Location: [facility];  Service: Gastroenterology         Medical Review Of Systems:    Pertinent items are noted in HPI  Allergies:    No Known Allergies    Medications: All current active medications have been reviewed  Medication prior to admission:   Prior to Admission Medications   Prescriptions Last Dose Informant Patient Reported?  Taking?   b complex vitamins capsule   No No   Sig: Take 1 capsule by mouth daily   ferrous sulfate 324 (65 Fe) mg   No No   Sig: Take 1 tablet (324 mg total) by mouth daily   mirtazapine (REMERON) 15 mg tablet   No No   Sig: Take 1 tablet (15 mg total) by mouth daily at bedtime   pantoprazole (PROTONIX) 40 mg tablet   No No   Sig: Take 1 tablet (40 mg total) by mouth daily in the early morning      Facility-Administered Medications: None       Objective     Vital signs in last 24 hours:    Temp:  [97 9 °F (36 6 °C)-98 3 °F (36 8 °C)] 97 9 °F (36 6 °C)  HR:  [71-95] 71  Resp:  [22-44] 44  BP: ()/(51-64) 91/51    Intake/Output Summary (Last 24 hours) at 5/10/2021 1610  Last data filed at 5/10/2021 1357  Gross per 24 hour   Intake 1080 ml   Output 2846 ml   Net -1766 ml       Mental Status Evaluation:    Appearance:  disheveled, dressed in hospital attire, thin & gaunt looking, bearded   Behavior:  pleasant, cooperative, calm   Speech:  normal rate and volume, fluent, clear   Mood:  euthymic   Affect:  appropriate, reactive   Language: naming objects and repeating phrases   Thought Process:  decreased rate of thoughts, concrete   Associations: concrete associations   Thought Content:  no overt delusions   Perceptual Disturbances: no auditory hallucinations, no visual hallucinations   Risk Potential: Suicidal ideation - Yes, passive death wish, no active plan  Homicidal ideation - None at present  Potential for aggression - No   Sensorium:  oriented to person, place and time/date   Memory:  recent and remote memory grossly intact   Consciousness:  alert and awake    Attention/Concentration: attention span and concentration are age appropriate   Intellect: within normal limits   Fund of Knowledge: awareness of current events: normal   Insight:  fair   Judgment: fair   Muscle Strength Muscle Tone: normal  normal   Gait/Station: uses walker   Motor Activity: no abnormal movements     Laboratory Results:   I have personally reviewed all pertinent laboratory/tests results    Most Recent Labs:   Lab Results   Component Value Date    WBC 12 68 (H) 05/10/2021    RBC 4 70 05/10/2021    HGB 9 5 (L) 05/10/2021    HCT 36 1 (L) 05/10/2021     05/10/2021    RDW 18 6 (H) 05/10/2021    NEUTROABS 12 54 (H) 05/06/2021    SODIUM 143 05/10/2021    K 4 1 05/10/2021     05/10/2021    CO2 38 (H) 05/10/2021    BUN 35 (H) 05/10/2021    CREATININE 1 10 05/10/2021    GLUC 121 05/10/2021    CALCIUM 8 3 05/10/2021    AST 59 (H) 05/08/2021    ALT 95 (H) 05/08/2021    ALKPHOS 69 05/08/2021    TP 7 5 05/08/2021    ALB 3 2 (L) 05/08/2021    TBILI 0 70 05/08/2021    CHOLESTEROL 79 05/07/2021    HDL 28 (L) 05/07/2021    TRIG 106 05/07/2021    LDLCALC 30 05/07/2021    NONHDLC 67 09/19/2020    AMMONIA <10 (L) 05/06/2021    RPR Non-Reactive 09/19/2020    HGBA1C 5 9 (H) 05/07/2021     05/07/2021     Admission Labs:   No results displayed because visit has over 200 results  Imaging Studies: Xr Chest Portable    Result Date: 5/10/2021  Narrative: CHEST INDICATION:   chf/ effusions  COMPARISON:  Compared with 5/6/2021 EXAM PERFORMED/VIEWS:  XR CHEST PORTABLE FINDINGS: Unchanged cardiac size  Right paratracheal opacity unchanged which correlates with CT chest of vascular structures    Poor inspiration  Mild prominent vascular markings  Osseous structures appear within normal limits for patient age  Impression: Poor inspiration with possible congestive changes  No effusions  Workstation performed: [id number]     Xr Chest 1 View Portable    Result Date: 5/7/2021  Narrative: CHEST INDICATION:   Shortness of breath  COMPARISON:  January 13, 2019 EXAM PERFORMED/VIEWS:  XR CHEST PORTABLE FINDINGS: Heart shadow is enlarged but unchanged from prior exam  There are trace costophrenic angle pleural effusions  Bibasilar atelectasis is noted  Prominence of pulmonary vascular and interstitial markings suggesting mild pulmonary vascular congestion  No pneumothorax  Osseous structures appear within normal limits for patient age  Impression: Unchanged cardiomegaly with suggestion of mild pulmonary vascular congestion and trace costophrenic angle effusions  Workstation performed: [id number]     Ct Head Wo Contrast    Result Date: 5/8/2021  Narrative: CT BRAIN - WITHOUT CONTRAST INDICATION:   Stroke, follow up stroke; repeat ct patient refused MRI  COMPARISON:  5/6/2021  TECHNIQUE:  CT examination of the brain was performed  In addition to axial images, sagittal and coronal 2D reformatted images were created and submitted for interpretation   Radiation dose length product (DLP) for this visit:  884 81 mGy-cm   This examination, like all CT scans performed in the [facility], was performed utilizing techniques to minimize radiation dose exposure, including the use of iterative  reconstruction and automated exposure control  IMAGE QUALITY:  Diagnostic  FINDINGS: PARENCHYMA:  Stable small hypodense area in the left posterior parietal region  Punctate mildly hyperdense focus in this region suspected to represent a vessel versus microhemorrhage is less conspicuous  No mass effect or midline shift  Decreased attenuation is noted in periventricular and subcortical white matter demonstrating an appearance that is statistically most likely to represent mild microangiopathic change  Chronic lacunar infarcts in the right and left thalamus  Small, likely chronic infarct in the right occipital region  VENTRICLES AND EXTRA-AXIAL SPACES:  Normal for the patient's age  VISUALIZED ORBITS AND PARANASAL SINUSES:  Unremarkable  CALVARIUM AND EXTRACRANIAL SOFT TISSUES:  Normal      Impression: Stable small hypodense area in the left posterior parietal region suspicious for acute to subacute infarct  Punctate mildly hyperdense area in the region of infarct suspected to represent a vessel versus microhemorrhage is less conspicuous  Correlation  with MRI may be helpful for further evaluation  Workstation performed: [id number]     Ct Head Wo Contrast    Result Date: 5/6/2021  Narrative: CT BRAIN - WITHOUT CONTRAST INDICATION:   Altered mental status  COMPARISON:  None  TECHNIQUE:  CT examination of the brain was performed  In addition to axial images, sagittal and coronal 2D reformatted images were created and submitted for interpretation  Radiation dose length product (DLP) for this visit:  893 mGy-cm     This examination, like all CT scans performed in the [facility], was performed utilizing techniques to minimize radiation dose exposure, including the use of iterative reconstruction and automated exposure control  IMAGE QUALITY:  Diagnostic  FINDINGS: PARENCHYMA: Small focus of hypoattenuation in the left posterior parietal region  Punctate focus of mildly increased density within the area of hypoattenuation could represent a vessel versus subacute subarachnoid hemorrhage  Small, likely chronic infarct in the right posterior mesial temporal occipital region  Chronic lacunar infarct in the right and left thalamus  Periventricular and subcortical hypoattenuating foci consistent with microangiopathic disease  No mass effect  VENTRICLES AND EXTRA-AXIAL SPACES:  No hydrocephalus  VISUALIZED ORBITS AND PARANASAL SINUSES:  Intact globes and orbits  Clear paranasal sinuses  CALVARIUM AND EXTRACRANIAL SOFT TISSUES:  No lytic or blastic lesion  Impression: Small cortical and subcortical infarct in the left posterior parietal region, possibly acute to subacute  Questionable focus of subacute microhemorrhage within the infarct     No mass effect  MRI may be helpful for further evaluation  The study was marked in [facility] for immediate notification  Workstation performed: [id number]     Mri Brain Wo Contrast    Result Date: 5/8/2021  Narrative: MRI BRAIN WITHOUT CONTRAST INDICATION: acute vs subacute stroke found on CT in ecephalopathic patient  COMPARISON:   Head CT 5/7/2021 TECHNIQUE:  Sagittal T1, axial T2, axial FLAIR, axial T1, axial [place] and axial diffusion imaging  IMAGE QUALITY:  Diagnostic  FINDINGS: BRAIN PARENCHYMA: There is focal area of acute infarct involving left postcentral gyrus  Associated FLAIR hyperintensity without significant edema or mass effect  No hemorrhagic transformation  Cerebral atrophy  Chronic lacunar infarcts are noted in bilateral thalami, left periatrial white matter, and bilateral cerebellum  Nonspecific foci of T2/FLAIR hyperintensities involving periventricular and subcortical white matter most consistent with  mild microangiopathic changes    No intracranial hemorrhage  VENTRICLES:  Normal for the patient's age  SELLA AND PITUITARY GLAND:  Normal  ORBITS:  Normal  PARANASAL SINUSES:  Normal  VASCULATURE:  Evaluation of the major intracranial vasculature demonstrates appropriate flow voids  CALVARIUM AND SKULL BASE:  Normal  EXTRACRANIAL SOFT TISSUES:  Normal      Impression: 1  Acute infarct involving left postcentral gyrus  No significant edema or mass effect  No hemorrhagic transformation  2   Scattered chronic lacunar infarcts and microangiopathic changes  Workstation performed: [id number]     Cta Ed Chest Pe Study    Result Date: 5/6/2021  Narrative: CTA - CHEST WITH IV CONTRAST - PULMONARY ANGIOGRAM INDICATION:   PE   25-year-old male with increasing confusion and weakness  COMPARISON: CTA of the chest and abdomen from January 10, 2019  TECHNIQUE: CTA examination of the chest was performed using angiographic technique according to a protocol specifically tailored to evaluate for pulmonary embolism  Axial, sagittal, and coronal 2D reformatted images were created from the source data and  submitted for interpretation  In addition, coronal 3D MIP postprocessing was performed on the acquisition scanner  Radiation dose length product (DLP) for this visit:  339 mGy-cm   This examination, like all CT scans performed in the [facility], was performed utilizing techniques to minimize radiation dose exposure, including the use of iterative reconstruction and automated exposure control  IV Contrast:  85 mL of iohexol (OMNIPAQUE)  FINDINGS: PULMONARY ARTERIAL TREE:  No pulmonary embolus is seen  LUNGS:  Subsegmental atelectasis and fibrosis at the bases of both lower lobes  Fibrotic changes in both apices  PLEURA:  Small bibasilar pleural effusions  HEART/GREAT VESSELS:  Diffuse ectasia of the thoracic aorta  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, unchanged since the last CTA  No dissection   MEDIASTINUM AND MEGHA: No lymphadenopathy or mass   Esophagus unremarkable  Trachea and main stem bronchi normal  CHEST WALL AND LOWER NECK:   Unremarkable  VISUALIZED STRUCTURES IN THE UPPER ABDOMEN:  Dilated stomach  OSSEOUS STRUCTURES:  Chronic compression fracture of T12  No evidence of acute fracture or destructive lesion  Impression: 1  No evidence of pulmonary embolus  2   Subsegmental atelectasis and fibrosis in the bases of both lower lobes  3   Small bibasilar pleural effusions  4   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, unchanged since a CTA from 1/10/2019  5   Gastric dilatation  Workstation performed: [id number]       Code Status: Level 3 - DNAR and DNI  Advance Directive and Living Will:       Power of :      Assessment/Plan     Principal Problem:    Acute on chronic diastolic (congestive) heart failure (HCC)  Active Problems: Aortic disorder (HCC)    PUD (peptic ulcer disease)    Depressive disorder    Elevated troponin level not due myocardial infarction    Acute encephalopathy    CVA (cerebral vascular accident) ([facility] [address] )    DEYSI (acute kidney injury) ([facility] [address] )    Cystitis    CAP (community acquired pneumonia)    Transaminitis    Sepsis ([facility] [address] )    ALVIN (iron deficiency anemia)    Acute respiratory failure with hypoxia ([facility] [address] )    New onset a-fib ([facility] [address] )      Assessment:    In summary, this is a 20-year-old male with a history of MDD and 1 prior suicide attempt via wrist cutting and September of 2020 who presents for psychiatric consultation for optioning for placement  Patient has a passive death wish with no active suicidal intent or plan  He has already been consulted by palliative care and appears motivated to move forward with hospice  As noted in Dr Ash Divers note from palliative care from earlier today, the patient wants no aggressive measures and is at peace with his mortality    This is the sentiment that I received from the patient as well during my time with him    He has no clinical symptoms of depression aside from his ongoing passive death wish  He is otherwise pleasant, warm and cooperative and he appears to be a reliable historian  He does not appear suicidal to me and it would seem that he understands the decision that he is making  As such, the patient is cleared from a psychiatric standpoint for placement to pursue hospice  Thank you very much for this consult, Psychiatry will sign off  Please not hesitate to reach out if the patient's status should acutely change in the future  Treatment Plan:     Planned Medication Changes: All current active medications have been reviewed    -Cleared from a psych standpoint for placement  -Patient willing to pursue hospice  -Medical recs per primary team, greatly appreciated    Current Facility-Administered Medications   Medication Dose Route Frequency Provider Last Rate    acetaminophen  650 mg Oral Q6H PRN Ifrah Ripa, CRNP      amiodarone  200 mg Oral BID With Meals Ifrah Ripa, CRNP      apixaban  5 mg Oral BID Ifrah Ripa, CRNP      docusate sodium  100 mg Oral BID Ifrah Ripa, CRNP      furosemide  40 mg Intravenous BID (diuretic) Ifrah Ripa, CRNP      guaiFENesin  600 mg Oral Q12H [address] Ifrah Ripa, CRNP      sodium chloride  3 mL Nebulization TID Ifrah Ripa, CRNP      And    levalbuterol  1 25 mg Nebulization TID Ifrah Ripa, CRNP      metoprolol  5 mg Intravenous Once Ifrah Ripa, CRNP      midodrine  5 mg Oral TID AC Ifrah Ripa, CRNP      pantoprazole  40 mg Oral Early Morning Ifrah Ripa, CRNP      senna  1 tablet Oral HS Ifrah Ripa, CRNP         Risks / Benefits of Treatment:    Risks, benefits, and possible side effects of medications explained to patient and patient verbalizes understanding and agreement for treatment      Counseling / Coordination of Care:    Patient's presentation on admission and proposed treatment plan discussed with treatment team   Diagnosis, medication changes and treatment plan reviewed with patient      Shasha Clifford PA-C 05/10/21

## 2021-05-10 NOTE — PLAN OF CARE
Problem: OCCUPATIONAL THERAPY ADULT  Goal: Performs self-care activities at highest level of function for planned discharge setting  See evaluation for individualized goals  Description: Treatment Interventions: ADL retraining, Functional transfer training, UE strengthening/ROM, Endurance training, Cognitive reorientation, Patient/family training, Equipment evaluation/education, Activityengagement, Energy conservation          See flowsheet documentation for full assessment, interventions and recommendations  Outcome: Progressing  Note: Limitation: Decreased ADL status, Decreased UE strength, Decreased cognition, Decreased endurance, Decreased self-care trans, Decreased high-level ADLs  Prognosis: Fair  Assessment: Pt seen for OT tx session w/ focus on self care, functional transfers, stand tolerance, functional mobility, and activity tolerance  Denies pain  Requires repeat of instructions several times before completing tasks  Attention span slightly improved compared to IE, but only ~ 2 mins  Needs redirection and initiation to perform tasks  Continues to require O2, but less than IE  Min for transfers and ambulation using RW, but needs constant cuing to stay inside walker and keep walker with him during SPT  Uncontrolled descent to surfaces and frequent cuing for proper hand placement  Decreased carry over noted of education  Decreased balance noted during unsupported standing for clothing management  Reports he wants to go on hospice so he can be with his "soulmate " Educated pt on progress he's made since IE  Continue OT to achieve goals and independence  Recommendation for rehab remains appropriate due to decreased social environment, decreased support system, increased assist w/ ADLS, and increased assist for mobility        OT Discharge Recommendation: Post acute rehabilitation services

## 2021-05-10 NOTE — PROGRESS NOTES
[place]  Progress Note - Maggi Andrade 1945, 68 y o  male MRN: [id number]  Unit/Bed#: -01 SDU Encounter: [id number]  Primary Care Provider: No primary care provider on file  Date and time admitted to hospital: 5/6/2021  4:56 PM    * Acute on chronic diastolic (congestive) heart failure (HCC)  Assessment & Plan  Wt Readings from Last 3 Encounters:   05/09/21 74 5 kg (164 lb 3 9 oz)   09/18/20 62 5 kg (137 lb 12 8 oz)   01/14/19 63 5 kg (140 lb)       · Tx from med surg on 5/8 for worsening shortness of breath  · CT chest showed small bilateral pleural effusions on admission  · On admission, BNP 19988, troponin 0 09  · Patient required Jigar-Synephrine infusion - continue titrate to keep MAP > 65, which is now off   · Echo on 05/07/2021 showed EF 55% with grade 1 diastolic dysfunction  · Lasix 40mg BID to improve diuresis     New onset a-fib [facility])  Assessment & Plan  · New onset 5/7, transferred to ICU same day for amio gtt   · 5/9 bradycardia on amio gtt  Transitioned to PO amio per Cardiology   · Cardiology following  · AC: Eliquis initiated 5/9    Hypotensive episode  Assessment & Plan  · In the setting of atrial fibrillation with SBP 70s   · Has previously required Jigar, however he remains off pressors   · Cardiology following     CVA (cerebral vascular accident) [facility])  [address] [place] [address]  · Baseline neuro exam alert oriented to self and year  · CT head on admission showed small cortical and subcortical infarcts in the left posterior parietal region, acute to subacute  Questionable focus of subacute microhemorrhage within the infarct    No mass effect  · Neurology following  · MRI brain showed ischemia in the left parietal region   · Serial neuro exams     Acute encephalopathy  Assessment & Plan  · Now resolved, as patient is oriented to person, place and time on reassessment     DEYSI (acute kidney injury) ([facility] [address] )  Assessment & Plan  · Creatinine on admission 1 42  · Current creatinine 1 17  · Avoid nephrotoxic agents, hypotension   · Trend UO, renal indices     Elevated troponin level not due myocardial infarction  Assessment & Plan  · Troponin peak 0 16  · No EKG changes noted  · Suspect elevated troponin is non MI troponin elevation in the setting of acute heart failure    CAP (community acquired pneumonia)  Assessment & Plan  · Less likely CAP at this time  · CXR on admission shows infiltrates, though this may be 2/2 CHF   · Blood cultures negative  · Urine strep and Legionella negative  · Abx discontinued     Cystitis  Assessment & Plan  · Urinalysis on admission with 10-20 wbc's and occasional bacteria  · Urine culture showed mixed contaminants  · Antibiotics discontinued     Transaminitis  Assessment & Plan  · LFTs down trending  · Hepatitis panel negative  · Overall improving     Sepsis (HCC)  Assessment & Plan  · POA AEB SIRS criteria on admission with tachycardia and leukocytosis  · Suspected source pneumonia versus cystitis  · COVID-19 negative  · Urinalysis positive  · Chest x-ray showed possible infiltrate  · Antibiotics discontinued   · Cultures negative     ALVIN (iron deficiency anemia)  Assessment & Plan  · Hemoglobin 10 6 on admission  · Baseline hemoglobin 8-10  · Transfuse PRBCs for hemoglobin less than 7    Acute respiratory failure with hypoxia (HCC)  Assessment & Plan  · Initially presented with oxygen saturation 83% on room air  · Was placed on 4 L nasal cannula  · 5/8 bipap for increased WOB 2/2 CHF 2/2 AF RVR   · 4LNC throughout the day yesterday   · bipap HS, however patient refused last pm and instead remained on NC   · Goal to maintain spo2 >90%    Depressive disorder  Assessment & Plan  · Patient was discharged from the hospital in September 2020 to inpatient psych unit after he expressed suicidal ideation with a plan - he had a voluntary commitment  · On 05/07, the Department of Aging checked on him and felt that he was no longer safe to be home  · Remeron 15 mg HS on hold in the setting of acute encephalopathy    PUD (peptic ulcer disease)  Assessment & Plan  · Admitted 2020 due to acute GI bleed secondary to duodenal ulcer - at that time patient received 2 units packed RBCs  · Current hemoglobin stable 9 3  · Cont home PPI    Aortic disorder [facility])  Assessment & Plan  · CT the chest, patient has a 3 5 cm aneurysm that has been unchanged since the last CTA on January 10, 2019      ----------------------------------------------------------------------------------------  HPI/24hr events: Jigar was weaned off  He was started on eliquis and transitioned to PO amio yesterday after an episode of bradycardia while on amio gtt  He received Lasix yesterday evening  Overnight, he refused his bipap and remains on NC  The decision was made yesterday not to escalate care in keeping with the patient's wishes       Disposition: Transfer to Med Surg with Telemetry   Code Status: Level 3 - DNAR and DNI  ---------------------------------------------------------------------------------------  SUBJECTIVE  "I'm ok"    Review of Systems  Review of systems was reviewed and negative unless stated above in HPI/24-hour events   ---------------------------------------------------------------------------------------  OBJECTIVE    Vitals   Vitals:    21 05/10/21 0000   BP: 111/60 118/64  90/54   BP Location: Right arm   Right arm   Pulse: 85 81 83 84   Resp: (!) 26 (!) 23 22 22   Temp: 98 3 °F (36 8 °C)   98 °F (36 7 °C)   TempSrc: Oral   Oral   SpO2: 100% 100% 100% 99%   Weight:       Height:         Temp (24hrs), Av 1 °F (36 7 °C), Min:97 4 °F (36 3 °C), Max:98 8 °F (37 1 °C)  Current: Temperature: 98 °F (36 7 °C)          Respiratory:  SpO2: SpO2: 99 %, SpO2 Activity: SpO2 Activity: At Rest, SpO2 Device: O2 Device: Nasal cannula  Nasal Cannula O2 Flow Rate (L/min): 4 L/min    Invasive/non-invasive ventilation settings   Respiratory Lab Data (Last 4 hours)    None         O2/Vent Data (Last 4 hours)    None                Physical Exam  Vitals signs and nursing note reviewed  Constitutional:       General: He is awake  He is not in acute distress  Appearance: He is well-developed  He is ill-appearing  He is not toxic-appearing or diaphoretic  Cardiovascular:      Rate and Rhythm: Normal rate and regular rhythm  Heart sounds: Normal heart sounds  Pulmonary:      Effort: Pulmonary effort is normal  No accessory muscle usage  Abdominal:      General: Abdomen is flat  Bowel sounds are decreased  Palpations: Abdomen is soft  Tenderness: There is no abdominal tenderness  Skin:     General: Skin is warm  Capillary Refill: Capillary refill takes less than 2 seconds  Neurological:      General: No focal deficit present  Mental Status: He is alert, oriented to person, place, and time and easily aroused  GCS: GCS eye subscore is 4  GCS verbal subscore is 5  GCS motor subscore is 6  Cranial Nerves: Cranial nerves are intact  Psychiatric:         Mood and Affect: Affect is flat  Behavior: Behavior is cooperative           Laboratory and Diagnostics:  Results from last 7 days   Lab Units 05/09/21 0415 05/08/21 0400 05/08/21  0006 05/07/21  0419 05/06/21  1716 05/06/21  1711   WBC Thousand/uL 13 22* 15 93* 18 96* 21 82*  --  15 33*   HEMOGLOBIN g/dL 9 3* 9 0* 9 7* 9 8*  --  10 6*   I STAT HEMOGLOBIN g/dl  --   --   --   --  13 6  --    HEMATOCRIT % 34 8* 33 8* 36 6 36 9  --  40 0   HEMATOCRIT, ISTAT %  --   --   --   --  40  --    PLATELETS Thousands/uL 322 419* 423* 453*  --  533*   NEUTROS PCT %  --   --   --   --   --  82*   MONOS PCT %  --   --   --   --   --  9     Results from last 7 days   Lab Units 05/09/21  0415 05/08/21  0400 05/08/21  0006 05/07/21  1545 05/07/21 0419 05/06/21  1716 05/06/21  1711   SODIUM mmol/L 141 141 142 142  --   --  141   POTASSIUM mmol/L 3 8 4 0 3 6 3 8  -- --  5 1   CHLORIDE mmol/L 99* 98* 99* 98*  --   --  96*   CO2 mmol/L 37* 32 37* 39*  --   --  32   CO2, I-STAT mmol/L  --   --   --   --   --  39*  --    ANION GAP mmol/L 5 11 6 5  --   --  13   BUN mg/dL 39* 44* 45* 42*  --   --  38*   CREATININE mg/dL 1 17 1 30 1 33* 1 39*  --   --  1 42*   CALCIUM mg/dL 8 0* 7 8* 7 9* 8 0*  --   --  9 1   GLUCOSE RANDOM mg/dL 132 145* 128 125  --   --  106   ALT U/L  --  95* 113*  --  139*  --  188*   AST U/L  --  59* 63*  --  106*  --  214*   ALK PHOS U/L  --  69 74  --  96  --  105   ALBUMIN g/dL  --  3 2* 2 9*  --  2 9*  --  3 6   TOTAL BILIRUBIN mg/dL  --  0 70 0 40  --  0 50  --  0 60     Results from last 7 days   Lab Units 05/09/21 0415 05/08/21  0006 05/07/21  0419   MAGNESIUM mg/dL 2 2 2 1 2 1   PHOSPHORUS mg/dL  --   --  4 5*      Results from last 7 days   Lab Units 05/09/21 0415 05/08/21  0006 05/07/21  0419 05/06/21  1737   INR  1 22* 1 29* 1 17 1 21*   PTT seconds  --  42*  --  36      Results from last 7 days   Lab Units 05/07/21  0419 05/06/21  2338 05/06/21  1711   TROPONIN I ng/mL 0 14* 0 16* 0 09*     Results from last 7 days   Lab Units 05/06/21  1737   LACTIC ACID mmol/L 2 0     ABG:    VBG:    Results from last 7 days   Lab Units 05/09/21 0415 05/08/21  0400 05/07/21  0424 05/06/21  1737   PROCALCITONIN ng/ml 0 14 0 25 0 19 0 12       Micro  Results from last 7 days   Lab Units 05/06/21  1748 05/06/21  1737   BLOOD CULTURE   --  No Growth at 72 hrs  No Growth at 72 hrs  URINE CULTURE  80,000-89,000 cfu/ml   --    LEGIONELLA URINARY ANTIGEN  Negative  --    STREP PNEUMONIAE ANTIGEN, URINE  Negative  --        EKG: NSR on tele   Imaging: I have personally reviewed pertinent reports  Intake and Output  I/O       05/08 0701 - 05/09 0700 05/09 0701 - 05/10 0700    P  O  480 780    I V  (mL/kg) 615 4 (8 3) 66 5 (0 9)    IV Piggyback 300 500    Total Intake(mL/kg) 1395 4 (18 7) 1346 5 (18 1)    Urine (mL/kg/hr) 2088 (1 2) 1871 (1)    Stool 0     Total Output 2088 1871    Net -692 6 -524 5          Unmeasured Urine Occurrence 1 x 1 x    Unmeasured Stool Occurrence 0 x           Height and Weights   Height: 5' 11" (180 3 cm)  IBW (Ideal Body Weight): 75 3 kg  Body mass index is 22 91 kg/m²  Weight (last 2 days)     Date/Time   Weight    05/09/21 0431   74 5 (164 24)    05/08/21 0220   76 9 (169 53)                Nutrition       Diet Orders   (From admission, onward)             Start     Ordered    05/07/21 0402  Diet Cardiovascular; Cardiac; Fluid Restriction 1500 ML  Diet effective now     Question Answer Comment   Diet Type Cardiovascular    Cardiac Cardiac    Other Restriction(s): Fluid Restriction 1500 ML    RD to adjust diet per protocol?  Yes        05/07/21 0401                  Active Medications  Scheduled Meds:  Current Facility-Administered Medications   Medication Dose Route Frequency Provider Last Rate    acetaminophen  650 mg Oral Q6H PRN Debarah Level, PA-C      amiodarone  200 mg Oral BID With Meals Monie A Seddejah, CRNP      apixaban  5 mg Oral BID Monie A Sedora, CRNP      docusate sodium  100 mg Oral BID Monie A Sedora, CRNP      furosemide  40 mg Intravenous BID (diuretic) Debarah Level PA-C      guaiFENesin  600 mg Oral Q12H [facility] Debarah Level, PA-C      sodium chloride  3 mL Nebulization TID Monie A Sedora, CRNP      And    levalbuterol  1 25 mg Nebulization TID Monie A Sedora, CRNP      metoprolol  5 mg Intravenous Once Debarah Level, PA-C      midodrine  5 mg Oral TID AC Monie A Sedora, CRNP      pantoprazole  40 mg Oral Early Morning MANSI Langley      senna  1 tablet Oral HS Monie A Sedora, CRNP       Continuous Infusions:     PRN Meds:   acetaminophen, 650 mg, Q6H PRN        Invasive Devices Review  Invasive Devices     Peripheral Intravenous Line            Peripheral IV 05/08/21 Distal;Right;Ventral (anterior) Forearm 2 days    Peripheral IV 05/08/21 Left;Proximal;Ventral (anterior) Forearm 1 day                ---------------------------------------------------------------------------------------  Advance Directive and Living Will:      Power of :    POLST:    ---------------------------------------------------------------------------------------  Care Time Delivered:   No Critical Care time spent       MANSI Qureshi      Portions of the record may have been created with voice recognition software  Occasional wrong word or "sound a like" substitutions may have occurred due to the inherent limitations of voice recognition software    Read the chart carefully and recognize, using context, where substitutions have occurred

## 2021-05-10 NOTE — PROGRESS NOTES
Cardiology Progress Note - Dia Coe 68 y o  male MRN: [id number]    Unit/Bed#: -01 SDU Encounter: [id number]        Subjective:    No significant events overnight  Weaned off pressors  Review of Systems   Cardiovascular: Negative for chest pain, leg swelling and palpitations  Respiratory: Negative for shortness of breath  Objective:   Vitals: Blood pressure 93/54, pulse 95, temperature 98 2 °F (36 8 °C), temperature source Oral, resp  rate (!) 44, height 5' 11" (1 803 m), weight 74 5 kg (164 lb 3 9 oz), SpO2 93 %  , Body mass index is 22 91 kg/m² ,   Orthostatic Blood Pressures      Most Recent Value   Blood Pressure  93/54 filed at 05/10/2021 1020   Patient Position - Orthostatic VS  Lying filed at 05/10/2021 4527         Systolic (50ZSC), UIX:556 , Min:90 , XTO:765     Diastolic (80JSM), RHA:70, Min:53, Max:64      Intake/Output Summary (Last 24 hours) at 5/10/2021 1255  Last data filed at 5/10/2021 0930  Gross per 24 hour   Intake 800 ml   Output 2225 ml   Net -1425 ml     Weight (last 2 days)     Date/Time   Weight    05/09/21 0431   74 5 (164 24)    05/08/21 0220   76 9 (169 53)                  Telemetry Review: NSR    Physical Exam  Cardiovascular:      Rate and Rhythm: Normal rate and regular rhythm  Heart sounds: Normal heart sounds  No murmur  No friction rub  No gallop  Pulmonary:      Breath sounds: Normal breath sounds  No wheezing or rales             Laboratory Results:  Results from last 7 days   Lab Units 05/07/21  0419 05/06/21  2338 05/06/21  1711   CK TOTAL U/L  --   --  86   TROPONIN I ng/mL 0 14* 0 16* 0 09*       CBC with diff:   Results from last 7 days   Lab Units 05/10/21  0528 05/09/21  0415 05/08/21  0400 05/08/21  0006 05/07/21  0419 05/06/21  1716 05/06/21  1711   WBC Thousand/uL 12 68* 13 22* 15 93* 18 96* 21 82*  --  15 33*   HEMOGLOBIN g/dL 9 5* 9 3* 9 0* 9 7* 9 8*  --  10 6*   I STAT HEMOGLOBIN g/dl  --   --   --   --   --  13 6  --    HEMATOCRIT % 36 1* 34 8* 33 8* 36 6 36 9  --  40 0   HEMATOCRIT, ISTAT %  --   --   --   --   --  40  --    MCV fL 77* 76* 77* 76* 76*  --  76*   PLATELETS Thousands/uL 341 322 419* 423* 453*  --  533*   MCH pg 20 2* 20 3* 20 5* 20 2* 20 2*  --  20 2*   MCHC g/dL 26 3* 26 7* 26 6* 26 5* 26 6*  --  26 5*   RDW % 18 6* 19 4* 19 6* 19 7* 18 8*  --  19 5*   MPV fL 9 4 9 4 10 1 9 4 9 6  --  9 7   NRBC AUTO /100 WBCs  --   --   --   --   --   --  1         CMP:  Results from last 7 days   Lab Units 05/10/21  0528 05/09/21  0415 05/08/21  0400 05/08/21  0006 05/07/21  1545 05/07/21  0419 05/06/21  1716 05/06/21  1711   POTASSIUM mmol/L 4 1 3 8 4 0 3 6 3 8  --   --  5 1   CHLORIDE mmol/L 100 99* 98* 99* 98*  --   --  96*   CO2 mmol/L 38* 37* 32 37* 39*  --   --  32   CO2, I-STAT mmol/L  --   --   --   --   --   --  39*  --    BUN mg/dL 35* 39* 44* 45* 42*  --   --  38*   CREATININE mg/dL 1 10 1 17 1 30 1 33* 1 39*  --   --  1 42*   GLUCOSE, ISTAT mg/dl  --   --   --   --   --   --  114  --    CALCIUM mg/dL 8 3 8 0* 7 8* 7 9* 8 0*  --   --  9 1   AST U/L  --   --  59* 63*  --  106*  --  214*   ALT U/L  --   --  95* 113*  --  139*  --  188*   ALK PHOS U/L  --   --  69 74  --  96  --  105   EGFR ml/min/1 73sq m 65 60 53 52 49  --   --  48         BMP:  Results from last 7 days   Lab Units 05/10/21  0528 05/09/21  0415 05/08/21  0400 05/08/21  0006 05/07/21  1545 05/06/21  1716 05/06/21  1711   POTASSIUM mmol/L 4 1 3 8 4 0 3 6 3 8  --  5 1   CHLORIDE mmol/L 100 99* 98* 99* 98*  --  96*   CO2 mmol/L 38* 37* 32 37* 39*  --  32   CO2, I-STAT mmol/L  --   --   --   --   --  39*  --    BUN mg/dL 35* 39* 44* 45* 42*  --  38*   CREATININE mg/dL 1 10 1 17 1 30 1 33* 1 39*  --  1 42*   GLUCOSE, ISTAT mg/dl  --   --   --   --   --  114  --    CALCIUM mg/dL 8 3 8 0* 7 8* 7 9* 8 0*  --  9 1       BNP:     Magnesium:   Results from last 7 days   Lab Units 05/10/21  0528 05/09/21  0415 05/08/21  0006 05/07/21  0419   MAGNESIUM mg/dL 2 0 2 2 2 1 2 1 Coags:   Results from last 7 days   Lab Units 21  0415 21  0006 21  0419 21  1737   PTT seconds  --  42*  --  36   INR  1 22* 1 29* 1 17 1 21*       TSH:       Lipid Profile:   Results from last 7 days   Lab Units 21  1453   TRIGLYCERIDES mg/dL 106   HDL mg/dL 28*           Cardiac testing:   Results for orders placed during the hospital encounter of 21   Echo complete with contrast if indicated    Narrative [address] [place] [address]    Transthoracic Echocardiogram  2D, M-mode, Doppler, and Color Doppler    Study date:  07-May-2021    Patient: Noel Lauren  MR number: [id number]  Account number: [de-identified]  : 1945  Age: 68 years  Gender: Male  Status: Inpatient  Location: [address] [place] [address]  Height: 71 in  Weight: 138 lb  BP: 159/ 77 mmHg    Indications: Heart failure  Diagnoses: I50 9 - Heart failure, unspecified    Sonographer:  PATRAI Crandall RD RVT  Referring Physician:  Nicolette Marino DO  Group:  [id number]  Interpreting Physician:  Karely Pabon MD    SUMMARY    LEFT VENTRICLE:  Systolic function was normal by visual assessment  Ejection fraction was estimated to be 55 %  There were no regional wall motion abnormalities  Wall thickness was mildly increased  Doppler parameters were consistent with abnormal left ventricular relaxation (grade 1 diastolic dysfunction)  RIGHT VENTRICLE:  The ventricle was mildly dilated  Systolic pressure was moderately increased  Estimated peak pressure was 50 mmHg  LEFT ATRIUM:  The atrium was mildly dilated  RIGHT ATRIUM:  The atrium was mildly dilated  MITRAL VALVE:  There was mild regurgitation  AORTIC VALVE:  The valve was trileaflet  Leaflets exhibited normal thickness, moderate calcification, and moderately reduced cuspal separation  Transaortic velocity was increased due to valvular stenosis    There was mild stenosis  TRICUSPID VALVE:  There was mild regurgitation  AORTA:  There was mild dilatation of the ascending aorta  HISTORY: PRIOR HISTORY: CHF, Ascending aorta dilation  PROCEDURE: The study was performed in the [facility]  This was a routine study  The transthoracic approach was used  The study included complete 2D imaging, M-mode, complete spectral Doppler, and color Doppler  Images were  obtained from the parasternal, apical, subcostal, and suprasternal notch acoustic windows  Echocardiographic views were limited  This was a technically difficult study  LEFT VENTRICLE: Size was normal  Systolic function was normal by visual assessment  Ejection fraction was estimated to be 55 %  There were no regional wall motion abnormalities  Wall thickness was mildly increased  DOPPLER: There was an  increased relative contribution of atrial contraction to ventricular filling  Doppler parameters were consistent with abnormal left ventricular relaxation (grade 1 diastolic dysfunction)  RIGHT VENTRICLE: The ventricle was mildly dilated  Systolic function was normal  DOPPLER: Systolic pressure was moderately increased  Estimated peak pressure was 50 mmHg  LEFT ATRIUM: The atrium was mildly dilated  RIGHT ATRIUM: The atrium was mildly dilated  MITRAL VALVE: Valve structure was normal  There was normal leaflet separation  DOPPLER: The transmitral velocity was within the normal range  There was no evidence for stenosis  There was mild regurgitation  AORTIC VALVE: The valve was trileaflet  Leaflets exhibited normal thickness, moderate calcification, and moderately reduced cuspal separation  DOPPLER: Transaortic velocity was increased due to valvular stenosis  There was mild stenosis  There was no regurgitation  TRICUSPID VALVE: The valve structure was normal  There was normal leaflet separation  DOPPLER: The transtricuspid velocity was within the normal range   There was no evidence for stenosis  There was mild regurgitation  PULMONIC VALVE: Leaflets exhibited normal thickness, no calcification, and normal cuspal separation  DOPPLER: The transpulmonic velocity was within the normal range  There was no regurgitation  PERICARDIUM: There was no pericardial effusion  AORTA: The root exhibited normal size  There was mild dilatation of the ascending aorta  SYSTEMIC VEINS: IVC: The inferior vena cava was normal in size and course  Respirophasic changes were normal     SYSTEM MEASUREMENT TABLES    2D  %FS: 29 35 %  Ao Diam: 3 93 cm  EDV(Teich): 69 86 ml  EF(Teich): 56 82 %  ESV(Teich): 30 17 ml  IVSd: 1 14 cm  LA Area: 22 44 cm2  LA Diam: 3 71 cm  LVEDV MOD A4C: 123 67 ml  LVEF MOD A4C: 58 94 %  LVESV MOD A4C: 50 77 ml  LVIDd: 4 cm  LVIDs: 2 82 cm  LVLd A4C: 8 44 cm  LVLs A4C: 7 28 cm  LVOT Diam: 2 45 cm  LVPWd: 1 14 cm  RA Area: 21 79 cm2  RVIDd: 4 07 cm  SV MOD A4C: 72 9 ml  SV(Teich): 39 7 ml    CW  AV Env  Ti: 249 67 ms  AV VTI: 30 83 cm  AV Vmax: 1 93 m/s  AV Vmean: 1 24 m/s  AV maxP 92 mmHg  AV meanP 22 mmHg  TR Vmax: 3 12 m/s  TR maxP 88 mmHg    MM  TAPSE: 2 4 cm    PW  BRENDA (VTI): 2 58 cm2  BRENDA Vmax: 2 34 cm2  E' Sept: 0 1 m/s  E/E' Sept: 5 67  LVOT Env  Ti: 271 49 ms  LVOT VTI: 16 89 cm  LVOT Vmax: 0 96 m/s  LVOT Vmean: 0 62 m/s  LVOT maxPG: 3 72 mmHg  LVOT meanP 8 mmHg  LVSV Dopp: 79 44 ml  MV A Justin: 0 75 m/s  MV Dec [place]: 3 84 m/s2  MV DecT: 144 39 ms  MV E Justin: 0 55 m/s  MV E/A Ratio: 0 74  MV PHT: 41 87 ms  MVA By PHT: 5 25 cm2    [facility] Accredited Echocardiography Laboratory    Prepared and electronically signed by    Jessenia Taylor MD  Signed 07-May-2021 10:55:39       No results found for this or any previous visit  No results found for this or any previous visit  No results found for this or any previous visit      Meds/Allergies   Current Facility-Administered Medications   Medication Dose Route Frequency Provider Last Rate    acetaminophen 650 mg Oral Q6H PRN Shireen HugeGENNA      amiodarone  200 mg Oral BID With Meals Monie Meadows, MANSI      apixaban  5 mg Oral BID Monie A Sedora, CRNP      docusate sodium  100 mg Oral BID Monie A Sedora, CRNP      furosemide  40 mg Intravenous BID (diuretic) Shireen CallowayGENNA      guaiFENesin  600 mg Oral Q12H [address] Shireen GENNA Calloway      sodium chloride  3 mL Nebulization TID Monie A Sedora, CRNP      And    levalbuterol  1 25 mg Nebulization TID Monie A Sedora, MANSI      metoprolol  5 mg Intravenous Once Shireen HugeGENNA      midodrine  5 mg Oral TID AC Monie A Sedora, MANSI      pantoprazole  40 mg Oral Early Morning Brad Spearing, MANSI      senna  1 tablet Oral HS Monie A Sedora, MANSI          Medications Prior to Admission   Medication    b complex vitamins capsule    ferrous sulfate 324 (65 Fe) mg    mirtazapine (REMERON) 15 mg tablet    pantoprazole (PROTONIX) 40 mg tablet       Assessment:  Principal Problem:    Acute on chronic diastolic (congestive) heart failure (HCC)  Active Problems: Aortic disorder (HCC)    PUD (peptic ulcer disease)    Depressive disorder    Elevated troponin level not due myocardial infarction    Acute encephalopathy    CVA [facility]cerebral vascular accident) ([facility] )    DEYSI (acute kidney injury) ([facility] )    Cystitis    CAP (community acquired pneumonia)    Transaminitis    Sepsis ([facility] )    ALVIN (iron deficiency anemia)    Acute respiratory failure with hypoxia ([facility] )    New onset a-fib (HCC)      Impression:  1  Afib with RVR - likely precipitated by underlying pulmonary process  Now in NSR    2  Hypotension/sepsis - resolving  Weaned off pressors  3  ? Acute CVA - Started on Eliquis       Plan:  1  Continue current medications  2  Agree with midodrine to allow for diuresis

## 2021-05-10 NOTE — CONSULTS
Consultation - Palliative and Supportive Care   Yaz Vasques 68 y o  male [id number]    Patient Active Problem List   Diagnosis    Gastric outlet obstruction    Suicidal ideation    Aortic disorder (HCC)    PUD (peptic ulcer disease)    Severe protein-calorie malnutrition (HCC)    Depressive disorder    Elevated troponin level not due myocardial infarction    Acute on chronic diastolic (congestive) heart failure (HCC)    Acute encephalopathy    [facility]    DEYSI (acute kidney injury) ([place] [address] )    Cystitis    CAP (community acquired pneumonia)    Transaminitis    Sepsis ([place] [address] )    ALVIN (iron deficiency anemia)    Acute respiratory failure with hypoxia ([place] [address] )    New onset a-fib ([place] [address] )     Active issues specifically addressed today include:   - Acute on chronic diastolic CHF  - Afib, currently rate controlled  - severe protein calorie malnutrition  - Depression with history of SI   - goals of care counseling  - palliative care patient    Plan:  1  Symptom management -    - NA    2  Goals - Evolving  Patient is very clear that he wants no aggressive measures and is at peace with his mortality  He states that he has accomplished everything he wanted to accomplish- traveled the world with the [employer], raised children that are leading fulfilling lives, and had a soul-mate that  in September from cancer  Hospice introduced and he is interested in this further  However, given his SI with attempt will wait for formal psychiatry evaluation prior to consulting hospice  Upon my evaluation today, I do not think he is suicidal, he appears to be completely understanding of his advanced and end stage medical conditions  3  Psychosocial support- time spent providing supportive listening  D/W Moises Krishna and Arnie Franco   -     Code Status: DNR/DNI - Level 3   Decisional apparatus:  Patient is competent on my exam today    If competence is lost, patient's substitute decision maker would default to children by [place] 169  Advance Directive / Living Will / POLST:  no     I have reviewed the patient's controlled substance dispensing history in the Prescription Drug Monitoring Program in compliance with the [facility] regulations before prescribing any controlled substances  We appreciate the invitation to be involved in this patient's care  We will continue to follow  Please do not hesitate to reach our on call provider through our clinic answering service at  should you have acute symptom control concerns  Annabella Waters DO  Palliative and Supportive Care  Clinic/Answering Service: [phone]  You can find me on [employer]! IDENTIFICATION:  Inpatient consult to Palliative Care  Consult performed by: Annabella Waters DO  Consult ordered by: MANSI Sam        Physician Requesting Consult: Richard Currie MD  Reason for Consult / Principal Problem: goals of care  Hx and PE limited by: NA    HISTORY OF PRESENT ILLNESS:       Mamie Guerrero is a 68 y o  male who presented on 5/7 with acute on chronic heart failure on 5/8 he developed rapid afib, hypoxia and hypotension and patient was able to make his wishes known and made himself a DNR/DNI  Patient has since improved, he is off of pressers and his rate is controlled  PSC was consulted to further assist with goals of care  Of note, in September of 2020 he had a stay at [address] [place] [address] behavioral health unit for suicidal ideation with intent  His sig other had just passed away from cancer  He does freely speak about this and shows me his scars from where he cut himself and tells me, "if I would have had a pistol, I would not be here " Long time spent exploring this- he does tell me that he no longer feels that way, he just does not want to prolong his natural life expectancy  He feels he has accomplished all that he needs to and is ready to go "home, home"   He has spoken to his children about this and they are aware  He currently denies symptoms  He offers no other complaints at this time  Review of Systems   Cardiovascular: Negative for chest pain  Respiratory: Negative for shortness of breath  Gastrointestinal: Negative for nausea  Psychiatric/Behavioral: Negative for suicidal ideas and thoughts of violence  All other systems reviewed and are negative  Past Medical History:   Diagnosis Date    Bladder cancer [facility])      Past Surgical History:   Procedure Laterality Date    ESOPHAGOGASTRODUODENOSCOPY N/A 2019    Procedure: ESOPHAGOGASTRODUODENOSCOPY (EGD);   Surgeon: Mahamed Alfredo MD;  Location: [facility];  Service: Gastroenterology     Social History     Socioeconomic History    Marital status:      Spouse name: Not on file    Number of children: Not on file    Years of education: Not on file    Highest education level: Not on file   Occupational History    Not on file   Social Needs    Financial resource strain: Not on file    Food insecurity     Worry: Not on file     Inability: Not on file    Transportation needs     Medical: Not on file     Non-medical: Not on file   Tobacco Use    Smoking status: Former Smoker     Packs/day: 0 25     Types: Cigarettes     Quit date: 2020     Years since quittin 6    Smokeless tobacco: Former User    Tobacco comment: stopped smoking 2 weeks ago   Substance and Sexual Activity    Alcohol use: Not Currently     Frequency: Monthly or less     Comment: Socially    Drug use: No    Sexual activity: Yes     Partners: Female   Lifestyle    Physical activity     Days per week: Not on file     Minutes per session: Not on file    Stress: Not on file   Relationships    Social connections     Talks on phone: Not on file     Gets together: Not on file     Attends Congregation service: Not on file     Active member of club or organization: Not on file     Attends meetings of clubs or organizations: Not on file     Relationship status: Not on file    Intimate partner violence     Fear of current or ex partner: Not on file     Emotionally abused: Not on file     Physically abused: Not on file     Forced sexual activity: Not on file   Other Topics Concern    Not on file   Social History Narrative    Not on file     Family History   Problem Relation Age of Onset    No Known Problems Mother     No Known Problems Father        MEDICATIONS / ALLERGIES:    all current active meds have been reviewed and current meds:   Current Facility-Administered Medications   Medication Dose Route Frequency    acetaminophen (TYLENOL) tablet 650 mg  650 mg Oral Q6H PRN    amiodarone tablet 200 mg  200 mg Oral BID With Meals    apixaban (ELIQUIS) tablet 5 mg  5 mg Oral BID    docusate sodium (COLACE) capsule 100 mg  100 mg Oral BID    furosemide (LASIX) injection 40 mg  40 mg Intravenous BID (diuretic)    guaiFENesin (MUCINEX) 12 hr tablet 600 mg  600 mg Oral Q12H [address]    sodium chloride 0 9 % inhalation solution 3 mL  3 mL Nebulization TID    And    levalbuterol (XOPENEX) inhalation solution 1 25 mg  1 25 mg Nebulization TID    metoprolol (LOPRESSOR) injection 5 mg  5 mg Intravenous Once    midodrine (PROAMATINE) tablet 5 mg  5 mg Oral TID AC    pantoprazole (PROTONIX) EC tablet 40 mg  40 mg Oral Early Morning    senna (SENOKOT) tablet 8 6 mg  1 tablet Oral HS       No Known Allergies    OBJECTIVE:    Physical Exam  Physical Exam  Vitals signs and nursing note reviewed  Constitutional:       General: He is not in acute distress  Appearance: He is ill-appearing  HENT:      Head: Normocephalic and atraumatic  Right Ear: External ear normal       Left Ear: External ear normal       Nose: Nose normal       Mouth/Throat:      Comments: Poor dentition  Eyes:      General: No scleral icterus  Right eye: No discharge  Left eye: No discharge  Cardiovascular:      Rate and Rhythm: Normal rate  Rhythm irregular     Pulmonary:      Effort: Pulmonary effort is normal  No respiratory distress  Breath sounds: Normal breath sounds  Abdominal:      General: Bowel sounds are normal  There is no distension  Palpations: Abdomen is soft  Skin:     General: Skin is warm and dry  Coloration: Skin is pale  Findings: Bruising present  Neurological:      General: No focal deficit present  Mental Status: He is alert and oriented to person, place, and time  Psychiatric:         Mood and Affect: Mood normal          Behavior: Behavior normal          Lab Results:   I have personally reviewed pertinent labs  , CBC:   Lab Results   Component Value Date    WBC 12 68 (H) 05/10/2021    HGB 9 5 (L) 05/10/2021    HCT 36 1 (L) 05/10/2021    MCV 77 (L) 05/10/2021     05/10/2021    MCH 20 2 (L) 05/10/2021    MCHC 26 3 (L) 05/10/2021    RDW 18 6 (H) 05/10/2021    MPV 9 4 05/10/2021   , CMP:   Lab Results   Component Value Date    SODIUM 143 05/10/2021    K 4 1 05/10/2021     05/10/2021    CO2 38 (H) 05/10/2021    BUN 35 (H) 05/10/2021    CREATININE 1 10 05/10/2021    CALCIUM 8 3 05/10/2021    EGFR 65 05/10/2021   , PT/PTT:No results found for: PT, PTT  Imaging Studies: reviewed  EKG, Pathology, and Other Studies: reviewed    Counseling / Coordination of Care    Total floor / unit time spent today 55+ minutes  Greater than 50% of total time was spent with the patient and / or family counseling and / or coordination of care  A description of the counseling / coordination of care: chart review, medication review, goals of care, hospice care

## 2021-05-10 NOTE — CASE MANAGEMENT
LOS: 4 days  Pt is a documented bundle for stroke  Pt is not a 30 day readmission  Unplanned readmission score is 16 and green  Met with Pt  Pt presents AA&Ox2-3  Discussed role of , discharge planning, identifying help at home and discharge preference  Pt reports he was living will his stepson in [place], half step to enter  Pt reports he is independent with adls and uses cane for ambulation  Pt reports he was using [employer] in [place]  Pt reports he uses to have PCP but PCP retired and not sure if he had new PCP  Pt reports he is not sure if he has POA or living will but his son(Romario) would make decisions for him if he is not able too  Pt reports his stepson is moving to [place] this week and he is not able to go with him  Pt is aware that he needs nursing home placement  Pt denies hx of VNA and SNF in past  Pt has mental health stay in September 2020 for suicidal ideation and went to [facility]  Pt reports that he would like hospice at nursing home  Pt gave permission for CM to call his son, Sarah Velasquez who lives in [place] re: discharge planning  Pt reports he has no preference for nursing home but would like to stay in the area  Call placed to Pt's son(Romario: [phone]), left message  Anticipate return call  SNF referrals sent within 15 miles via [facility]  Pt will be target for nursing home due to suicidal ideation in Sept 2020  Faxed paperwork to [facility] on [address] to [phone]  Will need to fax psych consult once completed to [facility] on [address]  CM to follow

## 2021-05-10 NOTE — PROGRESS NOTES
Pt sleeping comfortably in bed, refusing BiPAP tonight, states "I am worse with it on, I don't like it"  Informed Dede Coker CCAP, pt currently 95% on 5L NC  No further requests, incontinence care provided, instructed to ring for assistance prior to getting OOB  Call bell within reach

## 2021-05-11 PROBLEM — R82.71 ASYMPTOMATIC BACTERIURIA: Status: ACTIVE | Noted: 2021-01-01

## 2021-05-11 PROBLEM — A41.9 SEPSIS (HCC): Status: RESOLVED | Noted: 2021-01-01 | Resolved: 2021-01-01

## 2021-05-11 PROBLEM — R82.71 ASYMPTOMATIC BACTERIURIA: Status: RESOLVED | Noted: 2021-01-01 | Resolved: 2021-01-01

## 2021-05-11 PROBLEM — G93.40 ACUTE ENCEPHALOPATHY: Status: RESOLVED | Noted: 2021-01-01 | Resolved: 2021-01-01

## 2021-05-11 PROBLEM — I71.2 THORACIC AORTIC ANEURYSM (TAA) (HCC): Status: ACTIVE | Noted: 2020-01-01

## 2021-05-11 NOTE — ASSESSMENT & PLAN NOTE
SIRS criteria met on admission a/e/b tachycardia with a pulse of 94bpm and leukocytosis at 15 33 suspected source was pneumonia vs cystitis, treated with antibiotics but later discontinued as culture data was negative, resolved  Labs/work up: COVID 19 PCR negative , UA suggestive of UTI, CXR from 05/08/2021 w/points radiation with possible congestive changes, no effusion    Blood cultures w/o growth

## 2021-05-11 NOTE — ASSESSMENT & PLAN NOTE
NIHSS could not be assessed due to encephalopathy      - CT head: "Small cortical and subcortical infarct in the left posterior parietal region, possibly acute to subacute  Questionable focus of subacute microhemorrhage within the infarct     No mass effect "    -MRI confirmed ischemia in the left parietal region initially seen on head CT     -echocardiogram demonstrates EF 55%, no gross dyskinesis, biatrial dilatation mild  given new onset Afib, patient was started on anticoagulation with Eliquis    PT/OT eval and treat      will follow-up with neurology outpatient

## 2021-05-11 NOTE — PROGRESS NOTES
Cardiology Progress Note - Dori Napoles 68 y o  male MRN: [id number]    Unit/Bed#: -01 Encounter: [id number]        Subjective:    No significant events overnight  Unable to tolerate BiPAP overnight  Review of Systems   Cardiovascular: Negative for chest pain, leg swelling and palpitations  Respiratory: Negative for shortness of breath  Objective:   Vitals: Blood pressure 130/65, pulse 93, temperature 98 °F (36 7 °C), resp  rate 20, height 5' 11" (1 803 m), weight 72 1 kg (158 lb 15 2 oz), SpO2 92 %  , Body mass index is 22 17 kg/m² ,   Orthostatic Blood Pressures      Most Recent Value   Blood Pressure  130/65 filed at 05/11/2021 1459   Patient Position - Orthostatic VS  Sitting filed at 05/10/2021 7928         Systolic (66WQZ), YMF:41 , Min:83 , POD:897     Diastolic (60THC), QIO:69, Min:52, Max:65      Intake/Output Summary (Last 24 hours) at 5/11/2021 1529  Last data filed at 5/11/2021 1239  Gross per 24 hour   Intake --   Output 630 ml   Net -630 ml     Weight (last 2 days)     Date/Time   Weight    05/11/21 0555   72 1 (158 95)    05/11/21 0453   72 1 (159)    05/09/21 0431   74 5 (164 24)                  Telemetry Review: NSR    Physical Exam  Cardiovascular:      Rate and Rhythm: Normal rate and regular rhythm  Heart sounds: Normal heart sounds  No murmur  No friction rub  No gallop  Pulmonary:      Breath sounds: Normal breath sounds  No wheezing or rales             Laboratory Results:  Results from last 7 days   Lab Units 05/07/21  0419 05/06/21  2338 05/06/21  1711   CK TOTAL U/L  --   --  86   TROPONIN I ng/mL 0 14* 0 16* 0 09*       CBC with diff:   Results from last 7 days   Lab Units 05/10/21  0528 05/09/21  0415 05/08/21  0400 05/08/21  0006 05/07/21  0419 05/06/21  1716 05/06/21  1711   WBC Thousand/uL 12 68* 13 22* 15 93* 18 96* 21 82*  --  15 33*   HEMOGLOBIN g/dL 9 5* 9 3* 9 0* 9 7* 9 8*  --  10 6*   I STAT HEMOGLOBIN g/dl  --   --   --   --   --  13 6  -- HEMATOCRIT % 36 1* 34 8* 33 8* 36 6 36 9  --  40 0   HEMATOCRIT, ISTAT %  --   --   --   --   --  40  --    MCV fL 77* 76* 77* 76* 76*  --  76*   PLATELETS Thousands/uL 341 322 419* 423* 453*  --  533*   MCH pg 20 2* 20 3* 20 5* 20 2* 20 2*  --  20 2*   MCHC g/dL 26 3* 26 7* 26 6* 26 5* 26 6*  --  26 5*   RDW % 18 6* 19 4* 19 6* 19 7* 18 8*  --  19 5*   MPV fL 9 4 9 4 10 1 9 4 9 6  --  9 7   NRBC AUTO /100 WBCs  --   --   --   --   --   --  1         CMP:  Results from last 7 days   Lab Units 05/10/21  0528 05/09/21  0415 05/08/21  0400 05/08/21  0006 05/07/21  1545 05/07/21  0419 05/06/21  1716 05/06/21  1711   POTASSIUM mmol/L 4 1 3 8 4 0 3 6 3 8  --   --  5 1   CHLORIDE mmol/L 100 99* 98* 99* 98*  --   --  96*   CO2 mmol/L 38* 37* 32 37* 39*  --   --  32   CO2, I-STAT mmol/L  --   --   --   --   --   --  39*  --    BUN mg/dL 35* 39* 44* 45* 42*  --   --  38*   CREATININE mg/dL 1 10 1 17 1 30 1 33* 1 39*  --   --  1 42*   GLUCOSE, ISTAT mg/dl  --   --   --   --   --   --  114  --    CALCIUM mg/dL 8 3 8 0* 7 8* 7 9* 8 0*  --   --  9 1   AST U/L  --   --  59* 63*  --  106*  --  214*   ALT U/L  --   --  95* 113*  --  139*  --  188*   ALK PHOS U/L  --   --  69 74  --  96  --  105   EGFR ml/min/1 73sq m 65 60 53 52 49  --   --  48         BMP:  Results from last 7 days   Lab Units 05/10/21  0528 05/09/21  0415 05/08/21  0400 05/08/21  0006 05/07/21  1545 05/06/21  1716 05/06/21  1711   POTASSIUM mmol/L 4 1 3 8 4 0 3 6 3 8  --  5 1   CHLORIDE mmol/L 100 99* 98* 99* 98*  --  96*   CO2 mmol/L 38* 37* 32 37* 39*  --  32   CO2, I-STAT mmol/L  --   --   --   --   --  39*  --    BUN mg/dL 35* 39* 44* 45* 42*  --  38*   CREATININE mg/dL 1 10 1 17 1 30 1 33* 1 39*  --  1 42*   GLUCOSE, ISTAT mg/dl  --   --   --   --   --  114  --    CALCIUM mg/dL 8 3 8 0* 7 8* 7 9* 8 0*  --  9 1       BNP:     Magnesium:   Results from last 7 days   Lab Units 05/10/21  0528 05/09/21  0415 05/08/21  0006 05/07/21  0419   MAGNESIUM mg/dL 2 0 2 2 2 1 2 1       Coags:   Results from last 7 days   Lab Units 21  0415 21  0006 21  0419 21  1737   PTT seconds  --  42*  --  36   INR  1 22* 1 29* 1 17 1 21*       TSH:       Lipid Profile:   Results from last 7 days   Lab Units 21  1453   TRIGLYCERIDES mg/dL 106   HDL mg/dL 28*           Cardiac testing:   Results for orders placed during the hospital encounter of 21   Echo complete with contrast if indicated    Narrative [address]    Transthoracic Echocardiogram  2D, M-mode, Doppler, and Color Doppler    Study date:  07-May-2021    Patient: Chadd Olivier  MR number: [id number]  Account number: [de-identified]  : 1945  Age: 68 years  Gender: Male  Status: Inpatient  Location: [facility]  Height: 71 in  Weight: 138 lb  BP: 159/ 77 mmHg    Indications: Heart failure  Diagnoses: I50 9 - Heart failure, unspecified    Sonographer:  PATRIA Dodson RDCS RVT  Referring Physician:  Irma Lynne DO  Group:  [id number]  Interpreting Physician:  Carmella Rubio MD    SUMMARY    LEFT VENTRICLE:  Systolic function was normal by visual assessment  Ejection fraction was estimated to be 55 %  There were no regional wall motion abnormalities  Wall thickness was mildly increased  Doppler parameters were consistent with abnormal left ventricular relaxation (grade 1 diastolic dysfunction)  RIGHT VENTRICLE:  The ventricle was mildly dilated  Systolic pressure was moderately increased  Estimated peak pressure was 50 mmHg  LEFT ATRIUM:  The atrium was mildly dilated  RIGHT ATRIUM:  The atrium was mildly dilated  MITRAL VALVE:  There was mild regurgitation  AORTIC VALVE:  The valve was trileaflet  Leaflets exhibited normal thickness, moderate calcification, and moderately reduced cuspal separation  Transaortic velocity was increased due to valvular stenosis    There was mild stenosis  TRICUSPID VALVE:  There was mild regurgitation  AORTA:  There was mild dilatation of the ascending aorta  HISTORY: PRIOR HISTORY: CHF, Ascending aorta dilation  PROCEDURE: The study was performed in the [address] [facility]  This was a routine study  The transthoracic approach was used  The study included complete 2D imaging, M-mode, complete spectral Doppler, and color Doppler  Images were  obtained from the parasternal, apical, subcostal, and suprasternal notch acoustic windows  Echocardiographic views were limited  This was a technically difficult study  LEFT VENTRICLE: Size was normal  Systolic function was normal by visual assessment  Ejection fraction was estimated to be 55 %  There were no regional wall motion abnormalities  Wall thickness was mildly increased  DOPPLER: There was an  increased relative contribution of atrial contraction to ventricular filling  Doppler parameters were consistent with abnormal left ventricular relaxation (grade 1 diastolic dysfunction)  RIGHT VENTRICLE: The ventricle was mildly dilated  Systolic function was normal  DOPPLER: Systolic pressure was moderately increased  Estimated peak pressure was 50 mmHg  LEFT ATRIUM: The atrium was mildly dilated  RIGHT ATRIUM: The atrium was mildly dilated  MITRAL VALVE: Valve structure was normal  There was normal leaflet separation  DOPPLER: The transmitral velocity was within the normal range  There was no evidence for stenosis  There was mild regurgitation  AORTIC VALVE: The valve was trileaflet  Leaflets exhibited normal thickness, moderate calcification, and moderately reduced cuspal separation  DOPPLER: Transaortic velocity was increased due to valvular stenosis  There was mild stenosis  There was no regurgitation  TRICUSPID VALVE: The valve structure was normal  There was normal leaflet separation  DOPPLER: The transtricuspid velocity was within the normal range   There was no evidence for stenosis  There was mild regurgitation  PULMONIC VALVE: Leaflets exhibited normal thickness, no calcification, and normal cuspal separation  DOPPLER: The transpulmonic velocity was within the normal range  There was no regurgitation  PERICARDIUM: There was no pericardial effusion  AORTA: The root exhibited normal size  There was mild dilatation of the ascending aorta  SYSTEMIC VEINS: IVC: The inferior vena cava was normal in size and course  Respirophasic changes were normal     SYSTEM MEASUREMENT TABLES    2D  %FS: 29 35 %  Ao Diam: 3 93 cm  EDV(Teich): 69 86 ml  EF(Teich): 56 82 %  ESV(Teich): 30 17 ml  IVSd: 1 14 cm  LA Area: 22 44 cm2  LA Diam: 3 71 cm  LVEDV MOD A4C: 123 67 ml  LVEF MOD A4C: 58 94 %  LVESV MOD A4C: 50 77 ml  LVIDd: 4 cm  LVIDs: 2 82 cm  LVLd A4C: 8 44 cm  LVLs A4C: 7 28 cm  LVOT Diam: 2 45 cm  LVPWd: 1 14 cm  RA Area: 21 79 cm2  RVIDd: 4 07 cm  SV MOD A4C: 72 9 ml  SV(Teich): 39 7 ml    CW  AV Env  Ti: 249 67 ms  AV VTI: 30 83 cm  AV Vmax: 1 93 m/s  AV Vmean: 1 24 m/s  AV maxP 92 mmHg  AV meanP 22 mmHg  TR Vmax: 3 12 m/s  TR maxP 88 mmHg    MM  TAPSE: 2 4 cm    PW  BRENDA (VTI): 2 58 cm2  BRENDA Vmax: 2 34 cm2  E' Sept: 0 1 m/s  E/E' Sept: 5 67  LVOT Env  Ti: 271 49 ms  LVOT VTI: 16 89 cm  LVOT Vmax: 0 96 m/s  LVOT Vmean: 0 62 m/s  LVOT maxPG: 3 72 mmHg  LVOT meanP 8 mmHg  LVSV Dopp: 79 44 ml  MV A Justin: 0 75 m/s  MV Dec [place]: 3 84 m/s2  MV DecT: 144 39 ms  MV E Justin: 0 55 m/s  MV E/A Ratio: 0 74  MV PHT: 41 87 ms  MVA By PHT: 5 25 cm2    [facility] Accredited Echocardiography Laboratory    Prepared and electronically signed by    Chitra Mcgrath MD  Signed 07-May-2021 10:55:39       No results found for this or any previous visit  No results found for this or any previous visit  No results found for this or any previous visit      Meds/Allergies   Current Facility-Administered Medications   Medication Dose Route Frequency Provider Last Rate    acetaminophen  650 mg Oral Q6H PRN Kristal Lashell, CRNP      amiodarone  200 mg Oral BID With Meals Kristal Lashell, CRNP      apixaban  5 mg Oral BID Kristal Lashell, CRNP      docusate sodium  100 mg Oral BID Kristal Lashell, CRNP      furosemide  40 mg Intravenous BID (diuretic) Kristal Lashell, CRNP      guaiFENesin  600 mg Oral Q12H [address] Kristal Lashell, CRNP      sodium chloride  3 mL Nebulization TID Kristal Lashell, CRNP      And    levalbuterol  1 25 mg Nebulization TID Kristal Lashell, CRNP      metoprolol  5 mg Intravenous Once Kristal Lashell, CRNP      midodrine  5 mg Oral TID AC Kristal Lashell, CRNP      pantoprazole  40 mg Oral Early Morning Kristal Lashell, CRNP      senna  1 tablet Oral HS Kristal Lashell, CRNP          Medications Prior to Admission   Medication    b complex vitamins capsule    ferrous sulfate 324 (65 Fe) mg    mirtazapine (REMERON) 15 mg tablet    pantoprazole (PROTONIX) 40 mg tablet       Assessment:  Principal Problem:    Acute on chronic diastolic (congestive) heart failure (HCC)  Active Problems: Aortic disorder (HCC)    PUD (peptic ulcer disease)    Depressive disorder    Elevated troponin level not due myocardial infarction    Acute encephalopathy    CVA [facility]cerebral vascular accident) ([facility] )    DEYSI (acute kidney injury) ([facility] )    Cystitis    CAP (community acquired pneumonia)    Transaminitis    Sepsis ([facility] )    ALVIN (iron deficiency anemia)    Acute respiratory failure with hypoxia ([facility] )    New onset a-fib (HCC)      Impression:  1  Afib with RVR - likely precipitated by underlying pulmonary process  Now in NSR    2  Hypotension/sepsis - resolving  3  ? Acute CVA - Started on Eliquis       Plan:  1  Continue current medications  2  Agree with midodrine to allow for diuresis  3  Switch to oral torsemide 20mg daily

## 2021-05-11 NOTE — ASSESSMENT & PLAN NOTE
Wt Readings from Last 3 Encounters:   05/11/21 72 1 kg (158 lb 15 2 oz)   09/18/20 62 5 kg (137 lb 12 8 oz)   01/14/19 63 5 kg (140 lb)     Present on admission as evidenced by acute hypoxemic respiratory failure, bilateral pleural [place] [address], treated with diuretics, EKG, echocardiogram, Cardiology consult      Echocardiogram from 05/07/2021 with ejection fraction of 55%, no wall motion abnormality, grade 1 diastolic dysfunction, RVSP of 50 mmHg    IV diuretics changed to torsemide 20 mg daily, continue

## 2021-05-11 NOTE — ASSESSMENT & PLAN NOTE
Macrocytic hypochromic anemia with anisocytosis  Hgb at 10 6 on admission   Baseline Hgb 8-10  Resume and continue iron tablets

## 2021-05-11 NOTE — ASSESSMENT & PLAN NOTE
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

## 2021-05-11 NOTE — ASSESSMENT & PLAN NOTE
troponin elevation not due to myocardial infarction, likely due to congestive heart failure, acute hypoxemic respiratory failure   Peaked at 0 16, no EKG findings consistent with ischemia, ECHO w/o wall motion abnormality    ECHO: Systolic function was normal by visual assessment   Ejection fraction was estimated to be 55 %, no regional wall motion abnormalities, wall thickness was mildly increased, grade 1 diastolic dysfunction

## 2021-05-11 NOTE — CASE MANAGEMENT
LOS 5 DAYS  CM faxed Behavioral Health eval to [address] [facility] fax#[phone]  Await response from [facility] [place] before Pt able to DC to SNF  CM dept will continue to follow

## 2021-05-11 NOTE — PROGRESS NOTES
Progress note - Palliative and Supportive Care   Kareem Huitron 68 y o  male [id number]    Patient Active Problem List   Diagnosis    Gastric outlet obstruction    Suicidal ideation    Aortic disorder (HCC)    PUD (peptic ulcer disease)    Severe protein-calorie malnutrition (HCC)    Depressive disorder    Elevated troponin level not due myocardial infarction    Acute on chronic diastolic (congestive) heart failure (HCC)    Acute encephalopathy    [facility]A [facility]cere[facility] accident) ([facility] )    DEYSI [facility]) ([facility] )    Cystitis    CAP (community acquired pneumonia)    Transaminitis    Sepsis ([facility] )    ALVIN (iron deficiency anemia)    Acute respiratory failure with hypoxia (HCC)    New onset a-fib ([facility] )     Active issues specifically addressed today include:   - HFpEF  - palliative care patient  - advanced care planning  - goals of care    Plan:  1  Symptom management - NA   -     2  Goals - Patient continues to request limited interventions and continues to state that he is okay with his mortality  Will plan for POLST with [facility] order prior to d/c    -     Code Status: DNR/DNI - Level 3    Decisional apparatus:  Patient is competent on my exam today  If competence is lost, patient's substitute decision maker would default to son by [place]  Advance Directive / Living Will / POLST:  No, POLST to be done    Interval history:       Patient in good spirits  He offers no complaints  Continues to state that he is happy with his decision to not pursue aggressive measures       MEDICATIONS / ALLERGIES:     all current active meds have been reviewed and current meds:   Current Facility-Administered Medications   Medication Dose Route Frequency    acetaminophen (TYLENOL) tablet 650 mg  650 mg Oral Q6H PRN    amiodarone tablet 200 mg  200 mg Oral BID With Meals    apixaban (ELIQUIS) tablet 5 mg  5 mg Oral BID    docusate sodium (COLACE) capsule 100 mg  100 mg Oral BID    guaiFENesin (MUCINEX) 12 hr tablet 600 mg  600 mg Oral Q12H [address]    sodium chloride 0 9 % inhalation solution 3 mL  3 mL Nebulization TID    And    levalbuterol (XOPENEX) inhalation solution 1 25 mg  1 25 mg Nebulization TID    metoprolol (LOPRESSOR) injection 5 mg  5 mg Intravenous Once    midodrine (PROAMATINE) tablet 5 mg  5 mg Oral TID AC    pantoprazole (PROTONIX) EC tablet 40 mg  40 mg Oral Early Morning    senna (SENOKOT) tablet 8 6 mg  1 tablet Oral HS    torsemide (DEMADEX) tablet 20 mg  20 mg Oral Daily       No Known Allergies    OBJECTIVE:    Physical Exam  Physical Exam  Vitals signs and nursing note reviewed  Constitutional:       General: He is not in acute distress  HENT:      Head: Normocephalic and atraumatic  Right Ear: External ear normal       Left Ear: External ear normal       Nose: Nose normal    Eyes:      General: No scleral icterus  Right eye: No discharge  Left eye: No discharge  Cardiovascular:      Rate and Rhythm: Normal rate and regular rhythm  Pulmonary:      Effort: Pulmonary effort is normal  No respiratory distress  Breath sounds: Normal breath sounds  Abdominal:      General: Bowel sounds are normal  There is no distension  Palpations: Abdomen is soft  Musculoskeletal:      Right lower leg: Edema present  Left lower leg: Edema present  Skin:     General: Skin is warm and dry  Coloration: Skin is pale  Neurological:      Mental Status: He is alert and oriented to person, place, and time  Psychiatric:         Mood and Affect: Mood normal          Behavior: Behavior normal          Lab Results: I have personally reviewed pertinent labs  , CBC: No results found for: WBC, HGB, HCT, MCV, PLT, ADJUSTEDWBC, MCH, MCHC, RDW, MPV, NRBC, CMP: No results found for: SODIUM, K, CL, CO2, ANIONGAP, BUN, CREATININE, GLUCOSE, CALCIUM, AST, ALT, ALKPHOS, PROT, BILITOT, EGFR, PT/PTT:No results found for: PT, PTT  Imaging Studies: reviewed  EKG, Pathology, and Other Studies: reviewed    Counseling / Coordination of Care    Total floor / unit time spent today 15 minutes  Greater than 50% of total time was spent with the patient and / or family counseling and / or coordination of care  A description of the counseling / coordination of care: goals of care

## 2021-05-11 NOTE — ASSESSMENT & PLAN NOTE
Present on admission as evidenced by an SpO2 of 83% on room air, likely due to CHF, treated with diuretics, oxygen supplementation, BiPAP HS  Now on 4 L of oxygen satting between 96-98%  When oxygen as able, oxygen saturation between 92-94% is acceptable

## 2021-05-11 NOTE — ASSESSMENT & PLAN NOTE
DEYSI POA a/e/b Ccreatinine on admission of 1 42, due to prerenal cause, treated with IV diuretics and improved  Creatinine is now 1 10  Trend BMP

## 2021-05-11 NOTE — ASSESSMENT & PLAN NOTE
Present on admission, likely hypoxia induced, acute stroke, treated with with oxygen supplementation and diuretics brain imaging, Cardiology and Neurology consult

## 2021-05-11 NOTE — PROGRESS NOTES
[place]  Progress Note - Antonio Antoine 1945, 68 y o  male MRN: [id number]  Unit/Bed#: -01 Encounter: [id number]  Primary Care Provider: No primary care provider on file  Date and time admitted to hospital: 5/6/2021  4:56 PM    * Acute on chronic diastolic (congestive) heart failure (HCC)  Assessment & Plan  Wt Readings from Last 3 Encounters:   05/11/21 72 1 kg (158 lb 15 2 oz)   09/18/20 62 5 kg (137 lb 12 8 oz)   01/14/19 63 5 kg (140 lb)     Present on admission as evidenced by acute hypoxemic respiratory failure, bilateral [place] [address], treated with diuretics, EKG, echocardiogram, Cardiology consult  Echocardiogram from 05/07/2021 with ejection fraction of 55%, no wall motion abnormality, grade 1 diastolic dysfunction, RVSP of 50 mmHg    IV diuretics changed to torsemide 20 mg daily, continue    Acute respiratory failure with hypoxia (HCC)  Assessment & Plan  Present on admission as evidenced by an SpO2 of 83% on room air, likely due to CHF, treated with diuretics, oxygen supplementation, BiPAP HS  Now on 4 L of oxygen satting between 96-98%  When oxygen as able, oxygen saturation between 92-94% is acceptable  New onset a-fib [facility])  Assessment & Plan  Present, suspected due to underlying lung pathology  Rhythm control with amiodarone 200 mg b i d , anticoagulation with Eliquis  Continue    CVA (cerebral vascular accident) [facility])  Assessment & Plan  NIHSS could not be assessed due to encephalopathy      - CT head: "Small cortical and subcortical infarct in the left posterior parietal region, possibly acute to subacute  Questionable focus of subacute microhemorrhage within the infarct     No mass effect "    -MRI confirmed ischemia in the left parietal region initially seen on head CT     -echocardiogram demonstrates EF 55%, no gross dyskinesis, biatrial dilatation mild        given new onset Afib, patient was started on anticoagulation with Eliquis    PT/OT eval and treat  will follow-up with neurology outpatient        ALVIN (iron deficiency anemia)  Assessment & Plan  Macrocytic hypochromic anemia with anisocytosis  Hgb at 10 6 on admission   Baseline Hgb 8-10  Resume and continue iron tablets  Transaminitis  Assessment & Plan  · Noted on admission, , , T bili 0 60, downtrended  · RUQ US not done, hepatitis panel non reactive     DEYSI (acute kidney injury) ([facility] [address] )  Assessment & Plan  DEYSI POA a/e/b Ccreatinine on admission of 1 42, due to prerenal cause, treated with IV diuretics and improved  Creatinine is now 1 10  Trend BMP    Elevated troponin level not due myocardial infarction  Assessment & Plan  troponin elevation not due to myocardial infarction, likely due to congestive heart failure, acute hypoxemic respiratory failure   Peaked at 0 16, no EKG findings consistent with ischemia, ECHO w/o wall motion abnormality    ECHO: Systolic function was normal by visual assessment  Ejection fraction was estimated to be 55 %, no regional wall motion abnormalities, wall thickness was mildly increased, grade 1 diastolic dysfunction      Depressive disorder  Assessment & Plan  Home regimen:  Remeron 15 mg hs, resume and continue  Seen by psychiatry and cleared    PUD (peptic ulcer disease)  Assessment & Plan  Continue Protonix 40 mg daily     [facility]  Assessment & Plan  CT chest: "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, unchanged since a CTA from 1/10/2019 "  Ideally patient should be seen and followed by surgery but with him refusing  further treatment, will hold off      Sepsis (HCC)-resolved as of 5/11/2021  Assessment & Plan  SIRS criteria met on admission a/e/b tachycardia with a pulse of 94bpm and leukocytosis at 15 33 suspected source was pneumonia vs cystitis, treated with antibiotics but later discontinued as culture data was negative, resolved  Labs/work up: COVID 19 PCR negative , UA suggestive of UTI, CXR from 2021 w/points radiation with possible congestive changes, no effusion  Blood cultures w/o growth    Asymptomatic bacteriuria-resolved as of 2021  Assessment & Plan  UA with 10-20 wbc's and occasional bacteria, urine cultures checked in with mixed contaminants  Antibiotics initially started were discontinued  Acute encephalopathy-resolved as of 2021  Assessment & Plan  Present on admission, likely hypoxia induced, acute stroke, treated with with oxygen supplementation and diuretics brain imaging, Cardiology and Neurology consult  VTE Pharmacologic Prophylaxis:   Pharmacologic: Apixaban (Eliquis)  Mechanical VTE Prophylaxis in Place: Yes    Patient Centered Rounds: I have performed bedside rounds with nursing staff today  Discussions with Specialists or Other Care Team Provider:  Palliative care    Education and Discussions with Family / Patient:  Patient  Time Spent for Care: 30 minutes  More than 50% of total time spent on counseling and coordination of care as described above  Current Length of Stay: 5 day(s)    Current Patient Status: Inpatient   Certification Statement: The patient will continue to require additional inpatient hospital stay due to   Discharge Plan:     Code Status: Level 3 - DNAR and DNI      Subjective:   Seen  No new complaints to offer  Did not tolerate BiPAP overnight  Denies worsening shortness of breath or chest discomfort  Objective:     Vitals:   Temp (24hrs), Av 8 °F (36 6 °C), Min:97 5 °F (36 4 °C), Max:98 °F (36 7 °C)    Temp:  [97 5 °F (36 4 °C)-98 °F (36 7 °C)] 98 °F (36 7 °C)  HR:  [] 93  Resp:  [16-20] 20  BP: ()/(52-65) 130/65  SpO2:  [88 %-95 %] 92 %  Body mass index is 22 17 kg/m²  Input and Output Summary (last 24 hours):        Intake/Output Summary (Last 24 hours) at 2021 1926  Last data filed at 2021 1736  Gross per 24 hour   Intake 442 ml   Output 938 ml   Net -496 ml       Physical Exam:     Physical Exam  Vitals signs reviewed  Constitutional:       General: He is not in acute distress  Appearance: Normal appearance  He is not ill-appearing, toxic-appearing or diaphoretic  HENT:      Head: Normocephalic  Eyes:      Extraocular Movements: Extraocular movements intact  Pupils: Pupils are equal, round, and reactive to light  Neck:      Musculoskeletal: Normal range of motion and neck supple  No neck rigidity or muscular tenderness  Vascular: No carotid bruit  Cardiovascular:      Rate and Rhythm: Normal rate and regular rhythm  Pulses: Normal pulses  Heart sounds: Normal heart sounds  No murmur  No friction rub  Pulmonary:      Effort: Pulmonary effort is normal       Breath sounds: No stridor  No wheezing, rhonchi or rales  Chest:      Chest wall: No tenderness  Abdominal:      General: Abdomen is flat  Bowel sounds are normal  There is no distension  Palpations: Abdomen is soft  Tenderness: There is no abdominal tenderness  There is no guarding or rebound  Musculoskeletal: Normal range of motion  General: No swelling or tenderness  Right lower leg: No edema  Left lower leg: No edema  Skin:     General: Skin is warm and dry  Coloration: Skin is not jaundiced  Neurological:      General: No focal deficit present  Mental Status: He is alert and oriented to person, place, and time  Cranial Nerves: No cranial nerve deficit        Sensory: No sensory deficit        )    Additional Data:     Labs:    Results from last 7 days   Lab Units 05/10/21  0528  05/06/21  1711   WBC Thousand/uL 12 68*   < > 15 33*   HEMOGLOBIN g/dL 9 5*   < > 10 6*   I STAT HEMOGLOBIN   --    < >  --    HEMATOCRIT % 36 1*   < > 40 0   HEMATOCRIT, ISTAT   --    < >  --    PLATELETS Thousands/uL 341   < > 533*   NEUTROS PCT %  --   --  82*   LYMPHS PCT %  --   --  7*   MONOS PCT %  --   --  9   EOS PCT %  --   --  0    < > = values in this interval not displayed  Results from last 7 days   Lab Units 05/10/21  0528  05/08/21  0400   SODIUM mmol/L 143   < > 141   POTASSIUM mmol/L 4 1   < > 4 0   CHLORIDE mmol/L 100   < > 98*   CO2 mmol/L 38*   < > 32   BUN mg/dL 35*   < > 44*   CREATININE mg/dL 1 10   < > 1 30   ANION GAP mmol/L 5   < > 11   CALCIUM mg/dL 8 3   < > 7 8*   ALBUMIN g/dL  --   --  3 2*   TOTAL BILIRUBIN mg/dL  --   --  0 70   ALK PHOS U/L  --   --  69   ALT U/L  --   --  95*   AST U/L  --   --  59*   GLUCOSE RANDOM mg/dL 121   < > 145*    < > = values in this interval not displayed  Results from last 7 days   Lab Units 05/09/21  0415   INR  1 22*         Results from last 7 days   Lab Units 05/07/21  1453   HEMOGLOBIN A1C % 5 9*     Results from last 7 days   Lab Units 05/09/21  0415 05/08/21  0400 05/07/21  0424 05/06/21  1737   LACTIC ACID mmol/L  --   --   --  2 0   PROCALCITONIN ng/ml 0 14 0 25 0 19 0 12           * I Have Reviewed All Lab Data Listed Above  * Additional Pertinent Lab Tests Reviewed: All Labs Within Last 24 Hours Reviewed    Imaging:    Imaging Reports Reviewed Today Include:   Imaging Personally Reviewed by Myself Includes:      Recent Cultures (last 7 days):     Results from last 7 days   Lab Units 05/06/21  1748 05/06/21  1737   BLOOD CULTURE   --  No Growth After 4 Days  No Growth After 4 Days     URINE CULTURE  80,000-89,000 cfu/ml   --    LEGIONELLA URINARY ANTIGEN  Negative  --        Last 24 Hours Medication List:   Current Facility-Administered Medications   Medication Dose Route Frequency Provider Last Rate    acetaminophen  650 mg Oral Q6H PRN MANSI Paz      amiodarone  200 mg Oral BID With Meals MANSI Paz      apixaban  5 mg Oral BID MANSI Paz      docusate sodium  100 mg Oral BID MANSI Paz      [START ON 5/12/2021] ferrous sulfate  325 mg Oral Daily With Breakfast Marcos A Nilson Haq MD      guaiFENesin  600 mg Oral Q12H [facility] Celina Farm, CRNP      sodium chloride  3 mL Nebulization TID Celina Farm, CRNP      And    levalbuterol  1 25 mg Nebulization TID Celina Farm, CRNP      midodrine  5 mg Oral TID AC Celina Farm, CRNP      mirtazapine  15 mg Oral HS Nico Nichols MD      pantoprazole  40 mg Oral Early Morning Celina Farm, MANSI      senna  1 tablet Oral HS Celina Farm, CRBRINA      torsemide  20 mg Oral Daily Sindy Sarah MD          Today, Patient Was Seen By: Nico Nichols MD    ** Please Note: Dictation voice to text software may have been used in the creation of this document   **

## 2021-05-11 NOTE — ASSESSMENT & PLAN NOTE
Present, suspected due to underlying lung pathology  Rhythm control with amiodarone 200 mg b i d , anticoagulation with Eliquis    Continue

## 2021-05-11 NOTE — PLAN OF CARE
Problem: Potential for Falls  Goal: Patient will remain free of falls  Description: INTERVENTIONS:  - Assess patient frequently for physical needs  -  Identify cognitive and physical deficits and behaviors that affect risk of falls  -  [place] fall precautions as indicated by assessment   - Educate patient/family on patient safety including physical limitations  - Instruct patient to call for assistance with activity based on assessment  - Modify environment to reduce risk of injury  - Consider OT/PT consult to assist with strengthening/mobility  Outcome: Progressing     Problem: Prexisting or High Potential for Compromised Skin Integrity  Goal: Skin integrity is maintained or improved  Description: INTERVENTIONS:  - Identify patients at risk for skin breakdown  - Assess and monitor skin integrity  - Assess and monitor nutrition and hydration status  - Monitor labs   - Assess for incontinence   - Turn and reposition patient  - Assist with mobility/ambulation  - Relieve pressure over bony prominences  - Avoid friction and shearing  - Provide appropriate hygiene as needed including keeping skin clean and dry  - Evaluate need for skin moisturizer/barrier cream  - Collaborate with interdisciplinary team   - Patient/family teaching  - Consider wound care consult   Outcome: Progressing     Problem: Neurological Deficit  Goal: Neurological status is stable or improving  Description: Interventions:  - Monitor and assess patient's level of consciousness, motor function, sensory function, and level of assistance needed for ADLs  - Monitor and report changes from baseline  Collaborate with interdisciplinary team to initiate plan and implement interventions as ordered  - Provide and maintain a safe environment  - Consider seizure precautions  - Consider fall precautions  - Consider aspiration precautions  - Consider bleeding precautions  Outcome: Progressing     Problem:  Activity Intolerance/Impaired Mobility  Goal: Mobility/activity is maintained at optimum level for patient  Description: Interventions:  - Assess and monitor patient  barriers to mobility and need for assistive/adaptive devices  - Assess patient's emotional response to limitations  - Collaborate with interdisciplinary team and initiate plans and interventions as ordered  - Encourage independent activity per ability   - Maintain proper body alignment  - Perform active/passive rom as tolerated/ordered  - Plan activities to conserve energy   - Turn patient as appropriate  Outcome: Progressing     Problem: Communication Impairment  Goal: Ability to express needs and understand communication  Description: Assess patient's communication skills and ability to understand information  Patient will demonstrate use of effective communication techniques, alternative methods of communication and understanding even if not able to speak  - Encourage communication and provide alternate methods of communication as needed  - Collaborate with case management/ for discharge needs  - Include patient/family/caregiver in decisions related to communication  Outcome: Progressing     Problem: Nutrition  Goal: Nutrition/Hydration status is improving  Description: Monitor and assess patient's nutrition/hydration status for malnutrition (ex- brittle hair, bruises, dry skin, pale skin and conjunctiva, muscle wasting, smooth red tongue, and disorientation)  Collaborate with interdisciplinary team and initiate plan and interventions as ordered  Monitor patient's weight and dietary intake as ordered or per policy  Utilize nutrition screening tool and intervene per policy  Determine patient's food preferences and provide high-protein, high-caloric foods as appropriate  - Assist patient with eating   - Allow adequate time for meals   - Encourage patient to take dietary supplement as ordered    - Collaborate with clinical nutritionist   - Include patient/family/caregiver in decisions related to nutrition  Outcome: Progressing     Problem: PAIN - ADULT  Goal: Verbalizes/displays adequate comfort level or baseline comfort level  Description: Interventions:  - Encourage patient to monitor pain and request assistance  - Assess pain using appropriate pain scale  - Administer analgesics based on type and severity of pain and evaluate response  - Implement non-pharmacological measures as appropriate and evaluate response  - Consider cultural and social influences on pain and pain management  - Notify physician/advanced practitioner if interventions unsuccessful or patient reports new pain  Outcome: Progressing     Problem: INFECTION - ADULT  Goal: Absence or prevention of progression during hospitalization  Description: INTERVENTIONS:  - Assess and monitor for signs and symptoms of infection  - Monitor lab/diagnostic results  - Monitor all insertion sites, i e  indwelling lines, tubes, and drains  - Monitor endotracheal if appropriate and nasal secretions for changes in amount and color  - [place] appropriate cooling/warming therapies per order  - Administer medications as ordered  - Instruct and encourage patient and family to use good hand hygiene technique  - Identify and instruct in appropriate isolation precautions for identified infection/condition  Outcome: Progressing  Goal: Absence of fever/infection during neutropenic period  Description: INTERVENTIONS:  - Monitor WBC    Outcome: Progressing     Problem: SAFETY ADULT  Goal: Patient will remain free of falls  Description: INTERVENTIONS:  - Assess patient frequently for physical needs  -  Identify cognitive and physical deficits and behaviors that affect risk of falls    -  [place] fall precautions as indicated by assessment   - Educate patient/family on patient safety including physical limitations  - Instruct patient to call for assistance with activity based on assessment  - Modify environment to reduce risk of injury  - Consider OT/PT consult to assist with strengthening/mobility  Outcome: Progressing  Goal: Maintain or return to baseline ADL function  Description: INTERVENTIONS:  -  Assess patient's ability to carry out ADLs; assess patient's baseline for ADL function and identify physical deficits which impact ability to perform ADLs (bathing, care of mouth/teeth, toileting, grooming, dressing, etc )  - Assess/evaluate cause of self-care deficits   - Assess range of motion  - Assess patient's mobility; develop plan if impaired  - Assess patient's need for assistive devices and provide as appropriate  - Encourage maximum independence but intervene and supervise when necessary  - Involve family in performance of ADLs  - Assess for home care needs following discharge   - Consider OT consult to assist with ADL evaluation and planning for discharge  - Provide patient education as appropriate  Outcome: Progressing  Goal: Maintain or return mobility status to optimal level  Description: INTERVENTIONS:  - Assess patient's baseline mobility status (ambulation, transfers, stairs, etc )    - Identify cognitive and physical deficits and behaviors that affect mobility  - Identify mobility aids required to assist with transfers and/or ambulation (gait belt, sit-to-stand, lift, walker, cane, etc )  - [place] fall precautions as indicated by assessment  - Record patient progress and toleration of activity level on Mobility SBAR; progress patient to next Phase/Stage  - Instruct patient to call for assistance with activity based on assessment  - Consider rehabilitation consult to assist with strengthening/weightbearing, etc   Outcome: Progressing     Problem: CARDIOVASCULAR - ADULT  Goal: Maintains optimal cardiac output and hemodynamic stability  Description: INTERVENTIONS:  - Monitor I/O, vital signs and rhythm  - Monitor for S/S and trends of decreased cardiac output  - Administer and titrate ordered vasoactive medications to optimize hemodynamic stability  - Assess quality of pulses, skin color and temperature  - Assess for signs of decreased coronary artery perfusion  - Instruct patient to report change in severity of symptoms  Outcome: Progressing  Goal: Absence of cardiac dysrhythmias or at baseline rhythm  Description: INTERVENTIONS:  - Continuous cardiac monitoring, vital signs, obtain 12 lead EKG if ordered  - Administer antiarrhythmic and heart rate control medications as ordered  - Monitor electrolytes and administer replacement therapy as ordered  Outcome: Progressing     Problem: METABOLIC, FLUID AND ELECTROLYTES - ADULT  Goal: Electrolytes maintained within normal limits  Description: INTERVENTIONS:  - Monitor labs and assess patient for signs and symptoms of electrolyte imbalances  - Administer electrolyte replacement as ordered  - Monitor response to electrolyte replacements, including repeat lab results as appropriate  - Instruct patient on fluid and nutrition as appropriate  Outcome: Progressing  Goal: Fluid balance maintained  Description: INTERVENTIONS:  - Monitor labs   - Monitor I/O and WT  - Instruct patient on fluid and nutrition as appropriate  - Assess for signs & symptoms of volume excess or deficit  Outcome: Progressing

## 2021-05-12 PROBLEM — N17.9 AKI (ACUTE KIDNEY INJURY) (HCC): Status: RESOLVED | Noted: 2021-01-01 | Resolved: 2021-01-01

## 2021-05-12 NOTE — PROGRESS NOTES
Progress Note - Palliative & Supportive Care  Luis Patel  68 y o   male  /-01   MRN: [id number]  Encounter: [id number]     Assessment   Gastric outlet obstruction    Suicidal ideation    Aortic disorder (HCC)    PUD (peptic ulcer disease)    Severe protein-calorie malnutrition (HCC)    Depressive disorder    Elevated troponin level not due myocardial infarction    Acute on chronic diastolic (congestive) heart failure (HCC)    Acute encephalopathy    [facility]A [facility]ce[facility]) [facility]    [facility]    Cystitis    CAP (community acquired pneumonia)    Transaminitis    Sepsis ([facility] )    ALVIN (iron deficiency anemia)    Acute respiratory failure with hypoxia (HCC)    New onset a-fib ([facility] )      Active issues specifically addressed today include:   - HFpEF  - palliative care patient  - advanced care planning  - goals of care      Goals of Care  Level 3 code status  POLST completed today reflecting DNR/DNI/DNH wishes  Wishes to be discharged home as soon as possible  Disease focused care while inpatient however he wishes to forgo returning to hospital in the future for acute medical needs  Plan  Symptom Management  Will defer symptom management to the primary team at this time  24 History  Chart reviewed before visit  Patient is seen out of bed in the chair today  He is pleasant and anxious to go home  He specifies "when I say home I mean home, home to [facility]  I dont want to talk about it theres no discussion and theres no arguing  Im ready and I want to go home "     He denies any symptoms needing management today including pain, nausea, vomiting, depression and anxiety  He feels he is ready to be discharged  Record of POLST discussion     I completed a POLST form with the patient and/or the patient's designated decision-maker  The pt is competent for medical decisions today    After discussing the problems addressed during this hospitalization, and prior to this encounter, the following limits and goals were set:    Part A)  do not Attempt Resuscitation     Part B)  Do not transport to hospital unless comfort can not be achieved at home    Part C)  No abx    Part D)  No artificial hydration or nutiriton    Patient has already dicussed these wishes with all of children who are supportive of his goals  He does not feel it is necessary for me to contact anyone in his family to discuss this further  Form was then signed by myself and patient  Copies of form were given to CM/SW for D/C planning, and given to family  Original of form was left in paper chart at nursing station, to be sent home with the patient  Review of Systems: Pertinent items are noted in HPI      Medications    Current Facility-Administered Medications:     acetaminophen (TYLENOL) tablet 650 mg, 650 mg, Oral, Q6H PRN, Yenifer Clas, CRNP    amiodarone tablet 200 mg, 200 mg, Oral, BID With Meals, Yenifer Clas, CRNP, 200 mg at 05/12/21 7962    apixaban (ELIQUIS) tablet 5 mg, 5 mg, Oral, BID, Yenifer Clas, CRNP, 5 mg at 05/12/21 0286    docusate sodium (COLACE) capsule 100 mg, 100 mg, Oral, BID, Yenifer Clas, CRNP, 100 mg at 05/12/21 9490    ferrous sulfate tablet 325 mg, 325 mg, Oral, Daily With Breakfast, Hero Bell MD, 325 mg at 05/12/21 0820    guaiFENesin (MUCINEX) 12 hr tablet 600 mg, 600 mg, Oral, Q12H [address], Yenifer Clas, CRNP, 600 mg at 05/12/21 0819    ipratropium (ATROVENT) 0 02 % inhalation solution 0 5 mg, 0 5 mg, Nebulization, Q8H PRN, Hero Bell MD    levalbuterol (XOPENEX) inhalation solution 1 25 mg, 1 25 mg, Nebulization, Q8H PRN, Hero Bell MD    metoprolol tartrate (LOPRESSOR) tablet 25 mg, 25 mg, Oral, Q12H [address], Marcos DOTTY Villafuerte MD, 25 mg at 05/12/21 0819    midodrine (PROAMATINE) tablet 5 mg, 5 mg, Oral, TID AC, Yeniferaisha Schaeffers, SANDRANP, 5 mg at 05/12/21 0622    mirtazapine (Aquiles Ayon) tablet 15 mg, 15 mg, Oral, HS, Marcos Lagunas MD, 15 mg at 05/11/21 2103    pantoprazole (PROTONIX) EC tablet 40 mg, 40 mg, Oral, Early Morning, Laine Needles, CRNP, 40 mg at 05/12/21 1533    senna (SENOKOT) tablet 8 6 mg, 1 tablet, Oral, HS, Laine Needles, CRNP, 8 6 mg at 05/11/21 2103    [START ON 5/13/2021] torsemide (DEMADEX) tablet 20 mg, 20 mg, Oral, Daily, Gerhard Lomeli MD    Objective  /74   Pulse 87   Temp 97 5 °F (36 4 °C)   Resp 22   Ht 5' 11" (1 803 m)   Wt 71 5 kg (157 lb 9 6 oz)   SpO2 91%   BMI 21 98 kg/m²   Physical Exam:   Constitutional: Appears chronically ill  In no acute distress  Head: Normocephalic and atraumatic  Eyes: EOM are normal  No ocular discharge  No scleral icterus  Neck: no visible adenopathy or masses  Respiratory: Effort normal  No stridor  No respiratory distress  Gastrointestinal: No abdominal distension  Musculoskeletal: No edema  Neurological: Alert, oriented and appropriately conversant  Skin: Dry, no diaphoresis  Psychiatric: Displays a normal mood and affect  Behavior, judgement and thought content appear normal      Lab Results:   I have personally reviewed pertinent labs  CMP:   Lab Results   Component Value Date    SODIUM 144 05/12/2021    K 4 1 05/12/2021     05/12/2021    CO2 41 (H) 05/12/2021    BUN 46 (H) 05/12/2021    CREATININE 1 34 (H) 05/12/2021    CALCIUM 8 9 05/12/2021    EGFR 51 05/12/2021       Counseling / Coordination of Care  Total floor / unit time spent today 45 minutes  Greater than 50% of total time was spent with the patient and / or family counseling and / or coordinating of care  A description of the counseling / coordination of care:   provided medical updates, discussed palliative care, confirmed capacity,  determined goals of care, determined social/family support, discussed plans of care, discussed symptom management, provided psychosocial support       [facility] & Supportive Care

## 2021-05-12 NOTE — ASSESSMENT & PLAN NOTE
Present, suspected due to underlying lung pathology  Patient with increased heart rate average in the 100s  Initiate metoprolol 25 mg b i d , continue amiodarone 200 mg b i d ,  anticoagulation with Eliquis

## 2021-05-12 NOTE — ASSESSMENT & PLAN NOTE
Present on admission as evidenced by an SpO2 of 83% on room air, likely due to CHF, treated with diuretics, oxygen supplementation, BiPAP HS  Remains on 4 L of oxygen    When oxygen as able, oxygen saturation between 92-94% is acceptable

## 2021-05-12 NOTE — PROGRESS NOTES
[place]  Progress Note - Nicolás Silva 1945, 68 y o  male MRN: [id number]  Unit/Bed#: -01 Encounter: [id number]  Primary Care Provider: No primary care provider on file  Date and time admitted to hospital: 5/6/2021  4:56 PM    * Acute on chronic diastolic (congestive) heart failure (HCC)  Assessment & Plan  Wt Readings from Last 3 Encounters:   05/12/21 71 5 kg (157 lb 9 6 oz)   09/18/20 62 5 kg (137 lb 12 8 oz)   01/14/19 63 5 kg (140 lb)     Present on admission as evidenced by acute hypoxemic respiratory failure, bilateral pleural [place] on [place] [address], treated with diuretics, EKG, echocardiogram, Cardiology consult  Echocardiogram from 05/07/2021 with ejection fraction of 55%, no wall motion abnormality, grade 1 diastolic dysfunction, RVSP of 50 mmHg    On torsemide 20 mg daily  Noted elevated creatinine of 1 34  Lung exams remain diminished, will give 40 mg b i d  of Lasix today  Re-evaluate tomorrow  chest x-ray with Worsening pulmonary venous congestion with small effusions    Acute respiratory failure with hypoxia (HCC)  Assessment & Plan  Present on admission as evidenced by an SpO2 of 83% on room air, likely due to CHF, treated with diuretics, oxygen supplementation, BiPAP HS  Remains on 4 L of oxygen    When oxygen as able, oxygen saturation between 92-94% is acceptable  New onset a-fib [facility])  Assessment & Plan  Present, suspected due to underlying lung pathology  Patient with increased heart rate average in the 100s  Initiate metoprolol 25 mg b i d , continue amiodarone 200 mg b i d ,  anticoagulation with Eliquis  CVA (cerebral vascular accident) [facility])  Assessment & Plan  NIHSS could not be assessed due to encephalopathy      - CT head: "Small cortical and subcortical infarct in the left posterior parietal region, possibly acute to subacute  Questionable focus of subacute microhemorrhage within the infarct     No mass effect "    -MRI confirmed ischemia in the left parietal region initially seen on head CT     -echocardiogram demonstrates EF 55%, no gross dyskinesis, biatrial dilatation mild  given new onset Afib, patient was started on anticoagulation with Eliquis    PT/OT eval and treat  will follow-up with neurology outpatient        ALVIN (iron deficiency anemia)  Assessment & Plan  Macrocytic hypochromic anemia with anisocytosis  Hgb at 10 6 on admission, now 9 7   Baseline Hgb 8-10  Resume and continue iron tablets  Transaminitis  Assessment & Plan  · Noted on admission, , , T bili 0 60, downtrended  · RUQ US not done, hepatitis panel non reactive     Elevated troponin level not due myocardial infarction  Assessment & Plan  troponin elevation not due to myocardial infarction, likely due to congestive heart failure, acute hypoxemic respiratory failure   Peaked at 0 16, no EKG findings consistent with ischemia, ECHO w/o wall motion abnormality    ECHO: Systolic function was normal by visual assessment  Ejection fraction was estimated to be 55 %, no regional wall motion abnormalities, wall thickness was mildly increased, grade 1 diastolic dysfunction      Depressive disorder  Assessment & Plan  Home regimen:  Remeron 15 mg hs, resume and continue  Seen by psychiatry and cleared    PUD (peptic ulcer disease)  Assessment & Plan  Continue Protonix 40 mg daily     Thoracic aortic aneurysm (TAA) ([facility] )  Assessment & Plan  CT chest: "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, unchanged since a CTA from 1/10/2019 "  Ideally patient should be seen and followed by surgery but with him refusing  further treatment, will hold off      Sepsis (HCC)-resolved as of 5/11/2021  Assessment & Plan  SIRS criteria met on admission a/e/b tachycardia with a pulse of 94bpm and leukocytosis at 15 33 suspected source was pneumonia vs cystitis, treated with antibiotics but later discontinued as culture data was negative, resolved  Labs/work up: COVID 19 PCR negative , UA suggestive of UTI, CXR from 2021 w/points radiation with possible congestive changes, no effusion  Blood cultures w/o growth    Asymptomatic bacteriuria-resolved as of 2021  Assessment & Plan  UA with 10-20 wbc's and occasional bacteria, urine cultures checked in with mixed contaminants  Antibiotics initially started were discontinued  DEYSI (acute kidney injury) (HCC)-resolved as of 2021  Assessment & Plan  DEYSI POA a/e/b Ccreatinine on admission of 1 42, due to prerenal cause, treated with IV diuretics and improved  Creatinine is now 1 10  Trend BMP    Acute encephalopathy-resolved as of 2021  Assessment & Plan  Present on admission, likely hypoxia induced, acute stroke, treated with with oxygen supplementation and diuretics brain imaging, Cardiology and Neurology consult  VTE Pharmacologic Prophylaxis:   Pharmacologic: Apixaban (Eliquis)  Mechanical VTE Prophylaxis in Place: Yes    Patient Centered Rounds: I have performed bedside rounds with nursing staff today  Discussions with Specialists or Other Care Team Provider:     Education and Discussions with Family / Patient:  Patient, declined family communication    Time Spent for Care: 30 minutes  More than 50% of total time spent on counseling and coordination of care as described above  Current Length of Stay: 6 day(s)    Current Patient Status: Inpatient   Certification Statement: The patient will continue to require additional inpatient hospital stay due to IV diuretics for CHF    Discharge Plan:  When medically stable    Code Status: Level 3 - DNAR and DNI      Subjective:   Seen  Noted elevated heart rate on monitor, denies palpitations or worsening shortness of breath or chest pain    He however examined wet    Objective:     Vitals:   Temp (24hrs), Av 5 °F (36 4 °C), Min:97 5 °F (36 4 °C), Max:97 5 °F (36 4 °C)    Temp:  [97 5 °F (36 4 °C)] 97 5 °F (36 4 °C)  HR:  [87-93] 90  Resp:  [17-22] 17  BP: (111-131)/(65-75) 111/65  SpO2:  [83 %-97 %] 88 %  Body mass index is 21 98 kg/m²  Input and Output Summary (last 24 hours): Intake/Output Summary (Last 24 hours) at 5/12/2021 1823  Last data filed at 5/12/2021 1348  Gross per 24 hour   Intake 400 ml   Output 935 ml   Net -535 ml       Physical Exam:     Physical Exam  Vitals signs reviewed  Constitutional:       General: He is not in acute distress  Appearance: Normal appearance  He is not ill-appearing, toxic-appearing or diaphoretic  HENT:      Head: Normocephalic  Eyes:      Extraocular Movements: Extraocular movements intact  Pupils: Pupils are equal, round, and reactive to light  Neck:      Musculoskeletal: Normal range of motion and neck supple  No neck rigidity or muscular tenderness  Vascular: No carotid bruit  Cardiovascular:      Rate and Rhythm: Normal rate  Rhythm irregular  Pulses: Normal pulses  Heart sounds: Normal heart sounds  No murmur  No friction rub  Pulmonary:      Effort: Pulmonary effort is normal       Breath sounds: No stridor  No wheezing, rhonchi or rales  Comments: Reduced breath sounds bilateral bases  Chest:      Chest wall: No tenderness  Abdominal:      General: Abdomen is flat  Bowel sounds are normal  There is no distension  Palpations: Abdomen is soft  Tenderness: There is no abdominal tenderness  There is no guarding or rebound  Musculoskeletal: Normal range of motion  General: No swelling or tenderness  Right lower leg: No edema  Left lower leg: No edema  Skin:     General: Skin is warm and dry  Coloration: Skin is not jaundiced  Neurological:      General: No focal deficit present  Mental Status: He is alert and oriented to person, place, and time  Cranial Nerves: No cranial nerve deficit  Sensory: No sensory deficit             Additional Data: Labs:    Results from last 7 days   Lab Units 05/12/21  0420  05/06/21  1711   WBC Thousand/uL 13 22*   < > 15 33*   HEMOGLOBIN g/dL 9 7*   < > 10 6*   I STAT HEMOGLOBIN   --    < >  --    HEMATOCRIT % 37 0   < > 40 0   HEMATOCRIT, ISTAT   --    < >  --    PLATELETS Thousands/uL 393*   < > 533*   BANDS PCT % 1  --   --    NEUTROS PCT %  --   --  82*   LYMPHS PCT %  --   --  7*   LYMPHO PCT % 6*  --   --    MONOS PCT %  --   --  9   MONO PCT % 8  --   --    EOS PCT % 7*  --  0    < > = values in this interval not displayed  Results from last 7 days   Lab Units 05/12/21 05/08/21  0400   SODIUM mmol/L 144   < > 141   POTASSIUM mmol/L 4 1   < > 4 0   CHLORIDE mmol/L 100   < > 98*   CO2 mmol/L 41*   < > 32   BUN mg/dL 46*   < > 44*   CREATININE mg/dL 1 34*   < > 1 30   ANION GAP mmol/L 3*   < > 11   CALCIUM mg/dL 8 9   < > 7 8*   ALBUMIN g/dL  --   --  3 2*   TOTAL BILIRUBIN mg/dL  --   --  0 70   ALK PHOS U/L  --   --  69   ALT U/L  --   --  95*   AST U/L  --   --  59*   GLUCOSE RANDOM mg/dL 119   < > 145*    < > = values in this interval not displayed  Results from last 7 days   Lab Units 05/09/21  0415   INR  1 22*         Results from last 7 days   Lab Units 05/07/21  1453   HEMOGLOBIN A1C % 5 9*     Results from last 7 days   Lab Units 05/09/21  0415 05/08/21  0400 05/07/21  0424 05/06/21  1737   LACTIC ACID mmol/L  --   --   --  2 0   PROCALCITONIN ng/ml 0 14 0 25 0 19 0 12           * I Have Reviewed All Lab Data Listed Above  * Additional Pertinent Lab Tests Reviewed: All Labs Within Last 24 Hours Reviewed    Imaging:    Imaging Reports Reviewed Today Include:  Chest x-ray  Imaging Personally Reviewed by Myself Includes:  Chest x-ray    Recent Cultures (last 7 days):     Results from last 7 days   Lab Units 05/06/21  1748 05/06/21  1737   BLOOD CULTURE   --  No Growth After 5 Days  No Growth After 5 Days     URINE CULTURE  80,000-89,000 cfu/ml   --    LEGIONELLA URINARY ANTIGEN  Negative  -- Last 24 Hours Medication List:   Current Facility-Administered Medications   Medication Dose Route Frequency Provider Last Rate    acetaminophen  650 mg Oral Q6H PRN MANSI Lorenzana      amiodarone  200 mg Oral BID With Meals MANSI Lorenzana      apixaban  5 mg Oral BID MANSI Lorenzana      docusate sodium  100 mg Oral BID MANSI Lorenzana      ferrous sulfate  325 mg Oral Daily With Breakfast Sascha Caldwell MD      furosemide  40 mg Intravenous Once Sascha Caldwell MD      guaiFENesin  600 mg Oral Q12H [address] MANSI Lorenzana      ipratropium  0 5 mg Nebulization Q8H PRN Sascha Caldwell MD      levalbuterol  1 25 mg Nebulization Q8H PRN Sascha Caldwell MD      metoprolol tartrate  25 mg Oral Q12H [address] Sascha Caldwell MD      midodrine  5 mg Oral TID AC MANSI Lorenzana      mirtazapine  15 mg Oral HS Sascha Caldwell MD      pantoprazole  40 mg Oral Early Morning MANSI Lorenzana      senna  1 tablet Oral HS MANSI Lorenzana      [START ON 5/13/2021] torsemide  20 mg Oral Daily Sascha Caldwell MD          Today, Patient Was Seen By: Sascha Caldwell MD    ** Please Note: Dictation voice to text software may have been used in the creation of this document   **

## 2021-05-12 NOTE — CASE MANAGEMENT
Continue to follow  Zenon Zhao from [facility] will do eval on Thursday, 5/13/2021 at 11:30am for nursing home eval  CM to follow

## 2021-05-12 NOTE — PLAN OF CARE
Problem: Potential for Falls  Goal: Patient will remain free of falls  Description: INTERVENTIONS:  - Assess patient frequently for physical needs  -  Identify cognitive and physical deficits and behaviors that affect risk of falls  -  [place] fall precautions as indicated by assessment   - Educate patient/family on patient safety including physical limitations  - Instruct patient to call for assistance with activity based on assessment  - Modify environment to reduce risk of injury  - Consider OT/PT consult to assist with strengthening/mobility  Outcome: Progressing     Problem: Prexisting or High Potential for Compromised Skin Integrity  Goal: Skin integrity is maintained or improved  Description: INTERVENTIONS:  - Identify patients at risk for skin breakdown  - Assess and monitor skin integrity  - Assess and monitor nutrition and hydration status  - Monitor labs   - Assess for incontinence   - Turn and reposition patient  - Assist with mobility/ambulation  - Relieve pressure over bony prominences  - Avoid friction and shearing  - Provide appropriate hygiene as needed including keeping skin clean and dry  - Evaluate need for skin moisturizer/barrier cream  - Collaborate with interdisciplinary team   - Patient/family teaching  - Consider wound care consult   Outcome: Progressing     Problem: Neurological Deficit  Goal: Neurological status is stable or improving  Description: Interventions:  - Monitor and assess patient's level of consciousness, motor function, sensory function, and level of assistance needed for ADLs  - Monitor and report changes from baseline  Collaborate with interdisciplinary team to initiate plan and implement interventions as ordered  - Provide and maintain a safe environment  - Consider seizure precautions  - Consider fall precautions  - Consider aspiration precautions  - Consider bleeding precautions  Outcome: Progressing     Problem:  Activity Intolerance/Impaired Mobility  Goal: Mobility/activity is maintained at optimum level for patient  Description: Interventions:  - Assess and monitor patient  barriers to mobility and need for assistive/adaptive devices  - Assess patient's emotional response to limitations  - Collaborate with interdisciplinary team and initiate plans and interventions as ordered  - Encourage independent activity per ability   - Maintain proper body alignment  - Perform active/passive rom as tolerated/ordered  - Plan activities to conserve energy   - Turn patient as appropriate  Outcome: Progressing     Problem: Communication Impairment  Goal: Ability to express needs and understand communication  Description: Assess patient's communication skills and ability to understand information  Patient will demonstrate use of effective communication techniques, alternative methods of communication and understanding even if not able to speak  - Encourage communication and provide alternate methods of communication as needed  - Collaborate with case management/ for discharge needs  - Include patient/family/caregiver in decisions related to communication  Outcome: Progressing     Problem: Potential for Aspiration  Goal: Non-ventilated patient's risk of aspiration is minimized  Description: Assess and monitor vital signs, respiratory status, and labs (WBC)  Monitor for signs of aspiration (tachypnea, cough, rales, wheezing, cyanosis, fever)  - Assess and monitor patient's ability to swallow  - Place patient up in chair to eat if possible  - HOB up at 90 degrees to eat if unable to get patient up into chair   - Supervise patient during oral intake  - Instruct patient/ family to take small bites  - Instruct patient/ family to take small single sips when taking liquids    - Follow patient-specific strategies generated by speech pathologist   Outcome: Progressing  Goal: Ventilated patient's risk of aspiration is minimized  Description: Assess and monitor vital signs, respiratory status, airway cuff pressure, and labs (WBC)  Monitor for signs of aspiration (tachypnea, cough, rales, wheezing, cyanosis, fever)  - Elevate head of bed 30 degrees if patient has tube feeding   - Monitor tube feeding  Outcome: Progressing     Problem: Nutrition  Goal: Nutrition/Hydration status is improving  Description: Monitor and assess patient's nutrition/hydration status for malnutrition (ex- brittle hair, bruises, dry skin, pale skin and conjunctiva, muscle wasting, smooth red tongue, and disorientation)  Collaborate with interdisciplinary team and initiate plan and interventions as ordered  Monitor patient's weight and dietary intake as ordered or per policy  Utilize nutrition screening tool and intervene per policy  Determine patient's food preferences and provide high-protein, high-caloric foods as appropriate  - Assist patient with eating   - Allow adequate time for meals   - Encourage patient to take dietary supplement as ordered  - Collaborate with clinical nutritionist   - Include patient/family/caregiver in decisions related to nutrition    Outcome: Progressing     Problem: PAIN - ADULT  Goal: Verbalizes/displays adequate comfort level or baseline comfort level  Description: Interventions:  - Encourage patient to monitor pain and request assistance  - Assess pain using appropriate pain scale  - Administer analgesics based on type and severity of pain and evaluate response  - Implement non-pharmacological measures as appropriate and evaluate response  - Consider cultural and social influences on pain and pain management  - Notify physician/advanced practitioner if interventions unsuccessful or patient reports new pain  Outcome: Progressing     Problem: INFECTION - ADULT  Goal: Absence or prevention of progression during hospitalization  Description: INTERVENTIONS:  - Assess and monitor for signs and symptoms of infection  - Monitor lab/diagnostic results  - Monitor all insertion sites, i e  indwelling lines, tubes, and drains  - Monitor endotracheal if appropriate and nasal secretions for changes in amount and color  - [place] appropriate cooling/warming therapies per order  - Administer medications as ordered  - Instruct and encourage patient and family to use good hand hygiene technique  - Identify and instruct in appropriate isolation precautions for identified infection/condition  Outcome: Progressing  Goal: Absence of fever/infection during neutropenic period  Description: INTERVENTIONS:  - Monitor WBC    Outcome: Progressing     Problem: SAFETY ADULT  Goal: Patient will remain free of falls  Description: INTERVENTIONS:  - Assess patient frequently for physical needs  -  Identify cognitive and physical deficits and behaviors that affect risk of falls    -  [place] fall precautions as indicated by assessment   - Educate patient/family on patient safety including physical limitations  - Instruct patient to call for assistance with activity based on assessment  - Modify environment to reduce risk of injury  - Consider OT/PT consult to assist with strengthening/mobility  Outcome: Progressing  Goal: Maintain or return to baseline ADL function  Description: INTERVENTIONS:  -  Assess patient's ability to carry out ADLs; assess patient's baseline for ADL function and identify physical deficits which impact ability to perform ADLs (bathing, care of mouth/teeth, toileting, grooming, dressing, etc )  - Assess/evaluate cause of self-care deficits   - Assess range of motion  - Assess patient's mobility; develop plan if impaired  - Assess patient's need for assistive devices and provide as appropriate  - Encourage maximum independence but intervene and supervise when necessary  - Involve family in performance of ADLs  - Assess for home care needs following discharge   - Consider OT consult to assist with ADL evaluation and planning for discharge  - Provide patient education as appropriate  Outcome: Progressing  Goal: Maintain or return mobility status to optimal level  Description: INTERVENTIONS:  - Assess patient's baseline mobility status (ambulation, transfers, stairs, etc )    - Identify cognitive and physical deficits and behaviors that affect mobility  - Identify mobility aids required to assist with transfers and/or ambulation (gait belt, sit-to-stand, lift, walker, cane, etc )  - [place] fall precautions as indicated by assessment  - Record patient progress and toleration of activity level on Mobility SBAR; progress patient to next Phase/Stage  - Instruct patient to call for assistance with activity based on assessment  - Consider rehabilitation consult to assist with strengthening/weightbearing, etc   Outcome: Progressing     Problem: DISCHARGE PLANNING  Goal: Discharge to home or other facility with appropriate resources  Description: INTERVENTIONS:  - Identify barriers to discharge w/patient and caregiver  - Arrange for needed discharge resources and transportation as appropriate  - Identify discharge learning needs (meds, wound care, etc )  - Arrange for interpretive services to assist at discharge as needed  - Refer to Case Management Department for coordinating discharge planning if the patient needs post-hospital services based on physician/advanced practitioner order or complex needs related to functional status, cognitive ability, or social support system  Outcome: Progressing     Problem: Knowledge Deficit  Goal: Patient/family/caregiver demonstrates understanding of disease process, treatment plan, medications, and discharge instructions  Description: Complete learning assessment and assess knowledge base    Interventions:  - Provide teaching at level of understanding  - Provide teaching via preferred learning methods  Outcome: Progressing     Problem: CARDIOVASCULAR - ADULT  Goal: Maintains optimal cardiac output and hemodynamic stability  Description: INTERVENTIONS:  - Monitor I/O, vital signs and rhythm  - Monitor for S/S and trends of decreased cardiac output  - Administer and titrate ordered vasoactive medications to optimize hemodynamic stability  - Assess quality of pulses, skin color and temperature  - Assess for signs of decreased coronary artery perfusion  - Instruct patient to report change in severity of symptoms  Outcome: Progressing  Goal: Absence of cardiac dysrhythmias or at baseline rhythm  Description: INTERVENTIONS:  - Continuous cardiac monitoring, vital signs, obtain 12 lead EKG if ordered  - Administer antiarrhythmic and heart rate control medications as ordered  - Monitor electrolytes and administer replacement therapy as ordered  Outcome: Progressing     Problem: METABOLIC, FLUID AND ELECTROLYTES - ADULT  Goal: Electrolytes maintained within normal limits  Description: INTERVENTIONS:  - Monitor labs and assess patient for signs and symptoms of electrolyte imbalances  - Administer electrolyte replacement as ordered  - Monitor response to electrolyte replacements, including repeat lab results as appropriate  - Instruct patient on fluid and nutrition as appropriate  Outcome: Progressing  Goal: Fluid balance maintained  Description: INTERVENTIONS:  - Monitor labs   - Monitor I/O and WT  - Instruct patient on fluid and nutrition as appropriate  - Assess for signs & symptoms of volume excess or deficit  Outcome: Progressing

## 2021-05-12 NOTE — ASSESSMENT & PLAN NOTE
Wt Readings from Last 3 Encounters:   05/12/21 71 5 kg (157 lb 9 6 oz)   09/18/20 62 5 kg (137 lb 12 8 oz)   01/14/19 63 5 kg (140 lb)     Present on admission as evidenced by acute hypoxemic respiratory failure, [place] [address], treated with diuretics, EKG, echocardiogram, Cardiology consult      Echocardiogram from 05/07/2021 with ejection fraction of 55%, no wall motion abnormality, grade 1 diastolic dysfunction, RVSP of 50 mmHg    On torsemide 20 mg daily  Noted elevated creatinine of 1 34  Lung exams remain diminished, will give 40 mg b i d  of Lasix today  Re-evaluate tomorrow  chest x-ray with Worsening pulmonary venous congestion with small effusions

## 2021-05-13 NOTE — ASSESSMENT & PLAN NOTE
Macrocytic hypochromic anemia with anisocytosis  Hgb at 10 6 on admission, now 10  Baseline Hgb 8-10  Resume and continue iron tablets

## 2021-05-13 NOTE — PROGRESS NOTES
[place]  Progress Note - Amena Rivera 1945, 68 y o  male MRN: [id number]  Unit/Bed#: -01 Encounter: [id number]  Primary Care Provider: No primary care provider on file  Date and time admitted to hospital: 5/6/2021  4:56 PM    * Acute on chronic diastolic (congestive) heart failure (HCC)  Assessment & Plan  Wt Readings from Last 3 Encounters:   05/13/21 70 5 kg (155 lb 6 8 oz)   09/18/20 62 5 kg (137 lb 12 8 oz)   01/14/19 63 5 kg (140 lb)     Present on admission as evidenced by acute hypoxemic respiratory failure, bilateral pleural [place] on [place] [address], treated with diuretics, EKG, echocardiogram, Cardiology consult  Echocardiogram from 05/07/2021 with ejection fraction of 55%, no wall motion abnormality, grade 1 diastolic dysfunction, RVSP of 50 mmHg    On torsemide 20 mg daily however will hold and resume on 05/14/2020 given contraction alkalosis with CO2 of 41  Re-evaluate tomorrow      Acute respiratory failure with hypoxia (HCC)  Assessment & Plan  Present on admission as evidenced by an SpO2 of 83% on room air, multifactorial likely due to CHF, likely tipped over from atrial fibrillation, highly suspicious for aspiration pneumonia  Worsening oxygenation and now 6 L of oxygen    Suspect aspiration pneumonia, initiate antibiotics with ceftriaxone  Speech and swallow eval  Check sputum culture  Wean as able oxygen saturation between 92-94% is acceptable  New onset a-fib [facility])  Assessment & Plan  Present, suspected due to underlying lung pathology  Improved heart rate  Continue metoprolol 25 mg b i d , continue amiodarone 200 mg b i d ,  anticoagulation with Eliquis  CVA (cerebral vascular accident) [facility])  Assessment & Plan  NIHSS could not be assessed due to encephalopathy      - CT head: "Small cortical and subcortical infarct in the left posterior parietal region, possibly acute to subacute    Questionable focus of subacute microhemorrhage within the infarct     No mass effect "    -MRI confirmed ischemia in the left parietal region initially seen on head CT     -echocardiogram demonstrates EF 55%, no gross dyskinesis, biatrial dilatation mild  given new onset Afib, patient was started on anticoagulation with Eliquis    PT/OT eval and treat  will follow-up with neurology outpatient        ALVIN (iron deficiency anemia)  Assessment & Plan  Macrocytic hypochromic anemia with anisocytosis  Hgb at 10 6 on admission, now 10  Baseline Hgb 8-10  Resume and continue iron tablets  Transaminitis  Assessment & Plan  · Noted on admission, , , T bili 0 60, downtrended  · RUQ US not done, hepatitis panel non reactive     Elevated troponin level not due myocardial infarction  Assessment & Plan  troponin elevation not due to myocardial infarction, likely due to congestive heart failure, acute hypoxemic respiratory failure   Peaked at 0 16, no EKG findings consistent with ischemia, ECHO w/o wall motion abnormality    ECHO: Systolic function was normal by visual assessment  Ejection fraction was estimated to be 55 %, no regional wall motion abnormalities, wall thickness was mildly increased, grade 1 diastolic dysfunction      Depressive disorder  Assessment & Plan  Home regimen:  Remeron 15 mg hs, resume and continue  Seen by psychiatry and cleared    PUD (peptic ulcer disease)  Assessment & Plan  Continue Protonix 40 mg daily     Thoracic aortic aneurysm (TAA) ([facility] [address] )  Assessment & Plan  CT chest: "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, unchanged since a CTA from 1/10/2019 "  Ideally patient should be seen and followed by surgery but with him refusing  further treatment, will hold off      Sepsis (HCC)-resolved as of 5/11/2021  Assessment & Plan  SIRS criteria met on admission a/e/b tachycardia with a pulse of 94bpm and leukocytosis at 15 33 suspected source was pneumonia vs cystitis, treated with antibiotics but later discontinued as culture data was negative, resolved  Labs/work up: COVID 19 PCR negative , UA suggestive of UTI, CXR from 2021 w/points radiation with possible congestive changes, no effusion  Blood cultures w/o growth    Asymptomatic bacteriuria-resolved as of 2021  Assessment & Plan  UA with 10-20 wbc's and occasional bacteria, urine cultures checked in with mixed contaminants  Antibiotics initially started were discontinued  VTE Pharmacologic Prophylaxis:   Pharmacologic: Apixaban (Eliquis)  Mechanical VTE Prophylaxis in Place: No    Patient Centered Rounds: I have performed bedside rounds with nursing staff today  Discussions with Specialists or Other Care Team Provider:     Education and Discussions with Family / Patient:  Patient    Time Spent for Care: 30 minutes  More than 50% of total time spent on counseling and coordination of care as described above  Current Length of Stay: 7 day(s)    Current Patient Status: Inpatient   Certification Statement: The patient will continue to require additional inpatient hospital stay due to As above    Discharge Plan:  Ongoing    Code Status: Level 3 - DNAR and DNI      Subjective:   Seen  No new complaints however appears to have more increased work within  Cough, productive of whitish sputum    Objective:     Vitals:   Temp (24hrs), Av 1 °F (36 7 °C), Min:97 8 °F (36 6 °C), Max:98 3 °F (36 8 °C)    Temp:  [97 8 °F (36 6 °C)-98 3 °F (36 8 °C)] 98 3 °F (36 8 °C)  HR:  [64-85] 77  Resp:  [16-28] 28  BP: (103-113)/(56-67) 112/67  SpO2:  [88 %-94 %] 94 %  Body mass index is 21 68 kg/m²  Input and Output Summary (last 24 hours): Intake/Output Summary (Last 24 hours) at 2021 1546  Last data filed at 2021 0820  Gross per 24 hour   Intake 60 ml   Output 1889 ml   Net -1829 ml       Physical Exam:     Physical Exam  Vitals signs reviewed     Constitutional:       General: He is not in acute distress  Appearance: Normal appearance  He is not ill-appearing, toxic-appearing or diaphoretic  HENT:      Head: Normocephalic  Eyes:      Extraocular Movements: Extraocular movements intact  Pupils: Pupils are equal, round, and reactive to light  Neck:      Musculoskeletal: Normal range of motion and neck supple  No neck rigidity or muscular tenderness  Vascular: No carotid bruit  Cardiovascular:      Rate and Rhythm: Normal rate and regular rhythm  Pulses: Normal pulses  Heart sounds: Normal heart sounds  No murmur  No friction rub  Pulmonary:      Effort: Pulmonary effort is normal       Breath sounds: No stridor  Rales present  No wheezing or rhonchi  Comments: Diminished breath sounds right greater than left  Chest:      Chest wall: No tenderness  Abdominal:      General: Abdomen is flat  Bowel sounds are normal  There is no distension  Palpations: Abdomen is soft  Tenderness: There is no abdominal tenderness  There is no guarding or rebound  Musculoskeletal: Normal range of motion  General: No swelling or tenderness  Right lower leg: No edema  Left lower leg: No edema  Skin:     General: Skin is warm and dry  Coloration: Skin is not jaundiced  Neurological:      General: No focal deficit present  Mental Status: He is alert and oriented to person, place, and time  Cranial Nerves: No cranial nerve deficit  Sensory: No sensory deficit             Additional Data:     Labs:    Results from last 7 days   Lab Units 05/13/21  0530  05/06/21  1711   WBC Thousand/uL 14 34*   < > 15 33*   HEMOGLOBIN g/dL 10 0*   < > 10 6*   I STAT HEMOGLOBIN   --    < >  --    HEMATOCRIT % 38 2   < > 40 0   HEMATOCRIT, ISTAT   --    < >  --    PLATELETS Thousands/uL 473*   < > 533*   BANDS PCT % 1   < >  --    NEUTROS PCT %  --   --  82*   LYMPHS PCT %  --   --  7*   LYMPHO PCT % 6*   < >  --    MONOS PCT %  --   --  9   MONO PCT % 11   < > --    EOS PCT % 4   < > 0    < > = values in this interval not displayed  Results from last 7 days   Lab Units 05/13/21  0530  05/08/21  0400   SODIUM mmol/L 143   < > 141   POTASSIUM mmol/L 4 8   < > 4 0   CHLORIDE mmol/L 99*   < > 98*   CO2 mmol/L 41*   < > 32   BUN mg/dL 56*   < > 44*   CREATININE mg/dL 1 22   < > 1 30   ANION GAP mmol/L 3*   < > 11   CALCIUM mg/dL 9 3   < > 7 8*   ALBUMIN g/dL  --   --  3 2*   TOTAL BILIRUBIN mg/dL  --   --  0 70   ALK PHOS U/L  --   --  69   ALT U/L  --   --  95*   AST U/L  --   --  59*   GLUCOSE RANDOM mg/dL 115   < > 145*    < > = values in this interval not displayed  Results from last 7 days   Lab Units 05/09/21  0415   INR  1 22*         Results from last 7 days   Lab Units 05/07/21  1453   HEMOGLOBIN A1C % 5 9*     Results from last 7 days   Lab Units 05/09/21  0415 05/08/21  0400 05/07/21  0424 05/06/21  1737   LACTIC ACID mmol/L  --   --   --  2 0   PROCALCITONIN ng/ml 0 14 0 25 0 19 0 12           * I Have Reviewed All Lab Data Listed Above  * Additional Pertinent Lab Tests Reviewed: All Labs Within Last 24 Hours Reviewed    Imaging:    Imaging Reports Reviewed Today Include:   Imaging Personally Reviewed by Myself Includes:      Recent Cultures (last 7 days):     Results from last 7 days   Lab Units 05/06/21  1748 05/06/21  1737   BLOOD CULTURE   --  No Growth After 5 Days  No Growth After 5 Days     URINE CULTURE  80,000-89,000 cfu/ml   --    LEGIONELLA URINARY ANTIGEN  Negative  --        Last 24 Hours Medication List:   Current Facility-Administered Medications   Medication Dose Route Frequency Provider Last Rate    acetaminophen  650 mg Oral Q6H PRN Heath Sos, CRNP      amiodarone  200 mg Oral BID With Meals Heath Sos, CRNP      apixaban  5 mg Oral BID Heath Sos, CRNP      benzonatate  100 mg Oral TID PRN Mikhail Babb MD      cefTRIAXone  1,000 mg Intravenous Q24H Mikhail Babb MD 1,000 mg (05/13/21 9313)   Kimberly Mcgrath docusate sodium  100 mg Oral BID MANSI Wilkinson      ferrous sulfate  325 mg Oral Daily With Breakfast Aron Aase, MD      guaiFENesin  600 mg Oral Q12H [address] MANSI Wilkinson      ipratropium  0 5 mg Nebulization Q8H PRN Aron Aase, MD      levalbuterol  1 25 mg Nebulization Q8H PRN Aron Aase, MD      metoprolol tartrate  25 mg Oral Q12H [address] Aron Aase, MD      midodrine  5 mg Oral TID AC MANSI Wilkinson      mirtazapine  15 mg Oral HS Aron Aase, MD      pantoprazole  40 mg Oral Early Morning MANSI Wilkinson      senna  1 tablet Oral HS MANSI Wilkinson      [START ON 5/14/2021] torsemide  20 mg Oral Daily Aron Aase, MD          Today, Patient Was Seen By: Aron Aase, MD    ** Please Note: Dictation voice to text software may have been used in the creation of this document   **

## 2021-05-13 NOTE — ASSESSMENT & PLAN NOTE
Present on admission as evidenced by an SpO2 of 83% on room air, multifactorial likely due to CHF, likely tipped over from atrial fibrillation, highly suspicious for aspiration pneumonia  Worsening oxygenation and now 6 L of oxygen    Suspect aspiration pneumonia, initiate antibiotics with ceftriaxone  Speech and swallow eval  Check sputum culture  Wean as able oxygen saturation between 92-94% is acceptable

## 2021-05-13 NOTE — ASSESSMENT & PLAN NOTE
Present, suspected due to underlying lung pathology  Improved heart rate  Continue metoprolol 25 mg b i d , continue amiodarone 200 mg b i d ,  anticoagulation with Eliquis

## 2021-05-13 NOTE — NURSING NOTE
Pt resting comfortably  Rhythm regular, pulses 2/1, no edema  Lungs diminished on BIPAP  Bowels normoactive  Incontinent of urine

## 2021-05-13 NOTE — ASSESSMENT & PLAN NOTE
Wt Readings from Last 3 Encounters:   05/13/21 70 5 kg (155 lb 6 8 oz)   09/18/20 62 5 kg (137 lb 12 8 oz)   01/14/19 63 5 kg (140 lb)     Present on admission as evidenced by acute hypoxemic respiratory failure, bilateral pleural [place] on [place] [address], treated with diuretics, EKG, echocardiogram, Cardiology consult      Echocardiogram from 05/07/2021 with ejection fraction of 55%, no wall motion abnormality, grade 1 diastolic dysfunction, RVSP of 50 mmHg    On torsemide 20 mg daily however will hold and resume on 05/14/2020 given contraction alkalosis with CO2 of 41  Re-evaluate tomorrow

## 2021-05-14 NOTE — ASSESSMENT & PLAN NOTE
Present on admission as evidenced by an SpO2 of 83% on room air, multifactorial likely due to CHF, likely tipped over from atrial fibrillation, highly suspicious for aspiration pneumonia  oxygenation remains at 6 L of oxygen    Suspect aspiration pneumonia, continue antibiotics with ceftriaxone  Speech and swallow eval, BS ordered  sputum culture not collected yet  Check CT chest  Wean as able oxygen saturation between 92-94% is acceptable

## 2021-05-14 NOTE — PLAN OF CARE
Problem: Potential for Falls  Goal: Patient will remain free of falls  Description: INTERVENTIONS:  - Assess patient frequently for physical needs  -  Identify cognitive and physical deficits and behaviors that affect risk of falls  -  [place] fall precautions as indicated by assessment   - Educate patient/family on patient safety including physical limitations  - Instruct patient to call for assistance with activity based on assessment  - Modify environment to reduce risk of injury  - Consider OT/PT consult to assist with strengthening/mobility  Outcome: Progressing     Problem: Prexisting or High Potential for Compromised Skin Integrity  Goal: Skin integrity is maintained or improved  Description: INTERVENTIONS:  - Identify patients at risk for skin breakdown  - Assess and monitor skin integrity  - Assess and monitor nutrition and hydration status  - Monitor labs   - Assess for incontinence   - Turn and reposition patient  - Assist with mobility/ambulation  - Relieve pressure over bony prominences  - Avoid friction and shearing  - Provide appropriate hygiene as needed including keeping skin clean and dry  - Evaluate need for skin moisturizer/barrier cream  - Collaborate with interdisciplinary team   - Patient/family teaching  - Consider wound care consult   Outcome: Progressing     Problem: Neurological Deficit  Goal: Neurological status is stable or improving  Description: Interventions:  - Monitor and assess patient's level of consciousness, motor function, sensory function, and level of assistance needed for ADLs  - Monitor and report changes from baseline  Collaborate with interdisciplinary team to initiate plan and implement interventions as ordered  - Provide and maintain a safe environment  - Consider seizure precautions  - Consider fall precautions  - Consider aspiration precautions  - Consider bleeding precautions  Outcome: Progressing     Problem:  Activity Intolerance/Impaired Mobility  Goal: Mobility/activity is maintained at optimum level for patient  Description: Interventions:  - Assess and monitor patient  barriers to mobility and need for assistive/adaptive devices  - Assess patient's emotional response to limitations  - Collaborate with interdisciplinary team and initiate plans and interventions as ordered  - Encourage independent activity per ability   - Maintain proper body alignment  - Perform active/passive rom as tolerated/ordered  - Plan activities to conserve energy   - Turn patient as appropriate  Outcome: Progressing     Problem: Communication Impairment  Goal: Ability to express needs and understand communication  Description: Assess patient's communication skills and ability to understand information  Patient will demonstrate use of effective communication techniques, alternative methods of communication and understanding even if not able to speak  - Encourage communication and provide alternate methods of communication as needed  - Collaborate with case management/ for discharge needs  - Include patient/family/caregiver in decisions related to communication  Outcome: Progressing     Problem: Potential for Aspiration  Goal: Non-ventilated patient's risk of aspiration is minimized  Description: Assess and monitor vital signs, respiratory status, and labs (WBC)  Monitor for signs of aspiration (tachypnea, cough, rales, wheezing, cyanosis, fever)  - Assess and monitor patient's ability to swallow  - Place patient up in chair to eat if possible  - HOB up at 90 degrees to eat if unable to get patient up into chair   - Supervise patient during oral intake  - Instruct patient/ family to take small bites  - Instruct patient/ family to take small single sips when taking liquids    - Follow patient-specific strategies generated by speech pathologist   Outcome: Progressing  Goal: Ventilated patient's risk of aspiration is minimized  Description: Assess and monitor vital signs, respiratory status, airway cuff pressure, and labs (WBC)  Monitor for signs of aspiration (tachypnea, cough, rales, wheezing, cyanosis, fever)  - Elevate head of bed 30 degrees if patient has tube feeding   - Monitor tube feeding  Outcome: Progressing     Problem: Nutrition  Goal: Nutrition/Hydration status is improving  Description: Monitor and assess patient's nutrition/hydration status for malnutrition (ex- brittle hair, bruises, dry skin, pale skin and conjunctiva, muscle wasting, smooth red tongue, and disorientation)  Collaborate with interdisciplinary team and initiate plan and interventions as ordered  Monitor patient's weight and dietary intake as ordered or per policy  Utilize nutrition screening tool and intervene per policy  Determine patient's food preferences and provide high-protein, high-caloric foods as appropriate  - Assist patient with eating   - Allow adequate time for meals   - Encourage patient to take dietary supplement as ordered  - Collaborate with clinical nutritionist   - Include patient/family/caregiver in decisions related to nutrition    Outcome: Progressing     Problem: PAIN - ADULT  Goal: Verbalizes/displays adequate comfort level or baseline comfort level  Description: Interventions:  - Encourage patient to monitor pain and request assistance  - Assess pain using appropriate pain scale  - Administer analgesics based on type and severity of pain and evaluate response  - Implement non-pharmacological measures as appropriate and evaluate response  - Consider cultural and social influences on pain and pain management  - Notify physician/advanced practitioner if interventions unsuccessful or patient reports new pain  Outcome: Progressing     Problem: INFECTION - ADULT  Goal: Absence or prevention of progression during hospitalization  Description: INTERVENTIONS:  - Assess and monitor for signs and symptoms of infection  - Monitor lab/diagnostic results  - Monitor all insertion sites, i e  indwelling lines, tubes, and drains  - Monitor endotracheal if appropriate and nasal secretions for changes in amount and color  - [place] appropriate cooling/warming therapies per order  - Administer medications as ordered  - Instruct and encourage patient and family to use good hand hygiene technique  - Identify and instruct in appropriate isolation precautions for identified infection/condition  Outcome: Progressing  Goal: Absence of fever/infection during neutropenic period  Description: INTERVENTIONS:  - Monitor WBC    Outcome: Progressing     Problem: SAFETY ADULT  Goal: Patient will remain free of falls  Description: INTERVENTIONS:  - Assess patient frequently for physical needs  -  Identify cognitive and physical deficits and behaviors that affect risk of falls    -  [place] fall precautions as indicated by assessment   - Educate patient/family on patient safety including physical limitations  - Instruct patient to call for assistance with activity based on assessment  - Modify environment to reduce risk of injury  - Consider OT/PT consult to assist with strengthening/mobility  Outcome: Progressing  Goal: Maintain or return to baseline ADL function  Description: INTERVENTIONS:  -  Assess patient's ability to carry out ADLs; assess patient's baseline for ADL function and identify physical deficits which impact ability to perform ADLs (bathing, care of mouth/teeth, toileting, grooming, dressing, etc )  - Assess/evaluate cause of self-care deficits   - Assess range of motion  - Assess patient's mobility; develop plan if impaired  - Assess patient's need for assistive devices and provide as appropriate  - Encourage maximum independence but intervene and supervise when necessary  - Involve family in performance of ADLs  - Assess for home care needs following discharge   - Consider OT consult to assist with ADL evaluation and planning for discharge  - Provide patient education as appropriate  Outcome: Progressing  Goal: Maintain or return mobility status to optimal level  Description: INTERVENTIONS:  - Assess patient's baseline mobility status (ambulation, transfers, stairs, etc )    - Identify cognitive and physical deficits and behaviors that affect mobility  - Identify mobility aids required to assist with transfers and/or ambulation (gait belt, sit-to-stand, lift, walker, cane, etc )  - [place] fall precautions as indicated by assessment  - Record patient progress and toleration of activity level on Mobility SBAR; progress patient to next Phase/Stage  - Instruct patient to call for assistance with activity based on assessment  - Consider rehabilitation consult to assist with strengthening/weightbearing, etc   Outcome: Progressing     Problem: DISCHARGE PLANNING  Goal: Discharge to home or other facility with appropriate resources  Description: INTERVENTIONS:  - Identify barriers to discharge w/patient and caregiver  - Arrange for needed discharge resources and transportation as appropriate  - Identify discharge learning needs (meds, wound care, etc )  - Arrange for interpretive services to assist at discharge as needed  - Refer to Case Management Department for coordinating discharge planning if the patient needs post-hospital services based on physician/advanced practitioner order or complex needs related to functional status, cognitive ability, or social support system  Outcome: Progressing     Problem: Knowledge Deficit  Goal: Patient/family/caregiver demonstrates understanding of disease process, treatment plan, medications, and discharge instructions  Description: Complete learning assessment and assess knowledge base    Interventions:  - Provide teaching at level of understanding  - Provide teaching via preferred learning methods  Outcome: Progressing     Problem: CARDIOVASCULAR - ADULT  Goal: Maintains optimal cardiac output and hemodynamic stability  Description: INTERVENTIONS:  - Monitor I/O, vital signs and rhythm  - Monitor for S/S and trends of decreased cardiac output  - Administer and titrate ordered vasoactive medications to optimize hemodynamic stability  - Assess quality of pulses, skin color and temperature  - Assess for signs of decreased coronary artery perfusion  - Instruct patient to report change in severity of symptoms  Outcome: Progressing  Goal: Absence of cardiac dysrhythmias or at baseline rhythm  Description: INTERVENTIONS:  - Continuous cardiac monitoring, vital signs, obtain 12 lead EKG if ordered  - Administer antiarrhythmic and heart rate control medications as ordered  - Monitor electrolytes and administer replacement therapy as ordered  Outcome: Progressing     Problem: METABOLIC, FLUID AND ELECTROLYTES - ADULT  Goal: Electrolytes maintained within normal limits  Description: INTERVENTIONS:  - Monitor labs and assess patient for signs and symptoms of electrolyte imbalances  - Administer electrolyte replacement as ordered  - Monitor response to electrolyte replacements, including repeat lab results as appropriate  - Instruct patient on fluid and nutrition as appropriate  Outcome: Progressing  Goal: Fluid balance maintained  Description: INTERVENTIONS:  - Monitor labs   - Monitor I/O and WT  - Instruct patient on fluid and nutrition as appropriate  - Assess for signs & symptoms of volume excess or deficit  Outcome: Progressing     Problem: Nutrition/Hydration-ADULT  Goal: Nutrient/Hydration intake appropriate for improving, restoring or maintaining nutritional needs  Description: Monitor and assess patient's nutrition/hydration status for malnutrition  Collaborate with interdisciplinary team and initiate plan and interventions as ordered  Monitor patient's weight and dietary intake as ordered or per policy  Utilize nutrition screening tool and intervene as necessary   Determine patient's food preferences and provide high-protein, high-caloric foods as appropriate       INTERVENTIONS:  - Monitor oral intake, urinary output, labs, and treatment plans  - Assess nutrition and hydration status and recommend course of action  - Evaluate amount of meals eaten  - Assist patient with eating if necessary   - Allow adequate time for meals  - Recommend/ encourage appropriate diets, oral nutritional supplements, and vitamin/mineral supplements  - Order, calculate, and assess calorie counts as needed  - Recommend, monitor, and adjust tube feedings and TPN/PPN based on assessed needs  - Assess need for intravenous fluids  - Provide specific nutrition/hydration education as appropriate  - Include patient/family/caregiver in decisions related to nutrition  Outcome: Progressing

## 2021-05-14 NOTE — PROGRESS NOTES
[place]  Progress Note - Jurgen Caldwell 1945, 68 y o  male MRN: [id number]  Unit/Bed#: -[place] Encounter: [id number]  Primary Care Provider: No primary care provider on file  Date and time admitted to hospital: 5/6/2021  4:56 PM    * Acute on chronic diastolic (congestive) heart failure (HCC)  Assessment & Plan  Wt Readings from Last 3 Encounters:   05/14/21 73 9 kg (162 lb 14 7 oz)   09/18/20 62 5 kg (137 lb 12 8 oz)   01/14/19 63 5 kg (140 lb)     Present on admission as evidenced by acute hypoxemic respiratory failure, bilateral [place] [address], treated with diuretics, EKG, echocardiogram, Cardiology consult  Echocardiogram from 05/07/2021 with ejection fraction of 55%, no wall motion abnormality, grade 1 diastolic dysfunction, RVSP of 50 mmHg    On torsemide 20 mg daily however will hold and resume on 05/14/2020 given contraction alkalosis with CO2 of 41  Re-evaluate tomorrow      Acute respiratory failure with hypoxia (HCC)  Assessment & Plan  Present on admission as evidenced by an SpO2 of 83% on room air, multifactorial likely due to CHF, likely tipped over from atrial fibrillation, highly suspicious for aspiration pneumonia  oxygenation remains at 6 L of oxygen    Suspect aspiration pneumonia, continue antibiotics with ceftriaxone  Speech and swallow eval, BS ordered  sputum culture not collected yet  Check CT chest  Wean as able oxygen saturation between 92-94% is acceptable  New onset a-fib [facility])  Assessment & Plan  Present, suspected due to underlying lung pathology  Improved heart rate  Continue metoprolol 25 mg b i d , continue amiodarone 200 mg b i d ,  anticoagulation with Eliquis  CVA (cerebral vascular accident) [facility])  Assessment & Plan  NIHSS could not be assessed due to encephalopathy      - CT head: "Small cortical and subcortical infarct in the left posterior parietal region, possibly acute to subacute  Questionable focus of subacute microhemorrhage within the infarct     No mass effect "    -MRI confirmed ischemia in the left parietal region initially seen on head CT     -echocardiogram demonstrates EF 55%, no gross dyskinesis, biatrial dilatation mild  given new onset Afib, patient was started on anticoagulation with Eliquis    PT/OT eval and treat  will follow-up with neurology outpatient        ALVIN (iron deficiency anemia)  Assessment & Plan  Macrocytic hypochromic anemia with anisocytosis  Hgb at 10 6 on admission, now 9 9  Baseline Hgb 8-10  Resume and continue iron tablets  Transaminitis  Assessment & Plan  · Noted on admission, , , T bili 0 60, downtrended  · RUQ US not done, hepatitis panel non reactive     Elevated troponin level not due myocardial infarction  Assessment & Plan  troponin elevation not due to myocardial infarction, likely due to congestive heart failure, acute hypoxemic respiratory failure   Peaked at 0 16, no EKG findings consistent with ischemia, ECHO w/o wall motion abnormality    ECHO: Systolic function was normal by visual assessment  Ejection fraction was estimated to be 55 %, no regional wall motion abnormalities, wall thickness was mildly increased, grade 1 diastolic dysfunction      Depressive disorder  Assessment & Plan  Home regimen:  Remeron 15 mg hs, resume and continue  Seen by psychiatry and cleared    PUD (peptic ulcer disease)  Assessment & Plan  Continue Protonix 40 mg daily     Thoracic aortic aneurysm (TAA) ([facility] [address] )  Assessment & Plan  CT chest: "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, unchanged since a CTA from 1/10/2019 "  Ideally patient should be seen and followed by surgery but with him refusing  further treatment, will hold off      Sepsis (HCC)-resolved as of 5/11/2021  Assessment & Plan  SIRS criteria met on admission a/e/b tachycardia with a pulse of 94bpm and leukocytosis at 15 33 suspected source was pneumonia vs cystitis, treated with antibiotics but later discontinued as culture data was negative, resolved  Labs/work up: COVID 19 PCR negative , UA suggestive of UTI, CXR from 2021 w/points radiation with possible congestive changes, no effusion  Blood cultures w/o growth    Asymptomatic bacteriuria-resolved as of 2021  Assessment & Plan  UA with 10-20 wbc's and occasional bacteria, urine cultures checked in with mixed contaminants  Antibiotics initially started were discontinued  Acute encephalopathy-resolved as of 2021  Assessment & Plan  Present on admission, likely hypoxia induced, acute stroke, treated with with oxygen supplementation and diuretics brain imaging, Cardiology and Neurology consult  VTE Pharmacologic Prophylaxis:   Pharmacologic: Apixaban (Eliquis)  Mechanical VTE Prophylaxis in Place: Yes    Patient Centered Rounds: I have performed bedside rounds with nursing staff today  Discussions with Specialists or Other Care Team Provider:     Education and Discussions with Family / Patient:  Patient, declined family communication    Time Spent for Care: 30 minutes  More than 50% of total time spent on counseling and coordination of care as described above  Current Length of Stay: 8 day(s)    Current Patient Status: Inpatient   Certification Statement: The patient will continue to require additional inpatient hospital stay due to Acute hypoxemic respiratory failure multifactorial likely due to aspiration pneumonia and CHF    Discharge Plan:  Ongoing    Code Status: Level 3 - DNAR and DNI      Subjective:   Seen  Remains hypoxic at 6 L of oxygen, has wet cough    Denies worsening shortness of breath    Objective:     Vitals:   Temp (24hrs), Av 1 °F (35 6 °C), Min:94 3 °F (34 6 °C), Max:97 8 °F (36 6 °C)    Temp:  [94 3 °F (34 6 °C)-97 8 °F (36 6 °C)] 94 3 °F (34 6 °C)  HR:  [75-90] 89  Resp:  [22] 22  BP: ()/(54-59) 106/59  SpO2:  [84 %-99 %] 99 %  Body mass index is 22 72 kg/m²  Input and Output Summary (last 24 hours): Intake/Output Summary (Last 24 hours) at 5/14/2021 0900  Last data filed at 5/14/2021 0850  Gross per 24 hour   Intake 420 ml   Output 119 ml   Net 301 ml       Physical Exam:     Physical Exam  Vitals signs reviewed  Constitutional:       General: He is not in acute distress  Appearance: Normal appearance  He is not ill-appearing, toxic-appearing or diaphoretic  HENT:      Head: Normocephalic  Eyes:      Extraocular Movements: Extraocular movements intact  Pupils: Pupils are equal, round, and reactive to light  Neck:      Musculoskeletal: Normal range of motion and neck supple  No neck rigidity or muscular tenderness  Vascular: No carotid bruit  Cardiovascular:      Rate and Rhythm: Normal rate and regular rhythm  Pulses: Normal pulses  Heart sounds: Normal heart sounds  No murmur  No friction rub  Pulmonary:      Effort: Pulmonary effort is normal       Breath sounds: No stridor  Rhonchi present  No wheezing or rales  Comments: Decreased breath sounds bilaterally  Chest:      Chest wall: No tenderness  Abdominal:      General: Abdomen is flat  Bowel sounds are normal  There is no distension  Palpations: Abdomen is soft  Tenderness: There is no abdominal tenderness  There is no guarding or rebound  Musculoskeletal: Normal range of motion  General: No swelling or tenderness  Right lower leg: No edema  Left lower leg: No edema  Skin:     General: Skin is warm and dry  Coloration: Skin is not jaundiced  Neurological:      General: No focal deficit present  Mental Status: He is alert and oriented to person, place, and time  Cranial Nerves: No cranial nerve deficit  Sensory: No sensory deficit             Additional Data:     Labs:    Results from last 7 days   Lab Units 05/14/21  0521 05/13/21  0530   WBC Thousand/uL 12 84* 14 34*   HEMOGLOBIN g/dL 9 9* 10 0*   HEMATOCRIT % 37 7 38 2   PLATELETS Thousands/uL 466* 473*   BANDS PCT %  --  1   LYMPHO PCT % 11* 6*   MONO PCT % 0* 11   EOS PCT % 6 4     Results from last 7 days   Lab Units 05/14/21  0522  05/08/21  0400   SODIUM mmol/L 143   < > 141   POTASSIUM mmol/L 5 2   < > 4 0   CHLORIDE mmol/L 100   < > 98*   CO2 mmol/L 41*   < > 32   BUN mg/dL 59*   < > 44*   CREATININE mg/dL 1 27   < > 1 30   ANION GAP mmol/L 2*   < > 11   CALCIUM mg/dL 8 8   < > 7 8*   ALBUMIN g/dL  --   --  3 2*   TOTAL BILIRUBIN mg/dL  --   --  0 70   ALK PHOS U/L  --   --  69   ALT U/L  --   --  95*   AST U/L  --   --  59*   GLUCOSE RANDOM mg/dL 134   < > 145*    < > = values in this interval not displayed  Results from last 7 days   Lab Units 05/09/21  0415   INR  1 22*         Results from last 7 days   Lab Units 05/07/21  1453   HEMOGLOBIN A1C % 5 9*     Results from last 7 days   Lab Units 05/09/21  0415 05/08/21  0400   PROCALCITONIN ng/ml 0 14 0 25           * I Have Reviewed All Lab Data Listed Above  * Additional Pertinent Lab Tests Reviewed:  All Labs Within Last 24 Hours Reviewed    Imaging:    Imaging Reports Reviewed Today Include:   Imaging Personally Reviewed by Myself Includes:      Recent Cultures (last 7 days):           Last 24 Hours Medication List:   Current Facility-Administered Medications   Medication Dose Route Frequency Provider Last Rate    acetaminophen  650 mg Oral Q6H PRN MANSI Carrizales      amiodarone  200 mg Oral BID With Meals MANSI Carrizales      apixaban  5 mg Oral BID MANSI Carrizales      benzonatate  100 mg Oral TID PRN Sam Walton MD      cefTRIAXone  1,000 mg Intravenous Q24H Sam Walton MD 1,000 mg (05/13/21 0852)    docusate sodium  100 mg Oral BID MANSI Carrizales      ferrous sulfate  325 mg Oral Daily With Breakfast Sam Walton MD      guaiFENesin  600 mg Oral Q12H [facility] MANSI Carrizales      ipratropium  0 5 mg Nebulization Q8H PRN Eyal Momin MD      levalbuterol  1 25 mg Nebulization Q8H PRN Eyal Momin MD      melatonin  6 mg Oral HS [place], CRNP      metoprolol tartrate  25 mg Oral Q12H [address] Eyal Momin MD      midodrine  5 mg Oral TID AC Matthias Sender, CRNP      mirtazapine  15 mg Oral HS Eyal Momin MD      pantoprazole  40 mg Oral Early Morning Matthias Sender, CRNP      senna  1 tablet Oral HS Matthias Sender, CRNP      torsemide  20 mg Oral Daily Eyal Momin MD          Today, Patient Was Seen By: Eyal Momin MD    ** Please Note: Dictation voice to text software may have been used in the creation of this document   **

## 2021-05-14 NOTE — ASSESSMENT & PLAN NOTE
Macrocytic hypochromic anemia with anisocytosis  Hgb at 10 6 on admission, now 9 9  Baseline Hgb 8-10  Resume and continue iron tablets

## 2021-05-14 NOTE — ASSESSMENT & PLAN NOTE
Wt Readings from Last 3 Encounters:   05/14/21 73 9 kg (162 lb 14 7 oz)   09/18/20 62 5 kg (137 lb 12 8 oz)   01/14/19 63 5 kg (140 lb)     Present on admission as evidenced by acute hypoxemic respiratory failure, bilateral pleural effusion on CT, [place] [address], treated with diuretics, EKG, echocardiogram, Cardiology consult      Echocardiogram from 05/07/2021 with ejection fraction of 55%, no wall motion abnormality, grade 1 diastolic dysfunction, RVSP of 50 mmHg    On torsemide 20 mg daily however will hold and resume on 05/14/2020 given contraction alkalosis with CO2 of 41  Re-evaluate tomorrow

## 2021-05-14 NOTE — SPEECH THERAPY NOTE
Speech Language/Pathology    Speech-Language Pathology Bedside Swallow Evaluation      Patient Name: Brittny Ruiz    TFFNN'G Date: 5/14/2021     Problem List  Principal Problem:    Acute on chronic diastolic (congestive) heart failure ([facility] )  Active Problems:    Thoracic aortic aneurysm (TAA) (HCC)    PUD (peptic ulcer disease)    Depressive disorder    Elevated troponin level not due myocardial infarction    CVA (cerebral vascular accident) ([facility] [address] )    Transaminitis    ALVIN (iron deficiency anemia)    Acute respiratory failure with hypoxia ([facility] )    New onset a-fib [facility])      Past Medical History  Past Medical History:   Diagnosis Date    Bladder cancer [facility])        Past Surgical History  Past Surgical History:   Procedure Laterality Date    ESOPHAGOGASTRODUODENOSCOPY N/A 1/13/2019    Procedure: ESOPHAGOGASTRODUODENOSCOPY (EGD); Surgeon: Yenny Ott MD;  [facility];  Service: Gastroenterology       Summary   Pt presented with functional appearing oral swallow skills and ? pharyngeal stage swallowing skills with materials administered today  Pt w/ limited dentition mildly impacting oral stage, though pt able to breakdown advanced textures  Swallow initiation suspected fairly prompt  Pt w/ cough prior to, intermittently throughout, and after PO  Cough not directly timed to consistency or immediately w/ swallow  ? Cough as related to aspiration vs  Other  Pt may benefit from VBS to rule out aspiration  Risk/s for Aspiration: Suspect mild     Recommended Diet: regular diet and thin liquids   Recommended Form of Meds: As tolerated   Aspiration precautions and swallowing strategies: upright posture, only feed when fully alert and slow rate of feeding  Other Recommendations: Continue frequent oral care, ?  VBS         Current Medical Status  Pt is a 68 y o  male who presented to [facility] with   * Acute on chronic diastolic (congestive) heart failure (HCC)  Assessment & Plan      Wt Readings from Last 3 Encounters:   05/13/21 70 5 kg (155 lb 6 8 oz)   09/18/20 62 5 kg (137 lb 12 8 oz)   01/14/19 63 5 kg (140 lb)      Present on admission as evidenced by acute hypoxemic respiratory failure, bilateral pleural [place] on [place] [address], treated with diuretics, EKG, echocardiogram, Cardiology consult      Echocardiogram from 05/07/2021 with ejection fraction of 55%, no wall motion abnormality, grade 1 diastolic dysfunction, RVSP of 50 mmHg     On torsemide 20 mg daily however will hold and resume on 05/14/2020 given contraction alkalosis with CO2 of 41  Re-evaluate tomorrow        Acute respiratory failure with hypoxia (HCC)  Assessment & Plan  Present on admission as evidenced by an SpO2 of 83% on room air, multifactorial likely due to CHF, likely tipped over from atrial fibrillation, highly suspicious for aspiration pneumonia  Worsening oxygenation and now 6 L of oxygen    Suspect aspiration pneumonia, initiate antibiotics with ceftriaxone  Speech and swallow eval  Check sputum culture  Wean as able oxygen saturation between 92-94% is acceptable        New onset a-fib [facility])  Assessment & Plan  Present, suspected due to underlying lung pathology  Improved heart rate  Continue metoprolol 25 mg b i d , continue amiodarone 200 mg b i d ,  anticoagulation with Eliquis       CVA (cerebral vascular accident) ([facility] [address] )  Assessment & Plan  NIHSS could not be assessed due to encephalopathy       - CT head: "Small cortical and subcortical infarct in the left posterior parietal region, possibly acute to subacute   Questionable focus of subacute microhemorrhage within the infarct     No mass effect "     -MRI confirmed ischemia in the left parietal region initially seen on head CT     -echocardiogram demonstrates EF 55%, no gross dyskinesis, biatrial dilatation mild         given new onset Afib, patient was started on anticoagulation with Eliquis     PT/OT eval and treat      will follow-up with neurology outpatient           ALVIN (iron deficiency anemia)  Assessment & Plan  Macrocytic hypochromic anemia with anisocytosis  Hgb at 10 6 on admission, now 10  Baseline Hgb 8-10  Resume and continue iron tablets        Transaminitis  Assessment & Plan  · Noted on admission, , , T bili 0 60, downtrended  · RUQ US not done, hepatitis panel non reactive      Elevated troponin level not due myocardial infarction  Assessment & Plan  troponin elevation not due to myocardial infarction, likely due to congestive heart failure, acute hypoxemic respiratory failure   Peaked at 0 16, no EKG findings consistent with ischemia, ECHO w/o wall motion abnormality     ECHO: Systolic function was normal by visual assessment  Ejection fraction was estimated to be 55 %, no regional wall motion abnormalities, wall thickness was mildly increased, grade 1 diastolic dysfunction        Depressive disorder  Assessment & Plan  Home regimen:  Remeron 15 mg hs, resume and continue  Seen by psychiatry and cleared     PUD (peptic ulcer disease)  Assessment & Plan  Continue Protonix 40 mg daily      Thoracic aortic aneurysm (TAA) (HCC)  Assessment & Plan  CT chest: "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, unchanged since a CTA from 1/10/2019 "  Ideally patient should be seen and followed by surgery but with him refusing  further treatment, will hold off      Sepsis (HCC)-resolved as of 5/11/2021  Assessment & Plan  SIRS criteria met on admission a/e/b tachycardia with a pulse of 94bpm and leukocytosis at 15 33 suspected source was pneumonia vs cystitis, treated with antibiotics but later discontinued as culture data was negative, resolved  Labs/work up: COVID 19 PCR negative , UA suggestive of UTI, CXR from 05/08/2021 w/points radiation with possible congestive changes, no effusion    Blood cultures w/o growth     Asymptomatic bacteriuria-resolved as of 5/11/2021  Assessment & Plan  UA with 10-20 wbc's and occasional bacteria, urine cultures checked in with mixed contaminants  Antibiotics initially started were discontinued          Current Precautions:  Fall     Allergies:  No known food allergies    Past medical history:  Please see H&P for details    Special Studies:  CXR 5/12/21: Worsening pulmonary venous congestion with small effusions  MRI brain 5/8/21: 1  Acute infarct involving left postcentral gyrus  No significant edema or mass effect  No hemorrhagic transformation      2   Scattered chronic lacunar infarcts and microangiopathic changes  Social/Education/Vocational Hx:  Pt lives alone    Swallow Information   Current Risks for Dysphagia & Aspiration: CVA and CHF  Current Symptoms/Concerns: ? pna  Current Diet: regular diet and thin liquids   Baseline Diet: regular diet and thin liquids      Baseline Assessment   Behavior/Cognition: alert  Speech/Language Status: able to participate in conversation and able to follow commands  Patient Positioning: upright in bed  Pain Status/Interventions/Response to Interventions:  No report of or nonverbal indications of pain  Swallow Mechanism Exam  Facial: symmetrical  Labial: WFL  Lingual: WFL  Velum: symmetrical  Mandible: adequate ROM  Dentition: limited dentition  Vocal quality:clear/adequate   Volitional Cough: strong/productive   Respiratory Status: on 6L O2       Consistencies Assessed and Performance   Consistencies Administered: regular texture breakfast tray  Materials administered included applesauce, pancakes, scrambled eggs, cubed mary    Oral Stage: WFL  Mastication was adequate with the materials administered today  Bolus formation and transfer were functional with no significant oral residue noted  No overt s/s reduced oral control  Pharyngeal Stage: ?   Swallow Mechanics:  Swallowing initiation appeared prompt  Laryngeal rise was palpated and judged to be within functional limits   Cough prior to, during, and after meal  Esophageal Concerns: none reported    Strategies and Efficacy: -    Summary and Recommendations (see above)    Results Reviewed with: patient, RN and MD     Treatment Recommended: Yes     Frequency of treatment: As able     Patient Stated Goal: none stated     Dysphagia LTG  -Patient will demonstrate safe and effective oral intake (without overt s/s significant oral/pharyngeal dysphagia including s/s penetration or aspiration) for the highest appropriate diet level       Short Term Goals:  -Patient will comply with a Video/Modified Barium Swallow study for more complete assessment of swallowing anatomy/physiology/aspiration risk and to assess efficacy of treatment techniques so as to best guide treatment plan          Speech Therapy Prognosis   Prognosis: deferred    Prognosis Considerations: age, medical status, prior medical history, cognitive status and VBS findings

## 2021-05-14 NOTE — PROCEDURES
Video Swallow Study      Patient Name: Randy BOLES Date: 5/14/2021        Past Medical History  Past Medical History:   Diagnosis Date    Bladder cancer [facility])         Past Surgical History  Past Surgical History:   Procedure Laterality Date    ESOPHAGOGASTRODUODENOSCOPY N/A 1/13/2019    Procedure: ESOPHAGOGASTRODUODENOSCOPY (EGD); Surgeon: Artemio Hummel MD;  Location[facility];  Service: Gastroenterology       Video Barium Swallow Study    Summary:  Images are on PACS for review  Pt presents w/ mild oropharyngeal dysphagia anel by prolonged oral manipulation, mildly reduced oral control resulting in premature spill into the pharynx  Hyo-laryngeal excursion is reduced, reduced driving forces for the swallow  Retention of all material noted in the valleculae  followed by pooling in the pyriforms from retention  No gross penetration or aspiration noted on today's study, though pt w/ risk from retention, pooling, and respiratory compromise  Pt would benefit from ongoing ST for strategies to optimize swallow safety  Recommendations:  Diet: Dysphagia 3   Liquids: Thin   Meds: As tolerated  Strategies: Small bites/sips, liquid wash   Upright position  F/u ST tx: Yes  Therapy Prognosis: Fair   Prognosis considerations: Age, current medical, past emdical   Intermittent Supervision  Aspiration Precautions  Reflux Precautions   Consider consult with: -   Results reviewed with: pt, nursing,, physician  Aspiration precautions posted  If a dedicated assessment of the esophagus is desired, consider esophagram/barium swallow or EGD  Patient's goal:  None stated     Goals:  Pt will tolerate least restrictive diet w/out s/s aspiration or oral/pharyngeal difficulties        PMH:  Pt is a 68 y o  male who presented to [facility] with   * Acute on chronic diastolic (congestive) heart failure (HCC)  Assessment & Plan        Wt Readings from Last 3 Encounters: 05/13/21 70 5 kg (155 lb 6 8 oz)   09/18/20 62 5 kg (137 lb 12 8 oz)   01/14/19 63 5 kg (140 lb)      Present on admission as evidenced by acute hypoxemic respiratory failure, bilateral pleural effusion on CT, [place] [address], treated with diuretics, EKG, echocardiogram, Cardiology consult      Echocardiogram from 05/07/2021 with ejection fraction of 55%, no wall motion abnormality, grade 1 diastolic dysfunction, RVSP of 50 mmHg     On torsemide 20 mg daily however will hold and resume on 05/14/2020 given contraction alkalosis with CO2 of 41  Re-evaluate tomorrow        Acute respiratory failure with hypoxia (HCC)  Assessment & Plan  Present on admission as evidenced by an SpO2 of 83% on room air, multifactorial likely due to CHF, likely tipped over from atrial fibrillation, highly suspicious for aspiration pneumonia  Worsening oxygenation and now 6 L of oxygen    Suspect aspiration pneumonia, initiate antibiotics with ceftriaxone  Speech and swallow eval  Check sputum culture  Wean as able oxygen saturation between 92-94% is acceptable        New onset a-fib [facility])  Assessment & Plan  Present, suspected due to underlying lung pathology  Improved heart rate  Continue metoprolol 25 mg b i d , continue amiodarone 200 mg b i d ,  anticoagulation with Eliquis       CVA (cerebral vascular accident) ([facility] [address] )  Assessment & Plan  NIHSS could not be assessed due to encephalopathy       - CT head: "Small cortical and subcortical infarct in the left posterior parietal region, possibly acute to subacute   Questionable focus of subacute microhemorrhage within the infarct     No mass effect "     -MRI confirmed ischemia in the left parietal region initially seen on head CT     -echocardiogram demonstrates EF 55%, no gross dyskinesis, biatrial dilatation mild         given new onset Afib, patient was started on anticoagulation with Eliquis     PT/OT eval and treat      will follow-up with neurology outpatient         Previous VBS:  None on record    Current Diet:    Regular w/ thin     Premorbid diet:  Regular w/ thin     Dentition:  Limited dentition     O2 requirement:  6L O2    Oral mech:  Strength and ROM: WFL     Vocal Quality/Speech:  Clear/adequate    Cognitive status:  Awake, alert     Consistencies administered: Barium laden applesauce, banana, chicken, bagel honey thick, nectar thick, thin liquids, 13mm barium pill whole w/ thin  Liquids were administered by cup and straw  Pt was seated laterally at 90 degrees       Oral stage:  Lip closure: wfl   Mastication: fair   Bolus formation:  Min prolonged  Bolus control:  Mild reduced   Transfer: lingual pumping   Residue: -     Pharyngeal stage:  Swallow promptness: fairly prompt   Spill to valleculae: w/ all   Spill to pyriforms:  W/ thin   Epiglottic inversion: complete  Laryngeal excursion: reduced   Pharyngeal constriction: fair   Vallecular retention: w/ all, pill briefly in valleculae cleared by secondary sip thin   Pyriform retention: pooling from vallecular retention   PPW coating: -   Osteophytes:  -   CP prominence: +   Retropulsion from prominence: +   Transient penetration: w/ consecutive sips thin   Epiglottic undercoat: -   Penetration: -   Aspiration: -   Strategies: -   Response to aspiration: -     Screening of Esophageal stage:  Dysmotility: -   Slow motility: -   Retropulsion: -    Tertiary contractions: -   Reflux: +    Retention: -   Stricture: -   LES: -

## 2021-05-15 PROBLEM — Z71.89 GOALS OF CARE, COUNSELING/DISCUSSION: Status: ACTIVE | Noted: 2020-01-01

## 2021-05-15 NOTE — ASSESSMENT & PLAN NOTE
Present on admission as evidenced by an SpO2 of 83% on room air, multifactorial likely due to CHF, likely tipped over from atrial fibrillation, aspiration pneumonia as seen on CT scan  oxygenation remains at 4-6 L of oxygen    CT chest consistent with pneumonia, pleural effusion  discontinue antibiotics as patient does not want any medications administered

## 2021-05-15 NOTE — PROGRESS NOTES
Pt would like to be comfort care measures  Dr Shannon Mosquera aware and visiting with patient to discuss

## 2021-05-15 NOTE — ASSESSMENT & PLAN NOTE
Patient with history of depression, seen earlier by both palliative Care and behavior health  Patient states he does not want any more treatment  He wants his antibiotics and all other medicines discontinued at present  He wants to be made comfortable/hospice care  Patient does not lack capacity and understands his decisions  "States he has made his peace is ready to go meet his maker"  States he has discussed he has discussed his decision with his son  Please refer to psychiatry and palliative care documentation as well  Based on discussion with patient, will make him comfort care  Will discontinue all medicines including antibiotics  Comfort Care measures enacted    Will therefore consult hospice care

## 2021-05-15 NOTE — PROGRESS NOTES
[place]  Progress Note - Shona Soto 1945, 68 y o  male MRN: [id number]  Unit/Bed#: -01 Encounter: [id number]  Primary Care Provider: No primary care provider on file  Date and time admitted to hospital: 5/6/2021  4:56 PM    Goals of care, counseling/discussion  Assessment & Plan  Patient with history of depression, seen earlier by both palliative Care and behavior health  Patient states he does not want any more treatment  He wants his antibiotics and all other medicines discontinued at present  He wants to be made comfortable/hospice care  Patient does not lack capacity and understands his decisions  "States he has made his peace is ready to go meet his maker"  States he has discussed he has discussed his decision with his son  Please refer to psychiatry and palliative care documentation as well  Based on discussion with patient, will make him comfort care  Will discontinue all medicines including antibiotics  Comfort Care measures enacted  Will therefore consult hospice care    * Acute on chronic diastolic (congestive) heart failure (HCC)  Assessment & Plan  Wt Readings from Last 3 Encounters:   05/15/21 74 1 kg (163 lb 5 8 oz)   09/18/20 62 5 kg (137 lb 12 8 oz)   01/14/19 63 5 kg (140 lb)     Present on admission as evidenced by acute hypoxemic respiratory failure, bilateral pleural effusion on [place] [address], treated with diuretics, EKG, echocardiogram, Cardiology consult      Echocardiogram from 05/07/2021 with ejection fraction of 55%, no wall motion abnormality, grade 1 diastolic dysfunction, RVSP of 50 mmHg    Patient is now comfort directed care    Acute respiratory failure with hypoxia [facility])  Assessment & Plan  Present on admission as evidenced by an SpO2 of 83% on room air, multifactorial likely due to CHF, likely tipped over from atrial fibrillation, aspiration pneumonia as seen on CT scan  oxygenation remains at 4-6 L of oxygen    CT chest consistent with pneumonia, pleural effusion  discontinue antibiotics as patient does not want any medications administered       New onset a-fib [facility])  Assessment & Plan  Present, suspected due to underlying lung pathology  Improved heart rate  Patient has opted for comfort care    CVA (cerebral vascular accident) [facility])  Assessment & Plan  NIHSS could not be assessed due to encephalopathy      - CT head: "Small cortical and subcortical infarct in the left posterior parietal region, possibly acute to subacute  Questionable focus of subacute microhemorrhage within the infarct     No mass effect "    -MRI confirmed ischemia in the left parietal region initially seen on head CT     -echocardiogram demonstrates EF 55%, no gross dyskinesis, biatrial dilatation mild  Patient is now comfort directed care        ALVIN (iron deficiency anemia)  Assessment & Plan  Macrocytic hypochromic anemia with anisocytosis  Hgb at 10 6 on admission, now 9 9  Baseline Hgb 8-10      Transaminitis  Assessment & Plan  · Noted on admission, , , T bili 0 60, downtrended  · RUQ US not done, hepatitis panel non reactive     Elevated troponin level not due myocardial infarction  Assessment & Plan  troponin elevation not due to myocardial infarction, likely due to congestive heart failure, acute hypoxemic respiratory failure  Peaked at 0 16, no EKG findings consistent with ischemia, ECHO w/o wall motion abnormality    ECHO: Systolic function was normal by visual assessment   Ejection fraction was estimated to be 55 %, no regional wall motion abnormalities, wall thickness was mildly increased, grade 1 diastolic dysfunction      Depressive disorder  Assessment & Plan  Home regimen:  Remeron 15 mg hs  Patient now comfort care    PUD (peptic ulcer disease)  Assessment & Plan  Patient is now comfort care    Thoracic aortic aneurysm (TAA) ([address] )  Assessment & Plan  CT chest: "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, unchanged since a CTA from 1/10/2019 "  Ideally patient should be seen and followed by surgery but with him refusing  further treatment, will hold off given choice of comfort directed care  Sepsis (HCC)-resolved as of 5/11/2021  Assessment & Plan  SIRS criteria met on admission a/e/b tachycardia with a pulse of 94bpm and leukocytosis at 15 33 suspected source was pneumonia vs cystitis, treated with antibiotics but later discontinued as culture data was negative, resolved  Labs/work up: COVID 19 PCR negative , UA suggestive of UTI, CXR from 05/08/2021 w/points radiation with possible congestive changes, no effusion  Blood cultures w/o growth    Asymptomatic bacteriuria-resolved as of 5/11/2021  Assessment & Plan  UA with 10-20 wbc's and occasional bacteria, urine cultures checked in with mixed contaminants  Antibiotics initially started were discontinued  DEYSI (acute kidney injury) (HCC)-resolved as of 5/12/2021  Assessment & Plan  DEYSI POA a/e/b Ccreatinine on admission of 1 42, due to prerenal cause, treated with IV diuretics and improved  Creatinine is now 1 10  Stop trending labs    Acute encephalopathy-resolved as of 5/11/2021  Assessment & Plan  Present on admission, likely hypoxia induced, acute stroke, treated with with oxygen supplementation and diuretics brain imaging, Cardiology and Neurology consult  VTE Pharmacologic Prophylaxis:   Pharmacologic: Apixaban (Eliquis)  Mechanical VTE Prophylaxis in Place: Yes    Patient Centered Rounds: I have performed bedside rounds with nursing staff today  Discussions with Specialists or Other Care Team Provider:     Education and Discussions with Family / Patient:  Patient, clean is decision making, wants to be make comfort care and transition to hospice  Does not want any more medications administered including IV antibiotics  Does not want his BiPAP  Call placed to patient's Sofia Kendrick on [phone]  Unable to leave a voicemail  Time Spent for Care: 1 hour  More than 50% of total time spent on counseling and coordination of care as described above  Current Length of Stay: 9 day(s)    Current Patient Status: Inpatient   Certification Statement: The patient will continue to require additional inpatient hospital stay due to Transitioning to comfort care/hospice    Discharge Plan:  Hospice care    Code Status: Level 3 - DNAR and DNI      Subjective:   Seen  Refusing BiPAP  Patient clear in his decision making  He no longer wants to be treated  He no longer wants to receive medication  He wants to be transitioned to comfort care  Objective:     Vitals:   Temp (24hrs), Av 6 °F (36 4 °C), Min:97 2 °F (36 2 °C), Max:98 °F (36 7 °C)    Temp:  [97 2 °F (36 2 °C)-98 °F (36 7 °C)] 98 °F (36 7 °C)  HR:  [66-84] 79  Resp:  [17] 17  BP: (112-121)/(58-87) 113/87  SpO2:  [90 %-97 %] 90 %  Body mass index is 22 78 kg/m²  Input and Output Summary (last 24 hours): Intake/Output Summary (Last 24 hours) at 5/15/2021 1142  Last data filed at 5/15/2021 0800  Gross per 24 hour   Intake 240 ml   Output 588 ml   Net -348 ml       Physical Exam:     Physical Exam  Vitals signs reviewed  Constitutional:       General: He is not in acute distress  Appearance: He is ill-appearing  He is not toxic-appearing or diaphoretic  Comments: Cachectic   HENT:      Head: Normocephalic  Eyes:      Extraocular Movements: Extraocular movements intact  Pupils: Pupils are equal, round, and reactive to light  Neck:      Musculoskeletal: Normal range of motion and neck supple  No neck rigidity or muscular tenderness  Vascular: No carotid bruit  Cardiovascular:      Rate and Rhythm: Normal rate and regular rhythm  Pulses: Normal pulses  Heart sounds: Normal heart sounds  No murmur  No friction rub  Pulmonary:      Effort: Pulmonary effort is normal       Breath sounds: No stridor   Rhonchi and rales present  No wheezing  Chest:      Chest wall: No tenderness  Abdominal:      General: Abdomen is flat  Bowel sounds are normal  There is no distension  Palpations: Abdomen is soft  Tenderness: There is no abdominal tenderness  There is no guarding or rebound  Musculoskeletal: Normal range of motion  General: No swelling or tenderness  Right lower leg: No edema  Left lower leg: No edema  Skin:     General: Skin is warm and dry  Coloration: Skin is not jaundiced  Neurological:      General: No focal deficit present  Mental Status: He is alert and oriented to person, place, and time  Cranial Nerves: No cranial nerve deficit  Sensory: No sensory deficit  Additional Data:     Labs:    Results from last 7 days   Lab Units 05/15/21  0554  05/13/21  0530   WBC Thousand/uL 13 82*   < > 14 34*   HEMOGLOBIN g/dL 10 0*   < > 10 0*   HEMATOCRIT % 38 3   < > 38 2   PLATELETS Thousands/uL 394*   < > 473*   BANDS PCT %  --   --  1   NEUTROS PCT % 78*  --   --    LYMPHS PCT % 6*  --   --    LYMPHO PCT   --    < > 6*   MONOS PCT % 12  --   --    MONO PCT   --    < > 11   EOS PCT % 3   < > 4    < > = values in this interval not displayed  Results from last 7 days   Lab Units 05/15/21  0503   SODIUM mmol/L 142   POTASSIUM mmol/L 5 6*   CHLORIDE mmol/L 98*   CO2 mmol/L 38*   BUN mg/dL 60*   CREATININE mg/dL 1 33*   ANION GAP mmol/L 6   CALCIUM mg/dL 9 2   GLUCOSE RANDOM mg/dL 105     Results from last 7 days   Lab Units 05/09/21  0415   INR  1 22*             Results from last 7 days   Lab Units 05/09/21  0415   PROCALCITONIN ng/ml 0 14           * I Have Reviewed All Lab Data Listed Above  * Additional Pertinent Lab Tests Reviewed:  All Labs Within Last 24 Hours Reviewed    Imaging:    Imaging Reports Reviewed Today Include:  CT chest  Imaging Personally Reviewed by Myself Includes:  CT chest    Recent Cultures (last 7 days):           Last 24 Hours Medication List:   Current Facility-Administered Medications   Medication Dose Route Frequency Provider Last Rate    acetaminophen  650 mg Oral Q6H PRN Ifrah Ripa, CRNP      amiodarone  200 mg Oral BID With Meals Ifrah Ripa, CRNP      apixaban  5 mg Oral BID Ifrah Ripa, CRNP      benzonatate  100 mg Oral TID PRN Chanelle Lisa MD      cefTRIAXone  1,000 mg Intravenous Q24H Chanelle Lisa MD 1,000 mg (05/14/21 0933)    docusate sodium  100 mg Oral BID Ifrah Ripa, CRNP      ferrous sulfate  325 mg Oral Daily With Breakfast Chanelle Lisa MD      guaiFENesin  600 mg Oral Q12H [address] Ifrah Ripa, CRNP      ipratropium  0 5 mg Nebulization TID Charlean Block, PA-C      levalbuterol  1 25 mg Nebulization TID Charlean Block, PA-C      melatonin  6 mg Oral HS Denice Alamin, CRNP      metoprolol tartrate  25 mg Oral Q12H [address] Chanelle Lisa MD      midodrine  5 mg Oral TID AC Ifrah Ripa, CRNP      mirtazapine  15 mg Oral HS Chanelle Lisa MD      pantoprazole  40 mg Oral Early Morning Ifrah Ripa, CRNP      senna  1 tablet Oral HS Ifrah Ripa, CRNP      torsemide  20 mg Oral Daily Chanelle Lisa MD          Today, Patient Was Seen By: Chanelle Lias MD    ** Please Note: Dictation voice to text software may have been used in the creation of this document   **

## 2021-05-15 NOTE — ASSESSMENT & PLAN NOTE
DEYSI POA a/e/b Ccreatinine on admission of 1 42, due to prerenal cause, treated with IV diuretics and improved  Creatinine is now 1 10  Stop trending labs

## 2021-05-15 NOTE — ASSESSMENT & PLAN NOTE
Macrocytic hypochromic anemia with anisocytosis  Hgb at 10 6 on admission, now 9 9  Baseline Hgb 8-10

## 2021-05-15 NOTE — ASSESSMENT & PLAN NOTE
Present, suspected due to underlying lung pathology  Improved heart rate  Patient has opted for comfort care

## 2021-05-15 NOTE — ASSESSMENT & PLAN NOTE
CT chest: "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, unchanged since a CTA from 1/10/2019 "  Ideally patient should be seen and followed by surgery but with him refusing  further treatment, will hold off given choice of comfort directed care

## 2021-05-15 NOTE — ASSESSMENT & PLAN NOTE
Wt Readings from Last 3 Encounters:   05/15/21 74 1 kg (163 lb 5 8 oz)   09/18/20 62 5 kg (137 lb 12 8 oz)   01/14/19 63 5 kg (140 lb)     Present on admission as evidenced by acute hypoxemic respiratory failure, bilateral pleural effusion on CT, [place] of [address], treated with diuretics, EKG, echocardiogram, Cardiology consult      Echocardiogram from 05/07/2021 with ejection fraction of 55%, no wall motion abnormality, grade 1 diastolic dysfunction, RVSP of 50 mmHg    Patient is now comfort directed care

## 2021-05-15 NOTE — ASSESSMENT & PLAN NOTE
NIHSS could not be assessed due to encephalopathy      - CT head: "Small cortical and subcortical infarct in the left posterior parietal region, possibly acute to subacute  Questionable focus of subacute microhemorrhage within the infarct     No mass effect "    -MRI confirmed ischemia in the left parietal region initially seen on head CT     -echocardiogram demonstrates EF 55%, no gross dyskinesis, biatrial dilatation mild        Patient is now comfort directed care

## 2021-05-16 NOTE — PROGRESS NOTES
[place]  Progress Note - Aldair Yang 1945, 68 y o  male MRN: [id number]  Unit/Bed#: -01 Encounter: [id number]  Primary Care Provider: No primary care provider on file  Date and time admitted to hospital: 5/6/2021  4:56 PM    Goals of care, counseling/discussion  Assessment & Plan  Patient with history of depression, seen earlier by both palliative Care and behavior health  Patient states he does not want any more treatment  He wants his antibiotics and all other medicines discontinued at present  He wants to be made comfortable/hospice care  Patient does not lack capacity and understands his decisions  "States he has made his peace is ready to go meet his maker"  States he has discussed he has discussed his decision with his son  Please refer to psychiatry and palliative care documentation as well  Based on discussion with patient, will make him comfort care  Will discontinue all medicines including antibiotics  Comfort Care measures enacted  consult hospice care    Above was discussed extensively with patient is on of 05/16/2021 who is also at peace with the patient's decision  He was previously called on 05/15/2021, unable to leave voice message  NO MORE LABS    * Acute on chronic diastolic (congestive) heart failure (HCC)  Assessment & Plan  Wt Readings from Last 3 Encounters:   05/16/21 71 kg (156 lb 8 4 oz)   09/18/20 62 5 kg (137 lb 12 8 oz)   01/14/19 63 5 kg (140 lb)     Present on admission as evidenced by acute hypoxemic respiratory failure, bilateral pleural effusion on CT, [place] [address], treated with diuretics, EKG, echocardiogram, Cardiology consult      Echocardiogram from 05/07/2021 with ejection fraction of 55%, no wall motion abnormality, grade 1 diastolic dysfunction, RVSP of 50 mmHg    Patient is now comfort directed care    Acute respiratory failure with hypoxia [facility])  Assessment & Plan  Present on admission as evidenced by an SpO2 of 83% on room air, multifactorial likely due to CHF, likely tipped over from atrial fibrillation, aspiration pneumonia as seen on CT scan  oxygenation remains at 4-6 L of oxygen    CT chest consistent with pneumonia, pleural effusion  discontinue antibiotics as patient does not want any medications administered       New onset a-fib [facility])  Assessment & Plan  Present, suspected due to underlying lung pathology  Improved heart rate  Patient has opted for comfort care    CVA (cerebral vascular accident) [facility])  Assessment & Plan  NIHSS could not be assessed due to encephalopathy      - CT head: "Small cortical and subcortical infarct in the left posterior parietal region, possibly acute to subacute  Questionable focus of subacute microhemorrhage within the infarct     No mass effect "    -MRI confirmed ischemia in the left parietal region initially seen on head CT     -echocardiogram demonstrates EF 55%, no gross dyskinesis, biatrial dilatation mild  Patient is now comfort directed care        ALVIN (iron deficiency anemia)  Assessment & Plan  Macrocytic hypochromic anemia with anisocytosis  Hgb at 10 6 on admission, now 9 9  Baseline Hgb 8-10      Transaminitis  Assessment & Plan  · Noted on admission, , , T bili 0 60, downtrended  · RUQ US not done, hepatitis panel non reactive     Elevated troponin level not due myocardial infarction  Assessment & Plan  troponin elevation not due to myocardial infarction, likely due to congestive heart failure, acute hypoxemic respiratory failure  Peaked at 0 16, no EKG findings consistent with ischemia, ECHO w/o wall motion abnormality    ECHO: Systolic function was normal by visual assessment   Ejection fraction was estimated to be 55 %, no regional wall motion abnormalities, wall thickness was mildly increased, grade 1 diastolic dysfunction      Depressive disorder  Assessment & Plan  Home regimen:  Remeron 15 mg hs  Patient now comfort care    PUD (peptic ulcer disease)  Assessment & Plan  Patient is now comfort care    Thoracic aortic aneurysm (TAA) ([address] )  Assessment & Plan  CT chest: "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, unchanged since a CTA from 1/10/2019 "  Ideally patient should be seen and followed by surgery but with him refusing  further treatment, will hold off given choice of comfort directed care  Sepsis (HCC)-resolved as of 5/11/2021  Assessment & Plan  SIRS criteria met on admission a/e/b tachycardia with a pulse of 94bpm and leukocytosis at 15 33 suspected source was pneumonia vs cystitis, treated with antibiotics but later discontinued as culture data was negative, resolved  Labs/work up: COVID 19 PCR negative , UA suggestive of UTI, CXR from 05/08/2021 w/points radiation with possible congestive changes, no effusion  Blood cultures w/o growth    Asymptomatic bacteriuria-resolved as of 5/11/2021  Assessment & Plan  UA with 10-20 wbc's and occasional bacteria, urine cultures checked in with mixed contaminants  Antibiotics initially started were discontinued  DEYSI (acute kidney injury) (HCC)-resolved as of 5/12/2021  Assessment & Plan  DEYSI POA a/e/b Ccreatinine on admission of 1 42, due to prerenal cause, treated with IV diuretics and improved  Creatinine is now 1 10  Stop trending labs    Acute encephalopathy-resolved as of 5/11/2021  Assessment & Plan  Present on admission, likely hypoxia induced, acute stroke, treated with with oxygen supplementation and diuretics brain imaging, Cardiology and Neurology consult  VTE Pharmacologic Prophylaxis:   Pharmacologic:   Mechanical VTE Prophylaxis in Place: No    Patient Centered Rounds: I have performed bedside rounds with nursing staff today  Discussions with Specialists or Other Care Team Provider:     Education and Discussions with Family / Patient:  PATIENT, SON    Time Spent for Care: 30 minutes    More than 50% of total time spent on counseling and coordination of care as described above  Current Length of Stay: 10 day(s)    Current Patient Status: Inpatient   Certification Statement: The patient will continue to require additional inpatient hospital stay due to   Discharge Plan:  Hospice care    Code Status: Level 4 - Comfort Care      Subjective:   Seen  No new complaints to offer, comfortable  Objective:     Vitals:   Temp (24hrs), Av 4 °F (36 9 °C), Min:98 4 °F (36 9 °C), Max:98 4 °F (36 9 °C)    Temp:  [98 4 °F (36 9 °C)] 98 4 °F (36 9 °C)  HR:  [75] 75  Resp:  [16] 16  BP: (110)/(79) 110/79  Body mass index is 21 83 kg/m²  Input and Output Summary (last 24 hours): Intake/Output Summary (Last 24 hours) at 2021 0956  Last data filed at 5/15/2021 1801  Gross per 24 hour   Intake 240 ml   Output --   Net 240 ml       Physical Exam:     Physical Exam  Vitals signs reviewed  Constitutional:       General: He is not in acute distress  Appearance: Normal appearance  He is not ill-appearing, toxic-appearing or diaphoretic  Comments: Cachectic   HENT:      Head: Normocephalic  Eyes:      Extraocular Movements: Extraocular movements intact  Pupils: Pupils are equal, round, and reactive to light  Neck:      Musculoskeletal: Normal range of motion and neck supple  No neck rigidity or muscular tenderness  Vascular: No carotid bruit  Cardiovascular:      Rate and Rhythm: Normal rate and regular rhythm  Pulses: Normal pulses  Heart sounds: Normal heart sounds  No murmur  No friction rub  Pulmonary:      Effort: Pulmonary effort is normal       Breath sounds: No stridor  Rhonchi present  No wheezing or rales  Chest:      Chest wall: No tenderness  Abdominal:      General: Abdomen is flat  Bowel sounds are normal  There is no distension  Palpations: Abdomen is soft  Tenderness: There is no abdominal tenderness  There is no guarding or rebound     Musculoskeletal: Normal range of motion  General: No swelling or tenderness  Right lower leg: No edema  Left lower leg: No edema  Skin:     General: Skin is warm and dry  Coloration: Skin is not jaundiced  Neurological:      General: No focal deficit present  Mental Status: He is alert and oriented to person, place, and time  Cranial Nerves: No cranial nerve deficit  Sensory: No sensory deficit  Additional Data:     Labs:    Results from last 7 days   Lab Units 05/15/21  0554  05/13/21  0530   WBC Thousand/uL 13 82*   < > 14 34*   HEMOGLOBIN g/dL 10 0*   < > 10 0*   HEMATOCRIT % 38 3   < > 38 2   PLATELETS Thousands/uL 394*   < > 473*   BANDS PCT %  --   --  1   NEUTROS PCT % 78*  --   --    LYMPHS PCT % 6*  --   --    LYMPHO PCT   --    < > 6*   MONOS PCT % 12  --   --    MONO PCT   --    < > 11   EOS PCT % 3   < > 4    < > = values in this interval not displayed  Results from last 7 days   Lab Units 05/15/21  0503   SODIUM mmol/L 142   POTASSIUM mmol/L 5 6*   CHLORIDE mmol/L 98*   CO2 mmol/L 38*   BUN mg/dL 60*   CREATININE mg/dL 1 33*   ANION GAP mmol/L 6   CALCIUM mg/dL 9 2   GLUCOSE RANDOM mg/dL 105         Results from last 7 days   Lab Units 05/15/21  0806   POC GLUCOSE mg/dl 119                   * I Have Reviewed All Lab Data Listed Above  * Additional Pertinent Lab Tests Reviewed:  All Labs Within Last 24 Hours Reviewed    Imaging:    Imaging Reports Reviewed Today Include:   Imaging Personally Reviewed by Myself Includes:      Recent Cultures (last 7 days):           Last 24 Hours Medication List:   Current Facility-Administered Medications   Medication Dose Route Frequency Provider Last Rate    benzonatate  100 mg Oral TID PRN Rosina Broderick MD      docusate sodium  100 mg Oral BID MANSI Soler      guaiFENesin  600 mg Oral Q12H [facility] MANSI Soler      HYDROmorphone  0 2 mg Intravenous Q6H PRN Rosina Broderick MD      hyoscyamine 0 125 mg Sublingual Q4H PRN Murray Jolley MD      ipratropium  0 5 mg Nebulization TID Rachele Paz PA-C      levalbuterol  1 25 mg Nebulization TID Rachele Paz PA-C      LORazepam  0 5 mg Oral Q6H PRN Murray Jolley MD      melatonin  6 mg Oral HS MANSI Cancino      senna  1 tablet Oral HS MANSI Woo          Today, Patient Was Seen By: Murray Jolley MD    ** Please Note: Dictation voice to text software may have been used in the creation of this document   **

## 2021-05-16 NOTE — ASSESSMENT & PLAN NOTE
Wt Readings from Last 3 Encounters:   05/16/21 71 kg (156 lb 8 4 oz)   09/18/20 62 5 kg (137 lb 12 8 oz)   01/14/19 63 5 kg (140 lb)     Present on admission as evidenced by acute hypoxemic respiratory failure, bilateral pleural effusion on CT, [place] [address], treated with diuretics, EKG, echocardiogram, Cardiology consult      Echocardiogram from 05/07/2021 with ejection fraction of 55%, no wall motion abnormality, grade 1 diastolic dysfunction, RVSP of 50 mmHg    Patient is now comfort directed care

## 2021-05-16 NOTE — ASSESSMENT & PLAN NOTE
Patient with history of depression, seen earlier by both palliative Care and behavior health  Patient states he does not want any more treatment  He wants his antibiotics and all other medicines discontinued at present  He wants to be made comfortable/hospice care  Patient does not lack capacity and understands his decisions  "States he has made his peace is ready to go meet his maker"  States he has discussed he has discussed his decision with his son  Please refer to psychiatry and palliative care documentation as well  Based on discussion with patient, will make him comfort care  Will discontinue all medicines including antibiotics  Comfort Care measures enacted  consult hospice care    Above was discussed extensively with patient is on of 05/16/2021 who is also at peace with the patient's decision  He was previously called on 05/15/2021, unable to leave voice message      NO MORE LABS

## 2021-05-16 NOTE — PLAN OF CARE
Problem: Potential for Falls  Goal: Patient will remain free of falls  Description: INTERVENTIONS:  - Assess patient frequently for physical needs  -  Identify cognitive and physical deficits and behaviors that affect risk of falls  -  [place] fall precautions as indicated by assessment   - Educate patient/family on patient safety including physical limitations  - Instruct patient to call for assistance with activity based on assessment  - Modify environment to reduce risk of injury  - Consider OT/PT consult to assist with strengthening/mobility  Outcome: Progressing     Problem: Prexisting or High Potential for Compromised Skin Integrity  Goal: Skin integrity is maintained or improved  Description: INTERVENTIONS:  - Identify patients at risk for skin breakdown  - Assess and monitor skin integrity  - Assess and monitor nutrition and hydration status  - Monitor labs   - Assess for incontinence   - Turn and reposition patient  - Assist with mobility/ambulation  - Relieve pressure over bony prominences  - Avoid friction and shearing  - Provide appropriate hygiene as needed including keeping skin clean and dry  - Evaluate need for skin moisturizer/barrier cream  - Collaborate with interdisciplinary team   - Patient/family teaching  - Consider wound care consult   Outcome: Progressing     Problem: Neurological Deficit  Goal: Neurological status is stable or improving  Description: Interventions:  - Monitor and assess patient's level of consciousness, motor function, sensory function, and level of assistance needed for ADLs  - Monitor and report changes from baseline  Collaborate with interdisciplinary team to initiate plan and implement interventions as ordered  - Provide and maintain a safe environment  - Consider seizure precautions  - Consider fall precautions  - Consider aspiration precautions  - Consider bleeding precautions  Outcome: Progressing     Problem:  Activity Intolerance/Impaired Mobility  Goal: Mobility/activity is maintained at optimum level for patient  Description: Interventions:  - Assess and monitor patient  barriers to mobility and need for assistive/adaptive devices  - Assess patient's emotional response to limitations  - Collaborate with interdisciplinary team and initiate plans and interventions as ordered  - Encourage independent activity per ability   - Maintain proper body alignment  - Perform active/passive rom as tolerated/ordered  - Plan activities to conserve energy   - Turn patient as appropriate  Outcome: Progressing     Problem: Communication Impairment  Goal: Ability to express needs and understand communication  Description: Assess patient's communication skills and ability to understand information  Patient will demonstrate use of effective communication techniques, alternative methods of communication and understanding even if not able to speak  - Encourage communication and provide alternate methods of communication as needed  - Collaborate with case management/ for discharge needs  - Include patient/family/caregiver in decisions related to communication  Outcome: Progressing     Problem: Potential for Aspiration  Goal: Non-ventilated patient's risk of aspiration is minimized  Description: Assess and monitor vital signs, respiratory status, and labs (WBC)  Monitor for signs of aspiration (tachypnea, cough, rales, wheezing, cyanosis, fever)  - Assess and monitor patient's ability to swallow  - Place patient up in chair to eat if possible  - HOB up at 90 degrees to eat if unable to get patient up into chair   - Supervise patient during oral intake  - Instruct patient/ family to take small bites  - Instruct patient/ family to take small single sips when taking liquids    - Follow patient-specific strategies generated by speech pathologist   Outcome: Progressing  Goal: Ventilated patient's risk of aspiration is minimized  Description: Assess and monitor vital signs, respiratory status, airway cuff pressure, and labs (WBC)  Monitor for signs of aspiration (tachypnea, cough, rales, wheezing, cyanosis, fever)  - Elevate head of bed 30 degrees if patient has tube feeding   - Monitor tube feeding  Outcome: Progressing     Problem: Nutrition  Goal: Nutrition/Hydration status is improving  Description: Monitor and assess patient's nutrition/hydration status for malnutrition (ex- brittle hair, bruises, dry skin, pale skin and conjunctiva, muscle wasting, smooth red tongue, and disorientation)  Collaborate with interdisciplinary team and initiate plan and interventions as ordered  Monitor patient's weight and dietary intake as ordered or per policy  Utilize nutrition screening tool and intervene per policy  Determine patient's food preferences and provide high-protein, high-caloric foods as appropriate  - Assist patient with eating   - Allow adequate time for meals   - Encourage patient to take dietary supplement as ordered  - Collaborate with clinical nutritionist   - Include patient/family/caregiver in decisions related to nutrition    Outcome: Progressing     Problem: PAIN - ADULT  Goal: Verbalizes/displays adequate comfort level or baseline comfort level  Description: Interventions:  - Encourage patient to monitor pain and request assistance  - Assess pain using appropriate pain scale  - Administer analgesics based on type and severity of pain and evaluate response  - Implement non-pharmacological measures as appropriate and evaluate response  - Consider cultural and social influences on pain and pain management  - Notify physician/advanced practitioner if interventions unsuccessful or patient reports new pain  Outcome: Progressing     Problem: INFECTION - ADULT  Goal: Absence or prevention of progression during hospitalization  Description: INTERVENTIONS:  - Assess and monitor for signs and symptoms of infection  - Monitor lab/diagnostic results  - Monitor all insertion sites, i e  indwelling lines, tubes, and drains  - Monitor endotracheal if appropriate and nasal secretions for changes in amount and color  - [place] appropriate cooling/warming therapies per order  - Administer medications as ordered  - Instruct and encourage patient and family to use good hand hygiene technique  - Identify and instruct in appropriate isolation precautions for identified infection/condition  Outcome: Progressing  Goal: Absence of fever/infection during neutropenic period  Description: INTERVENTIONS:  - Monitor WBC    Outcome: Progressing     Problem: SAFETY ADULT  Goal: Patient will remain free of falls  Description: INTERVENTIONS:  - Assess patient frequently for physical needs  -  Identify cognitive and physical deficits and behaviors that affect risk of falls    -  [place] fall precautions as indicated by assessment   - Educate patient/family on patient safety including physical limitations  - Instruct patient to call for assistance with activity based on assessment  - Modify environment to reduce risk of injury  - Consider OT/PT consult to assist with strengthening/mobility  Outcome: Progressing  Goal: Maintain or return to baseline ADL function  Description: INTERVENTIONS:  -  Assess patient's ability to carry out ADLs; assess patient's baseline for ADL function and identify physical deficits which impact ability to perform ADLs (bathing, care of mouth/teeth, toileting, grooming, dressing, etc )  - Assess/evaluate cause of self-care deficits   - Assess range of motion  - Assess patient's mobility; develop plan if impaired  - Assess patient's need for assistive devices and provide as appropriate  - Encourage maximum independence but intervene and supervise when necessary  - Involve family in performance of ADLs  - Assess for home care needs following discharge   - Consider OT consult to assist with ADL evaluation and planning for discharge  - Provide patient education as appropriate  Outcome: Progressing  Goal: Maintain or return mobility status to optimal level  Description: INTERVENTIONS:  - Assess patient's baseline mobility status (ambulation, transfers, stairs, etc )    - Identify cognitive and physical deficits and behaviors that affect mobility  - Identify mobility aids required to assist with transfers and/or ambulation (gait belt, sit-to-stand, lift, walker, cane, etc )  - [place] fall precautions as indicated by assessment  - Record patient progress and toleration of activity level on Mobility SBAR; progress patient to next Phase/Stage  - Instruct patient to call for assistance with activity based on assessment  - Consider rehabilitation consult to assist with strengthening/weightbearing, etc   Outcome: Progressing     Problem: DISCHARGE PLANNING  Goal: Discharge to home or other facility with appropriate resources  Description: INTERVENTIONS:  - Identify barriers to discharge w/patient and caregiver  - Arrange for needed discharge resources and transportation as appropriate  - Identify discharge learning needs (meds, wound care, etc )  - Arrange for interpretive services to assist at discharge as needed  - Refer to Case Management Department for coordinating discharge planning if the patient needs post-hospital services based on physician/advanced practitioner order or complex needs related to functional status, cognitive ability, or social support system  Outcome: Progressing     Problem: Knowledge Deficit  Goal: Patient/family/caregiver demonstrates understanding of disease process, treatment plan, medications, and discharge instructions  Description: Complete learning assessment and assess knowledge base    Interventions:  - Provide teaching at level of understanding  - Provide teaching via preferred learning methods  Outcome: Progressing     Problem: CARDIOVASCULAR - ADULT  Goal: Maintains optimal cardiac output and hemodynamic stability  Description: INTERVENTIONS:  - Monitor I/O, vital signs and rhythm  - Monitor for S/S and trends of decreased cardiac output  - Administer and titrate ordered vasoactive medications to optimize hemodynamic stability  - Assess quality of pulses, skin color and temperature  - Assess for signs of decreased coronary artery perfusion  - Instruct patient to report change in severity of symptoms  Outcome: Progressing  Goal: Absence of cardiac dysrhythmias or at baseline rhythm  Description: INTERVENTIONS:  - Continuous cardiac monitoring, vital signs, obtain 12 lead EKG if ordered  - Administer antiarrhythmic and heart rate control medications as ordered  - Monitor electrolytes and administer replacement therapy as ordered  Outcome: Progressing     Problem: METABOLIC, FLUID AND ELECTROLYTES - ADULT  Goal: Electrolytes maintained within normal limits  Description: INTERVENTIONS:  - Monitor labs and assess patient for signs and symptoms of electrolyte imbalances  - Administer electrolyte replacement as ordered  - Monitor response to electrolyte replacements, including repeat lab results as appropriate  - Instruct patient on fluid and nutrition as appropriate  Outcome: Progressing  Goal: Fluid balance maintained  Description: INTERVENTIONS:  - Monitor labs   - Monitor I/O and WT  - Instruct patient on fluid and nutrition as appropriate  - Assess for signs & symptoms of volume excess or deficit  Outcome: Progressing     Problem: Nutrition/Hydration-ADULT  Goal: Nutrient/Hydration intake appropriate for improving, restoring or maintaining nutritional needs  Description: Monitor and assess patient's nutrition/hydration status for malnutrition  Collaborate with interdisciplinary team and initiate plan and interventions as ordered  Monitor patient's weight and dietary intake as ordered or per policy  Utilize nutrition screening tool and intervene as necessary   Determine patient's food preferences and provide high-protein, high-caloric foods as appropriate       INTERVENTIONS:  - Monitor oral intake, urinary output, labs, and treatment plans  - Assess nutrition and hydration status and recommend course of action  - Evaluate amount of meals eaten  - Assist patient with eating if necessary   - Allow adequate time for meals  - Recommend/ encourage appropriate diets, oral nutritional supplements, and vitamin/mineral supplements  - Order, calculate, and assess calorie counts as needed  - Recommend, monitor, and adjust tube feedings and TPN/PPN based on assessed needs  - Assess need for intravenous fluids  - Provide specific nutrition/hydration education as appropriate  - Include patient/family/caregiver in decisions related to nutrition  Outcome: Progressing

## 2021-05-16 NOTE — PLAN OF CARE
Problem: Potential for Falls  Goal: Patient will remain free of falls  Description: INTERVENTIONS:  - Assess patient frequently for physical needs  -  Identify cognitive and physical deficits and behaviors that affect risk of falls  -  [place] fall precautions as indicated by assessment   - Educate patient/family on patient safety including physical limitations  - Instruct patient to call for assistance with activity based on assessment  - Modify environment to reduce risk of injury  - Consider OT/PT consult to assist with strengthening/mobility  Outcome: Progressing     Problem: Prexisting or High Potential for Compromised Skin Integrity  Goal: Skin integrity is maintained or improved  Description: INTERVENTIONS:  - Identify patients at risk for skin breakdown  - Assess and monitor skin integrity  - Assess and monitor nutrition and hydration status  - Monitor labs   - Assess for incontinence   - Turn and reposition patient  - Assist with mobility/ambulation  - Relieve pressure over bony prominences  - Avoid friction and shearing  - Provide appropriate hygiene as needed including keeping skin clean and dry  - Evaluate need for skin moisturizer/barrier cream  - Collaborate with interdisciplinary team   - Patient/family teaching  - Consider wound care consult   Outcome: Progressing     Problem: Neurological Deficit  Goal: Neurological status is stable or improving  Description: Interventions:  - Monitor and assess patient's level of consciousness, motor function, sensory function, and level of assistance needed for ADLs  - Monitor and report changes from baseline  Collaborate with interdisciplinary team to initiate plan and implement interventions as ordered  - Provide and maintain a safe environment  - Consider seizure precautions  - Consider fall precautions  - Consider aspiration precautions  - Consider bleeding precautions  Outcome: Progressing     Problem:  Activity Intolerance/Impaired Mobility  Goal: Mobility/activity is maintained at optimum level for patient  Description: Interventions:  - Assess and monitor patient  barriers to mobility and need for assistive/adaptive devices  - Assess patient's emotional response to limitations  - Collaborate with interdisciplinary team and initiate plans and interventions as ordered  - Encourage independent activity per ability   - Maintain proper body alignment  - Perform active/passive rom as tolerated/ordered  - Plan activities to conserve energy   - Turn patient as appropriate  Outcome: Progressing     Problem: Communication Impairment  Goal: Ability to express needs and understand communication  Description: Assess patient's communication skills and ability to understand information  Patient will demonstrate use of effective communication techniques, alternative methods of communication and understanding even if not able to speak  - Encourage communication and provide alternate methods of communication as needed  - Collaborate with case management/ for discharge needs  - Include patient/family/caregiver in decisions related to communication  Outcome: Progressing     Problem: Potential for Aspiration  Goal: Non-ventilated patient's risk of aspiration is minimized  Description: Assess and monitor vital signs, respiratory status, and labs (WBC)  Monitor for signs of aspiration (tachypnea, cough, rales, wheezing, cyanosis, fever)  - Assess and monitor patient's ability to swallow  - Place patient up in chair to eat if possible  - HOB up at 90 degrees to eat if unable to get patient up into chair   - Supervise patient during oral intake  - Instruct patient/ family to take small bites  - Instruct patient/ family to take small single sips when taking liquids    - Follow patient-specific strategies generated by speech pathologist   Outcome: Progressing  Goal: Ventilated patient's risk of aspiration is minimized  Description: Assess and monitor vital signs, respiratory status, airway cuff pressure, and labs (WBC)  Monitor for signs of aspiration (tachypnea, cough, rales, wheezing, cyanosis, fever)  - Elevate head of bed 30 degrees if patient has tube feeding   - Monitor tube feeding  Outcome: Progressing     Problem: Nutrition  Goal: Nutrition/Hydration status is improving  Description: Monitor and assess patient's nutrition/hydration status for malnutrition (ex- brittle hair, bruises, dry skin, pale skin and conjunctiva, muscle wasting, smooth red tongue, and disorientation)  Collaborate with interdisciplinary team and initiate plan and interventions as ordered  Monitor patient's weight and dietary intake as ordered or per policy  Utilize nutrition screening tool and intervene per policy  Determine patient's food preferences and provide high-protein, high-caloric foods as appropriate  - Assist patient with eating   - Allow adequate time for meals   - Encourage patient to take dietary supplement as ordered  - Collaborate with clinical nutritionist   - Include patient/family/caregiver in decisions related to nutrition    Outcome: Progressing     Problem: PAIN - ADULT  Goal: Verbalizes/displays adequate comfort level or baseline comfort level  Description: Interventions:  - Encourage patient to monitor pain and request assistance  - Assess pain using appropriate pain scale  - Administer analgesics based on type and severity of pain and evaluate response  - Implement non-pharmacological measures as appropriate and evaluate response  - Consider cultural and social influences on pain and pain management  - Notify physician/advanced practitioner if interventions unsuccessful or patient reports new pain  Outcome: Progressing     Problem: INFECTION - ADULT  Goal: Absence or prevention of progression during hospitalization  Description: INTERVENTIONS:  - Assess and monitor for signs and symptoms of infection  - Monitor lab/diagnostic results  - Monitor all insertion sites, i e  indwelling lines, tubes, and drains  - Monitor endotracheal if appropriate and nasal secretions for changes in amount and color  - House appropriate cooling/warming therapies per order  - Administer medications as ordered  - Instruct and encourage patient and family to use good hand hygiene technique  - Identify and instruct in appropriate isolation precautions for identified infection/condition  Outcome: Progressing  Goal: Absence of fever/infection during neutropenic period  Description: INTERVENTIONS:  - Monitor WBC    Outcome: Progressing     Problem: SAFETY ADULT  Goal: Patient will remain free of falls  Description: INTERVENTIONS:  - Assess patient frequently for physical needs  -  Identify cognitive and physical deficits and behaviors that affect risk of falls    -  House fall precautions as indicated by assessment   - Educate patient/family on patient safety including physical limitations  - Instruct patient to call for assistance with activity based on assessment  - Modify environment to reduce risk of injury  - Consider OT/PT consult to assist with strengthening/mobility  Outcome: Progressing  Goal: Maintain or return to baseline ADL function  Description: INTERVENTIONS:  -  Assess patient's ability to carry out ADLs; assess patient's baseline for ADL function and identify physical deficits which impact ability to perform ADLs (bathing, care of mouth/teeth, toileting, grooming, dressing, etc )  - Assess/evaluate cause of self-care deficits   - Assess range of motion  - Assess patient's mobility; develop plan if impaired  - Assess patient's need for assistive devices and provide as appropriate  - Encourage maximum independence but intervene and supervise when necessary  - Involve family in performance of ADLs  - Assess for home care needs following discharge   - Consider OT consult to assist with ADL evaluation and planning for discharge  - Provide patient education as appropriate  Outcome: Progressing  Goal: Maintain or return mobility status to optimal level  Description: INTERVENTIONS:  - Assess patient's baseline mobility status (ambulation, transfers, stairs, etc )    - Identify cognitive and physical deficits and behaviors that affect mobility  - Identify mobility aids required to assist with transfers and/or ambulation (gait belt, sit-to-stand, lift, walker, cane, etc )  - [place] fall precautions as indicated by assessment  - Record patient progress and toleration of activity level on Mobility SBAR; progress patient to next Phase/Stage  - Instruct patient to call for assistance with activity based on assessment  - Consider rehabilitation consult to assist with strengthening/weightbearing, etc   Outcome: Progressing     Problem: DISCHARGE PLANNING  Goal: Discharge to home or other facility with appropriate resources  Description: INTERVENTIONS:  - Identify barriers to discharge w/patient and caregiver  - Arrange for needed discharge resources and transportation as appropriate  - Identify discharge learning needs (meds, wound care, etc )  - Arrange for interpretive services to assist at discharge as needed  - Refer to Case Management Department for coordinating discharge planning if the patient needs post-hospital services based on physician/advanced practitioner order or complex needs related to functional status, cognitive ability, or social support system  Outcome: Progressing     Problem: Knowledge Deficit  Goal: Patient/family/caregiver demonstrates understanding of disease process, treatment plan, medications, and discharge instructions  Description: Complete learning assessment and assess knowledge base    Interventions:  - Provide teaching at level of understanding  - Provide teaching via preferred learning methods  Outcome: Progressing     Problem: CARDIOVASCULAR - ADULT  Goal: Maintains optimal cardiac output and hemodynamic stability  Description: INTERVENTIONS:  - Monitor I/O, vital signs and rhythm  - Monitor for S/S and trends of decreased cardiac output  - Administer and titrate ordered vasoactive medications to optimize hemodynamic stability  - Assess quality of pulses, skin color and temperature  - Assess for signs of decreased coronary artery perfusion  - Instruct patient to report change in severity of symptoms  Outcome: Progressing  Goal: Absence of cardiac dysrhythmias or at baseline rhythm  Description: INTERVENTIONS:  - Continuous cardiac monitoring, vital signs, obtain 12 lead EKG if ordered  - Administer antiarrhythmic and heart rate control medications as ordered  - Monitor electrolytes and administer replacement therapy as ordered  Outcome: Progressing     Problem: METABOLIC, FLUID AND ELECTROLYTES - ADULT  Goal: Electrolytes maintained within normal limits  Description: INTERVENTIONS:  - Monitor labs and assess patient for signs and symptoms of electrolyte imbalances  - Administer electrolyte replacement as ordered  - Monitor response to electrolyte replacements, including repeat lab results as appropriate  - Instruct patient on fluid and nutrition as appropriate  Outcome: Progressing  Goal: Fluid balance maintained  Description: INTERVENTIONS:  - Monitor labs   - Monitor I/O and WT  - Instruct patient on fluid and nutrition as appropriate  - Assess for signs & symptoms of volume excess or deficit  Outcome: Progressing     Problem: Nutrition/Hydration-ADULT  Goal: Nutrient/Hydration intake appropriate for improving, restoring or maintaining nutritional needs  Description: Monitor and assess patient's nutrition/hydration status for malnutrition  Collaborate with interdisciplinary team and initiate plan and interventions as ordered  Monitor patient's weight and dietary intake as ordered or per policy  Utilize nutrition screening tool and intervene as necessary   Determine patient's food preferences and provide high-protein, high-caloric foods as appropriate       INTERVENTIONS:  - Monitor oral intake, urinary output, labs, and treatment plans  - Assess nutrition and hydration status and recommend course of action  - Evaluate amount of meals eaten  - Assist patient with eating if necessary   - Allow adequate time for meals  - Recommend/ encourage appropriate diets, oral nutritional supplements, and vitamin/mineral supplements  - Order, calculate, and assess calorie counts as needed  - Recommend, monitor, and adjust tube feedings and TPN/PPN based on assessed needs  - Assess need for intravenous fluids  - Provide specific nutrition/hydration education as appropriate  - Include patient/family/caregiver in decisions related to nutrition  Outcome: Progressing

## 2021-05-16 NOTE — NURSING NOTE
Upon hourly rounds, patient was noted with oxygen off  This nurse went to place back on patient, and patient refused, he stated, "you are not putting that back on me " Patient has also refused medications all day

## 2021-05-16 NOTE — ACP (ADVANCE CARE PLANNING)
Serious Illness Conversation    1  What is your understanding now of where you are with your illness? Prognostic Understanding: appropriate understanding of prognosis     2  How much information about what is likely to be ahead with your illness would you like to have? Information: patient wants to be fully informed     3  What did you (clinician) communicate to the patient? Prognostic Communication: Uncertain - It can be difficult to predict what will happen with your illness  I hope you will continue to live well for a long time but Im worried that you could get sick quickly, and I think it is important to prepare for that possibility  4  If your health situation worsens, what are your most important goals? Goals: be spiritually and emotionally at peace, not be a burden     5  What are the biggest fears and worries about the future and your health? Fears/Worries: being a physical burden     6  What abilities are so critical to your life that you cannot imagine living without them? Unacceptable Function: not being myself     7  What gives you strength as you think about the future with your illness? I feel strong making the decision to stop treatment  I have given it a very long thought and I am at peace with my decision! 8  If you become sicker, how much are you willing to go through for the possibility of gaining more time? 9  How much does your proxy and family know about your priorities and wishes? Discussion: extensive discussion with family about goals and wishes     Сергей Wallace heard you say that meeting your maker is really important to you  Keeping that in mind, and what we know about your illness, I will follow your wishes  This will help us make sure that your treatment plans reflect whats important to you  How does this plan sound to you? I will do everything I can to help you through this    Patient verbalized understanding of the plan     I have spent 30 minutes speaking with my patient on advanced care planning today or during this visit     Advanced directives  Five Wishes: Patient does not have Five Wishes- would not like information

## 2021-05-17 NOTE — PROGRESS NOTES
Patient resting well  O2 remains off at patients requests  Repositioning and tanna-care provided for incontinence  Patient continues to state " I just want to go"  Patient reassured  6mg melatonin given for sleep, all other medications refused  Call bell within reach  Will continue to monitor

## 2021-05-17 NOTE — PROGRESS NOTES
[place]  Progress Note - Amira Grubbs 1945, 68 y o  male MRN: [id number]  Unit/Bed#: -01 Encounter: [id number]  Primary Care Provider: No primary care provider on file  Date and time admitted to hospital: 5/6/2021  4:56 PM    Goals of care, counseling/discussion  Assessment & Plan  Patient with history of depression, seen earlier by both palliative Care and behavior health  Patient states he does not want any more treatment  He wants his antibiotics and all other medicines discontinued at present  He wants to be made comfortable/hospice care  Patient does not lack capacity and understands his decisions  "States he has made his peace is ready to go meet his maker"  States he has discussed he has discussed his decision with his son  Please refer to psychiatry and palliative care documentation as well  Based on discussion with patient, will make him comfort care  Will discontinue all medicines including antibiotics  Comfort Care measures enacted  Above was discussed extensively with patient's son by prior provider on 05/16/2021 who is also at peace with the patient's decision       NO MORE LABS  Hospice CM team on board    New onset a-fib [facility])  Assessment & Plan  Present, suspected due to underlying lung pathology  Improved heart rate  Patient has opted for comfort care    Acute respiratory failure with hypoxia [facility])  Assessment & Plan  Present on admission as evidenced by an SpO2 of 83% on room air, multifactorial likely due to CHF, likely tipped over from atrial fibrillation, aspiration pneumonia as seen on CT scan  oxygenation remains at 4-6 L of oxygen    CT chest consistent with pneumonia, pleural effusion  discontinue antibiotics as patient does not want any medications administered       ALVIN (iron deficiency anemia)  Assessment & Plan  Macrocytic hypochromic anemia with anisocytosis  Hgb at 10 6 on admission, now 9 9  Baseline Hgb 8-10      CVA (cerebral vascular accident) [facility])  Assessment & Plan  NIHSS could not be assessed due to encephalopathy      - CT head: "Small cortical and subcortical infarct in the left posterior parietal region, possibly acute to subacute  Questionable focus of subacute microhemorrhage within the infarct     No mass effect "    -MRI confirmed ischemia in the left parietal region initially seen on head CT     -echocardiogram demonstrates EF 55%, no gross dyskinesis, biatrial dilatation mild  Patient is now comfort directed care        Elevated troponin level not due myocardial infarction  Assessment & Plan  troponin elevation not due to myocardial infarction, likely due to congestive heart failure, acute hypoxemic respiratory failure  Peaked at 0 16, no EKG findings consistent with ischemia, ECHO w/o wall motion abnormality    ECHO: Systolic function was normal by visual assessment  Ejection fraction was estimated to be 55 %, no regional wall motion abnormalities, wall thickness was mildly increased, grade 1 diastolic dysfunction      Depressive disorder  Assessment & Plan  Home regimen:  Remeron 15 mg hs  Patient now comfort care    * Acute on chronic diastolic (congestive) heart failure (HCC)  Assessment & Plan  Wt Readings from Last 3 Encounters:   05/16/21 71 kg (156 lb 8 4 oz)   09/18/20 62 5 kg (137 lb 12 8 oz)   01/14/19 63 5 kg (140 lb)     Present on admission as evidenced by acute hypoxemic respiratory failure, bilateral pleural [place] [address], treated with diuretics, EKG, echocardiogram, Cardiology consult      Echocardiogram from 05/07/2021 with ejection fraction of 55%, no wall motion abnormality, grade 1 diastolic dysfunction, RVSP of 50 mmHg    Patient is now comfort directed care      VTE Pharmacologic Prophylaxis:   Pharmacologic: Pharmacologic VTE Prophylaxis contraindicated due to hospice care  Mechanical VTE Prophylaxis in Place: No    Patient Centered Rounds: I have performed bedside rounds with nursing staff today  Discussions with Specialists or Other Care Team Provider: CM    Education and Discussions with Family / Patient: patient's son called, no answer, left VM    Time Spent for Care: 30 minutes  More than 50% of total time spent on counseling and coordination of care as described above  Current Length of Stay: 11 day(s)    Current Patient Status: Inpatient   Certification Statement: The patient will continue to require additional inpatient hospital stay due to as above    Discharge Plan: pending hospice palcement    Code Status: Level 4 - Comfort Care      Subjective:   No overnight events, no complaints this am    Objective:     Vitals:   Temp (24hrs), Av 9 °F (36 6 °C), Min:97 8 °F (36 6 °C), Max:97 9 °F (36 6 °C)    Temp:  [97 8 °F (36 6 °C)-97 9 °F (36 6 °C)] 97 9 °F (36 6 °C)  Resp:  [20] 20  BP: (112-126)/(61-95) 126/95  SpO2:  [90 %] 90 %  Body mass index is 21 83 kg/m²  Input and Output Summary (last 24 hours): Intake/Output Summary (Last 24 hours) at 2021 1604  Last data filed at 2021 1233  Gross per 24 hour   Intake --   Output 450 ml   Net -450 ml       Physical Exam:     Physical Exam  Vitals signs and nursing note reviewed  Constitutional:       General: He is not in acute distress  Appearance: Normal appearance  He is ill-appearing (with grayish skin)  He is not toxic-appearing or diaphoretic  Cardiovascular:      Rate and Rhythm: Normal rate and regular rhythm  Pulses: Normal pulses  Heart sounds: No murmur  No gallop  Pulmonary:      Effort: Pulmonary effort is normal  No respiratory distress  Breath sounds: Normal breath sounds  No stridor  No wheezing, rhonchi or rales  Chest:      Chest wall: No tenderness  Abdominal:      General: Bowel sounds are normal  There is no distension  Palpations: Abdomen is soft  Tenderness: There is no abdominal tenderness   There is no right CVA tenderness, left CVA tenderness or guarding  Musculoskeletal: Normal range of motion  General: No swelling or deformity  Right lower leg: No edema  Left lower leg: No edema  Skin:     General: Skin is warm and dry  Capillary Refill: Capillary refill takes less than 2 seconds  Coloration: Skin is not jaundiced  Findings: No bruising, erythema, lesion or rash  Neurological:      General: No focal deficit present  Mental Status: He is alert  Mental status is at baseline  Cranial Nerves: No cranial nerve deficit  Psychiatric:         Mood and Affect: Mood normal          Thought Content: Thought content normal          Judgment: Judgment normal        Additional Data:     Labs:    Results from last 7 days   Lab Units 05/15/21  0554  05/13/21  0530   WBC Thousand/uL 13 82*   < > 14 34*   HEMOGLOBIN g/dL 10 0*   < > 10 0*   HEMATOCRIT % 38 3   < > 38 2   PLATELETS Thousands/uL 394*   < > 473*   BANDS PCT %  --   --  1   NEUTROS PCT % 78*  --   --    LYMPHS PCT % 6*  --   --    LYMPHO PCT   --    < > 6*   MONOS PCT % 12  --   --    MONO PCT   --    < > 11   EOS PCT % 3   < > 4    < > = values in this interval not displayed  Results from last 7 days   Lab Units 05/15/21  0503   SODIUM mmol/L 142   POTASSIUM mmol/L 5 6*   CHLORIDE mmol/L 98*   CO2 mmol/L 38*   BUN mg/dL 60*   CREATININE mg/dL 1 33*   ANION GAP mmol/L 6   CALCIUM mg/dL 9 2   GLUCOSE RANDOM mg/dL 105         Results from last 7 days   Lab Units 05/15/21  0806   POC GLUCOSE mg/dl 119                   * I Have Reviewed All Lab Data Listed Above  * Additional Pertinent Lab Tests Reviewed:  All Labs Within Last 24 Hours Reviewed    Imaging:    Imaging Reports Reviewed Today Include:   Imaging Personally Reviewed by Myself Includes:      Recent Cultures (last 7 days):           Last 24 Hours Medication List:   Current Facility-Administered Medications   Medication Dose Route Frequency Provider Last Rate    benzonatate  100 mg Oral TID PRN Olivia Haddad MD      docusate sodium  100 mg Oral BID MANSI Johnson      guaiFENesin  600 mg Oral Q12H [facility] MANSI Johnson      HYDROmorphone  0 2 mg Intravenous Q6H PRN Olivia Haddad MD      hyoscyamine  0 125 mg Sublingual Q4H PRN Olivia Haddad MD      ipratropium  0 5 mg Nebulization TID Nona Fisher PA-C      levalbuterol  1 25 mg Nebulization TID Nona Fisher PA-C      LORazepam  0 5 mg Oral Q6H PRN Olivia Haddad MD      melatonin  6 mg Oral HS Arlester Gitelman, CRNP      senna  1 tablet Oral HS MANSI Johnson          Today, Patient Was Seen By: Mya Mckee MD    ** Please Note: Dictation voice to text software may have been used in the creation of this document   **

## 2021-05-17 NOTE — PLAN OF CARE
Problem: Potential for Falls  Goal: Patient will remain free of falls  Description: INTERVENTIONS:  - Assess patient frequently for physical needs  -  Identify cognitive and physical deficits and behaviors that affect risk of falls  -  [place] fall precautions as indicated by assessment   - Educate patient/family on patient safety including physical limitations  - Instruct patient to call for assistance with activity based on assessment  - Modify environment to reduce risk of injury  - Consider OT/PT consult to assist with strengthening/mobility  Outcome: Progressing     Problem: Prexisting or High Potential for Compromised Skin Integrity  Goal: Skin integrity is maintained or improved  Description: INTERVENTIONS:  - Identify patients at risk for skin breakdown  - Assess and monitor skin integrity  - Assess and monitor nutrition and hydration status  - Monitor labs   - Assess for incontinence   - Turn and reposition patient  - Assist with mobility/ambulation  - Relieve pressure over bony prominences  - Avoid friction and shearing  - Provide appropriate hygiene as needed including keeping skin clean and dry  - Evaluate need for skin moisturizer/barrier cream  - Collaborate with interdisciplinary team   - Patient/family teaching  - Consider wound care consult   Outcome: Progressing     Problem: Neurological Deficit  Goal: Neurological status is stable or improving  Description: Interventions:  - Monitor and assess patient's level of consciousness, motor function, sensory function, and level of assistance needed for ADLs  - Monitor and report changes from baseline  Collaborate with interdisciplinary team to initiate plan and implement interventions as ordered  - Provide and maintain a safe environment  - Consider seizure precautions  - Consider fall precautions  - Consider aspiration precautions  - Consider bleeding precautions  Outcome: Progressing     Problem:  Activity Intolerance/Impaired Mobility  Goal: Mobility/activity is maintained at optimum level for patient  Description: Interventions:  - Assess and monitor patient  barriers to mobility and need for assistive/adaptive devices  - Assess patient's emotional response to limitations  - Collaborate with interdisciplinary team and initiate plans and interventions as ordered  - Encourage independent activity per ability   - Maintain proper body alignment  - Perform active/passive rom as tolerated/ordered  - Plan activities to conserve energy   - Turn patient as appropriate  Outcome: Progressing     Problem: Communication Impairment  Goal: Ability to express needs and understand communication  Description: Assess patient's communication skills and ability to understand information  Patient will demonstrate use of effective communication techniques, alternative methods of communication and understanding even if not able to speak  - Encourage communication and provide alternate methods of communication as needed  - Collaborate with case management/ for discharge needs  - Include patient/family/caregiver in decisions related to communication  Outcome: Progressing     Problem: Nutrition  Goal: Nutrition/Hydration status is improving  Description: Monitor and assess patient's nutrition/hydration status for malnutrition (ex- brittle hair, bruises, dry skin, pale skin and conjunctiva, muscle wasting, smooth red tongue, and disorientation)  Collaborate with interdisciplinary team and initiate plan and interventions as ordered  Monitor patient's weight and dietary intake as ordered or per policy  Utilize nutrition screening tool and intervene per policy  Determine patient's food preferences and provide high-protein, high-caloric foods as appropriate  - Assist patient with eating   - Allow adequate time for meals   - Encourage patient to take dietary supplement as ordered    - Collaborate with clinical nutritionist   - Include patient/family/caregiver in decisions related to nutrition  Outcome: Progressing     Problem: PAIN - ADULT  Goal: Verbalizes/displays adequate comfort level or baseline comfort level  Description: Interventions:  - Encourage patient to monitor pain and request assistance  - Assess pain using appropriate pain scale  - Administer analgesics based on type and severity of pain and evaluate response  - Implement non-pharmacological measures as appropriate and evaluate response  - Consider cultural and social influences on pain and pain management  - Notify physician/advanced practitioner if interventions unsuccessful or patient reports new pain  Outcome: Progressing     Problem: INFECTION - ADULT  Goal: Absence or prevention of progression during hospitalization  Description: INTERVENTIONS:  - Assess and monitor for signs and symptoms of infection  - Monitor lab/diagnostic results  - Monitor all insertion sites, i e  indwelling lines, tubes, and drains  - Monitor endotracheal if appropriate and nasal secretions for changes in amount and color  - [place] appropriate cooling/warming therapies per order  - Administer medications as ordered  - Instruct and encourage patient and family to use good hand hygiene technique  - Identify and instruct in appropriate isolation precautions for identified infection/condition  Outcome: Progressing  Goal: Absence of fever/infection during neutropenic period  Description: INTERVENTIONS:  - Monitor WBC    Outcome: Progressing     Problem: SAFETY ADULT  Goal: Patient will remain free of falls  Description: INTERVENTIONS:  - Assess patient frequently for physical needs  -  Identify cognitive and physical deficits and behaviors that affect risk of falls    -  [place] fall precautions as indicated by assessment   - Educate patient/family on patient safety including physical limitations  - Instruct patient to call for assistance with activity based on assessment  - Modify environment to reduce risk of injury  - Consider OT/PT consult to assist with strengthening/mobility  Outcome: Progressing  Goal: Maintain or return to baseline ADL function  Description: INTERVENTIONS:  -  Assess patient's ability to carry out ADLs; assess patient's baseline for ADL function and identify physical deficits which impact ability to perform ADLs (bathing, care of mouth/teeth, toileting, grooming, dressing, etc )  - Assess/evaluate cause of self-care deficits   - Assess range of motion  - Assess patient's mobility; develop plan if impaired  - Assess patient's need for assistive devices and provide as appropriate  - Encourage maximum independence but intervene and supervise when necessary  - Involve family in performance of ADLs  - Assess for home care needs following discharge   - Consider OT consult to assist with ADL evaluation and planning for discharge  - Provide patient education as appropriate  Outcome: Progressing  Goal: Maintain or return mobility status to optimal level  Description: INTERVENTIONS:  - Assess patient's baseline mobility status (ambulation, transfers, stairs, etc )    - Identify cognitive and physical deficits and behaviors that affect mobility  - Identify mobility aids required to assist with transfers and/or ambulation (gait belt, sit-to-stand, lift, walker, cane, etc )  - [place] fall precautions as indicated by assessment  - Record patient progress and toleration of activity level on Mobility SBAR; progress patient to next Phase/Stage  - Instruct patient to call for assistance with activity based on assessment  - Consider rehabilitation consult to assist with strengthening/weightbearing, etc   Outcome: Progressing     Problem: DISCHARGE PLANNING  Goal: Discharge to home or other facility with appropriate resources  Description: INTERVENTIONS:  - Identify barriers to discharge w/patient and caregiver  - Arrange for needed discharge resources and transportation as appropriate  - Identify discharge learning needs (meds, wound care, etc )  - Arrange for interpretive services to assist at discharge as needed  - Refer to Case Management Department for coordinating discharge planning if the patient needs post-hospital services based on physician/advanced practitioner order or complex needs related to functional status, cognitive ability, or social support system  Outcome: Progressing     Problem: Knowledge Deficit  Goal: Patient/family/caregiver demonstrates understanding of disease process, treatment plan, medications, and discharge instructions  Description: Complete learning assessment and assess knowledge base    Interventions:  - Provide teaching at level of understanding  - Provide teaching via preferred learning methods  Outcome: Progressing     Problem: CARDIOVASCULAR - ADULT  Goal: Maintains optimal cardiac output and hemodynamic stability  Description: INTERVENTIONS:  - Monitor I/O, vital signs and rhythm  - Monitor for S/S and trends of decreased cardiac output  - Administer and titrate ordered vasoactive medications to optimize hemodynamic stability  - Assess quality of pulses, skin color and temperature  - Assess for signs of decreased coronary artery perfusion  - Instruct patient to report change in severity of symptoms  Outcome: Progressing  Goal: Absence of cardiac dysrhythmias or at baseline rhythm  Description: INTERVENTIONS:  - Continuous cardiac monitoring, vital signs, obtain 12 lead EKG if ordered  - Administer antiarrhythmic and heart rate control medications as ordered  - Monitor electrolytes and administer replacement therapy as ordered  Outcome: Progressing     Problem: METABOLIC, FLUID AND ELECTROLYTES - ADULT  Goal: Electrolytes maintained within normal limits  Description: INTERVENTIONS:  - Monitor labs and assess patient for signs and symptoms of electrolyte imbalances  - Administer electrolyte replacement as ordered  - Monitor response to electrolyte replacements, including repeat lab results as appropriate  - Instruct patient on fluid and nutrition as appropriate  Outcome: Progressing  Goal: Fluid balance maintained  Description: INTERVENTIONS:  - Monitor labs   - Monitor I/O and WT  - Instruct patient on fluid and nutrition as appropriate  - Assess for signs & symptoms of volume excess or deficit  Outcome: Progressing     Problem: Nutrition/Hydration-ADULT  Goal: Nutrient/Hydration intake appropriate for improving, restoring or maintaining nutritional needs  Description: Monitor and assess patient's nutrition/hydration status for malnutrition  Collaborate with interdisciplinary team and initiate plan and interventions as ordered  Monitor patient's weight and dietary intake as ordered or per policy  Utilize nutrition screening tool and intervene as necessary  Determine patient's food preferences and provide high-protein, high-caloric foods as appropriate       INTERVENTIONS:  - Monitor oral intake, urinary output, labs, and treatment plans  - Assess nutrition and hydration status and recommend course of action  - Evaluate amount of meals eaten  - Assist patient with eating if necessary   - Allow adequate time for meals  - Recommend/ encourage appropriate diets, oral nutritional supplements, and vitamin/mineral supplements  - Order, calculate, and assess calorie counts as needed  - Recommend, monitor, and adjust tube feedings and TPN/PPN based on assessed needs  - Assess need for intravenous fluids  - Provide specific nutrition/hydration education as appropriate  - Include patient/family/caregiver in decisions related to nutrition  Outcome: Progressing

## 2021-05-17 NOTE — CASE MANAGEMENT
Continue to follow  Met with Pt re: discharge planning  CM informed Pt that [facility], [address] are reviewing Pt and should have bed available  Pt in agreement with [facility]  Pt confirms he wants comfort care or hospice at facility  CM informed Pt that [facility] will have to do a MA asset review with either him or his son(Romario) for room and board cost since Pt wants comfort care at facility  Pt expressed understanding and gave permission for CM to call his son, Betty Garcia  Call placed to Pt's son(Romario: [phone]), discussed discharge planning  Pt's son confirmed that he spoke with Dr Beny Webster yesterday and that he is aware that his dad wants comfort care or hospice  CM informed Pt's son that Pt will need to be transferred to nursing home for comfort care and that room and board will be MA pending or private pay  Pt's son informed CM that Pt has social security and Pt's son will be willing to assist with the MA paperwork  CM informed Pt's son Pt chose [facility]  Call placed to [facility] in admissions at [facility], informed [facility] that Pt wants to do comfort care at facility and Pt's son(Romario) is willing to assist with MA pending process  [facility] informed CM that she will have facility MA rep to a asset review with Pt or Pt's son and inform CM of determination  CM to follow

## 2021-05-17 NOTE — ASSESSMENT & PLAN NOTE
Wt Readings from Last 3 Encounters:   05/16/21 71 kg (156 lb 8 4 oz)   09/18/20 62 5 kg (137 lb 12 8 oz)   01/14/19 63 5 kg (140 lb)     Present on admission as evidenced by acute hypoxemic respiratory failure, bilateral pleural effusion on CT, [place] [address], treated with diuretics, EKG, echocardiogram, Cardiology consult      Echocardiogram from 05/07/2021 with ejection fraction of 55%, no wall motion abnormality, grade 1 diastolic dysfunction, RVSP of 50 mmHg    Patient is now comfort directed care

## 2021-05-17 NOTE — ASSESSMENT & PLAN NOTE
Patient with history of depression, seen earlier by both palliative Care and behavior health  Patient states he does not want any more treatment  He wants his antibiotics and all other medicines discontinued at present  He wants to be made comfortable/hospice care  Patient does not lack capacity and understands his decisions  "States he has made his peace is ready to go meet his maker"  States he has discussed he has discussed his decision with his son  Please refer to psychiatry and palliative care documentation as well  Based on discussion with patient, will make him comfort care  Will discontinue all medicines including antibiotics  Comfort Care measures enacted  Above was discussed extensively with patient's son by prior provider on 05/16/2021 who is also at peace with the patient's decision       NO MORE LABS  Hospice CM team on board

## 2021-05-17 NOTE — CASE MANAGEMENT
Continue to follow  Nursing home approval letter received from [facility] on [address] informed of nursing home approval letter  Phone number for Pt's son, [place] given to Vicente Knox informed CM that MA rep at [facility] also reached out to Pt's [place] for financial information  Await determination

## 2021-05-18 NOTE — ASSESSMENT & PLAN NOTE
Present on admission as evidenced by hypoxia, SpO2 of 83% on room air, multifactorial secondary to CHF, bilateral pleural effusion and aspiration pneumonia as seen on CT scan  Currently requiring 4-6L NC  CT chest consistent with pneumonia, bilateral pleural effusion  Antibiotics discontinued as patient does not want any medications administered - now on comfort directed care   Patient requesting for hospice care

## 2021-05-18 NOTE — DISCHARGE SUMMARY
Damian Jaime  Discharge- Yaz Vasques 1945, 68 y o  male MRN: [id number]  Unit/Bed#: -01 Encounter: [id number]  Primary Care Provider: No primary care provider on file  Date and time admitted to hospital: 2021  4:56 PM    Goals of care, counseling/discussion  Assessment & Plan  Patient with history of depression, seen earlier by both palliative Care and behavior health  Patient states he does not want any more treatment  He wants his antibiotics and all other medicines discontinued at present  He wants to be made comfortable/hospice care  Patient does not lack capacity and understands his decisions  "States he has made his peace is ready to go meet his maker"  States he has discussed he has discussed his decision with his son  Please refer to psychiatry and palliative care documentation as well  Patient now comfort care  Will discontinue all medicines including antibiotics  Comfort Care measures enacted  Above was discussed extensively with patient's son by prior provider on 2021 who is also at peace with the patient's decision  NO MORE LABS  Hospice CM team on board - to be discharged to [facility] today and transition to hospice care there    UPDATE - Patient  at 3: 22pm just as the transport team arrived to take him to hospice care  He was comfortable, at peace just like he wanted      New onset a-fib [facility])  Assessment & Plan  Present on admission  Now rate controlled  Patient has opted for comfort care    Acute respiratory failure with hypoxia [facility])  Assessment & Plan  Present on admission as evidenced by hypoxia, SpO2 of 83% on room air, multifactorial secondary to CHF, bilateral pleural effusion and aspiration pneumonia as seen on CT scan  Currently requiring 4-6L NC  CT chest consistent with pneumonia, bilateral pleural effusion  Antibiotics discontinued as patient does not want any medications administered - now on comfort directed care         ALVIN (iron deficiency anemia)  Assessment & Plan  Macrocytic hypochromic anemia with anisocytosis  Hgb at 10 6 on admission  Hb stable  Baseline Hgb 8-10      Transaminitis  Assessment & Plan  · Noted on admission, , , T bili 0 60, downtrended  · hepatitis panel non reactive     CVA (cerebral vascular accident) [facility])  Assessment & Plan  Patient presented with altered mental status  NIHSS could not be assessed due to encephalopathy  CT head: "Small cortical and subcortical infarct in the left posterior parietal region, possibly acute to subacute  Questionable focus of subacute microhemorrhage within the infarct     No mass effect "  MRI Brain confirmed ischemia in the left parietal region initially seen on head CT  Echocardiogram demonstrates EF 55%, no gross dyskinesis, biatrial dilatation mild  Patient is now on comfort directed care        Elevated troponin level not due myocardial infarction  Assessment & Plan  Non MI troponin elevation secondary to congestive heart failure and afib  Troponin peaked at 0 16, no EKG findings consistent with ischemia, ECHO w/o wall motion abnormality    ECHO: Systolic function was normal by visual assessment  Ejection fraction was estimated to be 55 %, no regional wall motion abnormalities, wall thickness was mildly increased, grade 1 diastolic dysfunction      Depressive disorder  Assessment & Plan  Home regimen:  Remeron 15 mg hs  Patient now comfort care    PUD (peptic ulcer disease)  Assessment & Plan  Patient is now comfort care    Thoracic aortic aneurysm (TAA) ([facility] [address] )  Assessment & Plan  CT chest: "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, unchanged since a CTA from 1/10/2019 "  Ideally patient should be seen and followed by surgery regularly outpatient given stable aneursym but patient is refusing  further treatment, Now on comfort directed care      * Acute on chronic diastolic (congestive) heart failure [facility])  Assessment & Plan  Wt Readings from Last 3 Encounters:   05/18/21 70 5 kg (155 lb 6 8 oz)   09/18/20 62 5 kg (137 lb 12 8 oz)   01/14/19 63 5 kg (140 lb)     Present on admission as evidenced by acute hypoxemic respiratory failure, bilateral pleural effusion on CT, [place] [address], treated with diuretics, EKG, echocardiogram, Cardiology consult  Echocardiogram from 05/07/2021 with ejection fraction of 55%, no wall motion abnormality, grade 1 diastolic dysfunction, RVSP of 50 mmHg  Patient is now on comfort directed care, with plans to transition to hosp[facility] care at [facility]        Discharging Physician / Practitioner: Esha Rojas MD  PCP: No primary care provider on file  Admission Date:   Admission Orders (From admission, onward)     Ordered        05/06/21 2206  Inpatient Admission  Once                   Discharge Date: 05/18/21    Resolved Problems  Date Reviewed: 5/10/2021          Resolved    Acute encephalopathy 5/11/2021     Resolved by  Jose Martin Conklin MD    DEYSI (acute kidney injury) ([facility] [address] ) 5/12/2021     Resolved by  Jose Martin Conklin MD    Asymptomatic bacteriuria 5/11/2021     Resolved by  Jose Martin Conklin MD    Sepsis ([facility] [address] ) 5/11/2021     Resolved by  Jose Martin Conklin MD    Hypotensive episode 5/10/2021     Resolved by  Patel Owens, [address] [place] [address] Stay:  · Palliative care  · Neurology  · Behavioral health  · Cardiology    Procedures Performed:   ·     Significant Findings / Test Results:   · 5/14/21 CT chest     FINDINGS:     LUNGS:  Background emphysema with interval development of consolidation in the lingula consistent with pneumonia  Stable bibasilar atelectasis and subpleural atelectasis in the right upper lobe posteriorly  Dependent mucus in the trachea and left   mainstem bronchus      PLEURA:  Increased bilateral pleural effusions      HEART/GREAT VESSELS:  Heart is stable in size with trace pericardial effusion    Stable aneurysm of the aortic arch measuring 3 8 cm      MEDIASTINUM AND MEGHA:  Unremarkable      CHEST WALL AND LOWER NECK:   Unremarkable      VISUALIZED STRUCTURES IN THE UPPER ABDOMEN:  Stable right renal cyst measuring 1 7 cm stomach remains dilated in appearance  Upper abdomen otherwise unremarkable      OSSEOUS STRUCTURES:  No acute fracture or destructive osseous lesion  Stable chronic T12 compression deformity      IMPRESSION:     Background emphysema with interval development of consolidation in the lingula consistent with pneumonia  Given the mucus in the trachea and left mainstem bronchus, aspiration as likely      Increasing bilateral pleural effusions      Stable aneurysmal dilatation of the aortic arch measuring 3 8 cm      Stable gastric distention  No obstruction        Incidental Findings:   ·      Test Results Pending at Discharge (will require follow up):   ·      Outpatient Tests Requested:  ·     Complications:      Reason for Admission: Altered mental status    Hospital Course:     Shona Soto is a 68 y o  male patient who originally presented to the hospital on 2021 due to altered mental status, noted to be encephalopathic on presentation  CT head showed small cortical and sub cortical infarct in the left posterior parietal region and MRI brain confirmed the same  Patient was also found to be in rapid AFib as well as acute CHF for which he was placed on diuretics  However patient responded very poorly to treatment and continue to decline clinically thus he opted to transition to hospice care  He is being discharged to the nursing home on comfort directed care and transition fully to hospice care there  UPDATE - Patient  naturally at 3:22pm just as the transport team arrived to take him to the nursing home  I updated patient's son, who lives in [place]   He mentioned that patient would like to be cremated      Please see above list of diagnoses and related plan for additional information  Condition at Discharge: stable     Discharge Day Visit / Exam:     Subjective:  NO overnight events ' can I just die, please help me, i'm tired'    Vitals: Blood Pressure: 126/95 (05/17/21 1522)  Pulse: 75 (05/15/21 1433)  Temperature: 97 9 °F (36 6 °C) (05/17/21 1522)  Temp Source: Oral (05/15/21 1433)  Respirations: 20 (05/17/21 0720)  Height: 5' 11" (180 3 cm) (05/09/21 0730)  Weight - Scale: 70 5 kg (155 lb 6 8 oz)(pt refused standing scale) (05/18/21 0600)  SpO2: 90 % (05/17/21 0900)  Exam:   Physical Exam  Vitals signs and nursing note reviewed  Constitutional:       General: He is not in acute distress  Appearance: Normal appearance  He is ill-appearing  He is not toxic-appearing or diaphoretic  Cardiovascular:      Rate and Rhythm: Normal rate  Rhythm irregular  Pulses: Normal pulses  Heart sounds: No murmur  No gallop  Pulmonary:      Effort: Pulmonary effort is normal  No respiratory distress  Breath sounds: Normal breath sounds  No stridor  No wheezing, rhonchi or rales  Chest:      Chest wall: No tenderness  Abdominal:      General: Bowel sounds are normal  There is no distension  Palpations: Abdomen is soft  Tenderness: There is no abdominal tenderness  There is no right CVA tenderness or guarding  Musculoskeletal: Normal range of motion  General: No swelling or deformity  Right lower leg: No edema  Left lower leg: No edema  Skin:     General: Skin is cool  Capillary Refill: Capillary refill takes less than 2 seconds  Coloration: Skin is mottled (Mottled extremities)  Findings: No bruising or lesion  Neurological:      General: No focal deficit present  Mental Status: He is alert and oriented to person, place, and time  Mental status is at baseline  Cranial Nerves: No cranial nerve deficit     Psychiatric:         Mood and Affect: Mood normal        Discussion with Family: I called patient's son    Discharge instructions/Information to patient and family:   See after visit summary for information provided to patient and family  Provisions for Follow-Up Care:  See after visit summary for information related to follow-up care and any pertinent home health orders  Disposition:     [facility] at [address] [place] [address] to [facility]:   · Not Applicable to this Patient - Not Applicable to this Patient    Planned Readmission: No     Discharge Statement:  I spent 45 minutes discharging the patient  This time was spent on the day of discharge  I had direct contact with the patient on the day of discharge  Greater than 50% of the total time was spent examining patient, answering all patient questions, arranging and discussing plan of care with patient as well as directly providing post-discharge instructions  Additional time then spent on discharge activities  Discharge Medications:  See after visit summary for reconciled discharge medications provided to patient and family        ** Please Note: This note has been constructed using a voice recognition system **

## 2021-05-18 NOTE — ASSESSMENT & PLAN NOTE
CT chest: "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, unchanged since a CTA from 1/10/2019 "  Ideally patient should be seen and followed by surgery regularly outpatient given stable aneursym but patient is refusing  further treatment, Now on comfort directed care

## 2021-05-18 NOTE — DEATH NOTE
INPATIENT DEATH NOTE  Delroy Mejia 68 y o  male MRN: [id number]  Unit/Bed#: -01 Encounter: [id number]           [facility] Pronouncement of Death  Patient: Delroy Mejia 68 y o  male   MRN: [id number]  PCP: No primary care provider on file  Unit/Bed#: -01 Encounter: [id number]    Date of Admission:  5/6/2021  Date of Death: 05/18/21    Time of Death: 15:22    Code Status at Time of Death: Level 4 - Comfort Care    Patient on Hospice?: No- he is to transition to hospice care    Family Notification?: Yes, I notified patient's son, Michelle Gómez - currently resides in [place]    Autopsy Desired?: No    Suspected Cause of Death: Acute on chronic hypoxic respiratory failure secondary to Acute on chronic diastolic heart failure, aspiration pneumonia and bilateral pleural effusions    Hospital Problem List:     Principal Problem:    Acute on chronic diastolic (congestive) heart failure ([facility] [address] )  Active Problems:    Goals of care, counseling/discussion    Thoracic aortic aneurysm (TAA) ([facility])    PUD (peptic ulcer disease)    Depressive disorder    Elevated troponin level not due myocardial infarction    CVA (cerebral vascular accident) ([facility] [address] )    Transaminitis    ALVIN (iron deficiency anemia)    Acute respiratory failure with hypoxia ([facility] [address] )    New onset a-fib ([facility] [address] )      Pronouncement of Death: I was contacted by nursing staff to evaluate patient found without vital signs  Patient is identified visually and identification confirmed with identification bracelet  The patient is laying in bed with all lines and tubes intact  The patient is examined personally and no heart sounds heard nor pulse detected  No breath sounds after 2 minutes auscultation  Pupils are fixed and dilated   No corneal reflex present    The patient is pronounced dead on this date and time       - Jaime Gannon MD

## 2021-05-18 NOTE — ASSESSMENT & PLAN NOTE
Patient with history of depression, seen earlier by both palliative Care and behavior health  Patient states he does not want any more treatment  He wants his antibiotics and all other medicines discontinued at present  He wants to be made comfortable/hospice care  Patient does not lack capacity and understands his decisions  "States he has made his peace is ready to go meet his maker"  States he has discussed he has discussed his decision with his son  Please refer to psychiatry and palliative care documentation as well  Patient now comfort care  Will discontinue all medicines including antibiotics  Comfort Care measures enacted  Above was discussed extensively with patient's son by prior provider on 05/16/2021 who is also at peace with the patient's decision       NO MORE LABS  Hospice CM team on board - to be discharged to [facility] today and transition to hospice care there

## 2021-05-18 NOTE — ASSESSMENT & PLAN NOTE
Wt Readings from Last 3 Encounters:   05/18/21 70 5 kg (155 lb 6 8 oz)   09/18/20 62 5 kg (137 lb 12 8 oz)   01/14/19 63 5 kg (140 lb)     Present on admission as evidenced by acute hypoxemic respiratory failure, bilateral pleural effusion on CT, [place] of [address], treated with diuretics, EKG, echocardiogram, Cardiology consult    Echocardiogram from 05/07/2021 with ejection fraction of 55%, no wall motion abnormality, grade 1 diastolic dysfunction, RVSP of 50 mmHg  Patient is now on comfort directed care, with plans to transition to hospice care at [facility]

## 2021-05-18 NOTE — ASSESSMENT & PLAN NOTE
Macrocytic hypochromic anemia with anisocytosis  Hgb at 10 6 on admission  Hb stable  Baseline Hgb 8-10

## 2021-05-18 NOTE — DISCHARGE INSTRUCTIONS
Comfort Measures   WHAT YOU NEED TO KNOW:   What are comfort measures? Comfort measures are ways suffering can be eased during end-of-life care  Care can be provided at home or in a hospital  It can also be provided in a hospice or long-term care facility  Comfort measures are sometimes called palliative measures, but they are different  Palliative care can be given for any disease or illness, even if the person recovers  Comfort measures are only given when treatment will no longer be effective  Who provides comfort measures? Your comfort care team may include any of the following:  · Doctors, nurses, and dietitians    · Pain specialists and pharmacists    · Chaplains, social workers, and counselors    How will my symptoms be managed? · Pain  may be relieved with medicine  You may instead choose other methods, such as massage, music, or aromatherapy  Your team will ask how the pain is affecting your quality of life  You can describe where and how often you feel pain, and when it started  You can describe the pain as sharp, achy, or throbbing  You can also describe how bad it is  This may be on a pain scale from 0 to 10, or by choosing a face on a pain scale picture  · Shortness of breath  may be relieved with extra oxygen or breathing treatments  You can describe any problems with breathing  You may feel short of breath all the time, or only with activity  You may feel a little winded, or like you are struggling to breathe  Comfort care will depend on what is causing your shortness of breath  · Physical symptoms  can be relieved or treated to improve your comfort  Examples include nausea, fatigue, and sleep problems  Your skin can be treated or soothed if it is irritated or you develop a pressure injury from lying in bed  Some treatment may help relieve your symptoms even though it will not cure the disease  An example is radiation   Radiation will not cure the cancer, but it can make the tumor smaller  This may help relieve pain or other symptoms caused by the tumor  · Emotional symptoms  such as anxiety, sadness, or depression may affect your quality of life  These symptoms may be treated with medicines  Other methods include deep breathing and meditation  You may also want to talk to a counselor or   What support services may be offered? · Care support  is given to help you and your family  Your team can answer any questions you or family members have  You may have an advanced directive or other legal document that explains your wishes for care  Your care team will use this document if you are not able to tell them your wishes  You can also choose a person to make decisions for you  The person can be a family member or friend  If you are not able to make decisions, the person works with your team to provide the care you want  · Emotional and psychological support  is provided to help you and those close to you  Team members will meet with you and your family regularly about your condition  You and those close to you may also join support groups or meet others receiving comfort measures  · Spiritual and cultural support  helps your child and his or her family evaluate Mandaeism values and cultural beliefs  This may make it easier to understand and accept your child's condition  Where can I find more information? · [address] [facility])  Located within [facility], [address] [facility] [address]  [place] , [address] [facility]  Phone: [phone]  Web Address: Ask[employer]or com br    · [address] [facility] and [address] [facility] [address] [facility], [address] [facility] [address]  [place] , [address] [facility]  Phone: [phone]  Web Address: [email or web address]/  org  CARE AGREEMENT:   You have the right to help plan your care  Learn about your health condition and how it may be treated   Discuss treatment options with your healthcare providers to decide what care you want to receive  You always have the right to refuse treatment  The above information is an  only  It is not intended as medical advice for individual conditions or treatments  Talk to your doctor, nurse or pharmacist before following any medical regimen to see if it is safe and effective for you  © Copyright [address] Information is for End User's use only and may not be sold, redistributed or otherwise used for commercial purposes   All illustrations and images included in CareNotes® are the copyrighted property of A D A M , Inc  or [address] [place] [address]

## 2021-05-18 NOTE — CASE MANAGEMENT
Continue to follow  [facility] can accept Pt, SEVER[facility] delcid review completed and [facility] can accept Pt today  Call placed to Pt's son(Romario: [phone]), informed Pt's son that Pt will be discharge to [facility] today  Pt's son is not sure if he wants comfort care or hospice for Pt and at this time wants Pt to go to [facility] with comfort care  Met with Pt  CM informed Pt that [facility] can accept him today for comfort care  Pt in agreement  Call placed to [facility], spoke with Marylee Cheek, [address] [employer] [facility] arranged to [facility]  Pickup is 3pm  Pt's son informed of transport time  Pt's nurse(Kain) informed of transport time  Spoke with Lisette, [address] informed Laila Angry of transport time and Pt's son is undecided about hospice and wants Pt to be on comfort care at facility  Laila Angry informed CM that facility can follow up with Pt's son re: comfort care to hospice  Faxed Pt's PASRR, nursing home approval letter, OBRA letter to [facility]  Call placed to Herisau at [facility] ([phone]), left message to inform that Pt will be going to [facility] for comfort care

## 2021-05-18 NOTE — ASSESSMENT & PLAN NOTE
Non MI troponin elevation secondary to congestive heart failure and afib  Troponin peaked at 0 16, no EKG findings consistent with ischemia, ECHO w/o wall motion abnormality    ECHO: Systolic function was normal by visual assessment   Ejection fraction was estimated to be 55 %, no regional wall motion abnormalities, wall thickness was mildly increased, grade 1 diastolic dysfunction

## 2021-05-18 NOTE — ASSESSMENT & PLAN NOTE
Patient presented with altered mental status  NIHSS could not be assessed due to encephalopathy  CT head: "Small cortical and subcortical infarct in the left posterior parietal region, possibly acute to subacute  Questionable focus of subacute microhemorrhage within the infarct     No mass effect "  MRI Brain confirmed ischemia in the left parietal region initially seen on head CT  Echocardiogram demonstrates EF 55%, no gross dyskinesis, biatrial dilatation mild      Patient is now on comfort directed care

## 2021-05-18 NOTE — TRANSPORTATION MEDICAL NECESSITY
Section I - General Information    Name of Patient: Luis Patel                 : 1945    Medicare #: [id number]  Transport Date: 21 (PCS is valid for round trips on this date and for all repetitive trips in the 60-day range as noted below )  Origin: [facility]: [facility]  Is the pt's stay covered under Medicare Part A (PPS/DRG)   []     Closest appropriate facility? If no, why is transport to more distant facility required? Yes  If hospice pt, is this transport related to pt's terminal illness? NA       Section II - Medical Necessity Questionnaire  Ambulance transportation is medically necessary only if other means of transport are contraindicated or would be potentially harmful to the patient  To meet this requirement, the patient must either be "bed confined" or suffer from a condition such that transport by means other than ambulance is contraindicated by the patient's condition  The following questions must be answered by the medical professional signing below for this form to be valid:    1)  Describe the MEDICAL CONDITION (physical and/or mental) of this patient AT [address] [place] [address] that requires the patient to be transported in an ambulance and why transport by other means is contraindicated by the patient's condition: comfort care, needs assistance to get out of bed    2) Is the patient "bed confined" as defined below? No  To be "be confined" the patient must satisfy all three of the following conditions: (1) unable to get up from bed without Assistance; AND (2) unable to ambulate; AND (3) unable to sit in a chair or wheelchair  3) Can this patient safely be transported by car or wheelchair van (i e , seated during transport without a medical attendant or monitoring)?    No    4) In addition to completing questions 1-3 above, please check any of the following conditions that apply*:   *Note: supporting documentation for any boxes checked must be maintained in the patient's medical records  If hosp-hosp transfer, describe services needed at 2nd facility not available at 1st facility? Other(specify) fall risk      Section III - Signature of Physician or Healthcare Professional  I certify that the above information is true and correct based on my evaluation of this patient, and represent that the patient requires transport by ambulance and that other forms of transport are contraindicated  I understand that this information will be used by the Centers for Medicare and Medicaid Services (CMS) to support the determination of medical necessity for ambulance services, and I represent that I have personal knowledge of the patient's condition at time of transport  []  If this box is checked, I also certify that the patient is physically or mentally incapable of signing the ambulance service's claim and that the institution with which I am affiliated has furnished care, services, or assistance to the patient  My signature below is made on behalf of the patient pursuant to 42 CFR §424 36(b)(4)  In accordance with 42 CFR §424 37, the specific reason(s) that the patient is physically or mentally incapable of signing the claim form is as follows:       Signature of Physician* or Healthcare Professional______________________________________________________________  Signature Date 05/18/21 (For scheduled repetitive transports, this form is not valid for transports performed more than 60 days after this date)    Printed Name & Credentials of Physician or Healthcare Professional (MD, DO, RN, etc )________________________________  *Form must be signed by patient's attending physician for scheduled, repetitive transports   For non-repetitive, unscheduled ambulance transports, if unable to obtain the signature of the attending physician, any of the following may sign (choose appropriate option below)  [] Physician Assistant []  Clinical Nurse Specialist []  Registered Nurse  []  Nurse Practitioner  [x] Discharge Planner

## 2021-05-18 NOTE — PROGRESS NOTES
Pastoral Care Progress Note    2021  Patient: Christ Simmons : 1945  Admission Date & Time: 2021 1656  MRN: [id number] CSN: [id number]                     Chaplaincy Interventions Utilized:   Ritual: Provided prayer    Introduced myself  Brief visit  Patient had difficulty hearing  When I offered the option of a prayer, patient asked for a brief prayer         21 1100   Clinical Encounter Type   Visited With Patient   Routine Visit Introduction   Referral From Nurse   Referral To    Restorationism Encounters   Restorationism Needs Prayer        21 1100   Clinical Encounter Type   Visited With Patient   Routine Visit Introduction   Referral From Nurse   Referral To    Restorationism Encounters   Restorationism Needs Prayer

## 2021-05-19 ENCOUNTER — EPISODE CHANGES (OUTPATIENT)
Dept: CASE MANAGEMENT | Facility: HOSPITAL | Age: 76
End: 2021-05-19

## 2022-11-24 NOTE — ASSESSMENT & PLAN NOTE
Anesthesia Pre Eval Note    Anesthesia ROS/Med Hx        Anesthetic Complication History:  Patient does not have a history of anesthetic complications      Pulmonary Review:    Positive for COPD     Neuro/Psych Review:  Positive for seizures     Cardiovascular Review:  Exercise tolerance: good (>4 METS)  Positive for hypertension  Positive for hyperlipidemia    GI/HEPATIC/RENAL Review:  Patient does not have a GI/hepatic/renalhistory       End/Other Review:  Positive for diabetes  Positive for hypothyroidism  Additional Results:     ALLERGIES:   -- Penicillins -- ANAPHYLAXIS       No results found for: WBC, RBC, HGB, HCT, MCV, MCH, MCHC, RDWCV, SODIUM, POTASSIUM, CHLORIDE, CO2, GLUCOSE, BUN, CREATININE, GFRESTIMATE, EGFRNONAFR, GFRA, GFRNA, CALCIUM, HCG, PLT, PTT, INR   Past Medical History:  No date: Blood clot associated with vein wall inflammation      Comment:  R leg  No date: COPD (chronic obstructive pulmonary disease) (CMS/[facility])  No date: Diabetes mellitus (CMS/HCC)  No date: Epilepsy (CMS/HCC)  No date: Essential (primary) hypertension  No date: High cholesterol  No date: Hypothyroidism  No date: Macular degeneration  No date: RA (rheumatoid arthritis) (CMS/HCC)  No date: Rheumatoid nodule of elbow, left (CMS/HCC)    Past Surgical History:  No date: Appendectomy  No date: Hysterectomy  No date: Joint replacement; Bilateral      Comment:  hips  No date: Removal gallbladder  No date: Removal spleen, total  No date: Remv 2nd cataract,corn-scler sectn; Bilateral       Prior to Admission medications :  Medication Tocilizumab (ACTEMRA IV), Sig Inject into the vein every 30 days., Start Date , End Date , Taking? Yes, Authorizing Provider Outside Provider    Medication Multiple Vitamins-Minerals (PRESERVISION AREDS PO), Sig Take by mouth daily., Start Date , End Date , Taking? Yes, Authorizing Provider Outside Provider    Medication Cholecalciferol (D3 PO), Sig Take by mouth daily., Start Date , End Date , Taking?  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 Yes, Authorizing Provider Outside Provider    Medication venlafaxine XR (EFFEXOR XR) 150 MG 24 hr capsule, Sig TAKE 1 CAPSULE BY MOUTH ONCE DAILY WITH FOOD FOR 90 DAYS, Start Date 1/5/21, End Date , Taking? Yes, Authorizing Provider Outside Provider    Medication Xarelto 20 MG Tab, Sig TAKE 1 TABLET BY MOUTH ONCE DAILY WITH FOOD, Start Date 1/5/21, End Date , Taking? Yes, Authorizing Provider Outside Provider    Medication Dilantin 100 MG ER capsule, Sig 400 mg daily., Start Date 11/6/20, End Date , Taking? Yes, Authorizing Provider Outside Provider    Medication metoPROLOL tartrate (LOPRESSOR) 25 MG tablet, Sig TAKE 1 TABLET BY MOUTH TWICE DAILY (WITH FOOD), Start Date 11/5/20, End Date , Taking? Yes, Authorizing Provider Outside Provider    Medication losartan (COZAAR) 50 MG tablet, Sig TAKE 1 TABLET BY MOUTH ONCE DAILY FOR 90 DAYS, Start Date 1/5/21, End Date , Taking? Yes, Authorizing Provider Outside Provider    Medication metformin (GLUCOPHAGE) 1000 MG tablet, Sig Take 1,000 mg by mouth 2 times daily., Start Date 11/20/20, End Date , Taking? Yes, Authorizing Provider Outside Provider    Medication Synthroid 75 MCG tablet, Sig Take 75 mcg by mouth daily., Start Date 1/9/21, End Date , Taking? Yes, Authorizing Provider Outside Provider    Medication hydrochlorothiazide (MICROZIDE) 12.5 MG capsule, Sig TAKE 1 CAPSULE BY MOUTH ONCE DAILY IN THE MORNING FOR 90 DAYS, Start Date 12/15/20, End Date , Taking? Yes, Authorizing Provider Outside Provider    Medication atorvastatin (LIPITOR) 10 MG tablet, Sig 40 mg daily., Start Date 1/5/21, End Date , Taking? Yes, Authorizing Provider Outside Provider    Medication glimepiride (AMARYL) 1 MG tablet, Sig Take 2 mg by mouth daily., Start Date 11/20/20, End Date , Taking? Yes, Authorizing Provider Outside  Provider            Relevant Problems   No relevant active problems       Physical Exam     Airway   Mallampati: II  Neck ROM: Full  Neck: Able to place in sniff position and Non-tender  TMJ Mobility: Good    Cardiovascular  Cardiovascular exam normal  Cardio Rhythm: Regular  Cardio Rate: Normal    Head Assessment  Head assessment: Normocephalic    General Assessment  General Assessment: Alert and oriented    Dental Exam  Dental exam normal    Pulmonary Exam  Pulmonary exam normal  Breath sounds clear to auscultation:  Yes    Abdominal Exam  Abdominal exam normal      Anesthesia Plan:    ASA Status: 2  Anesthesia Type: General    Induction: Inhalational  Preferred Airway Type: LMA  Maintenance: Inhalational  Premedication: None      Post-op Pain Management: Per Surgeon      Checklist  Reviewed: Lab Results, EKG, Chest X-Rays, Consultations, Pregnancy Test Results, Patient Summary, Beta Blocker Status, NPO Status, DNR Status, Allergies, Medications, Problem list and Past Med History  Consent/Risks Discussed Statement:  The proposed anesthetic plan, including its risks and benefits, have been discussed with the Patient along with the risks and benefits of alternatives. Questions were encouraged and answered and the patient and/or representative understands and agrees to proceed.        I discussed with the patient (and/or patient's legal representative) the risks and benefits of the proposed anesthesia plan, General, which may include services performed by other anesthesia providers.    Alternative anesthesia plans, if available, were reviewed with the patient (and/or patient's legal representative). Discussion has been held with the patient (and/or patient's legal representative) regarding risks of anesthesia, which include Nausea, Vomiting, Dental Injury, Sore Throat, Allergic Reaction, Post-op Intubation, Aspiration and Intra-operative Awareness and emergent situations that may require change in anesthesia  plan.    The patient (and/or patient's legal representative) has indicated understanding, his/her questions have been answered, and he/she wishes to proceed with the planned anesthetic.

## 2024-08-22 NOTE — ASSESSMENT & PLAN NOTE
4 Eyes Skin Assessment     NAME:  Cindi Rodriguez  YOB: 1966  MEDICAL RECORD NUMBER:  [id number]    The patient is being assessed for  Admission    I agree that at least one RN has performed a thorough Head to Toe Skin Assessment on the patient. ALL assessment sites listed below have been assessed.      Areas assessed by both nurses:    Head, Face, Ears, Shoulders, Back, Chest, Arms, Elbows, Hands, Sacrum. Buttock, Coccyx, Ischium, Legs. Feet and Heels, and Under Medical Devices         Does the Patient have a Wound? Yes wound(s) were present on assessment. LDA wound assessment was Initiated and completed by RN     - stage 2 on Coccyx,   - stage 2 on Vagina   - Healed scabs on legs  Papa Prevention initiated by RN: Yes  Wound Care Orders initiated by RN: Yes    Pressure Injury (Stage 3,4, Unstageable, DTI, NWPT, and Complex wounds) if present, place Wound referral order by RN under : No    New Ostomies, if present place, Ostomy referral order under : No     Nurse 1 eSignature: Electronically signed by Gloria Omalley RN on 8/22/24 at 6:49 PM EDT    **SHARE this note so that the co-signing nurse can place an eSignature**    Nurse 2 eSignature: {Esignature:[id number]}    · Hemoglobin 10 6 on admission  · Baseline hemoglobin 8-10  · Transfuse PRBCs for hemoglobin less than 7

## (undated) DEVICE — SINGLE-USE BIOPSY FORCEPS: Brand: RADIAL JAW 4

## (undated) DEVICE — 60 ML SYRINGE,TOOMEY TYPE: Brand: MONOJECT